# Patient Record
Sex: FEMALE | Race: WHITE | NOT HISPANIC OR LATINO | Employment: FULL TIME | URBAN - METROPOLITAN AREA
[De-identification: names, ages, dates, MRNs, and addresses within clinical notes are randomized per-mention and may not be internally consistent; named-entity substitution may affect disease eponyms.]

---

## 2017-01-12 ENCOUNTER — ALLIED HEALTH/NURSE VISIT (OUTPATIENT)
Dept: FAMILY MEDICINE | Facility: CLINIC | Age: 37
End: 2017-01-12
Payer: COMMERCIAL

## 2017-01-12 DIAGNOSIS — E11.9 TYPE 2 DIABETES MELLITUS WITHOUT COMPLICATION (H): Primary | ICD-10-CM

## 2017-01-12 PROCEDURE — 99207 ZZC NO CHARGE NURSE ONLY: CPT

## 2017-01-12 RX ORDER — LISINOPRIL 2.5 MG/1
2.5 TABLET ORAL DAILY
Qty: 90 TABLET | Refills: 3 | Status: SHIPPED | OUTPATIENT
Start: 2017-01-12 | End: 2018-01-11

## 2017-02-01 ENCOUNTER — HOSPITAL ENCOUNTER (EMERGENCY)
Facility: CLINIC | Age: 37
Discharge: HOME | End: 2017-02-01
Attending: EMERGENCY MEDICINE | Admitting: EMERGENCY MEDICINE
Payer: COMMERCIAL

## 2017-02-01 VITALS
SYSTOLIC BLOOD PRESSURE: 138 MMHG | RESPIRATION RATE: 18 BRPM | BODY MASS INDEX: 33.29 KG/M2 | TEMPERATURE: 98.3 F | WEIGHT: 195 LBS | HEIGHT: 64 IN | OXYGEN SATURATION: 97 % | DIASTOLIC BLOOD PRESSURE: 77 MMHG

## 2017-02-01 DIAGNOSIS — L03.811 CELLULITIS OF POSTAURICULAR REGION: ICD-10-CM

## 2017-02-01 PROCEDURE — 25000132 ZZH RX MED GY IP 250 OP 250 PS 637: Performed by: EMERGENCY MEDICINE

## 2017-02-01 PROCEDURE — 99283 EMERGENCY DEPT VISIT LOW MDM: CPT

## 2017-02-01 PROCEDURE — 99283 EMERGENCY DEPT VISIT LOW MDM: CPT | Performed by: EMERGENCY MEDICINE

## 2017-02-01 RX ORDER — CEPHALEXIN 500 MG/1
500 CAPSULE ORAL 4 TIMES DAILY
Qty: 28 CAPSULE | Refills: 0 | Status: SHIPPED | OUTPATIENT
Start: 2017-02-01 | End: 2017-02-08

## 2017-02-01 RX ORDER — CEPHALEXIN 500 MG/1
1000 CAPSULE ORAL ONCE
Status: COMPLETED | OUTPATIENT
Start: 2017-02-01 | End: 2017-02-01

## 2017-02-01 RX ADMIN — CEPHALEXIN 1000 MG: 500 CAPSULE ORAL at 00:41

## 2017-02-01 NOTE — ED NOTES
"\"couple months\" of congestion and \"plugged feeling in ear\" reports R ear pops some times   Over past 2 days area behind R ear has become extremely painful and she can palpate a  \"swollen bump\"   Visual inspection reveals redness and light bump along crease  Where ear arises no drainage noted]  No fevers denies pain in ear  Has not been seen for this   occassionally has taken aleve - last dose approx 1 hr PTA    "

## 2017-02-01 NOTE — DISCHARGE INSTRUCTIONS
Discharge Instructions for Cellulitis  You have been diagnosed with cellulitis. This is an infection in the deepest layer of the skin. In some cases, the infection also affects the muscle. Cellulitis is caused by bacteria. The bacteria can enter the body through broken skin. This can happen with a cut, scratch, animal bite, or an insect bite that has been scratched. You may have been treated in the hospital with antibiotics and fluids. You will likely be given a prescription for antibiotics to take at home. This sheet will help you take care of yourself at home.  Home Care  When you are home:    Take the prescribed antibiotic medication you are given as directed until it is gone. Take it even if you feel better. It treats the infection and stops it from returning. Not taking all of the medication can make future infections hard to treat.    Keep the infected area clean.    When possible, raise the infected area above the level of your heart. This helps keep swelling down.    Talk to your doctor if you are in pain. Ask what kind of over-the-counter medication you can take for pain.    Apply clean bandages as advised.    Take your temperature once a day for a week.    Wash your hands often to prevent spreading the infection.  In the future, wash your hands before and after you touch cuts, scratches, or bandages. This will help prevent infection.   When to Call Your Doctor  Call your doctor immediately if you have any of the following:    Vomiting    Fever of100.4 F (38 C) or higher, or as directed by your health care provider    Shaking chills    Redness that gets worse in or around the infected area    Swelling of the infected area    Pain that gets worse in or around the infected area    Difficulty or pain when moving the joints above or below the infected area    Discharge or pus draining from the area     5074-6514 [employer]. [address] [place] [address], [place] [address]. All rights reserved. This  information is not intended as a substitute for professional medical care. Always follow your healthcare professional's instructions.      Warm pack area behind right ear 5-6 times daily    Begin keflex in am

## 2017-02-01 NOTE — ED AVS SNAPSHOT
[facility]    [address] [address]    Phone:  [phone]    Fax:  [phone]                                       Tammy Joshi   MRN: [id number]    Department:  [facility]   Date of Visit:  2/1/2017           After Visit Summary Signature Page     I have received my discharge instructions, and my questions have been answered. I have discussed any challenges I see with this plan with the nurse or doctor.    ..........................................................................................................................................  Patient/Patient Representative Signature      ..........................................................................................................................................  Patient Representative Print Name and Relationship to Patient    ..................................................               ................................................  Date                                            Time    ..........................................................................................................................................  Reviewed by Signature/Title    ...................................................              ..............................................  Date                                                            Time

## 2017-02-01 NOTE — ED PROVIDER NOTES
"Chief complaint pain behind right ear    36-year-old female 2 days pain and tenderness and swelling behind her right ear.  Denies any insect bite or trauma.  Denies change in hearing.  She has a feeling as if her right ear is plugged.  She denies associated fever.  No history of cutaneous abscesses.    Past medical history, medications, allergies, social history, family history all reviewed.  Patient Active Problem List   Diagnosis     Hand numbness     CARDIOVASCULAR SCREENING; LDL GOAL LESS THAN 160     Anxiety     Depression     Hyperlipidemia with target LDL less than 100     Type 2 diabetes mellitus without complication (H)     Problem focused review of systems otherwise negative    /77 mmHg  Temp(Src) 98.3  F (36.8  C) (Oral)  Resp 18  Ht 1.626 m (5' 4\")  Wt 88.451 kg (195 lb)  BMI 33.46 kg/m2  SpO2 97%  Nontoxic-appearing respiratory distress alert and oriented ×3  Head atraumatic normocephalic  Right ear shows postauricular erythema mild associated swelling, no area of fluctuance or obvious abscess, the ear itself is unremarkable, EAC is clear, TM unremarkable  Mild right submandibular adenopathy with mild associated tenderness    MDM: 36-year-old female right postauricular pain redness and swelling consistent with cellulitis, may represent small subcutaneous abscess, although no evidence for significant fluid collection or indication for incision and drainage at this time.  Recommended warm packs 5-6 times daily, cephalexin, return criteria reviewed.    Impression: Right postauricular cellulitis    Esteban Barnes MD  02/01/17 0036  "

## 2017-02-01 NOTE — ED AVS SNAPSHOT
[facility] Emergency Department    [address] [address]    Phone:  [phone]    Fax:  [phone]                                       Tammy Joshi   MRN: [id number]    Department:  [facility] Emergency Department   Date of Visit:  2/1/2017           Patient Information     Date Of Birth          1980        Your diagnoses for this visit were:     Cellulitis of postauricular region        You were seen by Esteban Barnes MD.      Follow-up Information     Follow up with Shawn Lewis MD.    Specialty:  Family Practice    Contact information:    [facility]  [address] [address]  [phone]          Discharge Instructions         Discharge Instructions for Cellulitis  You have been diagnosed with cellulitis. This is an infection in the deepest layer of the skin. In some cases, the infection also affects the muscle. Cellulitis is caused by bacteria. The bacteria can enter the body through broken skin. This can happen with a cut, scratch, animal bite, or an insect bite that has been scratched. You may have been treated in the hospital with antibiotics and fluids. You will likely be given a prescription for antibiotics to take at home. This sheet will help you take care of yourself at home.  Home Care  When you are home:    Take the prescribed antibiotic medication you are given as directed until it is gone. Take it even if you feel better. It treats the infection and stops it from returning. Not taking all of the medication can make future infections hard to treat.    Keep the infected area clean.    When possible, raise the infected area above the level of your heart. This helps keep swelling down.    Talk to your doctor if you are in pain. Ask what kind of over-the-counter medication you can take for pain.    Apply clean bandages as advised.    Take your temperature once a day for a week.    Wash your hands often to prevent  spreading the infection.  In the future, wash your hands before and after you touch cuts, scratches, or bandages. This will help prevent infection.   When to Call Your Doctor  Call your doctor immediately if you have any of the following:    Vomiting    Fever of100.4 F (38 C) or higher, or as directed by your health care provider    Shaking chills    Redness that gets worse in or around the infected area    Swelling of the infected area    Pain that gets worse in or around the infected area    Difficulty or pain when moving the joints above or below the infected area    Discharge or pus draining from the area     1163-3374 [employer]. [address] [place] [address]. All rights reserved. This information is not intended as a substitute for professional medical care. Always follow your healthcare professional's instructions.      Warm pack area behind right ear 5-6 times daily    Begin keflex in am    24 Hour Appointment Hotline       To make an appointment at any [facility] clinic, call [phone] ([phone]). If you don't have a family doctor or clinic, we will help you find one. [facility] clinics are conveniently located to serve the needs of you and your family.             Review of your medicines      START taking        Dose / Directions Last dose taken    cephALEXin 500 MG capsule   Commonly known as:  KEFLEX   Dose:  500 mg   Quantity:  28 capsule        Take 1 capsule (500 mg) by mouth 4 times daily for 7 days   Refills:  0          Our records show that you are taking the medicines listed below. If these are incorrect, please call your family doctor or clinic.        Dose / Directions Last dose taken    ALEVE 220 MG tablet   Generic drug:  naproxen sodium        Take by mouth 2 times daily (with meals)   Refills:  0        atorvastatin 10 MG tablet   Commonly known as:  LIPITOR   Dose:  10 mg   Quantity:  90 tablet        Take 1 tablet (10 mg) by mouth daily   Refills:  1         blood glucose monitoring lancets   Quantity:  1 Box        Use to test blood sugars 4 times daily or as directed.   Refills:  1        blood glucose monitoring test strip   Commonly known as:  no brand specified   Quantity:  120 each        Test blood sugar 4 times per day   Refills:  1        ibuprofen 200 MG tablet   Commonly known as:  ADVIL/MOTRIN   Dose:  200 mg        Take 200 mg by mouth every 4 hours as needed for mild pain   Refills:  0        lisinopril 2.5 MG tablet   Commonly known as:  PRINIVIL/Zestril   Dose:  2.5 mg   Quantity:  90 tablet        Take 1 tablet (2.5 mg) by mouth daily   Refills:  3        * metFORMIN 1000 MG tablet   Commonly known as:  GLUCOPHAGE   Dose:  1000 mg   Quantity:  180 tablet        Take 1 tablet (1,000 mg) by mouth 2 times daily (with meals)   Refills:  1        * metFORMIN 500 MG tablet   Commonly known as:  GLUCOPHAGE   Dose:  1000 mg   Quantity:  180 tablet        Take 2 tablets (1,000 mg) by mouth 2 times daily (with meals)   Refills:  3        sertraline 100 MG tablet   Commonly known as:  ZOLOFT   Dose:  50 mg   Quantity:  90 tablet        Take 0.5 tablets (50 mg) by mouth daily   Refills:  1        TYLENOL 500 MG tablet   Dose:  500-1000 mg   Generic drug:  acetaminophen        Take 500-1,000 mg by mouth every 6 hours as needed for mild pain   Refills:  0        * Notice:  This list has 2 medication(s) that are the same as other medications prescribed for you. Read the directions carefully, and ask your doctor or other care provider to review them with you.            Prescriptions were sent or printed at these locations (1 Prescription)                   Other Prescriptions                Printed at Department/Unit printer (1 of 1)         cephALEXin (KEFLEX) 500 MG capsule                Orders Needing Specimen Collection     None      Pending Results     No orders found from 1/31/2017 to 2/2/2017.            Pending Culture Results     No orders found from  "2017 to 2017.             Test Results from your hospital stay            Thank you for choosing [facility]       Thank you for choosing [facility] for your care. Our goal is always to provide you with excellent care. Hearing back from our patients is one way we can continue to improve our services. Please take a few minutes to complete the written survey that you may receive in the mail after you visit with us. Thank you!        The [email or web address] Information     [employer] lets you send messages to your doctor, view your test results, renew your prescriptions, schedule appointments and more. To sign up, go to [email or web address] . Click on \"Log in\" on the left side of the screen, which will take you to the Welcome page. Then click on \"Sign up Now\" on the right side of the page.     You will be asked to enter the access code listed below, as well as some personal information. Please follow the directions to create your username and password.     Your access code is: [email or web address]  Expires: 2017 11:37 AM     Your access code will  in 90 days. If you need help or a new code, please call your [facility] clinic or [phone].        Care EveryWhere ID     This is your Care EveryWhere ID. This could be used by other organizations to access your [facility] medical records  [id number]        After Visit Summary       This is your record. Keep this with you and show to your community pharmacist(s) and doctor(s) at your next visit.                  "

## 2017-02-07 DIAGNOSIS — E78.00 HIGH BLOOD CHOLESTEROL: Primary | ICD-10-CM

## 2017-02-07 NOTE — TELEPHONE ENCOUNTER
Lipitor 10mg     Last Written Prescription Date: 07/22/16  Last Fill Quantity: 90, # refills: 1  Last Office Visit with [facility], P or [facility] prescribing provider: 11/10/16       CHOL      188   12/17/2015  HDL       33   12/17/2015  LDL   Cannot estimate LDL when triglyceride exceeds 400 mg/dL   12/17/2015  LDL      108   7/8/2015  TRIG      513   12/17/2015  CHOLHDLRATIO      9.4   7/8/2015      Thank you -  Spike Toledo, Pharmacy Technician  [facility]  On Behalf Of [facility]

## 2017-02-08 RX ORDER — ATORVASTATIN CALCIUM 10 MG/1
10 TABLET, FILM COATED ORAL DAILY
Qty: 90 TABLET | Refills: 1 | Status: SHIPPED | OUTPATIENT
Start: 2017-02-08 | End: 2017-09-18

## 2017-03-22 ENCOUNTER — TELEPHONE (OUTPATIENT)
Dept: FAMILY MEDICINE | Facility: CLINIC | Age: 37
End: 2017-03-22

## 2017-03-22 DIAGNOSIS — F33.0 MILD EPISODE OF RECURRENT MAJOR DEPRESSIVE DISORDER (H): ICD-10-CM

## 2017-03-22 NOTE — TELEPHONE ENCOUNTER
Sertraline Hcl 100 mg tablet- Please verify current directions and send new order. Thank you!  Directions we have: Take one tablet by mouth every day     Last Written Prescription Date: 11/10/16  Last Fill Quantity: 90, # refills: 1  [place] Office Visit with [facility] [place] provider:  11/10/16        Last PHQ-9 score on record=   PHQ-9 SCORE 11/10/2016   Total Score -   Total Score 8       Daria Solano CPhT  On Behalf of [facility]

## 2017-03-24 NOTE — TELEPHONE ENCOUNTER
According to her last visit it appears she is taking half of 100 mg tablets. So I faxed a 50 mg tab for her.

## 2017-04-18 DIAGNOSIS — E11.9 TYPE 2 DIABETES MELLITUS WITHOUT COMPLICATION, WITHOUT LONG-TERM CURRENT USE OF INSULIN (H): ICD-10-CM

## 2017-04-18 NOTE — TELEPHONE ENCOUNTER
metFORMIN (GLUCOPHAGE) 1000 MG tablet         Last Written Prescription Date: 11/10/16  Last Fill Quantity: 180, # refills: 1  Last Office Visit with [facility], P or [facility] prescribing provider:  12/17/16        BP Readings from Last 3 Encounters:   02/01/17 138/77   11/10/16 124/58   06/13/16 131/79     Lab Results   Component Value Date    MICROL 56 11/10/2016     Lab Results   Component Value Date    UMALCR 20.18 11/10/2016     Creatinine   Date Value Ref Range Status   04/08/2016 0.50 (L) 0.52 - 1.04 mg/dL Final   ]  GFR Estimate   Date Value Ref Range Status   04/08/2016 >90  Non  GFR Calc   >60 mL/min/1.7m2 Final   08/03/2015 >90  Non  GFR Calc   >60 mL/min/1.7m2 Final   02/23/2014 >90 >60 mL/min/1.7m2 Final     GFR Estimate If Black   Date Value Ref Range Status   04/08/2016 >90   GFR Calc   >60 mL/min/1.7m2 Final   08/03/2015 >90   GFR Calc   >60 mL/min/1.7m2 Final   02/23/2014 >90 >60 mL/min/1.7m2 Final     Lab Results   Component Value Date    CHOL 188 12/17/2015     Lab Results   Component Value Date    HDL 33 12/17/2015     Lab Results   Component Value Date    LDL  12/17/2015     Cannot estimate LDL when triglyceride exceeds 400 mg/dL     Lab Results   Component Value Date    TRIG 513 12/17/2015     Lab Results   Component Value Date    CHOLHDLRATIO 9.4 07/08/2015     Lab Results   Component Value Date     02/23/2014     Lab Results   Component Value Date    ALT 98 02/23/2014     Lab Results   Component Value Date    A1C 7.9 11/10/2016    A1C 6.3 11/25/2015    A1C 10.9 07/08/2015     Potassium   Date Value Ref Range Status   04/08/2016 3.5 3.4 - 5.3 mmol/L Final

## 2017-04-21 ENCOUNTER — OFFICE VISIT (OUTPATIENT)
Dept: FAMILY MEDICINE | Facility: CLINIC | Age: 37
End: 2017-04-21
Payer: COMMERCIAL

## 2017-04-21 VITALS
TEMPERATURE: 98.2 F | HEART RATE: 100 BPM | WEIGHT: 200 LBS | SYSTOLIC BLOOD PRESSURE: 120 MMHG | DIASTOLIC BLOOD PRESSURE: 90 MMHG | HEIGHT: 64 IN | BODY MASS INDEX: 34.15 KG/M2

## 2017-04-21 DIAGNOSIS — A60.1 HERPES SIMPLEX INFECTION OF PERIANAL SKIN: Primary | ICD-10-CM

## 2017-04-21 PROCEDURE — 99213 OFFICE O/P EST LOW 20 MIN: CPT | Performed by: NURSE PRACTITIONER

## 2017-04-21 RX ORDER — VALACYCLOVIR HYDROCHLORIDE 1 G/1
1000 TABLET, FILM COATED ORAL 3 TIMES DAILY
Qty: 42 TABLET | Refills: 0 | Status: SHIPPED | OUTPATIENT
Start: 2017-04-21 | End: 2017-07-27

## 2017-04-21 NOTE — NURSING NOTE
"Chief Complaint   Patient presents with     Vaginal Problem     off and on for 2 months.       Initial BP (!) 120/101  Pulse 100  Temp 98.2  F (36.8  C) (Tympanic)  Ht 5' 4\" (1.626 m)  Wt 200 lb (90.7 kg)  BMI 34.33 kg/m2 Estimated body mass index is 34.33 kg/(m^2) as calculated from the following:    Height as of this encounter: 5' 4\" (1.626 m).    Weight as of this encounter: 200 lb (90.7 kg).  Medication Reconciliation: complete  "

## 2017-04-21 NOTE — PROGRESS NOTES
"  SUBJECTIVE:                                                    Tammy Joshi is a 36 year old female who presents to clinic today for the following health issues: pain in perianal area, itching, burning. Reports history of genital herpes. Her  have similar symptoms.       Vaginal Symptoms     Onset: couple days ago, had it on and off for 2 months      Description:  Vaginal Discharge: none   Itching (Pruritis): YES  Burning sensation:  YES  Odor: no     Accompanying Signs & Symptoms:  Pain with Urination: YES  Abdominal Pain: no   Fever: no    History:   Sexually active: YES  New Partner: no   Possibility of Pregnancy:  No    Precipitating factors:   Recent Antibiotic Use: no     Alleviating factors:  Nothing    Therapies Tried and outcome: Monistat without improvement.         Problem list and histories reviewed & adjusted, as indicated.  Additional history: as documented    Labs reviewed in EPIC    Reviewed and updated as needed this visit by clinical staff       Reviewed and updated as needed this visit by Provider         ROS:  Constitutional, HEENT, cardiovascular, pulmonary, gi and gu systems are negative, except as otherwise noted.    OBJECTIVE:                                                    /90  Pulse 100  Temp 98.2  F (36.8  C) (Tympanic)  Ht 5' 4\" (1.626 m)  Wt 200 lb (90.7 kg)  BMI 34.33 kg/m2  Body mass index is 34.33 kg/(m^2).  GENERAL: healthy, alert and no distress   (female): multiple open and closed blisters, erythema in perianal area      Diagnostic Test Results:  none      ASSESSMENT/PLAN:                                                      1. Herpes simplex infection of perianal skin  - valACYclovir (VALTREX) 1000 mg tablet; Take 1 tablet (1,000 mg) by mouth 3 times daily for 7 days, repeat treatment for another 7 days if still getting new blisters.   Dispense: 42 tablet; Refill: 0  -apply Bacitracin cream twice daily for prevention of secondary infection  -may use " Lidocaine cream twice daily as needed for pain    See Patient Instructions    [facility]

## 2017-04-21 NOTE — MR AVS SNAPSHOT
"              After Visit Summary   4/21/2017    Tammy Joshi    MRN: [id number]           Patient Information     Date Of Birth          1980        Visit Information        Provider Department      4/21/2017 1:20 PM Katelyn Ross APRN CNP [facility]        Today's Diagnoses     Herpes simplex infection of perianal skin    -  1      Care Instructions    Valtrex 1 tablet 3 times daily   Apply Lidocaine cream, or gel (available over the counter) as needed for pain  Apply Bacitracin cream twice daily for prevention of secondary infection                 Follow-ups after your visit        Who to contact     If you have questions or need follow up information about today's clinic visit or your schedule please contact [facility] directly at [phone].  Normal or non-critical lab and imaging results will be communicated to you by Denatorhart, letter or phone within 4 business days after the clinic has received the results. If you do not hear from us within 7 days, please contact the clinic through Denatorhart or phone. If you have a critical or abnormal lab result, we will notify you by phone as soon as possible.  Submit refill requests through AmpIdea or call your pharmacy and they will forward the refill request to us. Please allow 3 business days for your refill to be completed.          Additional Information About Your Visit        MyChart Information     AmpIdea lets you send messages to your doctor, view your test results, renew your prescriptions, schedule appointments and more. To sign up, go to [email or web address] . Click on \"Log in\" on the left side of the screen, which will take you to the Welcome page. Then click on \"Sign up Now\" on the right side of the page.     You will be asked to enter the access code listed below, as well as some personal information. Please follow the directions to create your username and password.     Your access code is: " "[email or web address]  Expires: 2017  1:32 PM     Your access code will  in 90 days. If you need help or a new code, please call your [place] clinic or [phone].        Care EveryWhere ID     This is your Care EveryWhere ID. This could be used by other organizations to access your [place] medical records  [id number]        Your Vitals Were     Pulse Temperature Height BMI (Body Mass Index)          100 98.2  F (36.8  C) (Tympanic) 5' 4\" (1.626 m) 34.33 kg/m2         Blood Pressure from Last 3 Encounters:   17 (!) 120/101   17 138/77   11/10/16 124/58    Weight from Last 3 Encounters:   17 200 lb (90.7 kg)   17 195 lb (88.5 kg)   11/10/16 195 lb (88.5 kg)              Today, you had the following     No orders found for display         Today's Medication Changes          These changes are accurate as of: 17  1:32 PM.  If you have any questions, ask your nurse or doctor.               Start taking these medicines.        Dose/Directions    valACYclovir 1000 mg tablet   Commonly known as:  VALTREX   Used for:  Herpes simplex infection of perianal skin   Started by:  Katelyn Ross APRN CNP        Dose:  1000 mg   Take 1 tablet (1,000 mg) by mouth 3 times daily for 14 days   Quantity:  42 tablet   Refills:  0            Where to get your medicines      These medications were sent to [facility] [address] [place] [address]  [address] [place] [address]     Phone:  [phone]     valACYclovir 1000 mg tablet                Primary Care Provider Office Phone # Fax #    Shawn Lewis [phone]       [facility] [address] [place] [address]        Thank you!     Thank you for choosing [facility]  for your care. Our goal is always to provide you with excellent care. Hearing back from our patients is one way we can continue to improve our services. Please take a few minutes to complete " the written survey that you may receive in the mail after your visit with us. Thank you!             Your Updated Medication List - Protect others around you: Learn how to safely use, store and throw away your medicines at www.disposemymeds.org.          This list is accurate as of: 4/21/17  1:32 PM.  Always use your most recent med list.                   Brand Name Dispense Instructions for use    ALEVE 220 MG tablet   Generic drug:  naproxen sodium      Take by mouth 2 times daily (with meals) Reported on 4/21/2017       atorvastatin 10 MG tablet    LIPITOR    90 tablet    Take 1 tablet (10 mg) by mouth daily       blood glucose monitoring lancets     1 Box    Use to test blood sugars 4 times daily or as directed.       blood glucose monitoring test strip    no brand specified    120 each    Test blood sugar 4 times per day       ibuprofen 200 MG tablet    ADVIL/MOTRIN     Take 200 mg by mouth every 4 hours as needed for mild pain Reported on 4/21/2017       lisinopril 2.5 MG tablet    PRINIVIL/Zestril    90 tablet    Take 1 tablet (2.5 mg) by mouth daily       * metFORMIN 1000 MG tablet    GLUCOPHAGE    180 tablet    Take 1 tablet (1,000 mg) by mouth 2 times daily (with meals)       * metFORMIN 500 MG tablet    GLUCOPHAGE    180 tablet    Take 2 tablets (1,000 mg) by mouth 2 times daily (with meals)       * sertraline 100 MG tablet    ZOLOFT    90 tablet    Take 0.5 tablets (50 mg) by mouth daily       * sertraline 50 MG tablet    ZOLOFT    90 tablet    Take 1 tablet (50 mg) by mouth daily       TYLENOL 500 MG tablet   Generic drug:  acetaminophen      Take 500-1,000 mg by mouth every 6 hours as needed for mild pain Reported on 4/21/2017       valACYclovir 1000 mg tablet    VALTREX    42 tablet    Take 1 tablet (1,000 mg) by mouth 3 times daily for 14 days       * Notice:  This list has 4 medication(s) that are the same as other medications prescribed for you. Read the directions carefully, and ask your doctor  or other care provider to review them with you.

## 2017-04-21 NOTE — PATIENT INSTRUCTIONS
Valtrex 1 tablet 3 times daily for 7 days, repeat treatment for another 7 days if still getting new blisters   Apply Lidocaine cream, or gel (available over the counter) as needed for pain  Apply Bacitracin cream twice daily for prevention of secondary infection

## 2017-04-25 ENCOUNTER — ALLIED HEALTH/NURSE VISIT (OUTPATIENT)
Dept: FAMILY MEDICINE | Facility: CLINIC | Age: 37
End: 2017-04-25
Payer: COMMERCIAL

## 2017-04-25 VITALS — DIASTOLIC BLOOD PRESSURE: 71 MMHG | HEART RATE: 95 BPM | SYSTOLIC BLOOD PRESSURE: 115 MMHG

## 2017-04-25 DIAGNOSIS — R03.0 ELEVATED BLOOD PRESSURE READING WITHOUT DIAGNOSIS OF HYPERTENSION: Primary | ICD-10-CM

## 2017-04-25 PROCEDURE — 99207 ZZC NO CHARGE NURSE ONLY: CPT | Performed by: FAMILY MEDICINE

## 2017-04-25 NOTE — MR AVS SNAPSHOT
"              After Visit Summary   2017    Tammy Joshi    MRN: [id number]           Patient Information     Date Of Birth          1980        Visit Information        Provider Department      2017 3:52 PM Shawn Lewis MD [facility]        Today's Diagnoses     Elevated blood pressure reading without diagnosis of hypertension    -  1       Follow-ups after your visit        Who to contact     If you have questions or need follow up information about today's clinic visit or your schedule please contact [facility] directly at [phone].  Normal or non-critical lab and imaging results will be communicated to you by GMZ Energyhart, letter or phone within 4 business days after the clinic has received the results. If you do not hear from us within 7 days, please contact the clinic through [employer] or phone. If you have a critical or abnormal lab result, we will notify you by phone as soon as possible.  Submit refill requests through Lifetable or call your pharmacy and they will forward the refill request to us. Please allow 3 business days for your refill to be completed.          Additional Information About Your Visit        MyChart Information     Lifetable lets you send messages to your doctor, view your test results, renew your prescriptions, schedule appointments and more. To sign up, go to [email or web address] . Click on \"Log in\" on the left side of the screen, which will take you to the Welcome page. Then click on \"Sign up Now\" on the right side of the page.     You will be asked to enter the access code listed below, as well as some personal information. Please follow the directions to create your username and password.     Your access code is: [email or web address]  Expires: 2017  1:32 PM     Your access code will  in 90 days. If you need help or a new code, please call your [facility] or [phone].        Care EveryWhere ID     This is your " Care EveryWhere ID. This could be used by other organizations to access your [place] medical records  [id number]        Your Vitals Were     Pulse                   95            Blood Pressure from Last 3 Encounters:   04/25/17 115/71   04/21/17 120/90   02/01/17 138/77    Weight from Last 3 Encounters:   04/21/17 200 lb (90.7 kg)   02/01/17 195 lb (88.5 kg)   11/10/16 195 lb (88.5 kg)              Today, you had the following     No orders found for display       Primary Care Provider Office Phone # Fax #    Shawn Lewis [phone]       [facility] [address] [facility] [address]        Thank you!     Thank you for choosing [facility]  for your care. Our goal is always to provide you with excellent care. Hearing back from our patients is one way we can continue to improve our services. Please take a few minutes to complete the written survey that you may receive in the mail after your visit with us. Thank you!             Your Updated Medication List - Protect others around you: Learn how to safely use, store and throw away your medicines at www.disposemymeds.org.          This list is accurate as of: 4/25/17  3:54 PM.  Always use your most recent med list.                   Brand Name Dispense Instructions for use    ALEVE 220 MG tablet   Generic drug:  naproxen sodium      Take by mouth 2 times daily (with meals) Reported on 4/21/2017       atorvastatin 10 MG tablet    LIPITOR    90 tablet    Take 1 tablet (10 mg) by mouth daily       blood glucose monitoring lancets     1 Box    Use to test blood sugars 4 times daily or as directed.       blood glucose monitoring test strip    no brand specified    120 each    Test blood sugar 4 times per day       ibuprofen 200 MG tablet    ADVIL/MOTRIN     Take 200 mg by mouth every 4 hours as needed for mild pain Reported on 4/21/2017       lisinopril 2.5 MG tablet    PRINIVIL/Zestril    90 tablet    Take 1  tablet (2.5 mg) by mouth daily       * metFORMIN 500 MG tablet    GLUCOPHAGE    180 tablet    Take 2 tablets (1,000 mg) by mouth 2 times daily (with meals)       * metFORMIN 1000 MG tablet    GLUCOPHAGE    180 tablet    TAKE ONE TABLET BY MOUTH TWICE A DAY WITH MEALS       * sertraline 100 MG tablet    ZOLOFT    90 tablet    Take 0.5 tablets (50 mg) by mouth daily       * sertraline 50 MG tablet    ZOLOFT    90 tablet    Take 1 tablet (50 mg) by mouth daily       TYLENOL 500 MG tablet   Generic drug:  acetaminophen      Take 500-1,000 mg by mouth every 6 hours as needed for mild pain Reported on 4/21/2017       valACYclovir 1000 mg tablet    VALTREX    42 tablet    Take 1 tablet (1,000 mg) by mouth 3 times daily for 14 days       * Notice:  This list has 4 medication(s) that are the same as other medications prescribed for you. Read the directions carefully, and ask your doctor or other care provider to review them with you.

## 2017-04-25 NOTE — NURSING NOTE
"No chief complaint on file.      Initial /71  Pulse 95 Estimated body mass index is 34.33 kg/(m^2) as calculated from the following:    Height as of 4/21/17: 5' 4\" (1.626 m).    Weight as of 4/21/17: 200 lb (90.7 kg).  Medication Reconciliation: unable or not appropriate to perform  "

## 2017-07-23 ENCOUNTER — HOSPITAL ENCOUNTER (EMERGENCY)
Facility: CLINIC | Age: 37
Discharge: HOME | End: 2017-07-23
Attending: FAMILY MEDICINE | Admitting: FAMILY MEDICINE
Payer: COMMERCIAL

## 2017-07-23 VITALS
SYSTOLIC BLOOD PRESSURE: 128 MMHG | TEMPERATURE: 97.7 F | DIASTOLIC BLOOD PRESSURE: 85 MMHG | OXYGEN SATURATION: 97 % | RESPIRATION RATE: 16 BRPM

## 2017-07-23 DIAGNOSIS — H81.10 BPPV (BENIGN PAROXYSMAL POSITIONAL VERTIGO), UNSPECIFIED LATERALITY: ICD-10-CM

## 2017-07-23 PROCEDURE — 25000128 H RX IP 250 OP 636: Performed by: FAMILY MEDICINE

## 2017-07-23 PROCEDURE — 96372 THER/PROPH/DIAG INJ SC/IM: CPT

## 2017-07-23 PROCEDURE — 99284 EMERGENCY DEPT VISIT MOD MDM: CPT | Mod: 25

## 2017-07-23 PROCEDURE — 25000125 ZZHC RX 250: Performed by: FAMILY MEDICINE

## 2017-07-23 PROCEDURE — 99284 EMERGENCY DEPT VISIT MOD MDM: CPT | Performed by: FAMILY MEDICINE

## 2017-07-23 RX ORDER — MECLIZINE HYDROCHLORIDE 25 MG/1
25 TABLET ORAL 3 TIMES DAILY PRN
Qty: 21 TABLET | Refills: 0 | Status: SHIPPED | OUTPATIENT
Start: 2017-07-23 | End: 2017-09-08

## 2017-07-23 RX ORDER — ONDANSETRON 4 MG/1
4-8 TABLET, ORALLY DISINTEGRATING ORAL EVERY 6 HOURS PRN
Qty: 10 TABLET | Refills: 0 | Status: SHIPPED | OUTPATIENT
Start: 2017-07-23 | End: 2017-07-27

## 2017-07-23 RX ORDER — DIPHENHYDRAMINE HYDROCHLORIDE 50 MG/ML
50 INJECTION INTRAMUSCULAR; INTRAVENOUS ONCE
Status: COMPLETED | OUTPATIENT
Start: 2017-07-23 | End: 2017-07-23

## 2017-07-23 RX ORDER — ONDANSETRON 4 MG/1
4 TABLET, ORALLY DISINTEGRATING ORAL ONCE
Status: COMPLETED | OUTPATIENT
Start: 2017-07-23 | End: 2017-07-23

## 2017-07-23 RX ADMIN — DIPHENHYDRAMINE HYDROCHLORIDE 50 MG: 50 INJECTION, SOLUTION INTRAMUSCULAR; INTRAVENOUS at 16:56

## 2017-07-23 RX ADMIN — ONDANSETRON 4 MG: 4 TABLET, ORALLY DISINTEGRATING ORAL at 16:56

## 2017-07-23 NOTE — ED AVS SNAPSHOT
[facility]    [address] [facility] [address]    Phone:  [phone]    Fax:  [phone]                                       Tammy Joshi   MRN: [id number]    Department:  [facility]   Date of Visit:  7/23/2017           After Visit Summary Signature Page     I have received my discharge instructions, and my questions have been answered. I have discussed any challenges I see with this plan with the nurse or doctor.    ..........................................................................................................................................  Patient/Patient Representative Signature      ..........................................................................................................................................  Patient Representative Print Name and Relationship to Patient    ..................................................               ................................................  Date                                            Time    ..........................................................................................................................................  Reviewed by Signature/Title    ...................................................              ..............................................  Date                                                            Time

## 2017-07-23 NOTE — ED PROVIDER NOTES
HPI  Patient is a 37-year-old female presenting with dizziness.  She has a known history of diabetes, renal lithiasis, anxiety.  She does not smoke.  Occasional alcohol, none recently.  No drug use.  No recent medication changes reported.    The patient has had vertigo since Friday, two days ago.  She recognized it first in the evening.  She was turning her head while sitting on a couch when she felt a spinning sensation.  It was sudden in onset and was brief in duration.  She became nauseous.  Her symptoms have been recurring since their onset.  Movement of her head or body position changes seem to elicit her vertigo.  When she sits still or lies down her symptoms resolve.  She has been nauseous with vertigo.  She has been vomiting.  No headache.  No old hearing or tinnitus.  No visual changes.  No difficulty with speech.  No peripheral incoordination.  No fever.  No nasal congestion or rhinorrhea.  No sore throat.  No dental pain.  No facial pain or pressure.  No falls or injury.  She has not had similar symptoms previously.    ROS: All other review of systems are negative other than that noted above.   PMH: Reviewed.  SH: Reviewed.  FH: Reviewed.      PHYSICAL  /85  Temp 97.7  F (36.5  C) (Oral)  Resp 16  SpO2 97%  General: Patient is alert and in mild to moderate distress.  Movement elicits dizziness.  Neurological: Alert.  Moving upper and lower extremities equally, bilaterally.  When putting her head down in a supine position and then placing her in Trendelenburg and turning her head to the left into the right I cannot elicit vertigo.  However, when she was doing this at home on her own she could elicit vertigo.  Head / Neck: Atraumatic.  Ears: Not done.  Eyes: Pupils are equal, round, and reactive.  Normal conjunctiva.  Nose: Midline.  No epistaxis.  Mouth / Throat: No ulcerations or lesions.  Upper pharynx is not erythematous.  Moist.  Respiratory: No respiratory distress. CTA B.  Cardiovascular:  Regular rhythm.  Peripheral extremities are warm.  No edema.  No calf tenderness.  Abdomen / Pelvis: Not tender.  No distention.  Soft throughout.  Genitalia: Not done.  Musculoskeletal: No tenderness over major muscles and joints.  Skin: No evidence of rash or trauma.        PHYSICIAN  1745.  The patient has vertigo consistent with a benign positional bright.  No infectious symptoms.  No trauma or injury.  No headache.  No neurological deficit.  Symptoms are elicited with movement and position change.  The resolve when sitting or lying still.  They can be brought on and her symptoms fatigue.  No emergent need for CT imaging or MRI.  Benadryl and Zofran given here.  Meclizine and Zofran prescribed.  Follow up with ENT discussed.  Physical therapy order place.      IMPRESSION    ICD-10-CM    1. BPPV (benign paroxysmal positional vertigo), unspecified laterality H81.10 PHYSICAL THERAPY REFERRAL           Critical Care time:  none                      Jameson Obrien MD  07/23/17 [id number]

## 2017-07-23 NOTE — DISCHARGE INSTRUCTIONS
Return to the Emergency Room if the following occurs:     Worsened vertigo, vomiting/dehydration, fever >101, severe headache, new incoordination or trouble with speech or vision, or for any concern at anytime.    Or, follow-up with the following provider as we discussed:     Return to ENT if not improved over the next week.  Consider Physical Therapy if not improved over the next 48 hours.  Call ahead to schedule - order placed in the ER.    Medications discussed:    Meclizine for vertigo, as needed.  Zofran for nausea, as needed.    If you received pain-relieving or sedating medication during your time in the ER, avoid alcohol, driving automobiles, or working with machinery.  Also, a responsible adult must stay with you.      If you had X-rays or labs done we will attempt to contact you if there is a change needed in your care.      Call the Nurse Advice Line at [phone] or [phone] for any concern at anytime.

## 2017-07-23 NOTE — ED AVS SNAPSHOT
[facility] Emergency Department    [address] [address]    Phone:  [phone]    Fax:  [phone]                                       Tammy Joshi   MRN: [id number]    Department:  [facility] Emergency Department   Date of Visit:  7/23/2017           Patient Information     Date Of Birth          1980        Your diagnoses for this visit were:     BPPV (benign paroxysmal positional vertigo), unspecified laterality        You were seen by Jameson Obrien MD.        Discharge Instructions       Return to the Emergency Room if the following occurs:     Worsened vertigo, vomiting/dehydration, fever >101, severe headache, new incoordination or trouble with speech or vision, or for any concern at anytime.    Or, follow-up with the following provider as we discussed:     Return to ENT if not improved over the next week.  Consider Physical Therapy if not improved over the next 48 hours.  Call ahead to schedule - order placed in the ER.    Medications discussed:    Meclizine for vertigo, as needed.  Zofran for nausea, as needed.    If you received pain-relieving or sedating medication during your time in the ER, avoid alcohol, driving automobiles, or working with machinery.  Also, a responsible adult must stay with you.      If you had X-rays or labs done we will attempt to contact you if there is a change needed in your care.      Call the Nurse Advice Line at [phone] or [phone] for any concern at anytime.      24 Hour Appointment Hotline       To make an appointment at any [facility], call [phone] ([phone]). If you don't have a family doctor or clinic, we will help you find one. [place] clinics are conveniently located to serve the needs of you and your family.             Review of your medicines      START taking        Dose / Directions Last dose taken    meclizine 25 MG tablet   Commonly known as:  ANTIVERT   Dose:  25 mg   Quantity:   21 tablet        Take 1 tablet (25 mg) by mouth 3 times daily as needed for dizziness   Refills:  0        ondansetron 4 MG ODT tab   Commonly known as:  ZOFRAN ODT   Dose:  4-8 mg   Quantity:  10 tablet        Take 1-2 tablets (4-8 mg) by mouth every 6 hours as needed for nausea   Refills:  0          Our records show that you are taking the medicines listed below. If these are incorrect, please call your family doctor or clinic.        Dose / Directions Last dose taken    ALEVE 220 MG tablet   Generic drug:  naproxen sodium        Take by mouth 2 times daily (with meals) Reported on 4/21/2017   Refills:  0        atorvastatin 10 MG tablet   Commonly known as:  LIPITOR   Dose:  10 mg   Quantity:  90 tablet        Take 1 tablet (10 mg) by mouth daily   Refills:  1        blood glucose monitoring lancets   Quantity:  1 Box        Use to test blood sugars 4 times daily or as directed.   Refills:  1        blood glucose monitoring test strip   Commonly known as:  no brand specified   Quantity:  120 each        Test blood sugar 4 times per day   Refills:  1        ibuprofen 200 MG tablet   Commonly known as:  ADVIL/MOTRIN   Dose:  200 mg        Take 200 mg by mouth every 4 hours as needed for mild pain Reported on 4/21/2017   Refills:  0        lisinopril 2.5 MG tablet   Commonly known as:  PRINIVIL/Zestril   Dose:  2.5 mg   Quantity:  90 tablet        Take 1 tablet (2.5 mg) by mouth daily   Refills:  3        * metFORMIN 500 MG tablet   Commonly known as:  GLUCOPHAGE   Dose:  1000 mg   Quantity:  180 tablet        Take 2 tablets (1,000 mg) by mouth 2 times daily (with meals)   Refills:  3        * metFORMIN 1000 MG tablet   Commonly known as:  GLUCOPHAGE   Quantity:  180 tablet        TAKE ONE TABLET BY MOUTH TWICE A DAY WITH MEALS   Refills:  1        * sertraline 100 MG tablet   Commonly known as:  ZOLOFT   Dose:  50 mg   Quantity:  90 tablet        Take 0.5 tablets (50 mg) by mouth daily   Refills:  1        *  sertraline 50 MG tablet   Commonly known as:  ZOLOFT   Dose:  50 mg   Quantity:  90 tablet        Take 1 tablet (50 mg) by mouth daily   Refills:  1        TYLENOL 500 MG tablet   Dose:  500-1000 mg   Generic drug:  acetaminophen        Take 500-1,000 mg by mouth every 6 hours as needed for mild pain Reported on 4/21/2017   Refills:  0        valACYclovir 1000 mg tablet   Commonly known as:  VALTREX   Dose:  1000 mg   Quantity:  42 tablet        Take 1 tablet (1,000 mg) by mouth 3 times daily for 14 days   Refills:  0        * Notice:  This list has 4 medication(s) that are the same as other medications prescribed for you. Read the directions carefully, and ask your doctor or other care provider to review them with you.            Prescriptions were sent or printed at these locations (2 Prescriptions)                   Other Prescriptions                Printed at Department/Unit printer (2 of 2)         ondansetron (ZOFRAN ODT) 4 MG ODT tab               meclizine (ANTIVERT) 25 MG tablet                Orders Needing Specimen Collection     None      Pending Results     No orders found from 7/21/2017 to 7/24/2017.            Pending Culture Results     No orders found from 7/21/2017 to 7/24/2017.            Pending Results Instructions     If you had any lab results that were not finalized at the time of your Discharge, you can call the ED Lab Result RN at [phone]. You will be contacted by this team for any positive Lab results or changes in treatment. The nurses are available 7 days a week from 10A to 6:30P.  You can leave a message 24 hours per day and they will return your call.        Test Results From Your Hospital Stay               Thank you for choosing [facility]       Thank you for choosing [facility] for your care. Our goal is always to provide you with excellent care. Hearing back from our patients is one way we can continue to improve our services. Please take a few minutes to complete the written  "survey that you may receive in the mail after you visit with us. Thank you!        [facility] Information     [employer] lets you send messages to your doctor, view your test results, renew your prescriptions, schedule appointments and more. To sign up, go to [email or web address] . Click on \"Log in\" on the left side of the screen, which will take you to the Welcome page. Then click on \"Sign up Now\" on the right side of the page.     You will be asked to enter the access code listed below, as well as some personal information. Please follow the directions to create your username and password.     Your access code is: [email or web address]  Expires: 10/21/2017  4:49 PM     Your access code will  in 90 days. If you need help or a new code, please call your [place] clinic or [phone].        Care EveryWhere ID     This is your Care EveryWhere ID. This could be used by other organizations to access your [place] medical records  [id number]        Equal Access to Services     [facility] : Hadcharline araujoo Sobrigitte, waaxda luqadaha, qaybta kaalmada candy, randall rendon . So [facility] [phone].    ATENCIÓN: Si habla español, tiene a dominguez disposición servicios gratuitos de asistencia lingüística. Llame al [phone].    We comply with applicable federal civil rights laws and [place] laws. We do not discriminate on the basis of race, color, national origin, age, disability sex, sexual orientation or gender identity.            After Visit Summary       This is your record. Keep this with you and show to your community pharmacist(s) and doctor(s) at your next visit.                  "

## 2017-07-27 ENCOUNTER — HOSPITAL ENCOUNTER (EMERGENCY)
Facility: CLINIC | Age: 37
Discharge: HOME | End: 2017-07-27
Attending: FAMILY MEDICINE | Admitting: FAMILY MEDICINE
Payer: COMMERCIAL

## 2017-07-27 ENCOUNTER — HOSPITAL ENCOUNTER (OUTPATIENT)
Dept: PHYSICAL THERAPY | Facility: CLINIC | Age: 37
Setting detail: THERAPIES SERIES
End: 2017-07-27
Attending: INTERNAL MEDICINE
Payer: COMMERCIAL

## 2017-07-27 ENCOUNTER — OFFICE VISIT (OUTPATIENT)
Dept: FAMILY MEDICINE | Facility: CLINIC | Age: 37
End: 2017-07-27
Payer: COMMERCIAL

## 2017-07-27 VITALS
DIASTOLIC BLOOD PRESSURE: 91 MMHG | TEMPERATURE: 98.1 F | HEIGHT: 64 IN | OXYGEN SATURATION: 96 % | RESPIRATION RATE: 16 BRPM | SYSTOLIC BLOOD PRESSURE: 129 MMHG | WEIGHT: 190 LBS | BODY MASS INDEX: 32.44 KG/M2

## 2017-07-27 VITALS
DIASTOLIC BLOOD PRESSURE: 88 MMHG | SYSTOLIC BLOOD PRESSURE: 130 MMHG | WEIGHT: 194 LBS | HEIGHT: 64 IN | HEART RATE: 103 BPM | TEMPERATURE: 98.2 F | BODY MASS INDEX: 33.12 KG/M2

## 2017-07-27 DIAGNOSIS — R42 VERTIGO: Primary | ICD-10-CM

## 2017-07-27 DIAGNOSIS — H81.11 BPPV (BENIGN PAROXYSMAL POSITIONAL VERTIGO), RIGHT: ICD-10-CM

## 2017-07-27 LAB
ALBUMIN SERPL-MCNC: 3.8 G/DL (ref 3.4–5)
ALP SERPL-CCNC: 63 U/L (ref 40–150)
ALT SERPL W P-5'-P-CCNC: 78 U/L (ref 0–50)
ANION GAP SERPL CALCULATED.3IONS-SCNC: 6 MMOL/L (ref 3–14)
AST SERPL W P-5'-P-CCNC: 99 U/L (ref 0–45)
BASOPHILS # BLD AUTO: 0 10E9/L (ref 0–0.2)
BASOPHILS NFR BLD AUTO: 0.6 %
BILIRUB SERPL-MCNC: 0.8 MG/DL (ref 0.2–1.3)
BUN SERPL-MCNC: 6 MG/DL (ref 7–30)
CALCIUM SERPL-MCNC: 9.3 MG/DL (ref 8.5–10.1)
CHLORIDE SERPL-SCNC: 99 MMOL/L (ref 94–109)
CO2 SERPL-SCNC: 29 MMOL/L (ref 20–32)
CREAT SERPL-MCNC: 0.44 MG/DL (ref 0.52–1.04)
DIFFERENTIAL METHOD BLD: NORMAL
EOSINOPHIL # BLD AUTO: 0.1 10E9/L (ref 0–0.7)
EOSINOPHIL NFR BLD AUTO: 1 %
ERYTHROCYTE [DISTWIDTH] IN BLOOD BY AUTOMATED COUNT: 13 % (ref 10–15)
GFR SERPL CREATININE-BSD FRML MDRD: ABNORMAL ML/MIN/1.7M2
GLUCOSE SERPL-MCNC: 176 MG/DL (ref 70–99)
HCT VFR BLD AUTO: 45.1 % (ref 35–47)
HGB BLD-MCNC: 15.4 G/DL (ref 11.7–15.7)
LYMPHOCYTES # BLD AUTO: 1.8 10E9/L (ref 0.8–5.3)
LYMPHOCYTES NFR BLD AUTO: 26.5 %
MCH RBC QN AUTO: 30.7 PG (ref 26.5–33)
MCHC RBC AUTO-ENTMCNC: 34.1 G/DL (ref 31.5–36.5)
MCV RBC AUTO: 90 FL (ref 78–100)
MONOCYTES # BLD AUTO: 0.6 10E9/L (ref 0–1.3)
MONOCYTES NFR BLD AUTO: 7.9 %
NEUTROPHILS # BLD AUTO: 4.5 10E9/L (ref 1.6–8.3)
NEUTROPHILS NFR BLD AUTO: 64 %
PLATELET # BLD AUTO: 175 10E9/L (ref 150–450)
POTASSIUM SERPL-SCNC: 4.2 MMOL/L (ref 3.4–5.3)
PROT SERPL-MCNC: 7.9 G/DL (ref 6.8–8.8)
RBC # BLD AUTO: 5.02 10E12/L (ref 3.8–5.2)
SODIUM SERPL-SCNC: 134 MMOL/L (ref 133–144)
TSH SERPL DL<=0.005 MIU/L-ACNC: 1.17 MU/L (ref 0.4–4)
VIT B12 SERPL-MCNC: 377 PG/ML (ref 193–986)
WBC # BLD AUTO: 7 10E9/L (ref 4–11)

## 2017-07-27 PROCEDURE — 99215 OFFICE O/P EST HI 40 MIN: CPT | Performed by: INTERNAL MEDICINE

## 2017-07-27 PROCEDURE — 82607 VITAMIN B-12: CPT | Performed by: INTERNAL MEDICINE

## 2017-07-27 PROCEDURE — 99284 EMERGENCY DEPT VISIT MOD MDM: CPT | Performed by: FAMILY MEDICINE

## 2017-07-27 PROCEDURE — 36415 COLL VENOUS BLD VENIPUNCTURE: CPT | Performed by: INTERNAL MEDICINE

## 2017-07-27 PROCEDURE — 40000840 ZZHC STATISTIC PT VESTIBULAR VISIT: Performed by: PHYSICAL THERAPIST

## 2017-07-27 PROCEDURE — 97162 PT EVAL MOD COMPLEX 30 MIN: CPT | Mod: GP | Performed by: PHYSICAL THERAPIST

## 2017-07-27 PROCEDURE — 80050 GENERAL HEALTH PANEL: CPT | Performed by: INTERNAL MEDICINE

## 2017-07-27 PROCEDURE — 95992 CANALITH REPOSITIONING PROC: CPT | Mod: GP | Performed by: PHYSICAL THERAPIST

## 2017-07-27 PROCEDURE — 86618 LYME DISEASE ANTIBODY: CPT | Performed by: INTERNAL MEDICINE

## 2017-07-27 PROCEDURE — 99282 EMERGENCY DEPT VISIT SF MDM: CPT

## 2017-07-27 RX ORDER — ONDANSETRON 4 MG/1
4-8 TABLET, ORALLY DISINTEGRATING ORAL EVERY 6 HOURS PRN
Qty: 20 TABLET | Refills: 2 | Status: SHIPPED | OUTPATIENT
Start: 2017-07-27 | End: 2017-08-15

## 2017-07-27 RX ORDER — LORAZEPAM 0.5 MG/1
0.5 TABLET ORAL EVERY 8 HOURS PRN
Qty: 20 TABLET | Refills: 1 | Status: SHIPPED | OUTPATIENT
Start: 2017-07-27 | End: 2017-09-08

## 2017-07-27 NOTE — PROGRESS NOTES
"   PHYSICAL THERAPY INITIAL EVALUATION  07/27/17 1600   Quick Adds   Quick Adds Vestibular Eval   Type of Visit Initial OP PT Evaluation   General Information   Start of Care Date 07/27/17   Referring Physician Dr. Duran   Orders Evaluate and Treat as Indicated   Order Date 07/27/17   Medical Diagnosis Vertigo   Onset of illness/injury or Date of Surgery 07/17/17   Precautions/Limitations no known precautions/limitations   Surgical/Medical history reviewed Yes   Pertinent history of current vestibular problem (include personal factors and/or comorbidities that impact the POC)  Anxiety;Depression   Pertinent history of current problem (include personal factors and/or comorbidities that impact the POC) Patient reports symptoms on and off with turning head for past couple months. About 10 days ago, dizziness became more severe, anytime she would change position she would get room spinning. Has fullness in the right ear, does report ear ringing. Has vomitied from it. Spinning symptoms are short in duration but feels constanty \"dizzy\". ED requesting PT to come evaluate patient.    Prior level of function comment independent   Oculomotor Exam   Smooth Pursuit Normal   Saccades Normal   VOR Normal   VOR Comments no saccades but felt really dizzy   Infrared Goggle Exam or Frenzel Lense Exam   Exam completed with Room Light   Spontaneous Nystagmus Negative   Gaze Evoked Nystagmus Negative   [place]-Hallpike (right) Upbeating R torsional   [place]-Hallpike (Left) Negative   HSCC Supine Roll Test (Right) Upbeating R torsional   HSCC Supine Roll Test (Left) Negative   BPPV Canal(s) R Posterior   BPPV Type Canalithasis   Planned Therapy Interventions   Planned Therapy Interventions neuromuscular re-education;other (see comments)   Planned Therapy Interventions Comment canalith repositioning    Clinical Impression   Criteria for Skilled Therapeutic Interventions Met yes, treatment indicated   PT Diagnosis dizziness   Influenced by the " following impairments dizziness, imbalance   Functional limitations due to impairments mobility   Clinical Presentation Evolving/Changing   Clinical Presentation Rationale symptoms worsening and becoming more constant   Clinical Decision Making (Complexity) Moderate complexity   Therapy Frequency 1 time/week   Predicted Duration of Therapy Intervention (days/wks) 4 weeks   Risk & Benefits of therapy have been explained Yes   Patient, Family & other staff in agreement with plan of care Yes   Clinical Impression Comments Patient presenting with signs and symptoms consistent with right posterior canal canalithiasis.   Education Assessment   Preferred Learning Style Listening;Demonstration;Pictures/video   Barriers to Learning No barriers   GOALS   PT Eval Goals 1;2   Goal 1   Goal Identifier 1   Goal Description Patient will report no symptoms with turning in bed.   Target Date 08/24/17   Goal 2   Goal Identifier 2   Goal Description Patient will report no return of symptoms.   Target Date 08/24/17   Total Evaluation Time   Total Evaluation Time (Minutes) 15       Please contact me with any questions or concerns.  Thank you for your referral.    Daria Arias, PT, DPT, OCS  Physical Therapist, Orthopedic Certified Specialist  [facility]  [phone]

## 2017-07-27 NOTE — MR AVS SNAPSHOT
After Visit Summary   7/27/2017    Tammy Joshi    MRN: [id number]           Patient Information     Date Of Birth          1980        Visit Information        Provider Department      7/27/2017 1:20 PM Sanjuana uDran, DO [facility]        Today's Diagnoses     Vertigo    -  1      Care Instructions          Thank you for choosing [facility].  You may be receiving a survey in the mail from Loyd Santana regarding your visit today.  Please take a few minutes to complete and return the survey to let us know how we are doing.      If you have questions or concerns, please contact us via [employer] or you can contact your care team at [phone].    Our Clinic hours are:  Monday 6:40 am  to 7:00 pm  Tuesday -Friday 6:40 am to 5:00 pm    The [place] outpatient lab hours are:  Monday - Friday 6:10 am to 4:45 pm  Saturdays 7:00 am to 11:00 am  Appointments are required, call [phone]    If you have clinical questions after hours or would like to schedule an appointment,  call the clinic at [phone].         [facility] [address]      1. Refill given on zofran for nausea.  This can also be a treatment for dizziness  2. Try Ativan (Lorazepam) in place of Meclizine for dizziness.  Watch for drowsiness.  Do not take with alcohol or other sedating medications.  Do not drive after taking this medication  3. Make appointment with physical therapy and ENT  4. Blood work today, get MRI          Follow-ups after your visit        Additional Services     OTOLARYNGOLOGY REFERRAL       Your provider has referred you to: [facility]: [facility] [phone]   [email or web address]    Please be aware that coverage of these services is subject to the terms and limitations of your health insurance plan.  Call member services at your health plan with any benefit or coverage questions.      Please bring the  "following with you to your appointment:    (1) Any X-Rays, CTs or MRIs which have been performed.  Contact the facility where they were done to arrange for  prior to your scheduled appointment.   (2) List of current medications  (3) This referral request   (4) Any documents/labs given to you for this referral            PHYSICAL THERAPY REFERRAL       *This therapy referral will be filtered to a centralized scheduling office at [facility] and the patient will receive a call to schedule an appointment at a [facility] location most convenient for them. *     [facility] provides Physical Therapy evaluation and treatment and many specialty services across the [facility] system.  If requesting a specialty program, please choose from the list below.    If you have not heard from the scheduling office within 2 business days, please call [phone] for all locations, with the exception of [place], please call [phone].  Treatment: Evaluation & Treatment  Special Instructions/Modalities:   Special Programs: Balance/Vestibular    Please be aware that coverage of these services is subject to the terms and limitations of your health insurance plan.  Call member services at your health plan with any benefit or coverage questions.      **Note to Provider:  If you are referring outside of [facility] for the therapy appointment, please list the name of the location in the \"special instructions\" above, print the referral and give to the patient to schedule the appointment.                  Future tests that were ordered for you today     Open Future Orders        Priority Expected Expires Ordered    MR Brain w/o Contrast Routine  7/27/2018 7/27/2017            Who to contact     If you have questions or need follow up information about today's clinic visit or your schedule please contact [facility] directly at [phone].  Normal or non-critical lab and imaging results " "will be communicated to you by MyChart, letter or phone within 4 business days after the clinic has received the results. If you do not hear from us within 7 days, please contact the clinic through [employer] or phone. If you have a critical or abnormal lab result, we will notify you by phone as soon as possible.  Submit refill requests through [employer] or call your pharmacy and they will forward the refill request to us. Please allow 3 business days for your refill to be completed.          Additional Information About Your Visit        [employer] Information     [employer] lets you send messages to your doctor, view your test results, renew your prescriptions, schedule appointments and more. To sign up, go to [email or web address] . Click on \"Log in\" on the left side of the screen, which will take you to the Welcome page. Then click on \"Sign up Now\" on the right side of the page.     You will be asked to enter the access code listed below, as well as some personal information. Please follow the directions to create your username and password.     Your access code is: [email or web address]  Expires: 10/21/2017  4:49 PM     Your access code will  in 90 days. If you need help or a new code, please call your [place] clinic or [phone].        Care EveryWhere ID     This is your Care EveryWhere ID. This could be used by other organizations to access your [place] medical records  [id number]        Your Vitals Were     Pulse Temperature Height Last Period BMI (Body Mass Index)       103 98.2  F (36.8  C) (Tympanic) 5' 4\" (1.626 m) 2017 33.3 kg/m2        Blood Pressure from Last 3 Encounters:   17 130/88   17 128/85   17 115/71    Weight from Last 3 Encounters:   17 194 lb (88 kg)   17 200 lb (90.7 kg)   17 195 lb (88.5 kg)              We Performed the Following     CBC with platelets and differential     Comprehensive metabolic panel     Lyme Disease Sofi with reflex to WB Serum     " OTOLARYNGOLOGY REFERRAL     PHYSICAL THERAPY REFERRAL     TSH with free T4 reflex     Vitamin B12          Today's Medication Changes          These changes are accurate as of: 7/27/17  2:07 PM.  If you have any questions, ask your nurse or doctor.               Start taking these medicines.        Dose/Directions    LORazepam 0.5 MG tablet   Commonly known as:  ATIVAN   Used for:  Vertigo   Started by:  Sanjuana Duran,         Dose:  0.5 mg   Take 1 tablet (0.5 mg) by mouth every 8 hours as needed for other   Quantity:  20 tablet   Refills:  1            Where to get your medicines      These medications were sent to [facility] [address]  [address] [address]     Phone:  [phone]     ondansetron 4 MG ODT tab         Some of these will need a paper prescription and others can be bought over the counter.  Ask your nurse if you have questions.     Bring a paper prescription for each of these medications     LORazepam 0.5 MG tablet                Primary Care Provider Office Phone # Fax #    Shawn Lewis [phone]       [facility] [address] [address]        Equal Access to Services     DIPIKA WILL AH: Hadii ree araujoo Sobrigitte, waaxda luqadaha, qaybta kaalmada adeegyada, randall monsalve. So [facility] [phone].    ATENCIÓN: Si habla español, tiene a dominguez disposición servicios gratuitos de asistencia lingüística. Cristina al [phone].    We comply with applicable federal civil rights laws and [place] laws. We do not discriminate on the basis of race, color, national origin, age, disability sex, sexual orientation or gender identity.            Thank you!     Thank you for choosing [facility]  for your care. Our goal is always to provide you with excellent care. Hearing back from our patients is one way we can continue to improve our services. Please  take a few minutes to complete the written survey that you may receive in the mail after your visit with us. Thank you!             Your Updated Medication List - Protect others around you: Learn how to safely use, store and throw away your medicines at www.disposemymeds.org.          This list is accurate as of: 7/27/17  2:07 PM.  Always use your most recent med list.                   Brand Name Dispense Instructions for use Diagnosis    ALEVE 220 MG tablet   Generic drug:  naproxen sodium      Take by mouth 2 times daily (with meals) Reported on 4/21/2017        atorvastatin 10 MG tablet    LIPITOR    90 tablet    Take 1 tablet (10 mg) by mouth daily    High blood cholesterol       blood glucose monitoring lancets     1 Box    Use to test blood sugars 4 times daily or as directed.    Type 2 diabetes mellitus without complication, without long-term current use of insulin (H)       blood glucose monitoring test strip    no brand specified    120 each    Test blood sugar 4 times per day    Type 2 diabetes mellitus without complication, without long-term current use of insulin (H)       ibuprofen 200 MG tablet    ADVIL/MOTRIN     Take 200 mg by mouth every 4 hours as needed for mild pain Reported on 4/21/2017        lisinopril 2.5 MG tablet    PRINIVIL/Zestril    90 tablet    Take 1 tablet (2.5 mg) by mouth daily    Type 2 diabetes mellitus without complication (H)       LORazepam 0.5 MG tablet    ATIVAN    20 tablet    Take 1 tablet (0.5 mg) by mouth every 8 hours as needed for other    Vertigo       meclizine 25 MG tablet    ANTIVERT    21 tablet    Take 1 tablet (25 mg) by mouth 3 times daily as needed for dizziness        * metFORMIN 500 MG tablet    GLUCOPHAGE    180 tablet    Take 2 tablets (1,000 mg) by mouth 2 times daily (with meals)    Type 2 diabetes mellitus without complication, without long-term current use of insulin (H)       * metFORMIN 1000 MG tablet    GLUCOPHAGE    180 tablet    TAKE ONE TABLET BY  MOUTH TWICE A DAY WITH MEALS    Type 2 diabetes mellitus without complication, without long-term current use of insulin (H)       ondansetron 4 MG ODT tab    ZOFRAN ODT    20 tablet    Take 1-2 tablets (4-8 mg) by mouth every 6 hours as needed for nausea    Vertigo       * sertraline 100 MG tablet    ZOLOFT    90 tablet    Take 0.5 tablets (50 mg) by mouth daily    Mild episode of recurrent major depressive disorder (H)       * sertraline 50 MG tablet    ZOLOFT    90 tablet    Take 1 tablet (50 mg) by mouth daily    Mild episode of recurrent major depressive disorder (H)       TYLENOL 500 MG tablet   Generic drug:  acetaminophen      Take 500-1,000 mg by mouth every 6 hours as needed for mild pain Reported on 4/21/2017        valACYclovir 1000 mg tablet    VALTREX    42 tablet    Take 1 tablet (1,000 mg) by mouth 3 times daily for 14 days    Herpes simplex infection of perianal skin       * Notice:  This list has 4 medication(s) that are the same as other medications prescribed for you. Read the directions carefully, and ask your doctor or other care provider to review them with you.

## 2017-07-27 NOTE — ED AVS SNAPSHOT
[facility] Emergency Department    [address] [address]    Phone:  [phone]    Fax:  [phone]                                       Tammy Joshi   MRN: [id number]    Department:  [facility] Emergency Department   Date of Visit:  7/27/2017           Patient Information     Date Of Birth          1980        Your diagnoses for this visit were:     BPPV (benign paroxysmal positional vertigo), right        You were seen by Vel Ortiz MD.        Discharge Instructions       Follow-up as planned with the vestibular therapist.  Do the exercises on the handout for BPPV.  Return if you develop new concerning symptoms.    24 Hour Appointment Hotline       To make an appointment at any [facility] clinic, call [phone] ([phone]). If you don't have a family doctor or clinic, we will help you find one. [place] clinics are conveniently located to serve the needs of you and your family.             Review of your medicines      Our records show that you are taking the medicines listed below. If these are incorrect, please call your family doctor or clinic.        Dose / Directions Last dose taken    ALEVE 220 MG tablet   Generic drug:  naproxen sodium        Take by mouth 2 times daily (with meals) Reported on 4/21/2017   Refills:  0        atorvastatin 10 MG tablet   Commonly known as:  LIPITOR   Dose:  10 mg   Quantity:  90 tablet        Take 1 tablet (10 mg) by mouth daily   Refills:  1        blood glucose monitoring lancets   Quantity:  1 Box        Use to test blood sugars 4 times daily or as directed.   Refills:  1        blood glucose monitoring test strip   Commonly known as:  no brand specified   Quantity:  120 each        Test blood sugar 4 times per day   Refills:  1        ibuprofen 200 MG tablet   Commonly known as:  ADVIL/MOTRIN   Dose:  200 mg        Take 200 mg by mouth every 4 hours as needed for mild pain Reported on 4/21/2017   Refills:  0         lisinopril 2.5 MG tablet   Commonly known as:  PRINIVIL/Zestril   Dose:  2.5 mg   Quantity:  90 tablet        Take 1 tablet (2.5 mg) by mouth daily   Refills:  3        LORazepam 0.5 MG tablet   Commonly known as:  ATIVAN   Dose:  0.5 mg   Quantity:  20 tablet        Take 1 tablet (0.5 mg) by mouth every 8 hours as needed for other   Refills:  1        meclizine 25 MG tablet   Commonly known as:  ANTIVERT   Dose:  25 mg   Quantity:  21 tablet        Take 1 tablet (25 mg) by mouth 3 times daily as needed for dizziness   Refills:  0        metFORMIN 1000 MG tablet   Commonly known as:  GLUCOPHAGE   Quantity:  180 tablet        TAKE ONE TABLET BY MOUTH TWICE A DAY WITH MEALS   Refills:  1        ondansetron 4 MG ODT tab   Commonly known as:  ZOFRAN ODT   Dose:  4-8 mg   Quantity:  20 tablet        Take 1-2 tablets (4-8 mg) by mouth every 6 hours as needed for nausea   Refills:  2        sertraline 50 MG tablet   Commonly known as:  ZOLOFT   Dose:  50 mg   Quantity:  90 tablet        Take 1 tablet (50 mg) by mouth daily   Refills:  1        TYLENOL 500 MG tablet   Dose:  500-1000 mg   Generic drug:  acetaminophen        Take 500-1,000 mg by mouth every 6 hours as needed for mild pain Reported on 4/21/2017   Refills:  0                Orders Needing Specimen Collection     None      Pending Results     Date and Time Order Name Status Description    7/27/2017 1407 VITAMIN B12 In process     7/27/2017 1407 LYME DISEASE VASYL WITH REFLEX TO WB SERUM In process     7/27/2017 1407 TSH WITH FREE T4 REFLEX In process     7/27/2017 1407 COMPREHENSIVE METABOLIC PANEL In process             Pending Culture Results     No orders found from 7/25/2017 to 7/28/2017.            Pending Results Instructions     If you had any lab results that were not finalized at the time of your Discharge, you can call the ED Lab Result RN at [phone]. You will be contacted by this team for any positive Lab results or changes in treatment. The  "nurses are available 7 days a week from 10A to 6:30P.  You can leave a message 24 hours per day and they will return your call.        Test Results From Your Hospital Stay               Thank you for choosing [facility]       Thank you for choosing [facility] for your care. Our goal is always to provide you with excellent care. Hearing back from our patients is one way we can continue to improve our services. Please take a few minutes to complete the written survey that you may receive in the mail after you visit with us. Thank you!        [employer] Information     [employer] lets you send messages to your doctor, view your test results, renew your prescriptions, schedule appointments and more. To sign up, go to [email or web address] . Click on \"Log in\" on the left side of the screen, which will take you to the Welcome page. Then click on \"Sign up Now\" on the right side of the page.     You will be asked to enter the access code listed below, as well as some personal information. Please follow the directions to create your username and password.     Your access code is: [email or web address]  Expires: 10/21/2017  4:49 PM     Your access code will  in 90 days. If you need help or a new code, please call your [facility] clinic or [phone].        Care EveryWhere ID     This is your Care EveryWhere ID. This could be used by other organizations to access your [facility] medical records  [id number]        Equal Access to Services     DIPIKA WILL : Hadii ree Torres, waaxda luayanadaha, qaybta kaalmablair gupta, randall rendon . So [facility] [phone].    ATENCIÓN: Si habla español, tiene a dominguez disposición servicios gratuitos de asistencia lingüística. Cristina al [phone].    We comply with applicable federal civil rights laws and [place] laws. We do not discriminate on the basis of race, color, national origin, age, disability sex, sexual orientation or gender identity.            After " Visit Summary       This is your record. Keep this with you and show to your community pharmacist(s) and doctor(s) at your next visit.

## 2017-07-27 NOTE — PROGRESS NOTES
SUBJECTIVE:                                                    Tammy Joshi is a 37 year old female who presents to clinic today for the following health issues:      ED/UC Followup:    Facility:  [facility]  Date of visit: 07/23/17  Reason for visit: Dizzy  Current Status: still very dizzy, pressure in ears now and then with ringing, feels like everything worse since ER.  Has 6 kids and unable to do anything. Feels like is going to fall over and laying down still feels it.          Dizziness      Duration: almost 10 days     Description   Feeling faint:  YES feels like will fall but not faint  Feeling like the surroundings are moving: YES  Loss of consciousness or falls: no     Intensity:  severe    Accompanying signs and symptoms:   Nausea/vomitting: YES  Palpitations: YES and sweaty  Weakness in arms or legs: YES  Vision or speech changes: YES vision only  Ringing in ears (Tinnitus): YES  Hearing loss related to dizziness: YES when has the pressure in her ears  Other (fevers/chills/sweating/dyspnea): YES    History (similar episodes/head trauma/previous evaluation/recent bleeding): None    Precipitating or alleviating factors (new meds/chemicals): None  Worse with activity/head movement: YES    Therapies tried and outcome: None    ER recommend ENT and physical therapy for diagnosis of BPPV. No appointment scheduled.  She wasn't clear that this was expected.    She is laying on exam table with room dark when provider enters room    The dizziness is worse - near constant.    No headache.    She has tinnitus/pressure in ears.  This along with dizziness does not go away with lying down like it did when dizziness first started.      She is scared she is dying, is tearful throughout visit.    She thinks her vision is blurry.    Vomited a few times, nausea near constant.    She has tried ondansetron which helps with nausea    Meclizine did not help with dizziness at all.    She has tried benadryl which  didn't help    Thinks has mild hearing loss when she gets pressure feeling in her ears.    No URI symptoms.  No tick bites.  No head injuries        Current Outpatient Prescriptions   Medication Sig Dispense Refill     ondansetron (ZOFRAN ODT) 4 MG ODT tab Take 1-2 tablets (4-8 mg) by mouth every 6 hours as needed for nausea 20 tablet 2     LORazepam (ATIVAN) 0.5 MG tablet Take 1 tablet (0.5 mg) by mouth every 8 hours as needed for other 20 tablet 1     meclizine (ANTIVERT) 25 MG tablet Take 1 tablet (25 mg) by mouth 3 times daily as needed for dizziness 21 tablet 0     metFORMIN (GLUCOPHAGE) 1000 MG tablet TAKE ONE TABLET BY MOUTH TWICE A DAY WITH MEALS 180 tablet 1     sertraline (ZOLOFT) 50 MG tablet Take 1 tablet (50 mg) by mouth daily 90 tablet 1     atorvastatin (LIPITOR) 10 MG tablet Take 1 tablet (10 mg) by mouth daily 90 tablet 1     lisinopril (PRINIVIL/ZESTRIL) 2.5 MG tablet Take 1 tablet (2.5 mg) by mouth daily 90 tablet 3     valACYclovir (VALTREX) 1000 mg tablet Take 1 tablet (1,000 mg) by mouth 3 times daily for 14 days 42 tablet 0     blood glucose monitoring (FREESTYLE) lancets Use to test blood sugars 4 times daily or as directed. 1 Box 1     blood glucose monitoring (NO BRAND SPECIFIED) test strip Test blood sugar 4 times per day 120 each 1     sertraline (ZOLOFT) 100 MG tablet Take 0.5 tablets (50 mg) by mouth daily 90 tablet 1     metFORMIN (GLUCOPHAGE) 500 MG tablet Take 2 tablets (1,000 mg) by mouth 2 times daily (with meals) 180 tablet 3     naproxen sodium (ALEVE) 220 MG tablet Take by mouth 2 times daily (with meals) Reported on 4/21/2017       acetaminophen (TYLENOL) 500 MG tablet Take 500-1,000 mg by mouth every 6 hours as needed for mild pain Reported on 4/21/2017       ibuprofen (ADVIL,MOTRIN) 200 MG tablet Take 200 mg by mouth every 4 hours as needed for mild pain Reported on 4/21/2017         Reviewed and updated as needed this visit by clinical staffTobacco  Allergies      "  Reviewed and updated as needed this visit by Provider  Allergies  Meds  Problems         ROS:  Constitutional, HEENT, cardiovascular, pulmonary, gi and gu systems are negative, except as otherwise noted.      OBJECTIVE:   /88  Pulse 103  Temp 98.2  F (36.8  C) (Tympanic)  Ht 5' 4\" (1.626 m)  Wt 194 lb (88 kg)  LMP 07/06/2017  BMI 33.3 kg/m2  Body mass index is 33.3 kg/(m^2).  GENERAL APPEARANCE: laying flat in dark room, severe dizziness with changing positions  RESP: lungs clear to auscultation - no rales, rhonchi or wheezes  CV: regular rates and rhythm, normal S1 S2, no S3 or S4 and no murmur, click or rub  NEURO: Normal strength and tone, mentation intact, speech normal, DTR symmetrically normal in lower extremities, gait abnormal - cannot take more than a few steps w/out wall walking, cranial nerves 2-12 intact, Romberg abnormal - cannot stand w/out falling over and rapid alternating movements slowed      ASSESSMENT/PLAN:       1. Vertigo - BPPV vs Meinere's vs intracranial disease.  Severity of symptoms warrantes further work-up.  Labs, MRI as below.  Try ativan in place of ineffective meclizine.  Refill zofran for both nausea and vertigo.  See ENT, do physical therapy asap to see if component of BPPV causing her symptoms.  - PHYSICAL THERAPY REFERRAL  - ondansetron (ZOFRAN ODT) 4 MG ODT tab; Take 1-2 tablets (4-8 mg) by mouth every 6 hours as needed for nausea  Dispense: 20 tablet; Refill: 2  - LORazepam (ATIVAN) 0.5 MG tablet; Take 1 tablet (0.5 mg) by mouth every 8 hours as needed for other  Dispense: 20 tablet; Refill: 1  - OTOLARYNGOLOGY REFERRAL  - MR Brain w/o Contrast; Future  - Comprehensive metabolic panel  - CBC with platelets and differential  - TSH with free T4 reflex  - Lyme Disease Sofi with reflex to WB Serum  - Vitamin B12    After patient left, she vomited in lobby.  Her dizziness is worse even when provider was in room.  She drove here today, and doesn't feel safe to drive " self home.  Sent to ER for IVF and IV anti-emetics to help symptomatically.    Greater than 40 minutes was spent face-to-face with the patient by the provider.  Greater than 50% was spent in counseling or coordinating care for this patient.          Sanjuana Duran, [facility]

## 2017-07-27 NOTE — NURSING NOTE
"Chief Complaint   Patient presents with     Hospital F/U     ER for vertigo 7/23/17 can't function       Initial /88  Pulse 103  Temp 98.2  F (36.8  C) (Tympanic)  Ht 5' 4\" (1.626 m)  Wt 194 lb (88 kg)  LMP 07/06/2017  BMI 33.3 kg/m2 Estimated body mass index is 33.3 kg/(m^2) as calculated from the following:    Height as of this encounter: 5' 4\" (1.626 m).    Weight as of this encounter: 194 lb (88 kg).  Medication Reconciliation: complete  "

## 2017-07-27 NOTE — PATIENT INSTRUCTIONS
Thank you for choosing [facility].  You may be receiving a survey in the mail from Loyd Santana regarding your visit today.  Please take a few minutes to complete and return the survey to let us know how we are doing.      If you have questions or concerns, please contact us via [facility] or you can contact your care team at [phone].    Our Clinic hours are:  Monday 6:40 am  to 7:00 pm  Tuesday -Friday 6:40 am to 5:00 pm    The [place] outpatient lab hours are:  Monday - Friday 6:10 am to 4:45 pm  Saturdays 7:00 am to 11:00 am  Appointments are required, call [phone]    If you have clinical questions after hours or would like to schedule an appointment,  call the clinic at [phone].         [facility] [place], [place] - [address] [place] [address]      1. Refill given on zofran for nausea.  This can also be a treatment for dizziness  2. Try Ativan (Lorazepam) in place of Meclizine for dizziness.  Watch for drowsiness.  Do not take with alcohol or other sedating medications.  Do not drive after taking this medication  3. Make appointment with physical therapy and ENT  4. Blood work today, get MRI

## 2017-07-27 NOTE — ED AVS SNAPSHOT
[facility] Emergency Department    [address] [address]    Phone:  [phone]    Fax:  [phone]                                       Tammy Joshi   MRN: [id number]    Department:  [facility] Emergency Department   Date of Visit:  7/27/2017           After Visit Summary Signature Page     I have received my discharge instructions, and my questions have been answered. I have discussed any challenges I see with this plan with the nurse or doctor.    ..........................................................................................................................................  Patient/Patient Representative Signature      ..........................................................................................................................................  Patient Representative Print Name and Relationship to Patient    ..................................................               ................................................  Date                                            Time    ..........................................................................................................................................  Reviewed by Signature/Title    ...................................................              ..............................................  Date                                                            Time

## 2017-07-27 NOTE — ED PROVIDER NOTES
"  History     Chief Complaint   Patient presents with     Dizziness     sent to ED from  clinic for vertigo. seen in ED 7/23 for same thing, coming to ED for increased dizziness, worsening sx. Labs done in clinic results pending. Out pt MRI ordered.     HPI    Tammy Joshi is a 37 year old female who presents to the ED today from Clinic for evaluation of dizziness. On 7/27/17 the patient was evaluated in ED and diagnosed with benign paroxymal positional vertigo. She was prescribed Meclizine and Zofran as well as given a referral to vestibular therapy. She reports she has not scheduled an appointment yet with vestibular therapy. Today she was evaluated in clinic by Dr. Duran due to worsening symptoms of pressure in her bilateral ears, right greater than left, and constant dizziness associated with nausea. She was sent to ED by primary care provider after one episode of emesis in the waiting room for further evaluation. The patient admits to gradual hearing loss over the last year and reports it is worse upon pressure in her ears. She notes that she has been experiencing minor blurry vision and sometimes when she feels the \"room spinning\" she has double vision but reports that it is not constant. She states every time she turns her head she feels dizzy. Sometimes the patient will feel diaphoretic but denies chills. She also notes that for the last month she has been experiencing intermittent minor abdominal pain described as cramping that she gets right before episodes of diarrhea. She denies any numbness or weakness in one arm or one leg. She also denies any symptoms of a fever, headache, congestion, sinus pressure, a sore throat and difficulty breathing. The patient has not had any recent medication changes.     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     Polycystic " "ovaries      PPH (postpartum hemorrhage) 11/2009       Problem List  Patient Active Problem List   Diagnosis     Hand numbness     CARDIOVASCULAR SCREENING; LDL GOAL LESS THAN 160     Anxiety     Depression     Hyperlipidemia with target LDL less than 100     Type 2 diabetes mellitus without complication (H)       Past Surgical History  Past Surgical History:   Procedure Laterality Date     C HAND/FINGER SURGERY UNLISTED  7th grade    left index     D & C  1998    Missed Ab     LITHOTRIPSY  2011       Social History  Social History     Social History     Marital status:      Spouse name: N/A     Number of children: 3     Years of education: N/A     Occupational History     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 Yes     adopted- unknown     Social History Narrative     I have reviewed the Medications, Allergies, Past Medical and Surgical History, and Social History in the Epic system.         Review of Systems  Further problem focused review negative.    Physical Exam   BP: (!) 129/91  Heart Rate: 88  Temp: 98.1  F (36.7  C)  Resp: 16  Height: 162.6 cm (5' 4\")  Weight: 86.2 kg (190 lb)  SpO2: 96 %  Physical Exam  Nursing note and vitals were reviewed.  Constitutional: Awake and alert, healthy appearing 37-year-old no acute distress, who does not appear acutely ill, and who answers questions appropriately and cooperates with examination.  HEENT: Atraumatic and normocephalic EACs clear.  TMs normal.  PERRLA.  EOMI. Oropharynx normal.  Neck: Freely mobile.  No meningismus.  Cardiovascular: Cardiac examination reveals normal heart rate and regular rhythm without murmur.  Pulmonary/Chest: Breathing is " unlabored.  Breath sounds are clear and equal bilaterally.  There no retractions, tachypnea, rales, wheezes, or rhonchi.  Abdomen: Soft, nontender, no HSM or masses rebound or guarding.  Musculoskeletal: Extremities are warm and well-perfused and without edema  Neurological: Alert, oriented, thought content logical, coherent.  Cranial nerve testing is normal.  Motor strength is intact in the upper and lower extremities.  Motor tone is normal.  Cerebellar testing is normal with the exception of positive Linda-Hallpike maneuver to the right reproducing her symptoms.  Skin: Warm, dry, no rashes.  Psychiatric: Affect broad and appropriate.        ED Course     ED Course     Procedures             Critical Care time:  none               Labs Ordered and Resulted from Time of ED Arrival Up to the Time of Departure from the ED - No data to display      Medications - No data to display    1505 Patient assessed. Course of care outlined.     Assessments & Plan (with Medical Decision Making)     37-year-old female presented with positional vertigo.  Physical examination the ED reveals a normal neurologic exam with the exception of vertigo reproduced by external type maneuver on the right.  This is consistent with peripheral vestibulopathy, most likely benign paroxysmal positional vertigo.  There are no red flag symptoms and no worrisome symptoms or signs.  We discussed the option of further testing with MRI to exclude unlikely sources such as tumor in the area of the cochlea but the patient would prefer to defer and 1st try treating for BPPV.  I think this is appropriate as I have low suspicion for worrisome causes.  We were able to arrange for the vestibular therapist come to the department to begin treatment.  The patient responded well to her initial treatment with fatiguing of the symptom and will follow up next week to continue vestibular therapy.  I gave her our handout on BPPV from [email or web address].  I advised  her to return if she develops new concerning symptoms such as focal neurologic symptoms, such as difficulty with speech, swallowing, vision loss, or weakness or numbness in the upper and lower extremities.  She is comfortable with this plan and her questions were all answered.    I have reviewed the nursing notes.    I have reviewed the findings, diagnosis, plan and need for follow up with the patient.       New Prescriptions    No medications on file       Final diagnoses:   BPPV (benign paroxysmal positional vertigo), right     This document serves as a record of the services and decisions personally performed and made by Vel Ortiz MD. It was created on HIS/HER behalf by Karena Reaves, a trained medical scribe. The creation of this document is based the provider's statements to the medical scribe.  Karena Reaves 3:05 PM 7/27/2017    Provider:   The information in this document, created by the medical scribe for me, accurately reflects the services I personally performed and the decisions made by me. I have reviewed and approved this document for accuracy prior to leaving the patient care area.  Vel Ortiz MD 3:05 PM 7/27/2017 7/27/2017   [facility] EMERGENCY DEPARTMENT     Vel Ortiz MD  07/27/17 1622       Vel Ortiz MD  07/27/17 1622

## 2017-07-28 LAB — B BURGDOR IGG+IGM SER QL: 0.52 (ref 0–0.89)

## 2017-08-15 ENCOUNTER — OFFICE VISIT (OUTPATIENT)
Dept: OTOLARYNGOLOGY | Facility: CLINIC | Age: 37
End: 2017-08-15
Payer: COMMERCIAL

## 2017-08-15 ENCOUNTER — OFFICE VISIT (OUTPATIENT)
Dept: AUDIOLOGY | Facility: CLINIC | Age: 37
End: 2017-08-15
Payer: COMMERCIAL

## 2017-08-15 VITALS
SYSTOLIC BLOOD PRESSURE: 119 MMHG | HEART RATE: 99 BPM | HEIGHT: 64 IN | DIASTOLIC BLOOD PRESSURE: 86 MMHG | BODY MASS INDEX: 32.44 KG/M2 | TEMPERATURE: 98.4 F | WEIGHT: 190 LBS

## 2017-08-15 DIAGNOSIS — R42 VERTIGO: Primary | ICD-10-CM

## 2017-08-15 DIAGNOSIS — R42 DISEQUILIBRIUM: ICD-10-CM

## 2017-08-15 PROCEDURE — 99207 ZZC NO CHARGE LOS: CPT | Performed by: AUDIOLOGIST

## 2017-08-15 PROCEDURE — 99203 OFFICE O/P NEW LOW 30 MIN: CPT | Performed by: OTOLARYNGOLOGY

## 2017-08-15 PROCEDURE — 92550 TYMPANOMETRY & REFLEX THRESH: CPT | Performed by: AUDIOLOGIST

## 2017-08-15 PROCEDURE — 92557 COMPREHENSIVE HEARING TEST: CPT | Performed by: AUDIOLOGIST

## 2017-08-15 ASSESSMENT — PAIN SCALES - GENERAL: PAINLEVEL: NO PAIN (0)

## 2017-08-15 NOTE — NURSING NOTE
"Initial /86 (BP Location: Left arm, Patient Position: Chair, Cuff Size: Adult Regular)  Pulse 99  Temp 98.4  F (36.9  C) (Oral)  Ht 1.626 m (5' 4\")  Wt 86.2 kg (190 lb)  LMP 07/06/2017  BMI 32.61 kg/m2 Estimated body mass index is 32.61 kg/(m^2) as calculated from the following:    Height as of this encounter: 1.626 m (5' 4\").    Weight as of this encounter: 86.2 kg (190 lb). .    Katiuska Mayer CMA    "

## 2017-08-15 NOTE — MR AVS SNAPSHOT
"              After Visit Summary   8/15/2017    Tammy Joshi    MRN: [id number]           Patient Information     Date Of Birth          1980        Visit Information        Provider Department      8/15/2017 2:30 PM Emerald Montero AuD [facility]        Today's Diagnoses     Vertigo    -  1       Follow-ups after your visit        Who to contact     If you have questions or need follow up information about today's clinic visit or your schedule please contact [facility] directly at [phone].  Normal or non-critical lab and imaging results will be communicated to you by MyChart, letter or phone within 4 business days after the clinic has received the results. If you do not hear from us within 7 days, please contact the clinic through [employer] or phone. If you have a critical or abnormal lab result, we will notify you by phone as soon as possible.  Submit refill requests through [employer] or call your pharmacy and they will forward the refill request to us. Please allow 3 business days for your refill to be completed.          Additional Information About Your Visit        MyChart Information     [employer] lets you send messages to your doctor, view your test results, renew your prescriptions, schedule appointments and more. To sign up, go to [email or web address] . Click on \"Log in\" on the left side of the screen, which will take you to the Welcome page. Then click on \"Sign up Now\" on the right side of the page.     You will be asked to enter the access code listed below, as well as some personal information. Please follow the directions to create your username and password.     Your access code is: [email or web address]  Expires: 10/21/2017  4:49 PM     Your access code will  in 90 days. If you need help or a new code, please call your [facility] or [phone].        Care EveryWhere ID     This is your Care EveryWhere ID. This could be used by other organizations to " access your [facility] medical records  [id number]        Your Vitals Were     Last Period                   07/06/2017            Blood Pressure from Last 3 Encounters:   08/15/17 119/86   07/27/17 (!) 129/91   07/27/17 130/88    Weight from Last 3 Encounters:   08/15/17 190 lb (86.2 kg)   07/27/17 190 lb (86.2 kg)   07/27/17 194 lb (88 kg)              We Performed the Following     AUDIOGRAM/TYMPANOGRAM - INTERFACE     COMPREHENSIVE HEARING TEST     TYMPANOMETRY AND REFLEX THRESHOLD MEASUREMENTS          Today's Medication Changes          These changes are accurate as of: 8/15/17  3:57 PM.  If you have any questions, ask your nurse or doctor.               Stop taking these medicines if you haven't already. Please contact your care team if you have questions.     ondansetron 4 MG ODT tab   Commonly known as:  ZOFRAN ODT   Stopped by:  Cori Prater MD           TYLENOL 500 MG tablet   Generic drug:  acetaminophen   Stopped by:  Cori Prater MD                    Primary Care Provider Office Phone # Fax #    Shawn Stephen Lewis [phone] [address] [facility] [address]        Equal Access to Services     DIPIKA WILL AH: Hadii ree daniel hadasho Somarianali, waaxda luqadaha, qaybta kaalmada adeegyada, randall gonzalesn syd monsalve. So [facility] [phone].    ATENCIÓN: Si habla español, tiene a dominguez disposición servicios gratuitos de asistencia lingüística. Llmike al [phone].    We comply with applicable federal civil rights laws and [place] laws. We do not discriminate on the basis of race, color, national origin, age, disability sex, sexual orientation or gender identity.            Thank you!     Thank you for choosing [facility]  for your care. Our goal is always to provide you with excellent care. Hearing back from our patients is one way we can continue to improve our services. Please take a few minutes to complete the written survey that you may  receive in the mail after your visit with us. Thank you!             Your Updated Medication List - Protect others around you: Learn how to safely use, store and throw away your medicines at www.disposemymeds.org.          This list is accurate as of: 8/15/17  3:57 PM.  Always use your most recent med list.                   Brand Name Dispense Instructions for use Diagnosis    ALEVE 220 MG tablet   Generic drug:  naproxen sodium      Take by mouth 2 times daily (with meals) Reported on 4/21/2017        atorvastatin 10 MG tablet    LIPITOR    90 tablet    Take 1 tablet (10 mg) by mouth daily    High blood cholesterol       blood glucose monitoring lancets     1 Box    Use to test blood sugars 4 times daily or as directed.    Type 2 diabetes mellitus without complication, without long-term current use of insulin (H)       blood glucose monitoring test strip    no brand specified    120 each    Test blood sugar 4 times per day    Type 2 diabetes mellitus without complication, without long-term current use of insulin (H)       ibuprofen 200 MG tablet    ADVIL/MOTRIN     Take 200 mg by mouth every 4 hours as needed for mild pain Reported on 4/21/2017        lisinopril 2.5 MG tablet    PRINIVIL/Zestril    90 tablet    Take 1 tablet (2.5 mg) by mouth daily    Type 2 diabetes mellitus without complication (H)       LORazepam 0.5 MG tablet    ATIVAN    20 tablet    Take 1 tablet (0.5 mg) by mouth every 8 hours as needed for other    Vertigo       meclizine 25 MG tablet    ANTIVERT    21 tablet    Take 1 tablet (25 mg) by mouth 3 times daily as needed for dizziness        metFORMIN 1000 MG tablet    GLUCOPHAGE    180 tablet    TAKE ONE TABLET BY MOUTH TWICE A DAY WITH MEALS    Type 2 diabetes mellitus without complication, without long-term current use of insulin (H)       sertraline 50 MG tablet    ZOLOFT    90 tablet    Take 1 tablet (50 mg) by mouth daily    Mild episode of recurrent major depressive disorder (H)

## 2017-08-15 NOTE — PROGRESS NOTES
AUDIOLOGY REPORT    SUBJECTIVE:  Tammy Joshi is a 37 year old female who was seen in the Audiology Clinic at [facility] for an audiologic evaluation, referred by Dr. Prater.   The patient reports a 2 week history of vertigo. She was seen by PT in the ED and reports the vertigo is better. She states she is still feeling off-balanced with her right ear feeling plugged . The patient denies bilateral tinnitus, otalgia in the left ear, bilateral drainage and fullness in the left ear.     OBJECTIVE:  Otoscopic exam indicates ears are clear of cerumen bilaterally     Pure Tone Thresholds assessed using conventional audiometry with good  reliability from 250-8000 Hz bilaterally using insert earphones and circumaural headphones     RIGHT:  normal hearing thresholds    LEFT:    normal hearing thresholds    Tympanogram:    RIGHT: normal eardrum mobility, ipsi/contra reflexes present     LEFT:   normal eardrum mobility, ipsi/contra reflexes present     Speech Reception Threshold:    RIGHT: 10 dB HL    LEFT:   10 dB HL  Word Recognition Score:     RIGHT: 92% at 50 dB HL using W22 recorded word list.    LEFT:   96% at 50 dB HL using W22 recorded word list.      ASSESSMENT:   Normal hearing assessment bilaterally.     Today s results were discussed with the patient in detail.     PLAN: It is recommended that the patient be seen by Dr. Prater for medical evaluation of their ears and hearing evaluation. Patient was counseled regarding hearing loss and impact on communication.  Please call this clinic with questions regarding these results or recommendations.        Emerald Montero M.A. F-AAA  Clinical audiologist [place] # [id number]  8/15/2017

## 2017-08-15 NOTE — PATIENT INSTRUCTIONS
Per Physician's instructions    Consider returning to PT for balance exercises.     Schedule eye exam. This can also cause balance problems.    If symptoms persist, schedule appointment with neurologist.

## 2017-08-15 NOTE — PROGRESS NOTES
"    History of Present Illness - Tammy Joshi is a 37 year old female who presents with complaints of vertigo. She describes concerns for slight hearing loss. She has to turn up the TV and others complain that it is too loud. She also feels like there is pressure in her ears. She feels that she needs to pop her ears, but cannot get either to do so. She hears a ringing or \"fan\" at night at well.  She recently had a physical therapy workup which suggested BPPV, and she has undergone canalith repositioning. This stopped the vertigo.She is able to roll over in bed and bend over without experiencing vertigo symptoms. She has little to no symptoms sitting and laying flat. Now she feels like she is \"seasick\" and she feels off-balance like the floor moves. It is worse when she first stands up or if she is walking and not paying close attention. When this happens, she often vomits. She states that she had this sensation before the vertigo as well. She notes that her vision is also blurry.     Past Medical History -   Patient Active Problem List   Diagnosis     Hand numbness     CARDIOVASCULAR SCREENING; LDL GOAL LESS THAN 160     Anxiety     Depression     Hyperlipidemia with target LDL less than 100     Type 2 diabetes mellitus without complication (H)       Current Medications -   Current Outpatient Prescriptions:      meclizine (ANTIVERT) 25 MG tablet, Take 1 tablet (25 mg) by mouth 3 times daily as needed for dizziness, Disp: 21 tablet, Rfl: 0     metFORMIN (GLUCOPHAGE) 1000 MG tablet, TAKE ONE TABLET BY MOUTH TWICE A DAY WITH MEALS, Disp: 180 tablet, Rfl: 1     sertraline (ZOLOFT) 50 MG tablet, Take 1 tablet (50 mg) by mouth daily, Disp: 90 tablet, Rfl: 1     atorvastatin (LIPITOR) 10 MG tablet, Take 1 tablet (10 mg) by mouth daily, Disp: 90 tablet, Rfl: 1     lisinopril (PRINIVIL/ZESTRIL) 2.5 MG tablet, Take 1 tablet (2.5 mg) by mouth daily, Disp: 90 tablet, Rfl: 3     blood glucose monitoring (FREESTYLE) " "lancets, Use to test blood sugars 4 times daily or as directed., Disp: 1 Box, Rfl: 1     blood glucose monitoring (NO BRAND SPECIFIED) test strip, Test blood sugar 4 times per day, Disp: 120 each, Rfl: 1     LORazepam (ATIVAN) 0.5 MG tablet, Take 1 tablet (0.5 mg) by mouth every 8 hours as needed for other (Patient not taking: Reported on 8/15/2017), Disp: 20 tablet, Rfl: 1     naproxen sodium (ALEVE) 220 MG tablet, Take by mouth 2 times daily (with meals) Reported on 4/21/2017, Disp: , Rfl:      ibuprofen (ADVIL,MOTRIN) 200 MG tablet, Take 200 mg by mouth every 4 hours as needed for mild pain Reported on 4/21/2017, Disp: , Rfl:     Allergies - No Known Allergies    Social History -   Social History     Social History     Marital status:      Spouse name: N/A     Number of children: 3     Years of education: N/A     Occupational History     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 Yes     adopted- unknown     Social History Narrative     Family History -   Family History   Problem Relation Age of Onset     Adopted: Yes     Unknown/Adopted Other      patient was adopted; knows a limited amount of medical history of birth parents       Review of Systems - As per HPI and PMHx, otherwise 7 system review of the head and neck negative. 10+ system review negative.    Physical Exam  /86 (BP Location: Left arm, Patient Position: Chair, Cuff Size: Adult Regular)  Pulse 99  Temp 98.4  F (36.9  C) (Oral)  Ht 1.626 m (5' 4\")  Wt 86.2 kg (190 lb)  LMP 07/06/2017  BMI 32.61 kg/m2  General - The patient is well nourished and well developed, and appears to have good nutritional status.  Alert and oriented to " person and place, answers questions and cooperates with examination appropriately.   Head and Face - Normocephalic and atraumatic, with no gross asymmetry noted of the contour of the facial features.  The facial nerve is intact, with strong symmetric movements.  Voice and Breathing - The patient was breathing comfortably without the use of accessory muscles. There was no wheezing, stridor, or stertor.  The patients voice was clear and strong, and had appropriate pitch and quality.  Ears - Bilateral pinna and EACs with normal appearing overlying skin. Tympanic membrane intact with good mobility on pneumatic otoscopy bilaterally. Bony landmarks of the ossicular chain are normal. The tympanic membranes are normal in appearance. No retraction, perforation, or masses.  No fluid or purulence was seen in the external canal or the middle ear.   Eyes - Extraocular movements intact.  Sclera were not icteric or injected, conjunctiva were pink and moist.  Mouth - Examination of the oral cavity showed pink, healthy oral mucosa. No lesions or ulcerations noted.  The tongue was mobile and midline, and the dentition were in good condition.    Throat - The walls of the oropharynx were smooth, pink, moist, symmetric, and had no lesions or ulcerations.  The tonsillar pillars and soft palate were symmetric.  The uvula was midline on elevation.  Neck - Normal midline excursion of the laryngotracheal complex during swallowing.  Full range of motion on passive movement.  Palpation of the occipital, submental, submandibular, internal jugular chain, and supraclavicular nodes did not demonstrate any abnormal lymph nodes or masses.  The carotid pulse was palpable bilaterally.  Palpation of the thyroid was soft and smooth, with no nodules or goiter appreciated.  The trachea was mobile and midline.  Nose - External contour is symmetric, no gross deflection or scars.  Nasal mucosa is pink and moist with no abnormal mucus.  The septum was midline  "and non-obstructive, turbinates of normal size and position.  No polyps, masses, or purulence noted on examination.    Audiogram: Normal hearing bilaterally.      Assessment - Tammy Joshi is a 37 year old female with a \"seasick\" sensation and balance issues after being treated for BPPV.  This is the most common form of vertigo in adults. The suspected etiology of benign paroxysmal positional vertigo is small crystals tumbling in the semicircular canals, which was explained at length.  Also, the treatment of the problem with physical maneuvers for canalith repositioning maneuvers was discussed, as well as the typically favorable success rate. After successful treatment of BPPV with canalith repositioning, many patients feel some disequilibrium for a few days. However, she seems to have a more longstanding disequilibrium in addition to the BPPV. I recommended she followup with physical therapy to ascertain whether she might benefit from further maneuvers or perhaps balance exercises. Other investigations may include Neurology evaluation or optometric evaluation. The nausea and vomitting seems likely a result of her disequilibrium, but could potentially warrant separate workup as well.    This document serves as a record of the services and decisions personally performed and made by Dr. Cori Prater MD. It was created on his behalf by Anne Marie Salazar, a trained medical scribe. The creation of this document is based the provider's statements to the medical scribe.  Anne Marie Salazar 3:02 PM 8/15/2017    Provider:   The information in this document, created by the medical scribe for me, accurately reflects the services I personally performed and the decisions made by me. I have reviewed and approved this document for accuracy prior to leaving the patient care area.  Dr. Cori Prater MD 3:02 PM 8/15/2017    Dr. Cori Prater MD  Otolaryngology  [facility]      "

## 2017-08-15 NOTE — MR AVS SNAPSHOT
"              After Visit Summary   8/15/2017    Tammy Joshi    MRN: [id number]           Patient Information     Date Of Birth          1980        Visit Information        Provider Department      8/15/2017 3:00 PM Cori Prater MD [facility]        Today's Diagnoses     Vertigo    -  1      Care Instructions    Per Physician's instructions    Consider returning to PT for balance exercises.     Schedule eye exam. This can also cause balance problems.    If symptoms persist, schedule appointment with neurologist.             Follow-ups after your visit        Additional Services     AUDIOLOGY ADULT REFERRAL       Your provider has referred you to: [facility] [phone]   [email or web address]    Specialty Testing:  Audiogram w/Tymps and Reflexes (Comprehensive Audiology Evaluation)                  Who to contact     If you have questions or need follow up information about today's clinic visit or your schedule please contact [facility] directly at [phone].  Normal or non-critical lab and imaging results will be communicated to you by [employer], letter or phone within 4 business days after the clinic has received the results. If you do not hear from us within 7 days, please contact the clinic through [employer] or phone. If you have a critical or abnormal lab result, we will notify you by phone as soon as possible.  Submit refill requests through [employer] or call your pharmacy and they will forward the refill request to us. Please allow 3 business days for your refill to be completed.          Additional Information About Your Visit        [employer] Information     [employer] lets you send messages to your doctor, view your test results, renew your prescriptions, schedule appointments and more. To sign up, go to [email or web address] . Click on \"Log in\" on the left side of the screen, which will take you to the " "Welcome page. Then click on \"Sign up Now\" on the right side of the page.     You will be asked to enter the access code listed below, as well as some personal information. Please follow the directions to create your username and password.     Your access code is: [email or web address]  Expires: 10/21/2017  4:49 PM     Your access code will  in 90 days. If you need help or a new code, please call your [facility] clinic or [phone].        Care EveryWhere ID     This is your Care EveryWhere ID. This could be used by other organizations to access your [place] medical records  [id number]        Your Vitals Were     Pulse Temperature Height Last Period BMI (Body Mass Index)       99 98.4  F (36.9  C) (Oral) 1.626 m (5' 4\") 2017 32.61 kg/m2        Blood Pressure from Last 3 Encounters:   08/15/17 119/86   17 (!) 129/91   17 130/88    Weight from Last 3 Encounters:   08/15/17 86.2 kg (190 lb)   17 86.2 kg (190 lb)   17 88 kg (194 lb)              We Performed the Following     AUDIOLOGY ADULT REFERRAL          Today's Medication Changes          These changes are accurate as of: 8/15/17  3:13 PM.  If you have any questions, ask your nurse or doctor.               Stop taking these medicines if you haven't already. Please contact your care team if you have questions.     ondansetron 4 MG ODT tab   Commonly known as:  ZOFRAN ODT   Stopped by:  Cori Prater MD           TYLENOL 500 MG tablet   Generic drug:  acetaminophen   Stopped by:  Cori Prater MD                    Primary Care Provider Office Phone # Fax #    Shawn Lewis [phone] [address] [facility] [address]        Equal Access to Services     [facility] : Yaa Torres, wapoloda luqadaha, qaybta kaalrandall wilson . [facility] [phone].    ATENCIÓN: Si habla español, tiene a dominguez disposición servicios gratuitos de asistencia " lingüística. Cristina al [phone].    We comply with applicable federal civil rights laws and [place] laws. We do not discriminate on the basis of race, color, national origin, age, disability sex, sexual orientation or gender identity.            Thank you!     Thank you for choosing [facility]  for your care. Our goal is always to provide you with excellent care. Hearing back from our patients is one way we can continue to improve our services. Please take a few minutes to complete the written survey that you may receive in the mail after your visit with us. Thank you!             Your Updated Medication List - Protect others around you: Learn how to safely use, store and throw away your medicines at www.disposemymeds.org.          This list is accurate as of: 8/15/17  3:13 PM.  Always use your most recent med list.                   Brand Name Dispense Instructions for use Diagnosis    ALEVE 220 MG tablet   Generic drug:  naproxen sodium      Take by mouth 2 times daily (with meals) Reported on 4/21/2017        atorvastatin 10 MG tablet    LIPITOR    90 tablet    Take 1 tablet (10 mg) by mouth daily    High blood cholesterol       blood glucose monitoring lancets     1 Box    Use to test blood sugars 4 times daily or as directed.    Type 2 diabetes mellitus without complication, without long-term current use of insulin (H)       blood glucose monitoring test strip    no brand specified    120 each    Test blood sugar 4 times per day    Type 2 diabetes mellitus without complication, without long-term current use of insulin (H)       ibuprofen 200 MG tablet    ADVIL/MOTRIN     Take 200 mg by mouth every 4 hours as needed for mild pain Reported on 4/21/2017        lisinopril 2.5 MG tablet    PRINIVIL/Zestril    90 tablet    Take 1 tablet (2.5 mg) by mouth daily    Type 2 diabetes mellitus without complication (H)       LORazepam 0.5 MG tablet    ATIVAN    20 tablet    Take 1 tablet (0.5 mg) by mouth  every 8 hours as needed for other    Vertigo       meclizine 25 MG tablet    ANTIVERT    21 tablet    Take 1 tablet (25 mg) by mouth 3 times daily as needed for dizziness        metFORMIN 1000 MG tablet    GLUCOPHAGE    180 tablet    TAKE ONE TABLET BY MOUTH TWICE A DAY WITH MEALS    Type 2 diabetes mellitus without complication, without long-term current use of insulin (H)       sertraline 50 MG tablet    ZOLOFT    90 tablet    Take 1 tablet (50 mg) by mouth daily    Mild episode of recurrent major depressive disorder (H)

## 2017-09-08 ENCOUNTER — ALLIED HEALTH/NURSE VISIT (OUTPATIENT)
Dept: FAMILY MEDICINE | Facility: CLINIC | Age: 37
End: 2017-09-08
Payer: COMMERCIAL

## 2017-09-08 ENCOUNTER — HOSPITAL ENCOUNTER (OUTPATIENT)
Dept: PHYSICAL THERAPY | Facility: CLINIC | Age: 37
Setting detail: THERAPIES SERIES
End: 2017-09-08
Attending: FAMILY MEDICINE
Payer: COMMERCIAL

## 2017-09-08 ENCOUNTER — HOSPITAL ENCOUNTER (EMERGENCY)
Facility: CLINIC | Age: 37
Discharge: HOME | End: 2017-09-08
Attending: NURSE PRACTITIONER | Admitting: NURSE PRACTITIONER
Payer: COMMERCIAL

## 2017-09-08 VITALS
RESPIRATION RATE: 18 BRPM | WEIGHT: 187 LBS | DIASTOLIC BLOOD PRESSURE: 72 MMHG | TEMPERATURE: 98.2 F | OXYGEN SATURATION: 98 % | SYSTOLIC BLOOD PRESSURE: 119 MMHG | HEIGHT: 64 IN | BODY MASS INDEX: 31.92 KG/M2 | HEART RATE: 98 BPM

## 2017-09-08 DIAGNOSIS — M25.552 HIP PAIN, LEFT: Primary | ICD-10-CM

## 2017-09-08 DIAGNOSIS — M62.838 MUSCLE SPASM OF LEFT LOWER EXTREMITY: Primary | ICD-10-CM

## 2017-09-08 PROCEDURE — 99207 ZZC NO CHARGE NURSE ONLY: CPT

## 2017-09-08 PROCEDURE — 97161 PT EVAL LOW COMPLEX 20 MIN: CPT | Mod: GP | Performed by: PHYSICAL THERAPIST

## 2017-09-08 PROCEDURE — 99212 OFFICE O/P EST SF 10 MIN: CPT

## 2017-09-08 PROCEDURE — 40000718 ZZHC STATISTIC PT DEPARTMENT ORTHO VISIT: Performed by: PHYSICAL THERAPIST

## 2017-09-08 PROCEDURE — 97140 MANUAL THERAPY 1/> REGIONS: CPT | Mod: GP | Performed by: PHYSICAL THERAPIST

## 2017-09-08 PROCEDURE — 97110 THERAPEUTIC EXERCISES: CPT | Mod: GP | Performed by: PHYSICAL THERAPIST

## 2017-09-08 PROCEDURE — 99213 OFFICE O/P EST LOW 20 MIN: CPT | Performed by: NURSE PRACTITIONER

## 2017-09-08 RX ORDER — CYCLOBENZAPRINE HCL 10 MG
10 TABLET ORAL
Qty: 14 TABLET | Refills: 1 | Status: SHIPPED | OUTPATIENT
Start: 2017-09-08 | End: 2018-09-17

## 2017-09-08 RX ORDER — HYDROCODONE BITARTRATE AND ACETAMINOPHEN 5; 325 MG/1; MG/1
1-2 TABLET ORAL EVERY 4 HOURS PRN
Qty: 15 TABLET | Refills: 0 | Status: SHIPPED | OUTPATIENT
Start: 2017-09-08 | End: 2018-09-10

## 2017-09-08 NOTE — PROGRESS NOTES
09/08/17 1400   General Information   Type of Visit Initial OP Ortho PT Evaluation   Start of Care Date 09/08/17   Referring Physician Dr. chin   Patient/Family Goals Statement decrease pain   Orders Evaluate and Treat   Date of Order 08/28/17   Insurance Type Private   Medical Diagnosis hip pain, L   Surgical/Medical history reviewed Yes   Precautions/Limitations no known precautions/limitations   Body Part(s)   Body Part(s) Hip   Presentation and Etiology   Pertinent history of current problem (include personal factors and/or comorbidities that impact the POC) hip pain for years.  gets real bad after sitting.  hard to stand after sitting.  hurts almost constantly.  hurts with walking.  bad after the car.  lateral hip by trochanter.  -xray.     Impairments A. Pain;B. Decreased WB tolerance;D. Decreased ROM;E. Decreased flexibility;G. Impaired balance;H. Impaired gait;Q. Dizziness   Functional Limitations perform activities of daily living;perform required work activities;perform desired leisure / sports activities   How/Where did it occur From insidious onset   Onset date of current episode/exacerbation 09/08/15   Chronicity Chronic   Pain rating (0-10 point scale) Best (/10);Worst (/10)   Best (/10) 1   Worst (/10) 10   Pain quality A. Sharp;B. Dull;C. Aching;D. Burning;E. Shooting;F. Stabbing;G. Cramping   Frequency of pain/symptoms A. Constant   Pain/symptoms are: The same all the time   Pain/symptoms exacerbated by A. Sitting;B. Walking;E. Rest;F. Nothing;H. Overhead reach;C. Lifting;D. Carrying;G. Certain positions;I. Bending;J. ADL;K. Home tasks;L. Work tasks   Pain/symptoms eased by E. Changing positions;F. Certain positions   Fall Risk Screen   Per patient - Fall 2 or more times in past year? Yes   Per patient - Fall with injury in past year? No   Timed Up and Go score (seconds) 10   Is patient a fall risk? No  (being seen for dizziness.  not unsatble)   Hip Objective Findings   Side (if bilateral,  select both right and left) Left   Posture flat lumbar   Gait/Locomotion stiff   Lumbar ROM stiff ext, pain R SB   Pelvic Screen R ant innom, + R march   Femoral Nerve Stretch Test -   Scour Test -   FADIR Test lateral hip pain   Palpation tender greater trochanter, L piri and glutes   Accessory Motion/Joint Mobility L LIZ post, FRS RL5, ERS LL5,    Left Hip Flexion PROM 120 ER stiff   Left Hip ER PROM ER stiff   Left Hip IR PROM ER stiff   Left Hip Ext PROM 30   Peterson Flexibility Test -   Obers/ITB Flexibility +   Left Hamstring Flexibility 70 donny, stiff L   Left Piriformis Flexibility pain L   Left Prone Quad Flexibility 130 donny   Planned Therapy Interventions   Planned Therapy Interventions joint mobilization;manual therapy;ROM;strengthening;stretching;neuromuscular re-education;balance training   Clinical Impression   Criteria for Skilled Therapeutic Interventions Met yes, treatment indicated   PT Diagnosis Hip pain   Influenced by the following impairments pain, stiffness weakness   Clinical Presentation Stable/Uncomplicated   Clinical Presentation Rationale chronic pain   Clinical Decision Making (Complexity) Low complexity   Therapy Frequency 1 time/week   Predicted Duration of Therapy Intervention (days/wks) 8   Risk & Benefits of therapy have been explained Yes   Patient, Family & other staff in agreement with plan of care No   Education Assessment   Preferred Learning Style Listening   Barriers to Learning No barriers   ORTHO GOALS   PT Ortho Eval Goals 1;2;3   Ortho Goal 1   Goal Identifier 1   Goal Description pt will walk without limp   Target Date 10/08/17   Ortho Goal 2   Goal Identifier 2   Goal Description pt will be able to ascend/descend stairs wiht min pain   Target Date 11/08/17   Ortho Goal 3   Goal Identifier 3   Goal Description pt will be able to sleep on L side    Target Date 11/08/17   Total Evaluation Time   Total Evaluation Time 30

## 2017-09-08 NOTE — ED PROVIDER NOTES
"  History     Chief Complaint   Patient presents with     Hip Pain     L hip.  acute on chronic.  going to PT for same.  has an apt on Monday but she has not pain meds at home.       HPI  Tammy Joshi is a 37 year old female who presents with left hip pain.  Pt states the pain has been chronic and she was referred by Dr. Lewis to physical therapy.  She states she went to her first visit today and immediately post therapy there was pain and it is much worse than anticipated and she is wanting something to help manage the pain.  She denies any other concerns.  She has a follow up appointment on Monday with Dr. Lewis.  Pt denies bowel or bladder dysfunction.    I have reviewed the Medications, Allergies, Past Medical and Surgical History, and Social History in the Epic system.    Patient Active Problem List   Diagnosis     Hand numbness     CARDIOVASCULAR SCREENING; LDL GOAL LESS THAN 160     Anxiety     Depression     Hyperlipidemia with target LDL less than 100     Type 2 diabetes mellitus without complication (H)     Hip pain, left     Past Surgical History:   Procedure Laterality Date     C HAND/FINGER SURGERY UNLISTED  7th grade    left index     D & C  1998    Missed Ab     LITHOTRIPSY  2011       Review of Systems  10 point ROS of systems including Constitutional, Eyes, Respiratory, Cardiovascular, Gastroenterology, Genitourinary, Integumentary, Muscularskeletal, Psychiatric were all negative except for pertinent positives noted in my HPI.    Physical Exam   BP: 119/72  Pulse: 98  Temp: 98.2  F (36.8  C)  Resp: 18  Height: 162.6 cm (5' 4\")  Weight: 84.8 kg (187 lb)  SpO2: 98 %  Physical Exam   Constitutional: She appears well-developed and well-nourished. No distress.   Musculoskeletal:        Left hip: She exhibits tenderness (at gluteus medius region and gluteus viral slightly distal greater trochanter there is muscle spasm noted.  pt able to bear weight and reports pain and gait is antalgic; " there is normal sensation and color and of extermity and normal motion of feet). She exhibits normal range of motion, normal strength, no bony tenderness, no swelling, no crepitus, no deformity and no laceration.   Skin: She is not diaphoretic.       ED Course     ED Course     Procedures    Labs Ordered and Resulted from Time of ED Arrival Up to the Time of Departure from the ED - No data to display    Assessments & Plan (with Medical Decision Making)     I have reviewed the nursing notes.    I have reviewed the findings, diagnosis, plan and need for follow up with the patient.  Tammy Joshi is a 37 year old female who presents with left hip pain.  Pt states the pain has been chronic and she was referred by Dr. Lewis to physical therapy.  She states she went to her first visit today and immediately post therapy there was pain and it is much worse than anticipated and she is wanting something to help manage the pain.  She denies any other concerns.  She has a follow up appointment on Monday with Dr. Lewis.  Exam as noted above.  Discussed trigger point injections and offered by Mani Weber and patient declined.  Discussed and encouraged consider acupuncture with Dr. Robert.  Discussed narcotic usage and flexeril and limited usage.  Discussed risks and benefits.  Pt agrees and denies questions.  Discharge Medication List as of 9/8/2017  4:16 PM      START taking these medications    Details   cyclobenzaprine (FLEXERIL) 10 MG tablet Take 1 tablet (10 mg) by mouth nightly as needed for muscle spasms, Disp-14 tablet, R-1, E-Prescribe      HYDROcodone-acetaminophen (NORCO) 5-325 MG per tablet Take 1-2 tablets by mouth every 4 hours as needed for moderate to severe pain, Disp-15 tablet, R-0, Local Print             Final diagnoses:   Muscle spasm of left lower extremity       9/8/2017   [facility] EMERGENCY DEPARTMENT     Sanjuana Ramos, PACO CNP  09/08/17 8328

## 2017-09-08 NOTE — NURSING NOTE
"Patient stops by after her initial PT treatment/eval.  Patient states \"I had no idea my hip was going to hurt this bad after the treatment\".  \"I need something stronger than Ibuprofen or tylenol\".  The controlled substance policy is gone over with the patient.  She is willing to be seen but the hour of day is after 3 p.m. And all appt are gone.  I offered to look at surrounding clinics but patient states \"I want to go to  then\".  Patient offered a WC and taken over to the  area. Archana MAGALLANES RN    "

## 2017-09-08 NOTE — ED AVS SNAPSHOT
[facility] Department    [address] [address]    Phone:  [phone]    Fax:  [phone]                                       Tammy Joshi   MRN: [id number]    Department:  [facility]   Date of Visit:  9/8/2017           After Visit Summary Signature Page     I have received my discharge instructions, and my questions have been answered. I have discussed any challenges I see with this plan with the nurse or doctor.    ..........................................................................................................................................  Patient/Patient Representative Signature      ..........................................................................................................................................  Patient Representative Print Name and Relationship to Patient    ..................................................               ................................................  Date                                            Time    ..........................................................................................................................................  Reviewed by Signature/Title    ...................................................              ..............................................  Date                                                            Time

## 2017-09-08 NOTE — ED AVS SNAPSHOT
[facility] Emergency Department    [address] [address]    Phone:  [phone]    Fax:  [phone]                                       Tammy oJshi   MRN: [id number]    Department:  [facility] Department   Date of Visit:  9/8/2017           Patient Information     Date Of Birth          1980        Your diagnoses for this visit were:     Muscle spasm of left lower extremity        You were seen by Sanjuana Ramos APRN CNP.      Follow-up Information     Follow up with Shawn Lewis MD In 3 days.    Specialty:  Family Practice    Why:  recheck of left hip    Contact information:    [address] [address]  [phone]          Discharge Instructions           Trigger Point Injection  The cause of your muscle pain or spasms may be one or more trigger points. Your healthcare provider may decide to inject the painful spots to relax the muscle. This can help relieve your pain. Relaxing the muscle can also make movement easier. You may then be able to exercise to strengthen the muscle and help it heal.    What is a trigger point?  A trigger point is a tight, painful  knot  of muscle fiber. It can form where a muscle is strained or injured. The knot can sometimes be felt under the skin. A trigger point is very tender to the touch. Pain may also spread to other parts of the affected muscle. Muscles around a knee, shoulder blade, or other bones are prone to trigger points. This is because these muscles are more likely to be injured.     Injecting a trigger point can help relax the affected muscle and relieve pain.   About the injections  Any muscle in the body can have one or more trigger points. Several injections may be needed in each trigger point to best relieve pain. These injections may be given in sessions about 2 weeks apart, depending on the preference of your healthcare provider. In some cases, you may not feel much change in  your symptoms until after the third injection.  Risks and possible complications  Risks and complications are very rare, but may include:    Infection    Bleeding    Lung puncture (pneumothorax)    Nerve damage   Date Last Reviewed: 9/21/2015 2000-2017 [employer]. [address] [place] [address], [place] [address]. All rights reserved. This information is not intended as a substitute for professional medical care. Always follow your healthcare professional's instructions.          Muscle Spasm  A muscle spasm is a sudden tightening of the muscle you can t control. This may be caused by strain, overworking the muscle, or injury. It can also be caused by dehydration, electrolyte imbalance, diabetes, alcohol use, and certain medicines. If it goes on long enough the muscle spasm causes pain. Common areas for muscle spasm are the legs, neck, and back.  Home care    Heat, massage, and stretching will help relax muscle spasm.    When the spasm is in your arm or leg, stretch the muscle passively. To do this, have someone bend or straighten the joint above or below the muscle until you feel the stretch on the sore muscle. You can stretch the muscle actively by moving the affected body part. This will stretch the muscle that is in spasm. For example, if the spasm is in your calf, bend the ankle so your toes point upward toward your knee. This will stretch your calf muscle.    You may use over-the-counter pain medicine to control pain, unless another medicine was prescribed. If you have chronic liver or kidney disease or ever had a stomach ulcer or GI bleeding, talk with your healthcare provider before using these medicines.  Follow-up care  Follow up with your healthcare provider, or as advised.    When to seek medical advice  Call your healthcare provider right away if any of the following occur:    Fingers or toes become swollen, cold, blue, numb, or tingly    You develop weakness in the affected arm or leg    Pain  increases and is not controlled by the above measures  Date Last Reviewed: 11/21/2015 2000-2017 [employer]. [address] [place] [address], [place] [address]. All rights reserved. This information is not intended as a substitute for professional medical care. Always follow your healthcare professional's instructions.                Future Appointments        Provider Department Dept Phone Center    9/11/2017 1:20 PM Shawn Lewis MD [facility] [phone] [facility]    9/14/2017 3:00 PM Otis Anne PT [facility] Physical Therapy [phone] [facility]      24 Hour Appointment Hotline       To make an appointment at any [facility], call [phone] ([phone]). If you don't have a family doctor or clinic, we will help you find one. [facility] clinics are conveniently located to serve the needs of you and your family.             Review of your medicines      START taking        Dose / Directions Last dose taken    cyclobenzaprine 10 MG tablet   Commonly known as:  FLEXERIL   Dose:  10 mg   Quantity:  14 tablet        Take 1 tablet (10 mg) by mouth nightly as needed for muscle spasms   Refills:  1        HYDROcodone-acetaminophen 5-325 MG per tablet   Commonly known as:  NORCO   Dose:  1-2 tablet   Quantity:  15 tablet        Take 1-2 tablets by mouth every 4 hours as needed for moderate to severe pain   Refills:  0          Our records show that you are taking the medicines listed below. If these are incorrect, please call your family doctor or clinic.        Dose / Directions Last dose taken    ALEVE 220 MG tablet   Generic drug:  naproxen sodium        Take by mouth 2 times daily (with meals) Reported on 4/21/2017   Refills:  0        atorvastatin 10 MG tablet   Commonly known as:  LIPITOR   Dose:  10 mg   Quantity:  90 tablet        Take 1 tablet (10 mg) by mouth daily   Refills:  1        blood glucose monitoring lancets   Quantity:  1 Box        Use to test  blood sugars 4 times daily or as directed.   Refills:  1        blood glucose monitoring test strip   Commonly known as:  no brand specified   Quantity:  120 each        Test blood sugar 4 times per day   Refills:  1        ibuprofen 200 MG tablet   Commonly known as:  ADVIL/MOTRIN   Dose:  200 mg        Take 200 mg by mouth every 4 hours as needed for mild pain Reported on 4/21/2017   Refills:  0        lisinopril 2.5 MG tablet   Commonly known as:  PRINIVIL/Zestril   Dose:  2.5 mg   Quantity:  90 tablet        Take 1 tablet (2.5 mg) by mouth daily   Refills:  3        metFORMIN 1000 MG tablet   Commonly known as:  GLUCOPHAGE   Quantity:  180 tablet        TAKE ONE TABLET BY MOUTH TWICE A DAY WITH MEALS   Refills:  1        sertraline 50 MG tablet   Commonly known as:  ZOLOFT   Dose:  50 mg   Quantity:  90 tablet        Take 1 tablet (50 mg) by mouth daily   Refills:  1          STOP taking        Dose Reason for stopping Comments    LORazepam 0.5 MG tablet   Commonly known as:  ATIVAN              meclizine 25 MG tablet   Commonly known as:  ANTIVERT                      Prescriptions were sent or printed at these locations (2 Prescriptions)                   [facility] [place], [place] - [address]   [address] [address]    Telephone:  [phone]   Fax:  [phone]   Hours:                  E-Prescribed (1 of 2)         cyclobenzaprine (FLEXERIL) 10 MG tablet                 Printed at Department/Unit printer (1 of 2)         HYDROcodone-acetaminophen (NORCO) 5-325 MG per tablet                Orders Needing Specimen Collection     None      Pending Results     No orders found from 9/6/2017 to 9/9/2017.            Pending Culture Results     No orders found from 9/6/2017 to 9/9/2017.            Pending Results Instructions     If you had any lab results that were not finalized at the time of your Discharge, you can call the ED Lab Result RN at [phone]. You will be  "contacted by this team for any positive Lab results or changes in treatment. The nurses are available 7 days a week from 10A to 6:30P.  You can leave a message 24 hours per day and they will return your call.        Test Results From Your Hospital Stay               Thank you for choosing [facility]       Thank you for choosing [facility] for your care. Our goal is always to provide you with excellent care. Hearing back from our patients is one way we can continue to improve our services. Please take a few minutes to complete the written survey that you may receive in the mail after you visit with us. Thank you!        [employer] Information     [employer] lets you send messages to your doctor, view your test results, renew your prescriptions, schedule appointments and more. To sign up, go to [email or web address] . Click on \"Log in\" on the left side of the screen, which will take you to the Welcome page. Then click on \"Sign up Now\" on the right side of the page.     You will be asked to enter the access code listed below, as well as some personal information. Please follow the directions to create your username and password.     Your access code is: [email or web address]  Expires: 10/21/2017  4:49 PM     Your access code will  in 90 days. If you need help or a new code, please call your [facility] clinic or [phone].        Care EveryWhere ID     This is your Care EveryWhere ID. This could be used by other organizations to access your [facility] medical records  [id number]        Equal Access to Services     DIPIKA WILL AH: Hadii ree Torres, waaxda cm, qaybta kaalmada candy, randall monsalve. So [facility] [phone].    ATENCIÓN: Si habla español, tiene a dominguez disposición servicios gratuitos de asistencia lingüística. Cristina al [phone].    We comply with applicable federal civil rights laws and [place] laws. We do not discriminate on the basis of race, color, national origin, " age, disability sex, sexual orientation or gender identity.            After Visit Summary       This is your record. Keep this with you and show to your community pharmacist(s) and doctor(s) at your next visit.

## 2017-09-08 NOTE — MR AVS SNAPSHOT
"              After Visit Summary   9/8/2017    Tammy Joshi    MRN: [id number]           Patient Information     Date Of Birth          1980        Visit Information        Provider Department      9/8/2017 3:00 PM ANN MACIAS/IM RN [facility]        Today's Diagnoses     Hip pain, left    -  1       Follow-ups after your visit        Your next 10 appointments already scheduled     Sep 11, 2017  1:20 PM CDT   SHORT with Shawn Lewis MD   [facility]    [address] [facility] [address] [phone]            Sep 14, 2017  3:00 PM CDT   Ortho Treatment with Otis Anne PT   [facility] ([facility])    [address]  [address]  [place] [id number] [phone]              Who to contact     If you have questions or need follow up information about today's clinic visit or your schedule please contact [facility] directly at [phone].  Normal or non-critical lab and imaging results will be communicated to you by [employer], letter or phone within 4 business days after the clinic has received the results. If you do not hear from us within 7 days, please contact the clinic through [employer] or phone. If you have a critical or abnormal lab result, we will notify you by phone as soon as possible.  Submit refill requests through [employer] or call your pharmacy and they will forward the refill request to us. Please allow 3 business days for your refill to be completed.          Additional Information About Your Visit        [employer] Information     [employer] lets you send messages to your doctor, view your test results, renew your prescriptions, schedule appointments and more. To sign up, go to [email or web address] . Click on \"Log in\" on the left side of the screen, which will take you to the Welcome page. Then click on \"Sign up Now\" on the right side of the page.     You " will be asked to enter the access code listed below, as well as some personal information. Please follow the directions to create your username and password.     Your access code is: [email or web address]  Expires: 10/21/2017  4:49 PM     Your access code will  in 90 days. If you need help or a new code, please call your [place] clinic or [phone].        Care EveryWhere ID     This is your Care EveryWhere ID. This could be used by other organizations to access your [place] medical records  [id number]         Blood Pressure from Last 3 Encounters:   17 119/72   08/15/17 119/86   17 (!) 129/91    Weight from Last 3 Encounters:   17 187 lb (84.8 kg)   08/15/17 190 lb (86.2 kg)   17 190 lb (86.2 kg)              Today, you had the following     No orders found for display       Primary Care Provider Office Phone # Fax #    Jenyradha Stephen Lewis [phone] [address] [address]        Equal Access to Services     [facility] campusDAVE : Hadii aad ku hadasho Somarianali, waaxda luqadaha, qaybta kaalmada adeepifanioyada, randall rendon . So [facility] [phone].    ATENCIÓN: Si habla español, tiene a doimnguez disposición servicios gratuitos de asistencia lingüística. Llame al [phone].    We comply with applicable federal civil rights laws and [place] laws. We do not discriminate on the basis of race, color, national origin, age, disability sex, sexual orientation or gender identity.            Thank you!     Thank you for choosing [facility]  for your care. Our goal is always to provide you with excellent care. Hearing back from our patients is one way we can continue to improve our services. Please take a few minutes to complete the written survey that you may receive in the mail after your visit with us. Thank you!             Your Updated Medication List - Protect others around you: Learn how to safely use, store and throw away  your medicines at www.disposemymeds.org.          This list is accurate as of: 9/8/17  3:23 PM.  Always use your most recent med list.                   Brand Name Dispense Instructions for use Diagnosis    ALEVE 220 MG tablet   Generic drug:  naproxen sodium      Take by mouth 2 times daily (with meals) Reported on 4/21/2017        atorvastatin 10 MG tablet    LIPITOR    90 tablet    Take 1 tablet (10 mg) by mouth daily    High blood cholesterol       blood glucose monitoring lancets     1 Box    Use to test blood sugars 4 times daily or as directed.    Type 2 diabetes mellitus without complication, without long-term current use of insulin (H)       blood glucose monitoring test strip    no brand specified    120 each    Test blood sugar 4 times per day    Type 2 diabetes mellitus without complication, without long-term current use of insulin (H)       ibuprofen 200 MG tablet    ADVIL/MOTRIN     Take 200 mg by mouth every 4 hours as needed for mild pain Reported on 4/21/2017        lisinopril 2.5 MG tablet    PRINIVIL/Zestril    90 tablet    Take 1 tablet (2.5 mg) by mouth daily    Type 2 diabetes mellitus without complication (H)       LORazepam 0.5 MG tablet    ATIVAN    20 tablet    Take 1 tablet (0.5 mg) by mouth every 8 hours as needed for other    Vertigo       meclizine 25 MG tablet    ANTIVERT    21 tablet    Take 1 tablet (25 mg) by mouth 3 times daily as needed for dizziness        metFORMIN 1000 MG tablet    GLUCOPHAGE    180 tablet    TAKE ONE TABLET BY MOUTH TWICE A DAY WITH MEALS    Type 2 diabetes mellitus without complication, without long-term current use of insulin (H)       sertraline 50 MG tablet    ZOLOFT    90 tablet    Take 1 tablet (50 mg) by mouth daily    Mild episode of recurrent major depressive disorder (H)

## 2017-09-08 NOTE — DISCHARGE INSTRUCTIONS
Trigger Point Injection  The cause of your muscle pain or spasms may be one or more trigger points. Your healthcare provider may decide to inject the painful spots to relax the muscle. This can help relieve your pain. Relaxing the muscle can also make movement easier. You may then be able to exercise to strengthen the muscle and help it heal.    What is a trigger point?  A trigger point is a tight, painful  knot  of muscle fiber. It can form where a muscle is strained or injured. The knot can sometimes be felt under the skin. A trigger point is very tender to the touch. Pain may also spread to other parts of the affected muscle. Muscles around a knee, shoulder blade, or other bones are prone to trigger points. This is because these muscles are more likely to be injured.     Injecting a trigger point can help relax the affected muscle and relieve pain.   About the injections  Any muscle in the body can have one or more trigger points. Several injections may be needed in each trigger point to best relieve pain. These injections may be given in sessions about 2 weeks apart, depending on the preference of your healthcare provider. In some cases, you may not feel much change in your symptoms until after the third injection.  Risks and possible complications  Risks and complications are very rare, but may include:    Infection    Bleeding    Lung puncture (pneumothorax)    Nerve damage   Date Last Reviewed: 9/21/2015 2000-2017 The [employer]. [address] [place], [place] [address]. All rights reserved. This information is not intended as a substitute for professional medical care. Always follow your healthcare professional's instructions.          Muscle Spasm  A muscle spasm is a sudden tightening of the muscle you can t control. This may be caused by strain, overworking the muscle, or injury. It can also be caused by dehydration, electrolyte imbalance, diabetes, alcohol use, and certain medicines.  If it goes on long enough the muscle spasm causes pain. Common areas for muscle spasm are the legs, neck, and back.  Home care    Heat, massage, and stretching will help relax muscle spasm.    When the spasm is in your arm or leg, stretch the muscle passively. To do this, have someone bend or straighten the joint above or below the muscle until you feel the stretch on the sore muscle. You can stretch the muscle actively by moving the affected body part. This will stretch the muscle that is in spasm. For example, if the spasm is in your calf, bend the ankle so your toes point upward toward your knee. This will stretch your calf muscle.    You may use over-the-counter pain medicine to control pain, unless another medicine was prescribed. If you have chronic liver or kidney disease or ever had a stomach ulcer or GI bleeding, talk with your healthcare provider before using these medicines.  Follow-up care  Follow up with your healthcare provider, or as advised.    When to seek medical advice  Call your healthcare provider right away if any of the following occur:    Fingers or toes become swollen, cold, blue, numb, or tingly    You develop weakness in the affected arm or leg    Pain increases and is not controlled by the above measures  Date Last Reviewed: 11/21/2015 2000-2017 [employer]. [address] [place] [address], [place] [address]. All rights reserved. This information is not intended as a substitute for professional medical care. Always follow your healthcare professional's instructions.

## 2017-09-11 ENCOUNTER — OFFICE VISIT (OUTPATIENT)
Dept: FAMILY MEDICINE | Facility: CLINIC | Age: 37
End: 2017-09-11
Payer: COMMERCIAL

## 2017-09-11 VITALS
HEIGHT: 64 IN | DIASTOLIC BLOOD PRESSURE: 78 MMHG | BODY MASS INDEX: 33.29 KG/M2 | SYSTOLIC BLOOD PRESSURE: 115 MMHG | TEMPERATURE: 98.4 F | HEART RATE: 109 BPM | WEIGHT: 195 LBS

## 2017-09-11 DIAGNOSIS — E11.9 TYPE 2 DIABETES MELLITUS WITHOUT COMPLICATION, WITHOUT LONG-TERM CURRENT USE OF INSULIN (H): Primary | ICD-10-CM

## 2017-09-11 DIAGNOSIS — M70.62 TROCHANTERIC BURSITIS OF LEFT HIP: ICD-10-CM

## 2017-09-11 DIAGNOSIS — R42 DIZZINESS: ICD-10-CM

## 2017-09-11 LAB — HBA1C MFR BLD: 10.3 % (ref 4.3–6)

## 2017-09-11 PROCEDURE — 36416 COLLJ CAPILLARY BLOOD SPEC: CPT | Performed by: FAMILY MEDICINE

## 2017-09-11 PROCEDURE — 83036 HEMOGLOBIN GLYCOSYLATED A1C: CPT | Performed by: FAMILY MEDICINE

## 2017-09-11 PROCEDURE — 99214 OFFICE O/P EST MOD 30 MIN: CPT | Performed by: FAMILY MEDICINE

## 2017-09-11 RX ORDER — MECLIZINE HYDROCHLORIDE 25 MG/1
25 TABLET ORAL EVERY 6 HOURS PRN
Qty: 30 TABLET | Refills: 1 | Status: SHIPPED | OUTPATIENT
Start: 2017-09-11 | End: 2017-11-29

## 2017-09-11 NOTE — MR AVS SNAPSHOT
After Visit Summary   9/11/2017    Tammy Joshi    MRN: [id number]           Patient Information     Date Of Birth          1980        Visit Information        Provider Department      9/11/2017 1:20 PM Shawn Lewis MD [facility]        Today's Diagnoses     Type 2 diabetes mellitus without complication, without long-term current use of insulin (H)    -  1    Dizziness        Trochanteric bursitis of left hip          Care Instructions          Thank you for choosing [facility] ([facility]3].  You may be receiving a survey in the mail from Loyd Santana regarding your visit today.  Please take a few minutes to complete and return the survey to let us know how we are doing.      If you have questions or concerns, please contact us via [employer] or you can contact your care team at [phone].    Our Clinic hours are:  Monday 6:40 am  to 7:00 pm  Tuesday -Friday 6:40 am to 5:00 pm    The [place] outpatient lab hours are:  Monday - Friday 6:10 am to 4:45 pm  Saturdays 7:00 am to 11:00 am  Appointments are required, call [phone]    If you have clinical questions after hours or would like to schedule an appointment,  call the clinic at [phone].            Follow-ups after your visit        Additional Services     DIABETES EDUCATOR REFERRAL       DIABETES SELF MANAGEMENT TRAINING (DSMT)      Your provider has referred you to Diabetes Education: FMG: Diabetes Education - All [facility]3] [phone]   [email or web address]    Type of training and number of hours: Previous Diagnosis: Follow-up DSMT - 2 hours.    Medicare covers: 10 hours of initial DSMT in 12 month period from the time of first visit, plus 2 hours of follow-up DSMT annually, and additional hours as requested for insulin training.    Diabetes Type: Type 2 - On Oral Medication             Diabetes Co-Morbidities: obesity               A1C Goal:  <8.0        A1C is: Lab Results       Component                Value               Date                       A1C                      10.3                09/11/2017              If an urgent visit is needed or A1C is above 12, Care Team to call the Diabetes Education Team at [phone] or send an In Basket message to the Diabetes Education Pool (P DIAB ED-PATIENT CARE).    Diabetes Education Topics: Comprehensive Knowledge Assessment and Instruction    Special Educational Needs Requiring Individual DSMT: None       MEDICAL NUTRITION THERAPY (MNT) for Diabetes    Medical Nutrition Therapy with a Registered Dietitian can be provided in coordination with Diabetes Self-Management Training to assist in achieving optimal diabetes management.    MNT Type and Hours: New diagnosis: Initial MNT - 3 hours                       Medicare will cover: 3 hours initial MNT in 12 month period after first visit, plus 2 hours of follow-up MNT annually    Please be aware that coverage of these services is subject to the terms and limitations of your health insurance plan.  Call member services at your health plan to determine Diabetes Self-Management Training benefits and ask which blood glucose monitor brands are covered by your plan.      Please bring the following with you to your appointment:    (1)  List of current medications   (2)  List of Blood Glucose Monitor brands that are covered by your insurance plan  (3)  Blood Glucose Monitor and log book  (4)   Food records for the 3 days prior to your visit    The Certified Diabetes Educator may make diabetes medication adjustments per the CDE Protocol and Collaborative Practice Agreement.            ORTHO  REFERRAL       [facility] is referring you to the Orthopedic  Services at [facility].       The  Representative will assist you in the coordination of your Orthopedic and Musculoskeletal Care as prescribed by your  physician.    The [facility] Representative will call you within 1 business day to help schedule your appointment, or you may contact the [facility] Representative at:    All areas ~ [phone]     Type of Referral : Non Surgical       Timeframe requested: 3 - 5 days  Sports medicine  Coverage of these services is subject to the terms and limitations of your health insurance plan.  Please call member services at your health plan with any benefit or coverage questions.      If X-rays, CT or MRI's have been performed, please contact the facility where they were done to arrange for , prior to your scheduled appointment.  Please bring this referral request to your appointment and present it to your specialist.                  Your next 10 appointments already scheduled     Sep 14, 2017  3:00 PM CDT   Ortho Treatment with Otis Anne PT   [facility] ([facility])    [address]  [place] [address]   [phone]            Sep 20, 2017  1:40 PM CDT   New Visit with Leela Steve MD   [facility] [place] ([facility])    [address]  [place] [address]   [phone]            Sep 27, 2017  9:30 AM CDT   Diabetic Education with [place] DIABETES ED RESOURCE   [facility] ([facility])    [address] [address]   [phone]              Who to contact     If you have questions or need follow up information about today's clinic visit or your schedule please contact [facility] directly at [phone].  Normal or non-critical lab and imaging results will be communicated to you by MyChart, letter or phone within 4 business days after the clinic has received the results. If you do not hear from us within 7 days, please contact the clinic through MyChart or phone. If you have a critical or abnormal lab result, we will notify you by phone as soon  "as possible.  Submit refill requests through [employer] or call your pharmacy and they will forward the refill request to us. Please allow 3 business days for your refill to be completed.          Additional Information About Your Visit        [employer] Information     [employer] lets you send messages to your doctor, view your test results, renew your prescriptions, schedule appointments and more. To sign up, go to [email or web address] . Click on \"Log in\" on the left side of the screen, which will take you to the Welcome page. Then click on \"Sign up Now\" on the right side of the page.     You will be asked to enter the access code listed below, as well as some personal information. Please follow the directions to create your username and password.     Your access code is: [email or web address]  Expires: 10/21/2017  4:49 PM     Your access code will  in 90 days. If you need help or a new code, please call your [place] clinic or [phone].        Care EveryWhere ID     This is your Care EveryWhere ID. This could be used by other organizations to access your [place] medical records  [id number]        Your Vitals Were     Pulse Temperature Height BMI (Body Mass Index)          109 98.4  F (36.9  C) (Tympanic) 5' 4\" (1.626 m) 33.47 kg/m2         Blood Pressure from Last 3 Encounters:   17 115/78   17 119/72   08/15/17 119/86    Weight from Last 3 Encounters:   17 195 lb (88.5 kg)   17 187 lb (84.8 kg)   08/15/17 190 lb (86.2 kg)              We Performed the Following     DIABETES EDUCATOR REFERRAL     Hemoglobin A1c     ORTHO  REFERRAL          Today's Medication Changes          These changes are accurate as of: 17 11:59 PM.  If you have any questions, ask your nurse or doctor.               Start taking these medicines.        Dose/Directions    glipiZIDE 5 MG tablet   Commonly known as:  GLUCOTROL   Used for:  Type 2 diabetes mellitus without complication, without long-term " current use of insulin (H)        Dose:  5 mg   Take 1 tablet (5 mg) by mouth 2 times daily (before meals)   Quantity:  90 tablet   Refills:  3       meclizine 25 MG tablet   Commonly known as:  ANTIVERT   Used for:  Dizziness        Dose:  25 mg   Take 1 tablet (25 mg) by mouth every 6 hours as needed for dizziness   Quantity:  30 tablet   Refills:  1            Where to get your medicines      These medications were sent to [facility] [place] - [place], [place] - [address]  [address] [address]     Phone:  [phone]     glipiZIDE 5 MG tablet    meclizine 25 MG tablet                Primary Care Provider Office Phone # Fax #    Shawn Lewis [phone] [address] [address]        Equal Access to Services     DIPIKA WILL : Yaa duke Sobrigitte, waaxda luqadaha, qaybta kaalmada candy, randall rendon . So [facility] [phone].    ATENCIÓN: Si habla español, tiene a dominguez disposición servicios gratuitos de asistencia lingüística. Cristina al [phone].    We comply with applicable federal civil rights laws and [place] laws. We do not discriminate on the basis of race, color, national origin, age, disability sex, sexual orientation or gender identity.            Thank you!     Thank you for choosing [facility]  for your care. Our goal is always to provide you with excellent care. Hearing back from our patients is one way we can continue to improve our services. Please take a few minutes to complete the written survey that you may receive in the mail after your visit with us. Thank you!             Your Updated Medication List - Protect others around you: Learn how to safely use, store and throw away your medicines at www.disposemymeds.org.          This list is accurate as of: 9/11/17 11:59 PM.  Always use your most recent med list.                   Brand Name Dispense Instructions for use  Diagnosis    ALEVE 220 MG tablet   Generic drug:  naproxen sodium      Take by mouth 2 times daily (with meals) Reported on 4/21/2017        atorvastatin 10 MG tablet    LIPITOR    90 tablet    Take 1 tablet (10 mg) by mouth daily    High blood cholesterol       blood glucose monitoring lancets     1 Box    Use to test blood sugars 4 times daily or as directed.    Type 2 diabetes mellitus without complication, without long-term current use of insulin (H)       blood glucose monitoring test strip    no brand specified    120 each    Test blood sugar 4 times per day    Type 2 diabetes mellitus without complication, without long-term current use of insulin (H)       cyclobenzaprine 10 MG tablet    FLEXERIL    14 tablet    Take 1 tablet (10 mg) by mouth nightly as needed for muscle spasms        glipiZIDE 5 MG tablet    GLUCOTROL    90 tablet    Take 1 tablet (5 mg) by mouth 2 times daily (before meals)    Type 2 diabetes mellitus without complication, without long-term current use of insulin (H)       HYDROcodone-acetaminophen 5-325 MG per tablet    NORCO    15 tablet    Take 1-2 tablets by mouth every 4 hours as needed for moderate to severe pain        ibuprofen 200 MG tablet    ADVIL/MOTRIN     Take 200 mg by mouth every 4 hours as needed for mild pain Reported on 4/21/2017        lisinopril 2.5 MG tablet    PRINIVIL/Zestril    90 tablet    Take 1 tablet (2.5 mg) by mouth daily    Type 2 diabetes mellitus without complication (H)       meclizine 25 MG tablet    ANTIVERT    30 tablet    Take 1 tablet (25 mg) by mouth every 6 hours as needed for dizziness    Dizziness       metFORMIN 1000 MG tablet    GLUCOPHAGE    180 tablet    TAKE ONE TABLET BY MOUTH TWICE A DAY WITH MEALS    Type 2 diabetes mellitus without complication, without long-term current use of insulin (H)       sertraline 50 MG tablet    ZOLOFT    90 tablet    Take 1 tablet (50 mg) by mouth daily    Mild episode of recurrent major depressive disorder (H)

## 2017-09-11 NOTE — PROGRESS NOTES
SUBJECTIVE:   Tammy Joshi is a 37 year old female who presents to clinic today for the following health issues:      Dizziness  Onset: 2-3 months    Description: Pt states that she had Vertigo a few months ago and still feels off balance.  Do you feel faint:  no   Does it feel like the surroundings (bed, room) are moving: YES  Unsteady/off balance: YES  Have you passed out or fallen: YES- Pt has fallen from feeling off balance    Intensity: mild    Progression of Symptoms:  intermittent    Accompanying Signs & Symptoms:  Heart palpitations: YES  Nausea, vomiting: YES  Weakness in arms or legs: no   Fatigue: YES  Vision or speech changes: YES- Pt has noticed that her vision has worsened, but she thinks she may just need an eye exam.  Ringing in ears (Tinnitus): YES  Hearing Loss: YES, Pt was also seen in ENT.    History:   Head trauma/concussion hx: no   Previous similar symptoms: no   Recent bleeding history: no     Precipitating factors:   Worse with activity or head movement: no   Any new medications (BP?): no   Alcohol/drug abuse/withdrawal: no     Alleviating factors:   Does staying in a fixed position give relief:  YES- Occasionally it doesn't help.    Therapies Tried and outcome: She had her ears checked and was told there isnt anything in there.      Joint Pain    Onset: Years    Description: Pt states that she has been having Left hip pain for years, but has been getting worse.  Location: left hip  Character: Sharp and Dull ache    Intensity: moderate    Progression of Symptoms: worse    Accompanying Signs & Symptoms:  Other symptoms: none    History:   Previous similar pain: YES- Pt thinks that this could be from a gymnastics injury when she was younger.      Precipitating factors:   Trauma or overuse: YES- Possibly from gymnastics as a kid. Not sure.    Alleviating factors:  Improved by: nothing    Therapies Tried and outcome: RX Pain medications helped minimally, PT did not  help.          Patient comes in for hip pain. Pain has been ongoing for about a year and has progressed recently , she was seen in the ED a couple of days for same . She says that she does not recall any trauma to the hip. Pain is sharp in nature and tends to radiate down her leg. No numbness and no tingling. Imaging done on the affected hip include an x-ray which was negative,she has never had an MRI of the hip. She has been to physical therapy but this made her symptoms worse.     Patient also is in need of follow up labs for her diabetes. She reports that she has been taking her medication , she however does not check her blood sugars so she is unaware of what they are running .    Problem list and histories reviewed & adjusted, as indicated.  Additional history: as documented    Patient Active Problem List   Diagnosis     Hand numbness     CARDIOVASCULAR SCREENING; LDL GOAL LESS THAN 160     Anxiety     Depression     Hyperlipidemia with target LDL less than 100     Type 2 diabetes mellitus without complication (H)     Hip pain, left     Past Surgical History:   Procedure Laterality Date     C HAND/FINGER SURGERY UNLISTED  7th grade    left index     D & C  1998    Missed Ab     LITHOTRIPSY  2011       Social History   Substance Use Topics     Smoking status: Never Smoker     Smokeless tobacco: Never Used     Alcohol use Yes      Comment: wine occasionally, none while pregnant     Family History   Problem Relation Age of Onset     Adopted: Yes     Unknown/Adopted Other      patient was adopted; knows a limited amount of medical history of birth parents         Current Outpatient Prescriptions   Medication Sig Dispense Refill     glipiZIDE (GLUCOTROL) 5 MG tablet Take 1 tablet (5 mg) by mouth 2 times daily (before meals) 90 tablet 3     meclizine (ANTIVERT) 25 MG tablet Take 1 tablet (25 mg) by mouth every 6 hours as needed for dizziness 30 tablet 1     cyclobenzaprine (FLEXERIL) 10 MG tablet Take 1 tablet (10  mg) by mouth nightly as needed for muscle spasms 14 tablet 1     HYDROcodone-acetaminophen (NORCO) 5-325 MG per tablet Take 1-2 tablets by mouth every 4 hours as needed for moderate to severe pain 15 tablet 0     metFORMIN (GLUCOPHAGE) 1000 MG tablet TAKE ONE TABLET BY MOUTH TWICE A DAY WITH MEALS 180 tablet 1     sertraline (ZOLOFT) 50 MG tablet Take 1 tablet (50 mg) by mouth daily 90 tablet 1     atorvastatin (LIPITOR) 10 MG tablet Take 1 tablet (10 mg) by mouth daily 90 tablet 1     lisinopril (PRINIVIL/ZESTRIL) 2.5 MG tablet Take 1 tablet (2.5 mg) by mouth daily 90 tablet 3     blood glucose monitoring (FREESTYLE) lancets Use to test blood sugars 4 times daily or as directed. 1 Box 1     blood glucose monitoring (NO BRAND SPECIFIED) test strip Test blood sugar 4 times per day 120 each 1     naproxen sodium (ALEVE) 220 MG tablet Take by mouth 2 times daily (with meals) Reported on 4/21/2017       ibuprofen (ADVIL,MOTRIN) 200 MG tablet Take 200 mg by mouth every 4 hours as needed for mild pain Reported on 4/21/2017       No Known Allergies  Recent Labs   Lab Test  09/11/17   1349  07/27/17   1412  11/10/16   1108  04/08/16   2334  12/17/15   1029  11/25/15   1229   07/08/15   1108  02/23/14   2300   06/08/11   1424   A1C  10.3*   --   7.9*   --    --   6.3*   < >   --    --    --    --    LDL   --    --    --    --   Cannot estimate LDL when triglyceride exceeds 400 mg/dL   --    --   Cannot estimate LDL when triglyceride exceeds 400 mg/dL  108   --    --   95   HDL   --    --    --    --   33*   --    --   24*   --    --   42*   TRIG   --    --    --    --   513*   --    --   1416*   --    --    --    ALT   --   78*   --    --    --    --    --    --   98*   --   61*   CR   --   0.44*   --   0.50*   --    --    < >   --   0.56   --    --    GFRESTIMATED   --   >90  Non  GFR Calc     --   >90  Non  GFR Calc     --    --    < >   --   >90   --    --    GFRESTBLACK   --    ">90   GFR Calc     --   >90   GFR Calc     --    --    < >   --   >90   --    --    POTASSIUM   --   4.2   --   3.5   --    --    --    --   3.6   --    --    TSH   --   1.17  1.02   --    --    --    --    --   1.98   < >   --     < > = values in this interval not displayed.      BP Readings from Last 3 Encounters:   09/11/17 115/78   09/08/17 119/72   08/15/17 119/86    Wt Readings from Last 3 Encounters:   09/11/17 195 lb (88.5 kg)   09/08/17 187 lb (84.8 kg)   08/15/17 190 lb (86.2 kg)                          Reviewed and updated as needed this visit by clinical staff     Reviewed and updated as needed this visit by Provider         ROS:  Constitutional, HEENT, cardiovascular, pulmonary, gi and gu systems are negative, except as otherwise noted.      OBJECTIVE:   /78 (BP Location: Left arm, Patient Position: Sitting, Cuff Size: Adult Regular)  Pulse 109  Temp 98.4  F (36.9  C) (Tympanic)  Ht 5' 4\" (1.626 m)  Wt 195 lb (88.5 kg)  BMI 33.47 kg/m2  Body mass index is 33.47 kg/(m^2).  GENERAL: healthy, alert and no distress  EYES: Eyes grossly normal to inspection, PERRL and conjunctivae and sclerae normal  HENT: ear canals and TM's normal, nose and mouth without ulcers or lesions  NECK: no adenopathy, no asymmetry, masses, or scars and thyroid normal to palpation  RESP: lungs clear to auscultation - no rales, rhonchi or wheezes  CV: regular rate and rhythm, normal S1 S2, no S3 or S4, no murmur, click or rub, no peripheral edema and peripheral pulses strong  ABDOMEN: soft, nontender, no hepatosplenomegaly, no masses and bowel sounds normal  MS: normal range of motion, no muscle wasting. No swelling or warmth on left hip  SKIN: no suspicious lesions or rashes    Diagnostic Test Results:  Results for orders placed or performed in visit on 09/11/17   Hemoglobin A1c   Result Value Ref Range    Hemoglobin A1C 10.3 (H) 4.3 - 6.0 %       ASSESSMENT/PLAN:         (E11.9) Type 2 " diabetes mellitus without complication, without long-term current use of insulin (H)  (primary encounter diagnosis)  Comment: A1c is much worse added glipizide   Plan: Hemoglobin A1c, glipiZIDE (GLUCOTROL) 5 MG         tablet, DIABETES EDUCATOR REFERRAL            (R42) Dizziness  Comment: Asked to obtain the MRi that was ordered   Plan: meclizine (ANTIVERT) 25 MG tablet            (M70.62) Trochanteric bursitis of left hip  Comment: This may be a bursitis as there are no abnormalities seen on x-rays , may benefit from an MRI in the future.   Plan: ORTHO  REFERRAL              FUTURE APPOINTMENTS:       - Follow-up visit as needed    Shawn Lewis MD  [facility]

## 2017-09-11 NOTE — NURSING NOTE
"Chief Complaint   Patient presents with     Musculoskeletal Problem     Left Hip Pain     Blood Draw     Wants labs for DM.       Initial /78 (BP Location: Left arm, Patient Position: Sitting, Cuff Size: Adult Regular)  Pulse 109  Temp 98.4  F (36.9  C) (Tympanic)  Ht 5' 4\" (1.626 m)  Wt 195 lb (88.5 kg)  BMI 33.47 kg/m2 Estimated body mass index is 33.47 kg/(m^2) as calculated from the following:    Height as of this encounter: 5' 4\" (1.626 m).    Weight as of this encounter: 195 lb (88.5 kg).  Medication Reconciliation: complete    "

## 2017-09-11 NOTE — PATIENT INSTRUCTIONS
Thank you for choosing [facility].  You may be receiving a survey in the mail from Loyd Santana regarding your visit today.  Please take a few minutes to complete and return the survey to let us know how we are doing.      If you have questions or concerns, please contact us via [employer] or you can contact your care team at [phone].    Our Clinic hours are:  Monday 6:40 am  to 7:00 pm  Tuesday -Friday 6:40 am to 5:00 pm    The [place] outpatient lab hours are:  Monday - Friday 6:10 am to 4:45 pm  Saturdays 7:00 am to 11:00 am  Appointments are required, call [phone]    If you have clinical questions after hours or would like to schedule an appointment,  call the clinic at [phone].

## 2017-09-12 NOTE — PROGRESS NOTES
Please inform patient that test result shows that her diabetes is out of control. She will need to see the diabetic educator as soon as possible so that her medication can be adjusted and also for some education on diabetes in general. I am also going to add glipizide 5 mg daily to her regimen. She will need to come back in three months for a recheck but needs to see diabetic educator soon  Thank you.     Shawn Lewis M.D.

## 2017-09-13 RX ORDER — GLIPIZIDE 5 MG/1
5 TABLET ORAL
Qty: 90 TABLET | Refills: 3 | Status: SHIPPED | OUTPATIENT
Start: 2017-09-13 | End: 2018-02-14

## 2017-09-14 ENCOUNTER — HOSPITAL ENCOUNTER (OUTPATIENT)
Dept: PHYSICAL THERAPY | Facility: CLINIC | Age: 37
Setting detail: THERAPIES SERIES
End: 2017-09-14
Attending: FAMILY MEDICINE
Payer: COMMERCIAL

## 2017-09-14 PROCEDURE — 97110 THERAPEUTIC EXERCISES: CPT | Mod: GP | Performed by: PHYSICAL THERAPIST

## 2017-09-14 PROCEDURE — 97140 MANUAL THERAPY 1/> REGIONS: CPT | Mod: GP | Performed by: PHYSICAL THERAPIST

## 2017-09-14 PROCEDURE — 40000718 ZZHC STATISTIC PT DEPARTMENT ORTHO VISIT: Performed by: PHYSICAL THERAPIST

## 2017-09-18 DIAGNOSIS — E78.00 HIGH BLOOD CHOLESTEROL: ICD-10-CM

## 2017-09-18 RX ORDER — ATORVASTATIN CALCIUM 10 MG/1
10 TABLET, FILM COATED ORAL DAILY
Qty: 90 TABLET | Refills: 3 | Status: SHIPPED | OUTPATIENT
Start: 2017-09-18 | End: 2018-09-17

## 2017-09-18 NOTE — TELEPHONE ENCOUNTER
Atorvastatin was just prescribed on 09/18/2017. Please disregard this request.      Otis MERRILL)

## 2017-09-19 RX ORDER — ATORVASTATIN CALCIUM 10 MG/1
TABLET, FILM COATED ORAL
Qty: 90 TABLET | Refills: 1 | OUTPATIENT
Start: 2017-09-19

## 2017-10-16 ENCOUNTER — ALLIED HEALTH/NURSE VISIT (OUTPATIENT)
Dept: FAMILY MEDICINE | Facility: CLINIC | Age: 37
End: 2017-10-16
Payer: COMMERCIAL

## 2017-10-16 DIAGNOSIS — Z23 NEED FOR PROPHYLACTIC VACCINATION AND INOCULATION AGAINST INFLUENZA: Primary | ICD-10-CM

## 2017-10-16 PROCEDURE — 90686 IIV4 VACC NO PRSV 0.5 ML IM: CPT

## 2017-10-16 PROCEDURE — 99207 ZZC NO CHARGE NURSE ONLY: CPT

## 2017-10-16 PROCEDURE — 90471 IMMUNIZATION ADMIN: CPT

## 2017-10-16 NOTE — MR AVS SNAPSHOT
"              After Visit Summary   10/16/2017    Tammy Joshi    MRN: [id number]           Patient Information     Date Of Birth          1980        Visit Information        Provider Department      10/16/2017 2:05 PM [facility]        Today's Diagnoses     Need for prophylactic vaccination and inoculation against influenza    -  1       Follow-ups after your visit        Who to contact     If you have questions or need follow up information about today's clinic visit or your schedule please contact [facility] directly at [phone].  Normal or non-critical lab and imaging results will be communicated to you by OurCrowdhart, letter or phone within 4 business days after the clinic has received the results. If you do not hear from us within 7 days, please contact the clinic through [employer] or phone. If you have a critical or abnormal lab result, we will notify you by phone as soon as possible.  Submit refill requests through [employer] or call your pharmacy and they will forward the refill request to us. Please allow 3 business days for your refill to be completed.          Additional Information About Your Visit        MyChart Information     [employer]O lets you send messages to your doctor, view your test results, renew your prescriptions, schedule appointments and more. To sign up, go to [email or web address] . Click on \"Log in\" on the left side of the screen, which will take you to the Welcome page. Then click on \"Sign up Now\" on the right side of the page.     You will be asked to enter the access code listed below, as well as some personal information. Please follow the directions to create your username and password.     Your access code is: [email or web address]  Expires: 10/21/2017  4:49 PM     Your access code will  in 90 days. If you need help or a new code, please call your [facility] or [phone].        Care EveryWhere ID     This is your Care " EveryWhere ID. This could be used by other organizations to access your [place] medical records  [id number]         Blood Pressure from Last 3 Encounters:   09/11/17 115/78   09/08/17 119/72   08/15/17 119/86    Weight from Last 3 Encounters:   09/11/17 195 lb (88.5 kg)   09/08/17 187 lb (84.8 kg)   08/15/17 190 lb (86.2 kg)              We Performed the Following     FLU VAC, SPLIT VIRUS IM > 3 YO (QUADRIVALENT) [82187]     Vaccine Administration, Initial [34013]        Primary Care Provider Office Phone # Fax #    Jenyradha Stephen Lewis [phone] [address] [facility] [address]        Equal Access to Services     DIPIKA WILL : Yaa duke Sobrigitte, waaxda luqadaha, qaybta kaalmada adeepifanioyablair, randall rendon . So [facility] [phone].    ATENCIÓN: Si habla español, tiene a dominguez disposición servicios gratuitos de asistencia lingüística. Llame al [phone].    We comply with applicable federal civil rights laws and [place] laws. We do not discriminate on the basis of race, color, national origin, age, disability, sex, sexual orientation, or gender identity.            Thank you!     Thank you for choosing [facility]  for your care. Our goal is always to provide you with excellent care. Hearing back from our patients is one way we can continue to improve our services. Please take a few minutes to complete the written survey that you may receive in the mail after your visit with us. Thank you!             Your Updated Medication List - Protect others around you: Learn how to safely use, store and throw away your medicines at www.disposemymeds.org.          This list is accurate as of: 10/16/17  2:14 PM.  Always use your most recent med list.                   Brand Name Dispense Instructions for use Diagnosis    ALEVE 220 MG tablet   Generic drug:  naproxen sodium      Take by mouth 2 times daily (with meals) Reported on 4/21/2017         atorvastatin 10 MG tablet    LIPITOR    90 tablet    Take 1 tablet (10 mg) by mouth daily    High blood cholesterol       blood glucose monitoring lancets     1 Box    Use to test blood sugars 4 times daily or as directed.    Type 2 diabetes mellitus without complication, without long-term current use of insulin (H)       blood glucose monitoring test strip    no brand specified    120 each    Test blood sugar 4 times per day    Type 2 diabetes mellitus without complication, without long-term current use of insulin (H)       cyclobenzaprine 10 MG tablet    FLEXERIL    14 tablet    Take 1 tablet (10 mg) by mouth nightly as needed for muscle spasms        glipiZIDE 5 MG tablet    GLUCOTROL    90 tablet    Take 1 tablet (5 mg) by mouth 2 times daily (before meals)    Type 2 diabetes mellitus without complication, without long-term current use of insulin (H)       HYDROcodone-acetaminophen 5-325 MG per tablet    NORCO    15 tablet    Take 1-2 tablets by mouth every 4 hours as needed for moderate to severe pain        ibuprofen 200 MG tablet    ADVIL/MOTRIN     Take 200 mg by mouth every 4 hours as needed for mild pain Reported on 4/21/2017        lisinopril 2.5 MG tablet    PRINIVIL/Zestril    90 tablet    Take 1 tablet (2.5 mg) by mouth daily    Type 2 diabetes mellitus without complication (H)       meclizine 25 MG tablet    ANTIVERT    30 tablet    Take 1 tablet (25 mg) by mouth every 6 hours as needed for dizziness    Dizziness       metFORMIN 1000 MG tablet    GLUCOPHAGE    180 tablet    TAKE ONE TABLET BY MOUTH TWICE A DAY WITH MEALS    Type 2 diabetes mellitus without complication, without long-term current use of insulin (H)       sertraline 50 MG tablet    ZOLOFT    90 tablet    Take 1 tablet (50 mg) by mouth daily    Mild episode of recurrent major depressive disorder (H)

## 2017-10-16 NOTE — PROGRESS NOTES
Injectable Influenza Immunization Documentation    1.  Is the person to be vaccinated sick today?   No    2. Does the person to be vaccinated have an allergy to a component   of the vaccine?   No    3. Has the person to be vaccinated ever had a serious reaction   to influenza vaccine in the past?   No    4. Has the person to be vaccinated ever had Guillain-Barré syndrome?   No    Form completed by Tamela Jernigan

## 2017-11-21 ENCOUNTER — TELEPHONE (OUTPATIENT)
Dept: FAMILY MEDICINE | Facility: CLINIC | Age: 37
End: 2017-11-21

## 2017-11-29 ENCOUNTER — HOSPITAL ENCOUNTER (OUTPATIENT)
Dept: MRI IMAGING | Facility: CLINIC | Age: 37
Discharge: HOME | End: 2017-11-29
Attending: INTERNAL MEDICINE | Admitting: INTERNAL MEDICINE
Payer: COMMERCIAL

## 2017-11-29 ENCOUNTER — HOSPITAL ENCOUNTER (EMERGENCY)
Facility: CLINIC | Age: 37
Discharge: HOME | End: 2017-11-29
Attending: FAMILY MEDICINE | Admitting: FAMILY MEDICINE
Payer: COMMERCIAL

## 2017-11-29 VITALS
DIASTOLIC BLOOD PRESSURE: 86 MMHG | SYSTOLIC BLOOD PRESSURE: 113 MMHG | BODY MASS INDEX: 33.47 KG/M2 | OXYGEN SATURATION: 95 % | WEIGHT: 195 LBS | RESPIRATION RATE: 16 BRPM | TEMPERATURE: 98.7 F

## 2017-11-29 DIAGNOSIS — R40.20 LOSS OF CONSCIOUSNESS (H): ICD-10-CM

## 2017-11-29 DIAGNOSIS — R42 VERTIGO: ICD-10-CM

## 2017-11-29 LAB
ALBUMIN SERPL-MCNC: 3.2 G/DL (ref 3.4–5)
ALP SERPL-CCNC: 56 U/L (ref 40–150)
ALT SERPL W P-5'-P-CCNC: 76 U/L (ref 0–50)
ANION GAP SERPL CALCULATED.3IONS-SCNC: 6 MMOL/L (ref 3–14)
AST SERPL W P-5'-P-CCNC: 89 U/L (ref 0–45)
BASOPHILS # BLD AUTO: 0 10E9/L (ref 0–0.2)
BASOPHILS NFR BLD AUTO: 0.5 %
BILIRUB SERPL-MCNC: 0.5 MG/DL (ref 0.2–1.3)
BUN SERPL-MCNC: 5 MG/DL (ref 7–30)
CALCIUM SERPL-MCNC: 8.3 MG/DL (ref 8.5–10.1)
CHLORIDE SERPL-SCNC: 106 MMOL/L (ref 94–109)
CO2 SERPL-SCNC: 26 MMOL/L (ref 20–32)
CREAT SERPL-MCNC: 0.48 MG/DL (ref 0.52–1.04)
CRP SERPL-MCNC: <2.9 MG/L (ref 0–8)
DIFFERENTIAL METHOD BLD: NORMAL
EOSINOPHIL # BLD AUTO: 0.1 10E9/L (ref 0–0.7)
EOSINOPHIL NFR BLD AUTO: 1.1 %
ERYTHROCYTE [DISTWIDTH] IN BLOOD BY AUTOMATED COUNT: 12.8 % (ref 10–15)
GFR SERPL CREATININE-BSD FRML MDRD: >90 ML/MIN/1.7M2
GLUCOSE SERPL-MCNC: 252 MG/DL (ref 70–99)
HCG SERPL QL: NEGATIVE
HCT VFR BLD AUTO: 40.6 % (ref 35–47)
HGB BLD-MCNC: 14 G/DL (ref 11.7–15.7)
IMM GRANULOCYTES # BLD: 0 10E9/L (ref 0–0.4)
IMM GRANULOCYTES NFR BLD: 0.2 %
LYMPHOCYTES # BLD AUTO: 1.3 10E9/L (ref 0.8–5.3)
LYMPHOCYTES NFR BLD AUTO: 28.7 %
MCH RBC QN AUTO: 30.2 PG (ref 26.5–33)
MCHC RBC AUTO-ENTMCNC: 34.5 G/DL (ref 31.5–36.5)
MCV RBC AUTO: 88 FL (ref 78–100)
MONOCYTES # BLD AUTO: 0.3 10E9/L (ref 0–1.3)
MONOCYTES NFR BLD AUTO: 6.9 %
NEUTROPHILS # BLD AUTO: 2.7 10E9/L (ref 1.6–8.3)
NEUTROPHILS NFR BLD AUTO: 62.6 %
PLATELET # BLD AUTO: 156 10E9/L (ref 150–450)
POTASSIUM SERPL-SCNC: 3.9 MMOL/L (ref 3.4–5.3)
PROT SERPL-MCNC: 7 G/DL (ref 6.8–8.8)
RBC # BLD AUTO: 4.63 10E12/L (ref 3.8–5.2)
SODIUM SERPL-SCNC: 138 MMOL/L (ref 133–144)
WBC # BLD AUTO: 4.4 10E9/L (ref 4–11)

## 2017-11-29 PROCEDURE — 86140 C-REACTIVE PROTEIN: CPT | Performed by: FAMILY MEDICINE

## 2017-11-29 PROCEDURE — 85025 COMPLETE CBC W/AUTO DIFF WBC: CPT | Performed by: FAMILY MEDICINE

## 2017-11-29 PROCEDURE — 84703 CHORIONIC GONADOTROPIN ASSAY: CPT | Performed by: FAMILY MEDICINE

## 2017-11-29 PROCEDURE — 99283 EMERGENCY DEPT VISIT LOW MDM: CPT

## 2017-11-29 PROCEDURE — 80053 COMPREHEN METABOLIC PANEL: CPT | Performed by: FAMILY MEDICINE

## 2017-11-29 PROCEDURE — 70551 MRI BRAIN STEM W/O DYE: CPT

## 2017-11-29 PROCEDURE — 99283 EMERGENCY DEPT VISIT LOW MDM: CPT | Mod: Z6 | Performed by: FAMILY MEDICINE

## 2017-11-29 ASSESSMENT — ENCOUNTER SYMPTOMS
EYES NEGATIVE: 1
DIZZINESS: 1
MUSCULOSKELETAL NEGATIVE: 1
GASTROINTESTINAL NEGATIVE: 1
HEADACHES: 1
CARDIOVASCULAR NEGATIVE: 1
LIGHT-HEADEDNESS: 1
ENDOCRINE NEGATIVE: 1
FATIGUE: 1
RESPIRATORY NEGATIVE: 1
HEMATOLOGIC/LYMPHATIC NEGATIVE: 1
ALLERGIC/IMMUNOLOGIC NEGATIVE: 1
ACTIVITY CHANGE: 1
PSYCHIATRIC NEGATIVE: 1
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
## 2017-11-29 NOTE — ED AVS SNAPSHOT
[facility] Emergency Department    [address] [address]    Phone:  [phone]    Fax:  [phone]                                       Tammy Joshi   MRN: [id number]    Department:  [facility] Emergency Department   Date of Visit:  11/29/2017           After Visit Summary Signature Page     I have received my discharge instructions, and my questions have been answered. I have discussed any challenges I see with this plan with the nurse or doctor.    ..........................................................................................................................................  Patient/Patient Representative Signature      ..........................................................................................................................................  Patient Representative Print Name and Relationship to Patient    ..................................................               ................................................  Date                                            Time    ..........................................................................................................................................  Reviewed by Signature/Title    ...................................................              ..............................................  Date                                                            Time

## 2017-11-29 NOTE — DISCHARGE INSTRUCTIONS
Appointment in clinic with neurology for further evaluation.  You should not be driving a motor vehicle until you receive clearance medically to do so.  Please follow up in clinic with the primary care provider.  Return to the emergency department if worse or changes.

## 2017-11-29 NOTE — ED AVS SNAPSHOT
[facility] Emergency Department    [address] [address]    Phone:  [phone]    Fax:  [phone]                                       Tammy Joshi   MRN: [id number]    Department:  [facility] Emergency Department   Date of Visit:  11/29/2017           Patient Information     Date Of Birth          1980        Your diagnoses for this visit were:     Vertigo     Loss of consciousness (H)        You were seen by Martínez Robert MD.      Follow-up Information     Follow up with Shawn Lewis MD. Schedule an appointment as soon as possible for a visit in 2 days.    Specialty:  Family Practice    Contact information:    [address] [facility] [id number] [phone]          Schedule an appointment as soon as possible for a visit with NEUROLOGY, PHYSICIAN.    Why:  ASAP        Discharge Instructions       Appointment in clinic with neurology for further evaluation.  You should not be driving a motor vehicle until you receive clearance medically to do so.  Please follow up in clinic with the primary care provider.  Return to the emergency department if worse or changes.      Future Appointments        Provider Department Dept Phone Center    11/30/2017 11:20 AM Shawn Lewis MD [facility] [phone] [facility]      24 Hour Appointment Hotline       To make an appointment at any [facility], call [phone] ([phone]). If you don't have a family doctor or clinic, we will help you find one. [facility] are conveniently located to serve the needs of you and your family.          ED Discharge Orders     EEG AWAKE & ASLEEP                    Review of your medicines      Our records show that you are taking the medicines listed below. If these are incorrect, please call your family doctor or clinic.        Dose / Directions Last dose taken    atorvastatin 10 MG tablet   Commonly known as:  LIPITOR   Dose:  10 mg    Quantity:  90 tablet        Take 1 tablet (10 mg) by mouth daily   Refills:  3        blood glucose monitoring lancets   Quantity:  1 Box        Use to test blood sugars 4 times daily or as directed.   Refills:  1        blood glucose monitoring test strip   Commonly known as:  no brand specified   Quantity:  120 each        Test blood sugar 4 times per day   Refills:  1        CINNAMON PO   Dose:  1 tablet        Take 1 tablet by mouth   Refills:  0        CRANBERRY EXTRACT PO   Dose:  1 capsule        Take 1 capsule by mouth   Refills:  0        cyclobenzaprine 10 MG tablet   Commonly known as:  FLEXERIL   Dose:  10 mg   Quantity:  14 tablet        Take 1 tablet (10 mg) by mouth nightly as needed for muscle spasms   Refills:  1        glipiZIDE 5 MG tablet   Commonly known as:  GLUCOTROL   Dose:  5 mg   Quantity:  90 tablet        Take 1 tablet (5 mg) by mouth 2 times daily (before meals)   Refills:  3        HYDROcodone-acetaminophen 5-325 MG per tablet   Commonly known as:  NORCO   Dose:  1-2 tablet   Quantity:  15 tablet        Take 1-2 tablets by mouth every 4 hours as needed for moderate to severe pain   Refills:  0        lisinopril 2.5 MG tablet   Commonly known as:  PRINIVIL/Zestril   Dose:  2.5 mg   Quantity:  90 tablet        Take 1 tablet (2.5 mg) by mouth daily   Refills:  3        metFORMIN 1000 MG tablet   Commonly known as:  GLUCOPHAGE   Quantity:  180 tablet        TAKE ONE TABLET BY MOUTH TWICE A DAY WITH MEALS   Refills:  1        sertraline 50 MG tablet   Commonly known as:  ZOLOFT   Dose:  50 mg   Quantity:  90 tablet        Take 1 tablet (50 mg) by mouth daily   Refills:  1                Procedures and tests performed during your visit     CBC with platelets differential    CRP inflammation    Comprehensive metabolic panel    EKG 12-lead, tracing only    HCG qualitative Blood      Orders Needing Specimen Collection     None      Pending Results     No orders found from 11/27/2017 to  11/30/2017.            Pending Culture Results     No orders found from 11/27/2017 to 11/30/2017.            Pending Results Instructions     If you had any lab results that were not finalized at the time of your Discharge, you can call the ED Lab Result RN at [phone]. You will be contacted by this team for any positive Lab results or changes in treatment. The nurses are available 7 days a week from 10A to 6:30P.  You can leave a message 24 hours per day and they will return your call.        Test Results From Your Hospital Stay        11/29/2017  2:20 PM      Component Results     Component Value Ref Range & Units Status    WBC 4.4 4.0 - 11.0 10e9/L Final    RBC Count 4.63 3.8 - 5.2 10e12/L Final    Hemoglobin 14.0 11.7 - 15.7 g/dL Final    Hematocrit 40.6 35.0 - 47.0 % Final    MCV 88 78 - 100 fl Final    MCH 30.2 26.5 - 33.0 pg Final    MCHC 34.5 31.5 - 36.5 g/dL Final    RDW 12.8 10.0 - 15.0 % Final    Platelet Count 156 150 - 450 10e9/L Final    Diff Method Automated Method  Final    % Neutrophils 62.6 % Final    % Lymphocytes 28.7 % Final    % Monocytes 6.9 % Final    % Eosinophils 1.1 % Final    % Basophils 0.5 % Final    % Immature Granulocytes 0.2 % Final    Absolute Neutrophil 2.7 1.6 - 8.3 10e9/L Final    Absolute Lymphocytes 1.3 0.8 - 5.3 10e9/L Final    Absolute Monocytes 0.3 0.0 - 1.3 10e9/L Final    Absolute Eosinophils 0.1 0.0 - 0.7 10e9/L Final    Absolute Basophils 0.0 0.0 - 0.2 10e9/L Final    Abs Immature Granulocytes 0.0 0 - 0.4 10e9/L Final         11/29/2017  3:00 PM      Component Results     Component Value Ref Range & Units Status    CRP Inflammation <2.9 0.0 - 8.0 mg/L Final         11/29/2017  3:00 PM      Component Results     Component Value Ref Range & Units Status    Sodium 138 133 - 144 mmol/L Final    Potassium 3.9 3.4 - 5.3 mmol/L Final    Chloride 106 94 - 109 mmol/L Final    Carbon Dioxide 26 20 - 32 mmol/L Final    Anion Gap 6 3 - 14 mmol/L Final    Glucose 252 (H) 70 - 99  "mg/dL Final    Urea Nitrogen 5 (L) 7 - 30 mg/dL Final    Creatinine 0.48 (L) 0.52 - 1.04 mg/dL Final    GFR Estimate >90 >60 mL/min/1.7m2 Final    Non  GFR Calc    GFR Estimate If Black >90 >60 mL/min/1.7m2 Final    African American GFR Calc    Calcium 8.3 (L) 8.5 - 10.1 mg/dL Final    Bilirubin Total 0.5 0.2 - 1.3 mg/dL Final    Albumin 3.2 (L) 3.4 - 5.0 g/dL Final    Protein Total 7.0 6.8 - 8.8 g/dL Final    Alkaline Phosphatase 56 40 - 150 U/L Final    ALT 76 (H) 0 - 50 U/L Final    AST 89 (H) 0 - 45 U/L Final         2017  2:25 PM      Component Results     Component Value Ref Range & Units Status    HCG Qualitative Serum Negative NEG^Negative Final    This test is for screening purposes.  Results should be interpreted along with   the clinical picture.  Confirmation testing is available if warranted by   ordering [id number], HCG Quantitative Pregnancy.                  Thank you for choosing [facility]       Thank you for choosing [facility] for your care. Our goal is always to provide you with excellent care. Hearing back from our patients is one way we can continue to improve our services. Please take a few minutes to complete the written survey that you may receive in the mail after you visit with us. Thank you!        [employer] Information     [employer] lets you send messages to your doctor, view your test results, renew your prescriptions, schedule appointments and more. To sign up, go to [email or web address] . Click on \"Log in\" on the left side of the screen, which will take you to the Welcome page. Then click on \"Sign up Now\" on the right side of the page.     You will be asked to enter the access code listed below, as well as some personal information. Please follow the directions to create your username and password.     Your access code is: [email or web address]  Expires: 2018  3:31 PM     Your access code will  in 90 days. If you need help or a new code, please call your [facility] or [phone].        Care EveryWhere ID     This is your Care EveryWhere ID. This could be used by other organizations to access your [facility] medical records  [id number]        Equal Access to Services     DIPIKA WILL : Yaa Torres, wapoloda luayanadaha, qaybta kaalmada sydwestleyblair, randall monsalve. So [facility] [phone].    ATENCIÓN: Si habla español, tiene a dominguez disposición servicios gratuitos de asistencia lingüística. Llame al [phone].    We comply with applicable federal civil rights laws and [place] laws. We do not discriminate on the basis of race, color, national origin, age, disability, sex, sexual orientation, or gender identity.            After Visit Summary       This is your record. Keep this with you and show to your community pharmacist(s) and doctor(s) at your next visit.

## 2017-11-29 NOTE — ED NOTES
Pt is asking about her MRI results and would prefer not to have and lab work or IV due to needles

## 2017-11-30 ENCOUNTER — OFFICE VISIT (OUTPATIENT)
Dept: FAMILY MEDICINE | Facility: CLINIC | Age: 37
End: 2017-11-30
Payer: COMMERCIAL

## 2017-11-30 VITALS
HEART RATE: 86 BPM | WEIGHT: 200 LBS | SYSTOLIC BLOOD PRESSURE: 122 MMHG | BODY MASS INDEX: 34.15 KG/M2 | TEMPERATURE: 98.4 F | HEIGHT: 64 IN | DIASTOLIC BLOOD PRESSURE: 54 MMHG

## 2017-11-30 DIAGNOSIS — R42 DIZZINESS: Primary | ICD-10-CM

## 2017-11-30 PROCEDURE — 99213 OFFICE O/P EST LOW 20 MIN: CPT | Performed by: FAMILY MEDICINE

## 2017-11-30 ASSESSMENT — PATIENT HEALTH QUESTIONNAIRE - PHQ9
5. POOR APPETITE OR OVEREATING: NEARLY EVERY DAY
SUM OF ALL RESPONSES TO PHQ QUESTIONS 1-9: 13

## 2017-11-30 ASSESSMENT — ANXIETY QUESTIONNAIRES
2. NOT BEING ABLE TO STOP OR CONTROL WORRYING: SEVERAL DAYS
7. FEELING AFRAID AS IF SOMETHING AWFUL MIGHT HAPPEN: SEVERAL DAYS
6. BECOMING EASILY ANNOYED OR IRRITABLE: SEVERAL DAYS
GAD7 TOTAL SCORE: 11
1. FEELING NERVOUS, ANXIOUS, OR ON EDGE: SEVERAL DAYS
3. WORRYING TOO MUCH ABOUT DIFFERENT THINGS: SEVERAL DAYS
5. BEING SO RESTLESS THAT IT IS HARD TO SIT STILL: NEARLY EVERY DAY

## 2017-11-30 NOTE — PATIENT INSTRUCTIONS
Thank you for choosing [facility].  You may be receiving a survey in the mail from Loyd Santana regarding your visit today.  Please take a few minutes to complete and return the survey to let us know how we are doing.      If you have questions or concerns, please contact us via [employer] or you can contact your care team at [phone].    Our Clinic hours are:  Monday 6:40 am  to 7:00 pm  Tuesday -Friday 6:40 am to 5:00 pm    The [place] outpatient lab hours are:  Monday - Friday 6:10 am to 4:45 pm  Saturdays 7:00 am to 11:00 am  Appointments are required, call [phone]    If you have clinical questions after hours or would like to schedule an appointment,  call the clinic at [phone].
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
## 2017-11-30 NOTE — MR AVS SNAPSHOT
After Visit Summary   11/30/2017    Tammy Joshi    MRN: [id number]           Patient Information     Date Of Birth          1980        Visit Information        Provider Department      11/30/2017 11:20 AM Shawn Lewis MD [facility]        Today's Diagnoses     Dizziness    -  1      Care Instructions          Thank you for choosing [facility].  You may be receiving a survey in the mail from [facility] regarding your visit today.  Please take a few minutes to complete and return the survey to let us know how we are doing.      If you have questions or concerns, please contact us via [employer] or you can contact your care team at [phone].    Our Clinic hours are:  Monday 6:40 am  to 7:00 pm  Tuesday -Friday 6:40 am to 5:00 pm    The [place] outpatient lab hours are:  Monday - Friday 6:10 am to 4:45 pm  Saturdays 7:00 am to 11:00 am  Appointments are required, call [phone]    If you have clinical questions after hours or would like to schedule an appointment,  call the clinic at [phone].          Follow-ups after your visit        Additional Services     CARE COORDINATION REFERRAL       Services are provided by a Care Coordinator for people with complex needs such as: medical, social, or financial troubles.  The Care Coordinator works with the patient and their Primary Care Provider to determine health goals, obtain resources, achieve outcomes, and develop care plans that help coordinate the patient's care.     Reason for Referral: Other: Patient cannot drive till when she can be cleared , she is having dizziness which causes transient loss of consciousness. She is struggling to pay her bills and she will also need some form of transportation to her appointments.    Provide additional details for Care Coordination to best meet the patient's current needs:     Clinical Staff have discussed the Care Coordination Referral with the patient and/or  "caregiver: yes                  Your next 10 appointments already scheduled     Dec 11, 2017 12:30 PM CST   EEG with ME [facility] EEG 2   [facility] ([facility])    [address], [address]  [facility] [address] [phone]            Dec 14, 2017  9:00 AM CST   New Patient Visit with Debbie Patel MD   [facility] ([facility])    [address]  [facility] [address] [phone]              Future tests that were ordered for you today     Open Future Orders        Priority Expected Expires Ordered    EEG AWAKE & ASLEEP Routine  12/27/2017 11/29/2017            Who to contact     If you have questions or need follow up information about today's clinic visit or your schedule please contact [facility] directly at [phone].  Normal or non-critical lab and imaging results will be communicated to you by [employer], letter or phone within 4 business days after the clinic has received the results. If you do not hear from us within 7 days, please contact the clinic through [employer] or phone. If you have a critical or abnormal lab result, we will notify you by phone as soon as possible.  Submit refill requests through [employer] or call your pharmacy and they will forward the refill request to us. Please allow 3 business days for your refill to be completed.          Additional Information About Your Visit        [employer] Information     [employer] lets you send messages to your doctor, view your test results, renew your prescriptions, schedule appointments and more. To sign up, go to [email or web address] . Click on \"Log in\" on the left side of the screen, which will take you to the Welcome page. Then click on \"Sign up Now\" on the right side of the page.     You will be asked to enter the access code listed below, as well as some personal information. Please follow the directions to create your username and password.     Your access code " "is: [email or web address]  Expires: 2018  3:31 PM     Your access code will  in 90 days. If you need help or a new code, please call your [place] clinic or [phone].        Care EveryWhere ID     This is your Care EveryWhere ID. This could be used by other organizations to access your [place] medical records  [id number]        Your Vitals Were     Pulse Temperature Height BMI (Body Mass Index)          86 98.4  F (36.9  C) (Tympanic) 5' 4\" (1.626 m) 34.33 kg/m2         Blood Pressure from Last 3 Encounters:   17 122/54   17 113/86   17 115/78    Weight from Last 3 Encounters:   17 200 lb (90.7 kg)   17 195 lb (88.5 kg)   17 195 lb (88.5 kg)              We Performed the Following     CARE COORDINATION REFERRAL     DEPRESSION ACTION PLAN (DAP)        Primary Care Provider Office Phone # Fax #    Shawn Lewis [phone] [address] [address]        Equal Access to Services     DIPIKA WILL AH: Hadii ree araujoo Sobrigitte, waaxda luqadaha, qaybta kaalmada adeegyada, randall monsalve. So [facility] [phone].    ATENCIÓN: Si habla español, tiene a dominguez disposición servicios gratuitos de asistencia lingüística. [facility] [phone].    We comply with applicable federal civil rights laws and [place] laws. We do not discriminate on the basis of race, color, national origin, age, disability, sex, sexual orientation, or gender identity.            Thank you!     Thank you for choosing [facility]  for your care. Our goal is always to provide you with excellent care. Hearing back from our patients is one way we can continue to improve our services. Please take a few minutes to complete the written survey that you may receive in the mail after your visit with us. Thank you!             Your Updated Medication List - Protect others around you: Learn how to safely use, store and throw away your " medicines at www.disposemymeds.org.          This list is accurate as of: 11/30/17 11:56 AM.  Always use your most recent med list.                   Brand Name Dispense Instructions for use Diagnosis    atorvastatin 10 MG tablet    LIPITOR    90 tablet    Take 1 tablet (10 mg) by mouth daily    High blood cholesterol       blood glucose monitoring lancets     1 Box    Use to test blood sugars 4 times daily or as directed.    Type 2 diabetes mellitus without complication, without long-term current use of insulin (H)       blood glucose monitoring test strip    no brand specified    120 each    Test blood sugar 4 times per day    Type 2 diabetes mellitus without complication, without long-term current use of insulin (H)       CINNAMON PO      Take 1 tablet by mouth        CRANBERRY EXTRACT PO      Take 1 capsule by mouth        cyclobenzaprine 10 MG tablet    FLEXERIL    14 tablet    Take 1 tablet (10 mg) by mouth nightly as needed for muscle spasms        glipiZIDE 5 MG tablet    GLUCOTROL    90 tablet    Take 1 tablet (5 mg) by mouth 2 times daily (before meals)    Type 2 diabetes mellitus without complication, without long-term current use of insulin (H)       HYDROcodone-acetaminophen 5-325 MG per tablet    NORCO    15 tablet    Take 1-2 tablets by mouth every 4 hours as needed for moderate to severe pain        lisinopril 2.5 MG tablet    PRINIVIL/Zestril    90 tablet    Take 1 tablet (2.5 mg) by mouth daily    Type 2 diabetes mellitus without complication (H)       metFORMIN 1000 MG tablet    GLUCOPHAGE    180 tablet    TAKE ONE TABLET BY MOUTH TWICE A DAY WITH MEALS    Type 2 diabetes mellitus without complication, without long-term current use of insulin (H)       sertraline 50 MG tablet    ZOLOFT    90 tablet    Take 1 tablet (50 mg) by mouth daily    Mild episode of recurrent major depressive disorder (H)

## 2017-12-01 ENCOUNTER — CARE COORDINATION (OUTPATIENT)
Dept: CARE COORDINATION | Facility: CLINIC | Age: 37
End: 2017-12-01

## 2017-12-01 ASSESSMENT — ANXIETY QUESTIONNAIRES: GAD7 TOTAL SCORE: 11
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
## 2017-12-01 NOTE — TELEPHONE ENCOUNTER
Panel Management Review      Patient has the following on her problem list:     Diabetes    ASA: Failed    Last A1C  Lab Results   Component Value Date    A1C 10.3 09/11/2017    A1C 7.9 11/10/2016    A1C 6.3 11/25/2015    A1C 10.9 07/08/2015     A1C tested: MONITOR    Last LDL:    Lab Results   Component Value Date    CHOL 188 12/17/2015     Lab Results   Component Value Date    HDL 33 12/17/2015     Lab Results   Component Value Date    LDL  12/17/2015     Cannot estimate LDL when triglyceride exceeds 400 mg/dL     Lab Results   Component Value Date    TRIG 513 12/17/2015     Lab Results   Component Value Date    CHOLHDLRATIO 9.4 07/08/2015     Lab Results   Component Value Date    NHDL 155 12/17/2015       Is the patient on a Statin? NO             Is the patient on Aspirin? NO    Medications     HMG CoA Reductase Inhibitors    atorvastatin (LIPITOR) 10 MG tablet          Last three blood pressure readings:  BP Readings from Last 3 Encounters:   11/30/17 122/54   11/29/17 113/86   09/11/17 115/78       Date of last diabetes office visit: 9-11-17     Tobacco History:     History   Smoking Status     Never Smoker   Smokeless Tobacco     Never Used             Composite cancer screening  Chart review shows that this patient is due/due soon for the following Pap Smear  Summary:    Patient is due/failing the following:   A1C and PAP    Action needed:   Patient needs office visit for diabetes after issues are cleared up with dizziness.    Type of outreach:    Phone, spoke to patient.  at her appt about diabetic exam and pap will take care of those thing when other issues are taking care of     Questions for provider review:    None                                                                                                                                    Kell Juarez CMA     Chart routed to  .

## 2017-12-11 ENCOUNTER — ALLIED HEALTH/NURSE VISIT (OUTPATIENT)
Dept: NEUROLOGY | Facility: CLINIC | Age: 37
End: 2017-12-11

## 2017-12-11 DIAGNOSIS — F41.9 ANXIETY: Primary | ICD-10-CM

## 2017-12-11 DIAGNOSIS — R40.20 LOSS OF CONSCIOUSNESS (H): ICD-10-CM

## 2017-12-11 NOTE — MR AVS SNAPSHOT
After Visit Summary   2017    Tammy Joshi    MRN: [id number]           Patient Information     Date Of Birth          1980        Visit Information        Provider Department      2017 12:30 PM [facility] EEG 2 [facility]        Today's Diagnoses     Anxiety    -  1    Loss of consciousness (H)           Follow-ups after your visit        Who to contact     Please call your clinic at [phone] to:    Ask questions about your health    Make or cancel appointments    Discuss your medicines    Learn about your test results    Speak to your doctor   If you have compliments or concerns about an experience at your clinic, or if you wish to file a complaint, please contact [facility] Patient Relations at [phone] or email us at [email or web address]         Additional Information About Your Visit        MyChart Information     RuiYi is an electronic gateway that provides easy, online access to your medical records. With RuiYi, you can request a clinic appointment, read your test results, renew a prescription or communicate with your care team.     To sign up for [employer] visit the website at [email or web address]   You will be asked to enter the access code listed below, as well as some personal information. Please follow the directions to create your username and password.     Your access code is: [email or web address]  Expires: 2018  3:31 PM     Your access code will  in 90 days. If you need help or a new code, please contact your [facility] or call [phone] for assistance.        Care EveryWhere ID     This is your Care EveryWhere ID. This could be used by other organizations to access your [place] medical records  [id number]         Blood Pressure from Last 3 Encounters:   No data found for BP    Weight from Last 3 Encounters:   No data found for Wt              Today, you had the  following     No orders found for display       Primary Care Provider Office Phone # Fax #    Jenyradha Stephen Lewis [phone] [address] [facility] [address]        Equal Access to Services     DIPIKA WILL : Hadcharline daniel gayleo Soomaali, waaxda luqadaha, qaybta kaalmada adeegyada, randall kaiin hayaan esauepifanio chan justice monsalve. So [facility] [phone].    ATENCIÓN: Si habla español, tiene a dominguez disposición servicios gratuitos de asistencia lingüística. Llame al [phone].    We comply with applicable federal civil rights laws and [place] laws. We do not discriminate on the basis of race, color, national origin, age, disability, sex, sexual orientation, or gender identity.            Thank you!     Thank you for choosing [facility]  for your care. Our goal is always to provide you with excellent care. Hearing back from our patients is one way we can continue to improve our services. Please take a few minutes to complete the written survey that you may receive in the mail after your visit with us. Thank you!             Your Updated Medication List - Protect others around you: Learn how to safely use, store and throw away your medicines at www.disposemymeds.org.          This list is accurate as of: 12/11/17 11:59 PM.  Always use your most recent med list.                   Brand Name Dispense Instructions for use Diagnosis    atorvastatin 10 MG tablet    LIPITOR    90 tablet    Take 1 tablet (10 mg) by mouth daily    High blood cholesterol       blood glucose monitoring lancets     1 Box    Use to test blood sugars 4 times daily or as directed.    Type 2 diabetes mellitus without complication, without long-term current use of insulin (H)       blood glucose monitoring test strip    no brand specified    120 each    Test blood sugar 4 times per day    Type 2 diabetes mellitus without complication, without long-term current use of insulin (H)       CINNAMON PO      Take 1  tablet by mouth        CRANBERRY EXTRACT PO      Take 1 capsule by mouth        cyclobenzaprine 10 MG tablet    FLEXERIL    14 tablet    Take 1 tablet (10 mg) by mouth nightly as needed for muscle spasms        glipiZIDE 5 MG tablet    GLUCOTROL    90 tablet    Take 1 tablet (5 mg) by mouth 2 times daily (before meals)    Type 2 diabetes mellitus without complication, without long-term current use of insulin (H)       HYDROcodone-acetaminophen 5-325 MG per tablet    NORCO    15 tablet    Take 1-2 tablets by mouth every 4 hours as needed for moderate to severe pain        lisinopril 2.5 MG tablet    PRINIVIL/Zestril    90 tablet    Take 1 tablet (2.5 mg) by mouth daily    Type 2 diabetes mellitus without complication (H)       metFORMIN 1000 MG tablet    GLUCOPHAGE    180 tablet    TAKE ONE TABLET BY MOUTH TWICE A DAY WITH MEALS    Type 2 diabetes mellitus without complication, without long-term current use of insulin (H)       sertraline 50 MG tablet    ZOLOFT    90 tablet    Take 1 tablet (50 mg) by mouth daily    Mild episode of recurrent major depressive disorder (H)

## 2017-12-14 ENCOUNTER — OFFICE VISIT (OUTPATIENT)
Dept: NEUROLOGY | Facility: CLINIC | Age: 37
End: 2017-12-14
Payer: COMMERCIAL

## 2017-12-14 VITALS
HEART RATE: 105 BPM | SYSTOLIC BLOOD PRESSURE: 138 MMHG | BODY MASS INDEX: 34.08 KG/M2 | HEIGHT: 64 IN | WEIGHT: 199.6 LBS | DIASTOLIC BLOOD PRESSURE: 86 MMHG

## 2017-12-14 DIAGNOSIS — R41.0 CONFUSION: Primary | ICD-10-CM

## 2017-12-14 NOTE — MR AVS SNAPSHOT
"              After Visit Summary   2017    Tammy Joshi    MRN: [id number]           Patient Information     Date Of Birth          1980        Visit Information        Provider Department      2017 9:00 AM Debbie Patel MD [facility]         Follow-ups after your visit        Who to contact     Please call your clinic at [phone] to:    Ask questions about your health    Make or cancel appointments    Discuss your medicines    Learn about your test results    Speak to your doctor   If you have compliments or concerns about an experience at your clinic, or if you wish to file a complaint, please contact [facility] Patient Relations at [phone] or email us at [email or web address]         Additional Information About Your Visit        MyChart Information     [employer] is an electronic gateway that provides easy, online access to your medical records. With [employer], you can request a clinic appointment, read your test results, renew a prescription or communicate with your care team.     To sign up for [employer] visit the website at [email or web address]   You will be asked to enter the access code listed below, as well as some personal information. Please follow the directions to create your username and password.     Your access code is: [email or web address]  Expires: 2018  3:31 PM     Your access code will  in 90 days. If you need help or a new code, please contact your [facility] or call [phone] for assistance.        Care EveryWhere ID     This is your Care EveryWhere ID. This could be used by other organizations to access your [place] medical records  [id number]        Your Vitals Were     Pulse Height BMI (Body Mass Index)             105 5' 4\" (162.6 cm) 34.26 kg/m2          Blood Pressure from Last 3 Encounters:   17 138/86   17 122/54   17 113/86    Weight from Last 3 " Encounters:   12/14/17 199 lb 9.6 oz (90.5 kg)   11/30/17 200 lb (90.7 kg)   11/29/17 195 lb (88.5 kg)              Today, you had the following     No orders found for display       Primary Care Provider Office Phone # Fax #    Angelin Stephen Lewis [phone] [address] [facility] [address]        Equal Access to Services     DIPIKA WILL : Hadii aad ku hadasho Soomaali, waaxda luqadaha, qaybta kaalmada adeegyada, waxay idiin hayaan adeeg abbeyaravenancio laamerica . So [facility] [phone].    ATENCIÓN: Si juany boss, tiene a dominguez disposición servicios gratuitos de asistencia lingüística. [facility] [phone].    We comply with applicable federal civil rights laws and [place] laws. We do not discriminate on the basis of race, color, national origin, age, disability, sex, sexual orientation, or gender identity.            Thank you!     Thank you for choosing [facility]  for your care. Our goal is always to provide you with excellent care. Hearing back from our patients is one way we can continue to improve our services. Please take a few minutes to complete the written survey that you may receive in the mail after your visit with us. Thank you!             Your Updated Medication List - Protect others around you: Learn how to safely use, store and throw away your medicines at www.disposemymeds.org.          This list is accurate as of: 12/14/17 11:17 AM.  Always use your most recent med list.                   Brand Name Dispense Instructions for use Diagnosis    atorvastatin 10 MG tablet    LIPITOR    90 tablet    Take 1 tablet (10 mg) by mouth daily    High blood cholesterol       blood glucose monitoring lancets     1 Box    Use to test blood sugars 4 times daily or as directed.    Type 2 diabetes mellitus without complication, without long-term current use of insulin (H)       blood glucose monitoring test strip    no brand specified    120 each    Test blood sugar 4 times per  day    Type 2 diabetes mellitus without complication, without long-term current use of insulin (H)       CINNAMON PO      Take 1 tablet by mouth        CRANBERRY EXTRACT PO      Take 1 capsule by mouth        cyclobenzaprine 10 MG tablet    FLEXERIL    14 tablet    Take 1 tablet (10 mg) by mouth nightly as needed for muscle spasms        glipiZIDE 5 MG tablet    GLUCOTROL    90 tablet    Take 1 tablet (5 mg) by mouth 2 times daily (before meals)    Type 2 diabetes mellitus without complication, without long-term current use of insulin (H)       HYDROcodone-acetaminophen 5-325 MG per tablet    NORCO    15 tablet    Take 1-2 tablets by mouth every 4 hours as needed for moderate to severe pain        lisinopril 2.5 MG tablet    PRINIVIL/Zestril    90 tablet    Take 1 tablet (2.5 mg) by mouth daily    Type 2 diabetes mellitus without complication (H)       metFORMIN 1000 MG tablet    GLUCOPHAGE    180 tablet    TAKE ONE TABLET BY MOUTH TWICE A DAY WITH MEALS    Type 2 diabetes mellitus without complication, without long-term current use of insulin (H)       sertraline 50 MG tablet    ZOLOFT    90 tablet    Take 1 tablet (50 mg) by mouth daily    Mild episode of recurrent major depressive disorder (H)
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
## 2018-01-11 DIAGNOSIS — E11.9 TYPE 2 DIABETES MELLITUS WITHOUT COMPLICATION (H): ICD-10-CM

## 2018-01-11 RX ORDER — LISINOPRIL 2.5 MG/1
TABLET ORAL
Qty: 90 TABLET | Refills: 0 | Status: SHIPPED | OUTPATIENT
Start: 2018-01-11 | End: 2018-05-21

## 2018-01-11 NOTE — TELEPHONE ENCOUNTER
"Requested Prescriptions   Pending Prescriptions Disp Refills     lisinopril (PRINIVIL/ZESTRIL) 2.5 MG tablet [Pharmacy Med Name: LISINOPRIL 2.5MG TABS]  Last Written Prescription Date:  01/12/2017  Last Fill Quantity: 90,  # refills: 3   Last Office Visit with [facility], [facility] or [facility] prescribing provider:  11/30/2017   Future Office Visit:      90 tablet 3     Sig: TAKE ONE TABLET BY MOUTH EVERY DAY    ACE Inhibitors (Including Combos) Protocol Failed    1/11/2018  1:00 PM       Failed - Normal serum creatinine on file in past 12 months    Recent Labs   Lab Test  11/29/17   1355   CR  0.48*            Failed - No positive pregnancy test in past 12 months       Passed - Blood pressure under 140/90    BP Readings from Last 3 Encounters:   12/14/17 138/86   11/30/17 122/54   11/29/17 113/86                Passed - Recent or future visit with authorizing provider's specialty    Patient had office visit in the last year or has a visit in the next 30 days with authorizing provider.  See \"Patient Info\" tab in inbasket, or \"Choose Columns\" in Meds & Orders section of the refill encounter.              Passed - Patient is age 18 or older       Passed - No active pregnancy on record       Passed - Normal serum potassium on file in past 12 months    Recent Labs   Lab Test  11/29/17   1355   POTASSIUM  3.9             Otis Viera RT (R)    "

## 2018-01-11 NOTE — TELEPHONE ENCOUNTER
Per Dr. Duran on last lab notes:    Notes Recorded by Sanjuana Duran DO on 7/28/2017 at 12:37 PM  B12 is normal, thyroid normal, negative for Lyme.  Kidney function, electrolytes are normal.  Liver enzymes mildly elevated, similar to seen in the past.  Blood count normal    Alexia POLANCO RN

## 2018-01-17 DIAGNOSIS — E11.9 TYPE 2 DIABETES MELLITUS WITHOUT COMPLICATION, WITHOUT LONG-TERM CURRENT USE OF INSULIN (H): Primary | ICD-10-CM

## 2018-01-17 NOTE — PROGRESS NOTES
Talked to patient on the phone to remind her of her A1C . She says she will come in for this in the next few weeks

## 2018-01-17 NOTE — LETTER
[facility]  [address] [facility] [address] [address]  [phone]        January 17, 2018    Tammy Joshi                                                                                                                             [address] [place] [address]            Dear Tammy,         While doing a recent chart audit, it has come to our attention that you are due for an office visit to follow up on your diabetes management. Please schedule a morning appointment, and come having fasted for the previous 10-12 hours. Regular appointments are a vital part of the care and management for your diabetes and can help prevent many of the complications that can occur as part of this disease. Please call soon to schedule your follow-up appointment.        If you have transferred your care to another office or your diabetes is being managed elsewhere, please respond back to us by calling [phone] so that we do not continue to send you reminder letters.    Sincerely,      Shawn Lewis MD

## 2018-01-24 ENCOUNTER — TELEPHONE (OUTPATIENT)
Dept: FAMILY MEDICINE | Facility: CLINIC | Age: 38
End: 2018-01-24

## 2018-01-24 NOTE — LETTER
January 24, 2018      Tammy Joshi  [address] [address]        Dear Tammy,     Dr. Lewis's Care Team has been reviewing your chart and it appears that there are aspects of your care that could be improved. At this time you are due for Pap smear.  If you could plan to do that in the near future it would be very helpful.  We are trying to help our patients achieve their health care goals.    If you have any questions, please feel free to call the clinic at [phone].              Sincerely,        Shawn Lewis MD

## 2018-01-24 NOTE — TELEPHONE ENCOUNTER
Panel Management Review      Patient has the following on her problem list:       Composite cancer screening  Chart review shows that this patient is due/due soon for the following Pap Smear  Summary:    Patient is due/failing the following:   PAP    Action needed:   Patient needs office visit for pap.    Type of outreach:    Sent letter.    Questions for provider review:                                                                                                                                        Kell Juarez CMA     Chart routed to  .

## 2018-01-26 NOTE — PROCEDURES
Procedure Date: 2017      EEG #[id number]       DATE OF RECORDIN2017      DURATION OF RECORDING:  Two hours and 55 minutes.      CLINICAL HISTORY:  This patient is a 37-year-old female who presented with passing out spells.  EEG was requested for evaluation for seizures.      CURRENT MEDICATIONS:  Lisinopril, metformin, Nitrostat.      TECHNICAL SUMMARY: This continuous video- EEG monitoring procedure was performed with 23 scalp electrodes in 10-20 electrode system placements, and additional scalp, precordial and other surface electrodes used for electrical referencing and artifact detection.  Video monitoring was utilized and periodically reviewed by EEG technologists and the physician for electroclinical correlations.    RESULTS:   BACKGROUND ACTIVITIES: During maximal wakefulness, there is a symmetric, moderate amplitude, well formed, approximately 11 Hz posterior dominant rhythm, which attenuated with eye opening. Stage I and II sleep were identified with well formed sleep architectures including vertex sharp transients and sleep spindles. Hyperventilation produced no change of the background activities.  Photic stimulation produced no driving responses.  INTERICTAL ACTIVITIES: There are no focal pathological slowing or epileptiform abnormalities.   ICTAL ACTIVITIES: There are no clinical or electrographic seizures during this recording.  IMPRESSION:  This is a normal waking and sleep EEG.          HENNA STACK MD             D: 2018   T: 2018   MT: RAFAL      Name:     MAXIM MIR   MRN:      [id number]        Account:        [id number]   :      1980           Procedure Date: 2017      Document: [id number]

## 2018-01-30 NOTE — ADDENDUM NOTE
Encounter addended by: Daria Arias, PT on: 1/30/2018  1:12 PM<BR>     Actions taken: Sign clinical note, Flowsheet accepted, Episode resolved

## 2018-01-30 NOTE — PROGRESS NOTES
Patient did not return for follow up treatments. Goal status and current objective information is therefore unknown.  The daily note from the patient's last visit will serve as the discharge note. Discharge from PT services at this time for this episode of treatment. Please see attached documentation under this episode of care for further information including dates of service, start of care date, referring physician, Dx, treatment plan, treatments, etc.    Please contact me with any questions or concerns.  Thank you for your referral.    Daria Arias PT, DPT, OCS  Physical Therapist, Orthopedic Certified Specialist  [facility]  [phone]       PHYSICAL THERAPY DISCHARGE NOTE  07/27/17 1600   Signing Clinician's Name / Credentials   Signing clinician's name / credentials Daria Arias PT, DPT, OCS   Session Number   Session Number 1 PO   Progress Note/Recertification   Progress Note Due Date 08/24/17   Adult Goals   PT Eval Goals 1;2   Goal 1   Goal Identifier 1   Goal Description Patient will report no symptoms with turning in bed.   Target Date 08/24/17   Goal 2   Goal Identifier 2   Goal Description Patient will report no return of symptoms.   Target Date 08/24/17   Subjective Report   Subjective Report see eval   Treatment Interventions   Interventions Standard Canalith Repositioning   Oculomotor Exam   Smooth Pursuit Normal   Saccades Normal   VOR Normal   VOR Comments no saccades but felt really dizzy   Infrared Goggle Exam or Frenzel Lense Exam   Exam completed with Room Light   Spontaneous Nystagmus Negative   Gaze Evoked Nystagmus Negative   [place]-Hallpike (right) Upbeating R torsional   [place]-Hallpike (Left) Negative   HSCC Supine Roll Test (Right) Upbeating R torsional   HSCC Supine Roll Test (Left) Negative   BPPV Canal(s) R Posterior   BPPV Type Canalithasis   Standard Canalith Repositioning   Minutes 15   Skilled Intervention repositioning manuever to  decrease dizziness and improve function   Patient Response no nystagmus post-treatment, patient reported feeling less dizzy   Treatment Detail Epley manuever for R posterior canal x 2. Instructed patient in anatomy of vestibular system, what BPPV is, and purpose and procedure of the repositioning manuever.    Education   Learner Patient   Readiness Eager   Method Booklet/handout;Explanation   Response Verbalizes Understanding   Plan   Plan for next session f/u in 1 week if needed   Comments   Comments pt was seen in the ED and therefore a DHI was not performed.   Total Session Time   Total Treatment Time (sum of timed and untimed services) 30, 15ev 15 crp   AMBULATORY CLINICS ONLY-MEDICAL AND TREATMENT DIAGNOSIS   Medical Diagnosis Vertigo   PT Diagnosis dizziness

## 2018-02-14 DIAGNOSIS — F33.0 MILD EPISODE OF RECURRENT MAJOR DEPRESSIVE DISORDER (H): ICD-10-CM

## 2018-02-14 DIAGNOSIS — E11.9 TYPE 2 DIABETES MELLITUS WITHOUT COMPLICATION, WITHOUT LONG-TERM CURRENT USE OF INSULIN (H): ICD-10-CM

## 2018-02-14 DIAGNOSIS — E78.5 HYPERLIPIDEMIA WITH TARGET LDL LESS THAN 100: Primary | ICD-10-CM

## 2018-02-14 NOTE — TELEPHONE ENCOUNTER
"Requested Prescriptions   Pending Prescriptions Disp Refills     sertraline (ZOLOFT) 50 MG tablet [Pharmacy Med Name: SERTRALINE HCL 50MG TABS]  Last Written Prescription Date:  03/24/17  Last Fill Quantity: 90,  # refills: 1   Last office visit: 11/30/2017 with prescribing provider:  11/30/17   Future Office Visit:     90 tablet 1     Sig: TAKE ONE TABLET BY MOUTH EVERY DAY    SSRIs Protocol Failed    2/14/2018  3:29 PM   FORTINO-7 SCORE 8/3/2015 11/10/2016 11/30/2017   Total Score 4 - -   Total Score - 5 11         Failed - PHQ-9 score less than 5 in past 6 months    The PHQ-9 criteria is meant to fail. It requires a PHQ-9 score review  PHQ-9 SCORE 8/3/2015 11/10/2016 11/30/2017   Total Score 6 - -   Total Score - 8 13          Passed - Patient is age 18 or older       Passed - No active pregnancy on record       Passed - No positive pregnancy test in last 12 months       Passed - Recent (6 mo) or future visit with authorizing provider's specialty    Patient had office visit in the last 6 months or has a visit in the next 30 days with authorizing provider.  See \"Patient Info\" tab in inbasket, or \"Choose Columns\" in Meds & Orders section of the refill encounter.            glipiZIDE (GLUCOTROL)Last Written Prescription Date:  09/13/17  Last Fill Quantity: 90,  # refills: 3   Last office visit: 11/30/2017 with prescribing provider:  11/30/17   Future Office Visit:     5 MG tablet [Pharmacy Med Name: GLIPIZIDE 5MG TABS] 90 tablet 3     Sig: TAKE ONE TABLET BY MOUTH TWICE A DAY BEFORE MEALS    Sulfonylurea Agents Failed    2/14/2018  3:29 PM       Failed - Patient has documented LDL within the past 12 mos.    Recent Labs   Lab Test  12/17/15   1029   LDL  Cannot estimate LDL when triglyceride exceeds 400 mg/dL          Failed - Patient has had a Microalbumin in the past 12 mos.    Recent Labs   Lab Test  11/10/16   1115   MICROL  56   UMALCR  20.18          Failed - Patient has documented A1c within the specified period " "of time.    Recent Labs   Lab Test  09/11/17   1349   A1C  10.3*          Failed - Patient has a recent creatinine (normal) within the past 12 mos.    Recent Labs   Lab Test  11/29/17   1355   CR  0.48*          Passed - Blood pressure less than 140/90 in past 6 months    BP Readings from Last 3 Encounters:   12/14/17 138/86   11/30/17 122/54   11/29/17 113/86          Passed - Patient is age 18 or older       Passed - No active pregnancy on record       Passed - Patient has not had a positive pregnancy test within the past 12 mos.       Passed - Patient has had an appointment with authorizing provider within the past 6 mos. or  within next 30 days    Patient had office visit in the last 6 months or has a visit in the next 30 days with authorizing provider.  See \"Patient Info\" tab in inbasket, or \"Choose Columns\" in Meds & Orders section of the refill encounter.              "

## 2018-02-19 RX ORDER — GLIPIZIDE 5 MG/1
TABLET ORAL
Qty: 60 TABLET | Refills: 0 | Status: SHIPPED | OUTPATIENT
Start: 2018-02-19 | End: 2018-03-26

## 2018-02-19 NOTE — TELEPHONE ENCOUNTER
Medication is being filled for 1 time refill only due to:  due for Phq9 and labs,  also, per health maint, she is due for pap multiple things.   Honey Calixto RNC

## 2018-02-24 ENCOUNTER — HEALTH MAINTENANCE LETTER (OUTPATIENT)
Age: 38
End: 2018-02-24

## 2018-03-19 ENCOUNTER — CARE COORDINATION (OUTPATIENT)
Dept: CARE COORDINATION | Facility: CLINIC | Age: 38
End: 2018-03-19

## 2018-03-19 NOTE — PROGRESS NOTES
Clinic Care Coordination Contact  [facility]/Voicemail    Referral Source: PCP  Clinical Data: Care Coordinator Outreach  Outreach attempted x 1.  Left message on voicemail with call back information and requested return call.  Plan: Care Coordinator mailed out care coordination introduction letter on 3-19-18. Care Coordinator will try to reach patient again in 5-15 business days.    Hali Nguyen  Social Work Care Coordinator  [facility]  [phone]

## 2018-03-19 NOTE — LETTER
[facility]  [address] [place] [address]  [phone]      March 19, 2018    Tammy Joshi  [address] [place] [address]      Dear Tammy,    I am a clinic care coordinator who works with the [facility] and I wanted to introduce myself and provide you with my contact information so that you can call me with questions or concerns about your health care. Below is a description of clinic care coordination and how I can further assist you.     The clinic care coordinator is a registered nurse and/or  who understand the health care system. The goal of clinic care coordination is to help you manage your health and improve access to the [facility] system in the most efficient manner. The registered nurse can assist you in meeting your health care goals by providing education, coordinating services, and strengthening the communication among your providers. The  can assist you with financial, behavioral, psychosocial, chemical dependency, counseling, and/or psychiatric resources.    Please feel free to contact me at [phone], with any questions or concerns. We at [facility] are focused on providing you with the highest-quality healthcare experience possible and that all starts with you.     Sincerely,     Hali Mehta    Enclosed: I have enclosed a copy of a 24 Hour Access Plan. This has helpful phone numbers for you to call when needed. Please keep this in an easy to access place to use as needed.

## 2018-03-19 NOTE — LETTER
Health Care Home - Access Care Plan    About Me  Patient Name:  Tammy Joshi    YOB: 1980  Age:                             37 year old   [place] MRN:            [id number] Telephone Information:     Home Phone [phone]   Mobile [phone]       Address:    [address] [place] [address] Email address:  [email or web address]      Emergency Contact(s)  Name Relationship Lgl Grd Work Phone Home Phone Mobile Phone   1. SEGUNDO MICHEL* Mother  none [phone] none   2. NO SECONDARY C*    [phone]              Health Maintenance: Routine Health maintenance Reviewed: Not assessed    My Access Plan  Medical Emergency 911   Questions or concerns during clinic hours Primary Clinic Line, I will call the clinic directly: [facility] - [phone]   24 Hour Appointment Line [phone] or  [phone]) (toll free)   24 Hour Nurse Line [phone] (toll free)   Questions or concerns outside clinic hours 24 Hour Appointment Line, I will call the after-hours on-call line:   [facility] [phone] or [phone]) (toll-free)   Preferred Urgent Care [facility] Emergency Department [phone]   Preferred Hospital [place], [place]  [phone]   Preferred Pharmacy [facility] [address] [facility]     Behavioral Health Crisis Line The National Suicide Prevention Lifeline at 1-564.509.6941 or 541       My Care Team Members  Patient Care Team       Relationship Specialty Notifications Start End    Shawn Lewis MD PCP - General Family Practice  8/3/15     Phone: [phone] Fax: [phone]         [address] [facility] [address]    Hali Mehta LSW Lead Care Coordinator Primary Care - [facility] Admissions 3/19/18     Phone: [phone] Fax: [phone]            My Medical and Care Information  Problem List   Patient Active Problem List   Diagnosis      Hand numbness     CARDIOVASCULAR SCREENING; LDL GOAL LESS THAN 160     Anxiety     Depression     Hyperlipidemia with target LDL less than 100     Type 2 diabetes mellitus without complication (H)     Hip pain, left      Current Medications and Allergies:  See printed Medication Report

## 2018-03-21 ENCOUNTER — TELEPHONE (OUTPATIENT)
Dept: FAMILY MEDICINE | Facility: CLINIC | Age: 38
End: 2018-03-21

## 2018-03-21 DIAGNOSIS — E11.9 TYPE 2 DIABETES MELLITUS WITHOUT COMPLICATION, WITHOUT LONG-TERM CURRENT USE OF INSULIN (H): Primary | ICD-10-CM

## 2018-03-26 DIAGNOSIS — E11.9 TYPE 2 DIABETES MELLITUS WITHOUT COMPLICATION, WITHOUT LONG-TERM CURRENT USE OF INSULIN (H): ICD-10-CM

## 2018-03-26 DIAGNOSIS — E78.5 HYPERLIPIDEMIA WITH TARGET LDL LESS THAN 100: ICD-10-CM

## 2018-03-26 LAB
CHOLEST SERPL-MCNC: 150 MG/DL
CREAT UR-MCNC: 250 MG/DL
HBA1C MFR BLD: 9.3 % (ref 4.3–6)
HDLC SERPL-MCNC: 35 MG/DL
LDLC SERPL CALC-MCNC: ABNORMAL MG/DL
LDLC SERPL DIRECT ASSAY-MCNC: 68 MG/DL
MICROALBUMIN UR-MCNC: 37 MG/L
MICROALBUMIN/CREAT UR: 14.76 MG/G CR (ref 0–25)
NONHDLC SERPL-MCNC: 115 MG/DL
TRIGL SERPL-MCNC: 405 MG/DL

## 2018-03-26 PROCEDURE — 83721 ASSAY OF BLOOD LIPOPROTEIN: CPT | Mod: 59 | Performed by: FAMILY MEDICINE

## 2018-03-26 PROCEDURE — 80061 LIPID PANEL: CPT | Performed by: FAMILY MEDICINE

## 2018-03-26 PROCEDURE — 82043 UR ALBUMIN QUANTITATIVE: CPT | Performed by: FAMILY MEDICINE

## 2018-03-26 PROCEDURE — 36415 COLL VENOUS BLD VENIPUNCTURE: CPT | Performed by: FAMILY MEDICINE

## 2018-03-26 PROCEDURE — 83036 HEMOGLOBIN GLYCOSYLATED A1C: CPT | Performed by: FAMILY MEDICINE

## 2018-03-26 NOTE — TELEPHONE ENCOUNTER
"Requested Prescriptions   Pending Prescriptions Disp Refills     glipiZIDE (GLUCOTROL) 5 MG tablet [Pharmacy Med Name: GLIPIZIDE 5MG TABS]  Last Written Prescription Date:  02/19/20218  Last Fill Quantity: 60,  # refills: 0   Last office visit: 11/30/2017 with prescribing provider:  elsy   Future Office Visit:     60 tablet 0     Sig: TAKE ONE TABLET BY MOUTH TWICE A DAY BEFORE MEALS    Sulfonylurea Agents Failed    3/26/2018 12:17 PM       Failed - Patient has documented LDL within the past 12 mos.    Recent Labs   Lab Test  03/26/18   1141   LDL  Cannot estimate LDL when triglyceride exceeds 400 mg/dL  68            Failed - Patient has had a Microalbumin in the past 12 mos.    Recent Labs   Lab Test  03/26/18   1144   MICROL  37   UMALCR  14.76            Failed - Patient has a recent creatinine (normal) within the past 12 mos.    Recent Labs   Lab Test  11/29/17   1355   CR  0.48*            Passed - Blood pressure less than 140/90 in past 6 months    BP Readings from Last 3 Encounters:   12/14/17 138/86   11/30/17 122/54   11/29/17 113/86                Passed - Patient has documented A1c within the specified period of time.    Recent Labs   Lab Test  03/26/18   1139   A1C  9.3*            Passed - Patient is age 18 or older       Passed - No active pregnancy on record       Passed - Patient has not had a positive pregnancy test within the past 12 mos.       Passed - Recent (6 mo) or future (30 days) visit within the authorizing provider's specialty    Patient had office visit in the last 6 months or has a visit in the next 30 days with authorizing provider or within the authorizing provider's specialty.  See \"Patient Info\" tab in inbasket, or \"Choose Columns\" in Meds & Orders section of the refill encounter.            Otis Viera RT (R)    "

## 2018-03-28 NOTE — PROGRESS NOTES
Please inform patient that test result shows that her diabetes is slightly better but still not under control. Her cholesterol was very high. I will recommend increasing her Lipitor to 40 mg > for her diabetes will recommend increasing the Glipizide to 10 mg daily . She will likely need another oral medication. Will appreciate if she can come in so we can discuss the new medication   Thank you.     Shawn Lewis M.D.

## 2018-03-30 RX ORDER — GLIPIZIDE 5 MG/1
TABLET ORAL
Qty: 60 TABLET | Refills: 0 | Status: SHIPPED | OUTPATIENT
Start: 2018-03-30 | End: 2018-07-03

## 2018-03-30 NOTE — TELEPHONE ENCOUNTER
Routing refill request to provider for review/approval because:  Labs out of range:  See below    Gilda Lawson RN

## 2018-04-21 ENCOUNTER — HOSPITAL ENCOUNTER (EMERGENCY)
Facility: CLINIC | Age: 38
Discharge: HOME | End: 2018-04-21
Attending: PHYSICIAN ASSISTANT | Admitting: PHYSICIAN ASSISTANT
Payer: COMMERCIAL

## 2018-04-21 VITALS
SYSTOLIC BLOOD PRESSURE: 146 MMHG | RESPIRATION RATE: 16 BRPM | OXYGEN SATURATION: 97 % | HEART RATE: 91 BPM | HEIGHT: 64 IN | DIASTOLIC BLOOD PRESSURE: 86 MMHG | TEMPERATURE: 99.8 F

## 2018-04-21 DIAGNOSIS — J02.9 ACUTE PHARYNGITIS, UNSPECIFIED ETIOLOGY: Primary | ICD-10-CM

## 2018-04-21 LAB
INTERNAL QC OK POCT: YES
S PYO AG THROAT QL IA.RAPID: NEGATIVE

## 2018-04-21 PROCEDURE — 87081 CULTURE SCREEN ONLY: CPT | Performed by: PHYSICIAN ASSISTANT

## 2018-04-21 PROCEDURE — G0463 HOSPITAL OUTPT CLINIC VISIT: HCPCS

## 2018-04-21 PROCEDURE — 87880 STREP A ASSAY W/OPTIC: CPT | Performed by: PHYSICIAN ASSISTANT

## 2018-04-21 PROCEDURE — 99213 OFFICE O/P EST LOW 20 MIN: CPT | Performed by: PHYSICIAN ASSISTANT

## 2018-04-21 RX ORDER — DEXAMETHASONE 4 MG/1
10 TABLET ORAL ONCE
Qty: 3 TABLET | Refills: 0 | Status: SHIPPED | OUTPATIENT
Start: 2018-04-21 | End: 2019-04-28

## 2018-04-21 ASSESSMENT — ENCOUNTER SYMPTOMS
FEVER: 1
COUGH: 1
MUSCULOSKELETAL NEGATIVE: 1
SORE THROAT: 1
RHINORRHEA: 1
GASTROINTESTINAL NEGATIVE: 1
CARDIOVASCULAR NEGATIVE: 1

## 2018-04-21 NOTE — ED AVS SNAPSHOT
[facility] Emergency Department    [address] [facility] [address]    Phone:  [phone]    Fax:  [phone]                                       Tammy Joshi   MRN: [id number]    Department:  [facility] Emergency Department   Date of Visit:  4/21/2018           Patient Information     Date Of Birth          1980        Your diagnoses for this visit were:     Acute pharyngitis, unspecified etiology        You were seen by Fanny Malik PA-C.      Follow-up Information     Follow up with Shawn Lewis MD. Call in 5 days.    Specialty:  Family Practice    Why:  As needed, For persistent symptoms    Contact information:    [address] [place] [address]  [phone]          Follow up with [facility] Emergency Department.    Specialty:  EMERGENCY MEDICINE    Why:  As needed, If symptoms worsen    Contact information:    [address] [place] [address] [phone]    Additional information:    The medical center is located at   [address] [place] [address] (between [facility] and   [facility] in [place], four miles north   of [place]).      Discharge References/Attachments     PHARYNGITIS, VIRAL (ENGLISH)      24 Hour Appointment Hotline       To make an appointment at any [facility] clinic, call [phone] ([phone]). If you don't have a family doctor or clinic, we will help you find one. [facility] clinics are conveniently located to serve the needs of you and your family.             Review of your medicines      START taking        Dose / Directions Last dose taken    dexamethasone 4 MG tablet   Commonly known as:  DECADRON   Dose:  10 mg   Quantity:  3 tablet        Take 2.5 tablets (10 mg) by mouth once for 1 dose   Refills:  0          Our records show that you are taking the medicines listed below. If these are incorrect, please call your family doctor or clinic.        Dose / Directions Last dose taken    atorvastatin 10 MG tablet    Commonly known as:  LIPITOR   Dose:  10 mg   Quantity:  90 tablet        Take 1 tablet (10 mg) by mouth daily   Refills:  3        blood glucose monitoring lancets   Quantity:  1 Box        Use to test blood sugars 4 times daily or as directed.   Refills:  1        blood glucose monitoring test strip   Commonly known as:  no brand specified   Quantity:  120 each        Test blood sugar 4 times per day   Refills:  1        CINNAMON PO   Dose:  1 tablet        Take 1 tablet by mouth   Refills:  0        CRANBERRY EXTRACT PO   Dose:  1 capsule        Take 1 capsule by mouth   Refills:  0        cyclobenzaprine 10 MG tablet   Commonly known as:  FLEXERIL   Dose:  10 mg   Quantity:  14 tablet        Take 1 tablet (10 mg) by mouth nightly as needed for muscle spasms   Refills:  1        glipiZIDE 5 MG tablet   Commonly known as:  GLUCOTROL   Quantity:  60 tablet        TAKE ONE TABLET BY MOUTH TWICE A DAY BEFORE MEALS   Refills:  0        HYDROcodone-acetaminophen 5-325 MG per tablet   Commonly known as:  NORCO   Dose:  1-2 tablet   Quantity:  15 tablet        Take 1-2 tablets by mouth every 4 hours as needed for moderate to severe pain   Refills:  0        lisinopril 2.5 MG tablet   Commonly known as:  PRINIVIL/Zestril   Quantity:  90 tablet        TAKE ONE TABLET BY MOUTH EVERY DAY   Refills:  0        metFORMIN 1000 MG tablet   Commonly known as:  GLUCOPHAGE   Quantity:  180 tablet        TAKE ONE TABLET BY MOUTH TWICE A DAY WITH MEALS   Refills:  1        sertraline 50 MG tablet   Commonly known as:  ZOLOFT   Quantity:  30 tablet        TAKE ONE TABLET BY MOUTH EVERY DAY   Refills:  0                Prescriptions were sent or printed at these locations (1 Prescription)                   [place], [place] - [address] [facility]   [address] [facility] [address]    Telephone:  [phone]   Fax:  [phone]   Hours:                  E-Prescribed (1 of 1)         dexamethasone (DECADRON) 4  "MG tablet                Procedures and tests performed during your visit     Beta strep group A r/o culture    Rapid strep group A screen POCT      Orders Needing Specimen Collection     None      Pending Results     Date and Time Order Name Status Description    4/21/2018 1756 Beta strep group A r/o culture In process             Pending Culture Results     Date and Time Order Name Status Description    4/21/2018 1756 Beta strep group A r/o culture In process             Pending Results Instructions     If you had any lab results that were not finalized at the time of your Discharge, you can call the ED Lab Result RN at [phone]. You will be contacted by this team for any positive Lab results or changes in treatment. The nurses are available 7 days a week from 10A to 6:30P.  You can leave a message 24 hours per day and they will return your call.        Test Results From Your Hospital Stay        4/21/2018  5:56 PM      Component Results     Component Value Ref Range & Units Status    Rapid Strep A Screen NEGATIVE neg Final    Internal QC OK Yes  Final         4/21/2018  5:58 PM                Thank you for choosing [facility]       Thank you for choosing [facility] for your care. Our goal is always to provide you with excellent care. Hearing back from our patients is one way we can continue to improve our services. Please take a few minutes to complete the written survey that you may receive in the mail after you visit with us. Thank you!        internetstores Information     internetstores lets you send messages to your doctor, view your test results, renew your prescriptions, schedule appointments and more. To sign up, go to [email or web address] . Click on \"Log in\" on the left side of the screen, which will take you to the Welcome page. Then click on \"Sign up Now\" on the right side of the page.     You will be asked to enter the access code listed below, as well as some personal information. Please follow the directions " to create your username and password.     Your access code is: [email or web address]  Expires: 2018  6:04 PM     Your access code will  in 90 days. If you need help or a new code, please call your [facility] clinic or [phone].        Care EveryWhere ID     This is your Care EveryWhere ID. This could be used by other organizations to access your [facility] medical records  [id number]        Equal Access to Services     [facility] SUMAN : Hadii ree daniel hadasho Soomaali, waaxda luqadaha, qaybta kaalmada adeegyada, randall rendon . So [facility] [phone].    ATENCIÓN: Si habla español, tiene a dominguez disposición servicios gratuitos de asistencia lingüística. Llame al [phone].    We comply with applicable federal civil rights laws and [place] laws. We do not discriminate on the basis of race, color, national origin, age, disability, sex, sexual orientation, or gender identity.            After Visit Summary       This is your record. Keep this with you and show to your community pharmacist(s) and doctor(s) at your next visit.

## 2018-04-21 NOTE — ED PROVIDER NOTES
History     Chief Complaint   Patient presents with     Pharyngitis     HPI  Tammy Joshi is a 37 year old female with hx type 2 DM, depression, anxiety who presents with complaints of sore throat for the past week.  She also c/o associated subjective fevers, nasal congestion, rhinorrhea, and cough.  Denies rash or neck pain/stiffness.  She has been taking Tylenol, Ibuprofen, and Dayquil at home without improvement.  Pt reports her son recently tested positive for strep throat.      Problem List:    Patient Active Problem List    Diagnosis Date Noted     Hip pain, left 09/08/2017     Priority: Medium     Type 2 diabetes mellitus without complication (H) 10/25/2015     Priority: Medium     Hyperlipidemia with target LDL less than 100 08/06/2015     Priority: Medium     Diagnosis updated by automated process. Provider to review and confirm.       Depression 02/24/2014     Priority: Medium     Anxiety 07/21/2011     Priority: Medium     CARDIOVASCULAR SCREENING; LDL GOAL LESS THAN 160 10/31/2010     Priority: Medium     Hand numbness 10/14/2009     Priority: Medium        Past Medical History:    Past Medical History:   Diagnosis Date     Absence of menstruation      Female infertility of unspecified origin      MILD/NOS PREECLAMP-ANTEP 8/29/2005     Polycystic ovaries      PPH (postpartum hemorrhage) 11/2009       Past Surgical History:    Past Surgical History:   Procedure Laterality Date     C HAND/FINGER SURGERY UNLISTED  7th grade    left index     D & C  1998    Missed Ab     LITHOTRIPSY  2011       Family History:    Family History   Problem Relation Age of Onset     Adopted: Yes     Unknown/Adopted Other      patient was adopted; knows a limited amount of medical history of birth parents       Social History:  Marital Status:   [2]  Social History   Substance Use Topics     Smoking status: Never Smoker     Smokeless tobacco: Never Used     Alcohol use Yes      Comment: wine occasionally, none  "while pregnant        Medications:      dexamethasone (DECADRON) 4 MG tablet   atorvastatin (LIPITOR) 10 MG tablet   blood glucose monitoring (FREESTYLE) lancets   blood glucose monitoring (NO BRAND SPECIFIED) test strip   CINNAMON PO   CRANBERRY EXTRACT PO   cyclobenzaprine (FLEXERIL) 10 MG tablet   glipiZIDE (GLUCOTROL) 5 MG tablet   HYDROcodone-acetaminophen (NORCO) 5-325 MG per tablet   lisinopril (PRINIVIL/ZESTRIL) 2.5 MG tablet   metFORMIN (GLUCOPHAGE) 1000 MG tablet   sertraline (ZOLOFT) 50 MG tablet         Review of Systems   Constitutional: Positive for fever.   HENT: Positive for congestion, rhinorrhea and sore throat.    Respiratory: Positive for cough.    Cardiovascular: Negative.    Gastrointestinal: Negative.    Musculoskeletal: Negative.    Skin: Negative.    All other systems reviewed and are negative.      Physical Exam   BP: 146/86  Pulse: 91  Temp: 99.8  F (37.7  C)  Resp: 16  Height: 162.6 cm (5' 4\")  SpO2: 97 %      Physical Exam   Constitutional: She is oriented to person, place, and time. She appears well-developed and well-nourished. No distress.   HENT:   Head: Normocephalic and atraumatic.   Right Ear: Tympanic membrane, external ear and ear canal normal.   Left Ear: Tympanic membrane, external ear and ear canal normal.   Nose: Mucosal edema and rhinorrhea present.   Mouth/Throat: Uvula is midline and mucous membranes are normal. No uvula swelling. Posterior oropharyngeal erythema present. No oropharyngeal exudate, posterior oropharyngeal edema or tonsillar abscesses.   Eyes: Conjunctivae and EOM are normal. Pupils are equal, round, and reactive to light.   Neck: Normal range of motion and full passive range of motion without pain. Neck supple. No rigidity. Normal range of motion present.   Cardiovascular: Normal rate, regular rhythm and normal heart sounds.    Pulmonary/Chest: Effort normal and breath sounds normal. No respiratory distress. She has no wheezes. She has no rales. "   Lymphadenopathy:     She has cervical adenopathy.   Neurological: She is alert and oriented to person, place, and time.   Skin: Skin is warm and dry. No rash noted.       ED Course     ED Course     Procedures    Results for orders placed or performed during the hospital encounter of 04/21/18 (from the past 24 hour(s))   Rapid strep group A screen POCT   Result Value Ref Range    Rapid Strep A Screen NEGATIVE neg    Internal QC OK Yes        Medications - No data to display    Assessments & Plan (with Medical Decision Making)     Pt  is a 37 year old female with hx type 2 DM, depression, anxiety who presents with complaints of sore throat for the past week.  She also c/o associated subjective fevers, nasal congestion, rhinorrhea, and cough.  Denies rash or neck pain/stiffness.  She has been taking Tylenol, Ibuprofen, and Dayquil at home without improvement.  Pt reports her son recently tested positive for strep throat.  Pt is afebrile on arrival.  Exam as above.  Rapid strep was negative.  Culture is pending.  Discussed results with patient.  Return precautions were reviewed.  Hand-outs were provided.    Patient was sent with a one-time dose of Dexamethasone for symptomatic treatment and was instructed to follow-up with PCP if no improvement in 5-7 days for continued care and management or sooner if new or worsening symptoms.  She is to return to the ED for persistent and/or worsening symptoms.  Patient expressed understanding of the diagnosis and plan and was discharged home in good condition.    I have reviewed the nursing notes.    I have reviewed the findings, diagnosis, plan and need for follow up with the patient.    New Prescriptions    DEXAMETHASONE (DECADRON) 4 MG TABLET    Take 2.5 tablets (10 mg) by mouth once for 1 dose       Final diagnoses:   Acute pharyngitis, unspecified etiology       4/21/2018   [facility] EMERGENCY DEPARTMENT     Fanny Malik PA-C  04/21/18 0380

## 2018-04-21 NOTE — ED AVS SNAPSHOT
[facility] Emergency Department    [address] [facility] [address]    Phone:  [phone]    Fax:  [phone]                                       Tammy Joshi   MRN: [id number]    Department:  [facility] Emergency Department   Date of Visit:  4/21/2018           After Visit Summary Signature Page     I have received my discharge instructions, and my questions have been answered. I have discussed any challenges I see with this plan with the nurse or doctor.    ..........................................................................................................................................  Patient/Patient Representative Signature      ..........................................................................................................................................  Patient Representative Print Name and Relationship to Patient    ..................................................               ................................................  Date                                            Time    ..........................................................................................................................................  Reviewed by Signature/Title    ...................................................              ..............................................  Date                                                            Time

## 2018-04-23 ENCOUNTER — PATIENT OUTREACH (OUTPATIENT)
Dept: CARE COORDINATION | Facility: CLINIC | Age: 38
End: 2018-04-23

## 2018-04-23 LAB
BACTERIA SPEC CULT: NORMAL
Lab: NORMAL
SPECIMEN SOURCE: NORMAL

## 2018-04-23 NOTE — LETTER
Health Care Home - Access Care Plan    About Me:  Patient Name:  Tammy Joshi    YOB: 1980  Age:                  37 year old   [place] MRN:   [id number] Telephone Information:     Home Phone [phone]   Mobile [phone]       Address:    [address] [place] [address] Email address:  [email or web address]      Emergency Contact(s)  Name Relationship Lgl Grd Work Phone Home Phone Mobile Phone   1. SEGUNDO MICHEL* Mother  none [phone] none   2. NO SECONDARY C*    [phone]                My Access Plan  Medical Emergency 911   Questions or concerns during clinic hours Primary Clinic Line, I will call the clinic directly: [facility] - [phone]   24 Hour Appointment Line [phone] or  [phone] (toll free)   24 Hour Nurse Line [phone] (toll free)   Questions or concerns outside clinic hours 24 Hour Appointment Line, I will call the after-hours on-call line:   [facility] [phone] or [phone]) (toll-free)   Preferred Urgent Care  [facility] [phone]   Preferred Hospital  [facility] [phone]   Preferred Pharmacy [facility] [address] [facility]     Behavioral Health Crisis Line The National Suicide Prevention Lifeline at 1-613.134.6341 or 911             My Care Team Members  Patient Care Team       Relationship Specialty Notifications Start End    Shawn Lewis MD PCP - General Family Practice  8/3/15     Phone: [phone] Fax: [phone]         [address] [facility] [address]    Hali Mehta LSW Clinic Care Coordinator Primary Care - [facility] Admissions 3/19/18     Phone: [phone] Fax: [phone]               My Medical and Care Information  Problem List   Patient Active Problem List   Diagnosis     Hand numbness     CARDIOVASCULAR SCREENING; LDL GOAL LESS THAN 160     Anxiety     Depression     Hyperlipidemia with target  LDL less than 100     Type 2 diabetes mellitus without complication (H)     Hip pain, left      Current Medications and Allergies:  See printed Medication Report

## 2018-04-23 NOTE — LETTER
[facility] CARE COORDINATION  [address] [place] [address]  [phone]      April 23, 2018      Tammy Joshi  [address] [place] [address]      Dear Tammy,    I am a clinic care coordinator who works with the [facility] and I wanted to introduce myself and provide you with my contact information so that you can call me with questions or concerns about your health care. Below is a description of clinic care coordination and how I can further assist you.     The clinic care coordinator is a registered nurse and/or  who understand the health care system. The goal of clinic care coordination is to help you manage your health and improve access to the [facility] system in the most efficient manner. The registered nurse can assist you in meeting your health care goals by providing education, coordinating services, and strengthening the communication among your providers. The  can assist you with financial, behavioral, psychosocial, chemical dependency, counseling, and/or psychiatric resources.    Please feel free to contact me at [phone], with any questions or concerns. We at [facility] are focused on providing you with the highest-quality healthcare experience possible and that all starts with you.     Sincerely,     Hali Mehta    Enclosed: I have enclosed a copy of a 24 Hour Access Plan. This has helpful phone numbers for you to call when needed. Please keep this in an easy to access place to use as needed.

## 2018-04-23 NOTE — LETTER
Health Care Home - Access Care Plan    About Me:  Patient Name:  Tammy Joshi    YOB: 1980  Age:                             37 year old   [place] MRN:            [id number] Telephone Information:     Home Phone [phone]   Mobile [phone]       Address:    [address] [place] [address] Email address:  [email or web address]      Emergency Contact(s)  Name Relationship Lgl Grd Work Phone Home Phone Mobile Phone   1. ANAND,SEGUNDO* Mother  none [phone] none   2. NO SECONDARY C*    [phone]              My Access Plan  Medical Emergency 911   Questions or concerns during clinic hours Primary Clinic Line, I will call the clinic directly: [facility] - [phone]   24 Hour Appointment Line [phone] or  [phone]) (toll free)   24 Hour Nurse Line [phone] (toll free)   Questions or concerns outside clinic hours 24 Hour Appointment Line, I will call the after-hours on-call line:   [facility] [phone] or [phone]) (toll-free)   Preferred Urgent Care     Preferred Hospital     Preferred Pharmacy [facility] [address]     Behavioral Health Crisis Line The National Suicide Prevention Lifeline at 1-302.188.6764 or 911             My Care Team Members  Patient Care Team       Relationship Specialty Notifications Start End    Shawn Lewis MD PCP - General Family Practice  8/3/15     Phone: [phone] Fax: [phone]         [address] [facility] [address]    Hali Mehta LSW Clinic Care Coordinator Primary Care - CC Admissions 3/19/18     Phone: [phone] Fax: [phone]               My Medical and Care Information  Problem List   Patient Active Problem List   Diagnosis     Hand numbness     CARDIOVASCULAR SCREENING; LDL GOAL LESS THAN 160     Anxiety     Depression     Hyperlipidemia with target LDL less than 100     Type 2  diabetes mellitus without complication (H)     Hip pain, left      Current Medications and Allergies:  See printed Medication Report

## 2018-04-23 NOTE — PROGRESS NOTES
Clinic Care Coordination Contact  [facility]/Voicemail    Referral Source: ED Follow-Up & Care Coordination   Clinical Data: Care Coordinator Outreach  Outreach attempted x 2.  Left message on voicemail with call back information and requested return call.  Plan: Care Coordinator mailed out care coordination introduction letter on 3-19-18 and 4-23-18. Care Coordinator will do no further out reaches as pt has not returned calls.    Hali García  Social Work Care Coordinator  [facility]  [phone]

## 2018-05-05 DIAGNOSIS — F33.0 MILD EPISODE OF RECURRENT MAJOR DEPRESSIVE DISORDER (H): ICD-10-CM

## 2018-05-07 NOTE — TELEPHONE ENCOUNTER
"Requested Prescriptions   Pending Prescriptions Disp Refills     sertraline (ZOLOFT) 50 MG tablet [Pharmacy Med Name: SERTRALINE HCL 50MG TABS]  Last Written Prescription Date:  02/19/2018  Last Fill Quantity: 30,  # refills: 0   Last office visit: 11/30/2017 with prescribing provider:  Joshua   Future Office Visit:     30 tablet 0     Sig: TAKE ONE TABLET BY MOUTH EVERY DAY    SSRIs Protocol Failed    5/5/2018 11:41 AM       Failed - PHQ-9 score less than 5 in past 6 months    Please review last PHQ-9 score.   PHQ-9 SCORE 8/3/2015 11/10/2016 11/30/2017   Total Score 6 - -   Total Score - 8 13     FORTINO-7 SCORE 8/3/2015 11/10/2016 11/30/2017   Total Score 4 - -   Total Score - 5 11              Passed - Patient is age 18 or older       Passed - No active pregnancy on record       Passed - No positive pregnancy test in last 12 months       Passed - Recent (6 mo) or future (30 days) visit within the authorizing provider's specialty    Patient had office visit in the last 6 months or has a visit in the next 30 days with authorizing provider or within the authorizing provider's specialty.  See \"Patient Info\" tab in inbasket, or \"Choose Columns\" in Meds & Orders section of the refill encounter.            Otis LOPEZ (R)    "

## 2018-05-07 NOTE — TELEPHONE ENCOUNTER
**This refill requires provider completion and is not appropriate for RN review per RN refill guidelines.**  PH-Q9 needs to be less than 5 to approve medication on RN Refill protocol pt's score is 13.  Gilda Lawson RN

## 2018-06-27 ENCOUNTER — TELEPHONE (OUTPATIENT)
Dept: FAMILY MEDICINE | Facility: CLINIC | Age: 38
End: 2018-06-27

## 2018-06-27 NOTE — TELEPHONE ENCOUNTER
Panel Management Review      Patient has the following on her problem list:     Diabetes    ASA: Failed    Last A1C  Lab Results   Component Value Date    A1C 9.3 03/26/2018    A1C 10.3 09/11/2017    A1C 7.9 11/10/2016    A1C 6.3 11/25/2015    A1C 10.9 07/08/2015     A1C tested: FAILED    Last LDL:    Lab Results   Component Value Date    CHOL 150 03/26/2018     Lab Results   Component Value Date    HDL 35 03/26/2018     Lab Results   Component Value Date    LDL  03/26/2018     Cannot estimate LDL when triglyceride exceeds 400 mg/dL    LDL 68 03/26/2018     Lab Results   Component Value Date    TRIG 405 03/26/2018     Lab Results   Component Value Date    CHOLHDLRATIO 9.4 07/08/2015     Lab Results   Component Value Date    NHDL 115 03/26/2018       Is the patient on a Statin? YES             Is the patient on Aspirin? NO    Medications     HMG CoA Reductase Inhibitors    atorvastatin (LIPITOR) 10 MG tablet          Last three blood pressure readings:  BP Readings from Last 3 Encounters:   04/21/18 146/86   12/14/17 138/86   11/30/17 122/54       Date of last diabetes office visit: 9-     Tobacco History:     History   Smoking Status     Never Smoker   Smokeless Tobacco     Never Used           Composite cancer screening  Chart review shows that this patient is due/due soon for the following Pap Smear  Summary:    Patient is due/failing the following:   A1C, PAP and PHQ9    Action needed:   Patient needs office visit for diabetes and pap.    Type of outreach:    Phone, spoke to patient.  Patient will try to make appt after she get back from vac 2-3 week in july    Questions for provider review:    None                                                                                                                                    Kell Juarez CMA     Chart routed to  .

## 2018-07-03 DIAGNOSIS — E11.9 TYPE 2 DIABETES MELLITUS WITHOUT COMPLICATION, WITHOUT LONG-TERM CURRENT USE OF INSULIN (H): ICD-10-CM

## 2018-07-06 RX ORDER — GLIPIZIDE 5 MG/1
TABLET ORAL
Qty: 60 TABLET | Refills: 0 | Status: SHIPPED | OUTPATIENT
Start: 2018-07-06 | End: 2018-08-21

## 2018-07-06 NOTE — TELEPHONE ENCOUNTER
Routing refill request for glipizide to provider for review/approval because:  Labs out of range: See below  Last recorded BP not < 140/90  #60 with 0 refills ordered 3/30/18  Last diabetic OV 9/11/17    I have attempted to contact this patient by phone with the following results: left message to return my call on answering machine. Due for OV and labs.    Tana ROY RN

## 2018-07-06 NOTE — TELEPHONE ENCOUNTER
"Requested Prescriptions   Pending Prescriptions Disp Refills     glipiZIDE (GLUCOTROL) 5 MG tablet [Pharmacy Med Name: GLIPIZIDE 5MG TABS]  Last Written Prescription Date:  3/30/2018  Last Fill Quantity: 60,  # refills: 0   Last office visit: 11/30/2017 with prescribing provider:  Joshua   Future Office Visit:     60 tablet 0     Sig: TAKE ONE TABLET BY MOUTH TWICE A DAY BEFORE MEALS    Sulfonylurea Agents Failed    7/3/2018  3:16 PM       Failed - Blood pressure less than 140/90 in past 6 months    BP Readings from Last 3 Encounters:   04/21/18 146/86   12/14/17 138/86   11/30/17 122/54                Failed - Patient has documented A1c within the specified period of time.    If HgbA1C is 8 or greater, it needs to be on file within the past 3 months.  If less than 8, must be on file within the past 6 months.     Recent Labs   Lab Test  03/26/18   1139   A1C  9.3*            Failed - Patient has a recent creatinine (normal) within the past 12 mos.    Recent Labs   Lab Test  11/29/17   1355   CR  0.48*            Failed - Recent (6 mo) or future (30 days) visit within the authorizing provider's specialty    Patient had office visit in the last 6 months or has a visit in the next 30 days with authorizing provider or within the authorizing provider's specialty.  See \"Patient Info\" tab in inbasket, or \"Choose Columns\" in Meds & Orders section of the refill encounter.           Passed - Patient has documented LDL within the past 12 mos.    Recent Labs   Lab Test  03/26/18   1141   LDL  Cannot estimate LDL when triglyceride exceeds 400 mg/dL  68            Passed - Patient has had a Microalbumin in the past 12 mos.    Recent Labs   Lab Test  03/26/18   1144   MICROL  37   UMALCR  14.76            Passed - Patient is age 18 or older       Passed - No active pregnancy on record       Passed - Patient has not had a positive pregnancy test within the past 12 mos.          "

## 2018-07-15 ENCOUNTER — HOSPITAL ENCOUNTER (EMERGENCY)
Facility: CLINIC | Age: 38
Discharge: HOME | End: 2018-07-15
Attending: EMERGENCY MEDICINE | Admitting: EMERGENCY MEDICINE
Payer: COMMERCIAL

## 2018-07-15 ENCOUNTER — APPOINTMENT (OUTPATIENT)
Dept: GENERAL RADIOLOGY | Facility: CLINIC | Age: 38
End: 2018-07-15
Attending: EMERGENCY MEDICINE
Payer: COMMERCIAL

## 2018-07-15 VITALS
DIASTOLIC BLOOD PRESSURE: 62 MMHG | SYSTOLIC BLOOD PRESSURE: 124 MMHG | TEMPERATURE: 99.3 F | RESPIRATION RATE: 18 BRPM | OXYGEN SATURATION: 98 % | HEART RATE: 90 BPM

## 2018-07-15 DIAGNOSIS — R07.81 RIB PAIN ON LEFT SIDE: ICD-10-CM

## 2018-07-15 DIAGNOSIS — M94.0 COSTOCHONDRITIS: ICD-10-CM

## 2018-07-15 PROCEDURE — 96372 THER/PROPH/DIAG INJ SC/IM: CPT | Performed by: EMERGENCY MEDICINE

## 2018-07-15 PROCEDURE — 25000128 H RX IP 250 OP 636: Performed by: EMERGENCY MEDICINE

## 2018-07-15 PROCEDURE — 99284 EMERGENCY DEPT VISIT MOD MDM: CPT | Mod: 25 | Performed by: EMERGENCY MEDICINE

## 2018-07-15 PROCEDURE — 25000132 ZZH RX MED GY IP 250 OP 250 PS 637: Performed by: EMERGENCY MEDICINE

## 2018-07-15 PROCEDURE — 71101 X-RAY EXAM UNILAT RIBS/CHEST: CPT | Mod: LT

## 2018-07-15 PROCEDURE — 99284 EMERGENCY DEPT VISIT MOD MDM: CPT | Mod: Z6 | Performed by: EMERGENCY MEDICINE

## 2018-07-15 RX ORDER — ACETAMINOPHEN 500 MG
1000 TABLET ORAL ONCE
Status: COMPLETED | OUTPATIENT
Start: 2018-07-15 | End: 2018-07-15

## 2018-07-15 RX ORDER — NAPROXEN 500 MG/1
500 TABLET ORAL 2 TIMES DAILY WITH MEALS
Qty: 16 TABLET | Refills: 0 | Status: SHIPPED | OUTPATIENT
Start: 2018-07-15 | End: 2018-07-23

## 2018-07-15 RX ORDER — HYDROCODONE BITARTRATE AND ACETAMINOPHEN 5; 325 MG/1; MG/1
2 TABLET ORAL
Status: COMPLETED | OUTPATIENT
Start: 2018-07-15 | End: 2018-07-15

## 2018-07-15 RX ORDER — KETOROLAC TROMETHAMINE 30 MG/ML
30 INJECTION, SOLUTION INTRAMUSCULAR; INTRAVENOUS ONCE
Status: COMPLETED | OUTPATIENT
Start: 2018-07-15 | End: 2018-07-15

## 2018-07-15 RX ADMIN — ACETAMINOPHEN 1000 MG: 500 TABLET ORAL at 21:41

## 2018-07-15 RX ADMIN — HYDROCODONE BITARTRATE AND ACETAMINOPHEN 2 TABLET: 5; 325 TABLET ORAL at 22:14

## 2018-07-15 RX ADMIN — KETOROLAC TROMETHAMINE 30 MG: 30 INJECTION, SOLUTION INTRAMUSCULAR at 21:41

## 2018-07-15 ASSESSMENT — ENCOUNTER SYMPTOMS
FEVER: 0
SHORTNESS OF BREATH: 0
ABDOMINAL PAIN: 0

## 2018-07-15 NOTE — ED AVS SNAPSHOT
[facility] Emergency Department    [address] [facility] [address]    Phone:  [phone]    Fax:  [phone]                                       Tammy Joshi   MRN: [id number]    Department:  [facility] Emergency Department   Date of Visit:  7/15/2018           Patient Information     Date Of Birth          1980        Your diagnoses for this visit were:     Rib pain on left side     Costochondritis        You were seen by Omar Grissom MD.        Discharge Instructions       Naproxen as needed for pain.          Chest Wall Pain: Costochondritis    The chest pain that you have had today is caused by costochondritis. This condition is caused by an inflammation of the cartilage joining your ribs to your breastbone. It is not caused by heart or lung problems. Your healthcare team has made sure that the chest pain you feel is not from a life threatening cause of chest pain such as heart attack, collapsed lung, blood clot in the lung, tear in the aorta, or esophageal rupture. The inflammation may have been brought on by a blow to the chest, lifting heavy objects, intense exercise, or an illness that made you cough and sneeze a lot. It often occurs during times of emotional stress. It can be painful, but it is not dangerous. It usually goes away in 1 to 2 weeks. But it may happen again. Rarely, a more serious condition may cause symptoms similar to costochondritis. That s why it s important to watch for the warning signs listed below.  Home care  Follow these guidelines when caring for yourself at home:    If you feel that emotional stress is a cause of your condition, try to figure out the sources of that stress. It may not be obvious. Learn ways to deal with the stress in your life. This can include regular exercise, muscle relaxation, meditation, or simply taking time out for yourself.    You may use acetaminophen, ibuprofen, or naproxen to control pain, unless another pain  medicine was prescribed. If you have liver or kidney disease or ever had a stomach ulcer, talk with your healthcare provider before using these medicines.    You can also help ease pain by using a hot, wet compress or heating pad. Use this with or without a medicated skin cream that helps relieves pain.    Do stretching exercise as advised by your provider.    Take any prescribed medicines as directed.  Follow-up care  Follow up with your healthcare provider, or as advised, if you do not start to get better in the next 2 days.  When to seek medical advice  Call your healthcare provider right away if any of these occur:    A change in the type of pain. Call if it feels different, becomes more serious, lasts longer, or spreads into your shoulder, arm, neck, jaw, or back.    Shortness of breath or pain gets worse when you breathe    Weakness, dizziness, or fainting    Cough with dark-colored sputum (phlegm) or blood    Abdominal pain    Dark red or black stools    Fever of 100.4 F (38 C) or higher, or as directed by your healthcare provider  Date Last Reviewed: 12/1/2016 2000-2017 [employer]. [address] [place] [address]. All rights reserved. This information is not intended as a substitute for professional medical care. Always follow your healthcare professional's instructions.          24 Hour Appointment Hotline       To make an appointment at any [facility] clinic, call [phone] ([phone]). If you don't have a family doctor or clinic, we will help you find one. [facility] clinics are conveniently located to serve the needs of you and your family.             Review of your medicines      START taking        Dose / Directions Last dose taken    naproxen 500 MG tablet   Commonly known as:  NAPROSYN   Dose:  500 mg   Quantity:  16 tablet        Take 1 tablet (500 mg) by mouth 2 times daily (with meals) for 8 days   Refills:  0          Our records show that you are taking the  medicines listed below. If these are incorrect, please call your family doctor or clinic.        Dose / Directions Last dose taken    atorvastatin 10 MG tablet   Commonly known as:  LIPITOR   Dose:  10 mg   Quantity:  90 tablet        Take 1 tablet (10 mg) by mouth daily   Refills:  3        blood glucose monitoring lancets   Quantity:  1 Box        Use to test blood sugars 4 times daily or as directed.   Refills:  1        blood glucose monitoring test strip   Commonly known as:  no brand specified   Quantity:  120 each        Test blood sugar 4 times per day   Refills:  1        CINNAMON PO   Dose:  1 tablet        Take 1 tablet by mouth   Refills:  0        CRANBERRY EXTRACT PO   Dose:  1 capsule        Take 1 capsule by mouth   Refills:  0        cyclobenzaprine 10 MG tablet   Commonly known as:  FLEXERIL   Dose:  10 mg   Quantity:  14 tablet        Take 1 tablet (10 mg) by mouth nightly as needed for muscle spasms   Refills:  1        dexamethasone 4 MG tablet   Commonly known as:  DECADRON   Dose:  10 mg   Quantity:  3 tablet        Take 2.5 tablets (10 mg) by mouth once for 1 dose   Refills:  0        glipiZIDE 5 MG tablet   Commonly known as:  GLUCOTROL   Quantity:  60 tablet        TAKE ONE TABLET BY MOUTH TWICE A DAY BEFORE MEALS   Refills:  0        HYDROcodone-acetaminophen 5-325 MG per tablet   Commonly known as:  NORCO   Dose:  1-2 tablet   Quantity:  15 tablet        Take 1-2 tablets by mouth every 4 hours as needed for moderate to severe pain   Refills:  0        lisinopril 2.5 MG tablet   Commonly known as:  PRINIVIL/Zestril   Quantity:  30 tablet        TAKE ONE TABLET BY MOUTH EVERY DAY   Refills:  1        metFORMIN 1000 MG tablet   Commonly known as:  GLUCOPHAGE   Quantity:  60 tablet        TAKE ONE TABLET BY MOUTH TWICE A DAY WITH MEALS   Refills:  1        sertraline 50 MG tablet   Commonly known as:  ZOLOFT   Quantity:  30 tablet        TAKE ONE TABLET BY MOUTH EVERY DAY   Refills:  1         "        Prescriptions were sent or printed at these locations (1 Prescription)                   Other Prescriptions                Printed at Department/Unit printer (1 of 1)         naproxen (NAPROSYN) 500 MG tablet                Procedures and tests performed during your visit     Ribs XR, unilat 3 views + PA chest,  left      Orders Needing Specimen Collection     None      Pending Results     No orders found from 7/13/2018 to 7/16/2018.            Pending Culture Results     No orders found from 7/13/2018 to 7/16/2018.            Pending Results Instructions     If you had any lab results that were not finalized at the time of your Discharge, you can call the ED Lab Result RN at [phone]. You will be contacted by this team for any positive Lab results or changes in treatment. The nurses are available 7 days a week from 10A to 6:30P.  You can leave a message 24 hours per day and they will return your call.        Test Results From Your Hospital Stay        7/15/2018  9:39 PM      Narrative     RIBS AND CHEST LEFT THREE VIEW   7/15/2018 9:27 PM     HISTORY: Left lower rib pain.     COMPARISON: Chest x-ray 3/24/2016.        Impression     IMPRESSION: No displaced left rib fracture. Bones are unremarkable.  Lungs appear clear.    ARLENE WHITTAKER MD                Thank you for choosing [facility]       Thank you for choosing [facility] for your care. Our goal is always to provide you with excellent care. Hearing back from our patients is one way we can continue to improve our services. Please take a few minutes to complete the written survey that you may receive in the mail after you visit with us. Thank you!        [employer] Information     [employer] lets you send messages to your doctor, view your test results, renew your prescriptions, schedule appointments and more. To sign up, go to [email or web address] . Click on \"Log in\" on the left side of the screen, which will take you to the Welcome page. Then click on " "\"Sign up Now\" on the right side of the page.     You will be asked to enter the access code listed below, as well as some personal information. Please follow the directions to create your username and password.     Your access code is: [email or web address]  Expires: 2018  6:04 PM     Your access code will  in 90 days. If you need help or a new code, please call your [facility] clinic or [phone].        Care EveryWhere ID     This is your Care EveryWhere ID. This could be used by other organizations to access your [facility] medical records  [id number]        Equal Access to Services     [facility] : Yaa Torres, van pabon, rufus gupta, randall rendon . So [facility] [phone].    ATENCIÓN: Si habla español, tiene a dominguez disposición servicios gratuitos de asistencia lingüística. Llame al [phone].    We comply with applicable federal civil rights laws and [place] laws. We do not discriminate on the basis of race, color, national origin, age, disability, sex, sexual orientation, or gender identity.            After Visit Summary       This is your record. Keep this with you and show to your community pharmacist(s) and doctor(s) at your next visit.                  "

## 2018-07-15 NOTE — ED AVS SNAPSHOT
[facility] Emergency Department    [address] [address]    Phone:  [phone]    Fax:  [phone]                                       Tammy Joshi   MRN: [id number]    Department:  [facility] Emergency Department   Date of Visit:  7/15/2018           After Visit Summary Signature Page     I have received my discharge instructions, and my questions have been answered. I have discussed any challenges I see with this plan with the nurse or doctor.    ..........................................................................................................................................  Patient/Patient Representative Signature      ..........................................................................................................................................  Patient Representative Print Name and Relationship to Patient    ..................................................               ................................................  Date                                            Time    ..........................................................................................................................................  Reviewed by Signature/Title    ...................................................              ..............................................  Date                                                            Time

## 2018-07-16 NOTE — DISCHARGE INSTRUCTIONS
Naproxen as needed for pain.          Chest Wall Pain: Costochondritis    The chest pain that you have had today is caused by costochondritis. This condition is caused by an inflammation of the cartilage joining your ribs to your breastbone. It is not caused by heart or lung problems. Your healthcare team has made sure that the chest pain you feel is not from a life threatening cause of chest pain such as heart attack, collapsed lung, blood clot in the lung, tear in the aorta, or esophageal rupture. The inflammation may have been brought on by a blow to the chest, lifting heavy objects, intense exercise, or an illness that made you cough and sneeze a lot. It often occurs during times of emotional stress. It can be painful, but it is not dangerous. It usually goes away in 1 to 2 weeks. But it may happen again. Rarely, a more serious condition may cause symptoms similar to costochondritis. That s why it s important to watch for the warning signs listed below.  Home care  Follow these guidelines when caring for yourself at home:    If you feel that emotional stress is a cause of your condition, try to figure out the sources of that stress. It may not be obvious. Learn ways to deal with the stress in your life. This can include regular exercise, muscle relaxation, meditation, or simply taking time out for yourself.    You may use acetaminophen, ibuprofen, or naproxen to control pain, unless another pain medicine was prescribed. If you have liver or kidney disease or ever had a stomach ulcer, talk with your healthcare provider before using these medicines.    You can also help ease pain by using a hot, wet compress or heating pad. Use this with or without a medicated skin cream that helps relieves pain.    Do stretching exercise as advised by your provider.    Take any prescribed medicines as directed.  Follow-up care  Follow up with your healthcare provider, or as advised, if you do not start to get better in the next 2  days.  When to seek medical advice  Call your healthcare provider right away if any of these occur:    A change in the type of pain. Call if it feels different, becomes more serious, lasts longer, or spreads into your shoulder, arm, neck, jaw, or back.    Shortness of breath or pain gets worse when you breathe    Weakness, dizziness, or fainting    Cough with dark-colored sputum (phlegm) or blood    Abdominal pain    Dark red or black stools    Fever of 100.4 F (38 C) or higher, or as directed by your healthcare provider  Date Last Reviewed: 12/1/2016 2000-2017 [employer]. [address] [place] [address]. All rights reserved. This information is not intended as a substitute for professional medical care. Always follow your healthcare professional's instructions.

## 2018-07-16 NOTE — ED PROVIDER NOTES
History     Chief Complaint   Patient presents with     Rib Pain     L lower rib, LUQ pain after turning and hitting L side on something         HPI  Tammy Joshi is a 38 year old female who has past medical history significant for left lower rib pain, and left upper quadrant abdominal pain after the patient ran into a piece of wood that was sticking out from a wall.  Injury occurred at approximately 3 PM, 6 hours prior to emergency department arrival.  Pain is constant, moderate in severity, with no alleviating factors.  Worsened with movement.  No previous severe chest wall injury.  Denies any right-sided pains.  Did not eat or drink anything after the onset of pain.    Problem List:    Patient Active Problem List    Diagnosis Date Noted     Hip pain, left 09/08/2017     Priority: Medium     Type 2 diabetes mellitus without complication (H) 10/25/2015     Priority: Medium     Hyperlipidemia with target LDL less than 100 08/06/2015     Priority: Medium     Diagnosis updated by automated process. Provider to review and confirm.       Depression 02/24/2014     Priority: Medium     Anxiety 07/21/2011     Priority: Medium     CARDIOVASCULAR SCREENING; LDL GOAL LESS THAN 160 10/31/2010     Priority: Medium     Hand numbness 10/14/2009     Priority: Medium        Past Medical History:    Past Medical History:   Diagnosis Date     Absence of menstruation      Female infertility of unspecified origin      MILD/NOS PREECLAMP-ANTEP 8/29/2005     Polycystic ovaries      PPH (postpartum hemorrhage) 11/2009       Past Surgical History:    Past Surgical History:   Procedure Laterality Date     C HAND/FINGER SURGERY UNLISTED  7th grade    left index     D & C  1998    Missed Ab     LITHOTRIPSY  2011       Family History:    Family History   Problem Relation Age of Onset     Adopted: Yes     Unknown/Adopted Other      patient was adopted; knows a limited amount of medical history of birth parents       Social  History:  Marital Status:   [2]  Social History   Substance Use Topics     Smoking status: Never Smoker     Smokeless tobacco: Never Used     Alcohol use Yes      Comment: wine occasionally, none while pregnant        Medications:      naproxen (NAPROSYN) 500 MG tablet   atorvastatin (LIPITOR) 10 MG tablet   blood glucose monitoring (FREESTYLE) lancets   blood glucose monitoring (NO BRAND SPECIFIED) test strip   CINNAMON PO   CRANBERRY EXTRACT PO   cyclobenzaprine (FLEXERIL) 10 MG tablet   dexamethasone (DECADRON) 4 MG tablet   glipiZIDE (GLUCOTROL) 5 MG tablet   HYDROcodone-acetaminophen (NORCO) 5-325 MG per tablet   lisinopril (PRINIVIL/ZESTRIL) 2.5 MG tablet   metFORMIN (GLUCOPHAGE) 1000 MG tablet   sertraline (ZOLOFT) 50 MG tablet         Review of Systems   Constitutional: Negative for fever.   Respiratory: Negative for shortness of breath.    Cardiovascular: Positive for chest pain.   Gastrointestinal: Negative for abdominal pain.   All other systems reviewed and are negative.      Physical Exam   BP: 110/73  Heart Rate: 136  Temp: 99.3  F (37.4  C)  Resp: 24      Physical Exam  /73  Temp 99.3  F (37.4  C)  Resp 24  General: alert and tearful, complaining of left-sided chest pain  Head: atraumatic, normocephalic  Abd: Soft, nontender, nondistended, no peritoneal signs  Lungs: Clear to auscultation bilaterally.  Does have left lower chest wall tenderness, however does also have tenderness that is near the mid posterior back.  Seems to be somewhat exaggerated response to painful and minimal touching  Musculoskel/Extremities: normal extremities, no edema, erythema, tenderness and full AROM of major joints without tenderness  Skin: no rashes, no diaphoresis and skin color normal  Neuro: Patient awake, alert, oriented, speech is fluent, gait is normal  Psychiatric: affect/mood normal, cooperative, normal judgement/insight and memory intact      ED Course     ED Course     Procedures                Critical Care time:  none               Results for orders placed or performed during the hospital encounter of 07/15/18 (from the past 24 hour(s))   Ribs XR, unilat 3 views + PA chest,  left    Narrative    RIBS AND CHEST LEFT THREE VIEW   7/15/2018 9:27 PM     HISTORY: Left lower rib pain.     COMPARISON: Chest x-ray 3/24/2016.      Impression    IMPRESSION: No displaced left rib fracture. Bones are unremarkable.  Lungs appear clear.    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  Injury occurred approximately 6 hours prior to arrival.  Some pain with movement, with some worsening pain with movement.  Took ibuprofen prior to arrival without alleviation.  Chest x-ray obtained to rule out fracture, pneumothorax, or other acute abnormality.  Chest x-ray normal based on my personal interpretation, confirmed by radiology.  Given Toradol, and Tylenol in the emergency department.  Patient concerned about more severe pains.  Given 2 tablets Norco for this evening, however I do not feel that narcotics are indicated for home without any evidence of fracture, with low mechanism of injury.  I also discussed with patient that NSAIDs would be treatment of choice for this type of injury as well.     I have reviewed the nursing notes.    I have reviewed the findings, diagnosis, plan and need for follow up with the patient.       New Prescriptions    NAPROXEN (NAPROSYN) 500 MG TABLET    Take 1 tablet (500 mg) by mouth 2  times daily (with meals) for 8 days       Final diagnoses:   Rib pain on left side   Costochondritis       7/15/2018   [facility] EMERGENCY DEPARTMENT     Omar Grissom MD  07/15/18 [id number]

## 2018-07-16 NOTE — ED NOTES
Pt states she ran into a piece of wood that was sticking out from a wall at 3 pm today.  Pain in lt rib area and is constant.  Denies soa.

## 2018-08-21 DIAGNOSIS — E11.9 TYPE 2 DIABETES MELLITUS WITHOUT COMPLICATION, WITHOUT LONG-TERM CURRENT USE OF INSULIN (H): ICD-10-CM

## 2018-08-21 NOTE — TELEPHONE ENCOUNTER
"Requested Prescriptions   Pending Prescriptions Disp Refills     glipiZIDE (GLUCOTROL) 5 MG tablet [Pharmacy Med Name: GLIPIZIDE 5MG TABS] 60 tablet 0     Sig: TAKE ONE TABLET BY MOUTH TWICE A DAY BEFORE MEALS    Sulfonylurea Agents Failed    8/21/2018 12:58 PM       Failed - Patient has documented A1c within the specified period of time.    If HgbA1C is 8 or greater, it needs to be on file within the past 3 months.  If less than 8, must be on file within the past 6 months.     Recent Labs   Lab Test  03/26/18   1139   A1C  9.3*            Failed - Patient has a recent creatinine (normal) within the past 12 mos.    Recent Labs   Lab Test  11/29/17   1355   CR  0.48*            Failed - Recent (6 mo) or future (30 days) visit within the authorizing provider's specialty    Patient had office visit in the last 6 months or has a visit in the next 30 days with authorizing provider or within the authorizing provider's specialty.  See \"Patient Info\" tab in inbasket, or \"Choose Columns\" in Meds & Orders section of the refill encounter.           Passed - Blood pressure less than 140/90 in past 6 months    BP Readings from Last 3 Encounters:   07/15/18 124/62   04/21/18 146/86   12/14/17 138/86                Passed - Patient has documented LDL within the past 12 mos.    Recent Labs   Lab Test  03/26/18   1141   LDL  Cannot estimate LDL when triglyceride exceeds 400 mg/dL  68            Passed - Patient has had a Microalbumin in the past 12 mos.    Recent Labs   Lab Test  03/26/18   1144   MICROL  37   UMALCR  14.76            Passed - Patient is age 18 or older       Passed - No active pregnancy on record       Passed - Patient has not had a positive pregnancy test within the past 12 mos.        Last Written Prescription Date:  8/21/18  Last Fill Quantity: 60,  # refills: 0   Last office visit: 11/30/2017 with prescribing provider:  Joshua   Future Office Visit:      "

## 2018-08-22 RX ORDER — GLIPIZIDE 5 MG/1
TABLET ORAL
Qty: 60 TABLET | Refills: 0 | Status: SHIPPED | OUTPATIENT
Start: 2018-08-22 | End: 2018-09-17

## 2018-08-24 ENCOUNTER — TELEPHONE (OUTPATIENT)
Dept: FAMILY MEDICINE | Facility: CLINIC | Age: 38
End: 2018-08-24

## 2018-08-24 DIAGNOSIS — E11.9 TYPE 2 DIABETES MELLITUS WITHOUT COMPLICATION, WITHOUT LONG-TERM CURRENT USE OF INSULIN (H): ICD-10-CM

## 2018-08-24 DIAGNOSIS — E78.00 HIGH BLOOD CHOLESTEROL: ICD-10-CM

## 2018-08-24 RX ORDER — LISINOPRIL 2.5 MG/1
2.5 TABLET ORAL DAILY
Qty: 30 TABLET | Refills: 1 | Status: CANCELLED | OUTPATIENT
Start: 2018-08-24

## 2018-08-24 RX ORDER — ATORVASTATIN CALCIUM 10 MG/1
10 TABLET, FILM COATED ORAL DAILY
Qty: 90 TABLET | Refills: 3 | Status: CANCELLED | OUTPATIENT
Start: 2018-08-24

## 2018-08-24 NOTE — TELEPHONE ENCOUNTER
"Requested Prescriptions   Pending Prescriptions Disp Refills     atorvastatin (LIPITOR) 10 MG tablet  Last Written Prescription Date:  9/18/2017  Last Fill Quantity: 90,  # refills: 3   Last office visit: 11/30/2017 with prescribing provider:  Joshua   Future Office Visit:     90 tablet 3     Sig: Take 1 tablet (10 mg) by mouth daily    Statins Protocol Passed    8/24/2018 10:35 AM       Passed - LDL on file in past 12 months    Recent Labs   Lab Test  03/26/18   1141   LDL  Cannot estimate LDL when triglyceride exceeds 400 mg/dL  68            Passed - No abnormal creatine kinase in past 12 months    No lab results found.            Passed - Recent (12 mo) or future (30 days) visit within the authorizing provider's specialty    Patient had office visit in the last 12 months or has a visit in the next 30 days with authorizing provider or within the authorizing provider's specialty.  See \"Patient Info\" tab in inbasket, or \"Choose Columns\" in Meds & Orders section of the refill encounter.           Passed - Patient is age 18 or older       Passed - No active pregnancy on record       Passed - No positive pregnancy test in past 12 months        lisinopril (PRINIVIL/ZESTRIL) 2.5 MG tablet  Last Written Prescription Date:  5/25/2018  Last Fill Quantity: 30,  # refills: 1   Last office visit: 11/30/2017 with prescribing provider:  Joshua   Future Office Visit:     30 tablet 1     Sig: Take 1 tablet (2.5 mg) by mouth daily    ACE Inhibitors (Including Combos) Protocol Failed    8/24/2018 10:35 AM       Failed - Normal serum creatinine on file in past 12 months    Recent Labs   Lab Test  11/29/17   1355   CR  0.48*            Passed - Blood pressure under 140/90 in past 12 months    BP Readings from Last 3 Encounters:   07/15/18 124/62   04/21/18 146/86   12/14/17 138/86                Passed - Recent (12 mo) or future (30 days) visit within the authorizing provider's specialty    Patient had office visit in the last " "12 months or has a visit in the next 30 days with authorizing provider or within the authorizing provider's specialty.  See \"Patient Info\" tab in inbasket, or \"Choose Columns\" in Meds & Orders section of the refill encounter.           Passed - Patient is age 18 or older       Passed - No active pregnancy on record       Passed - Normal serum potassium on file in past 12 months    Recent Labs   Lab Test  11/29/17   1355   POTASSIUM  3.9            Passed - No positive pregnancy test in past 12 months        metFORMIN (GLUCOPHAGE) 1000 MG tablet  Last Written Prescription Date:  5/25/2018  Last Fill Quantity: 60,  # refills: 1   Last office visit: 11/30/2017 with prescribing provider:  Joshua   Future Office Visit:     60 tablet 1     Sig: Take 1 tablet (1,000 mg) by mouth 2 times daily (with meals)    Biguanide Agents Failed    8/24/2018 10:35 AM       Failed - Patient has documented A1c within the specified period of time.    If HgbA1C is 8 or greater, it needs to be on file within the past 3 months.  If less than 8, must be on file within the past 6 months.     Recent Labs   Lab Test  03/26/18   1139   A1C  9.3*            Failed - Recent (6 mo) or future (30 days) visit within the authorizing provider's specialty    Patient had office visit in the last 6 months or has a visit in the next 30 days with authorizing provider or within the authorizing provider's specialty.  See \"Patient Info\" tab in inbasket, or \"Choose Columns\" in Meds & Orders section of the refill encounter.           Passed - Blood pressure less than 140/90 in past 6 months    BP Readings from Last 3 Encounters:   07/15/18 124/62   04/21/18 146/86   12/14/17 138/86                Passed - Patient has documented LDL within the past 12 mos.    Recent Labs   Lab Test  03/26/18   1141   LDL  Cannot estimate LDL when triglyceride exceeds 400 mg/dL  68            Passed - Patient has had a Microalbumin in the past 12 mos.    Recent Labs   Lab Test  " 03/26/18   1144   MICROL  37   UMALCR  14.76            Passed - Patient is age 10 or older       Passed - Patient's CR is NOT>1.4 OR Patient's EGFR is NOT<45 within past 12 mos.    Recent Labs   Lab Test  11/29/17   1355   GFRESTIMATED  >90   GFRESTBLACK  >90       Recent Labs   Lab Test  11/29/17   1355   CR  0.48*            Passed - Patient does NOT have a diagnosis of CHF.       Passed - Patient is not pregnant       Passed - Patient has not had a positive pregnancy test within the past 12 mos.

## 2018-08-24 NOTE — TELEPHONE ENCOUNTER
Left message for patient to return call to clinic.  Needs OV - due for DM recheck.  Destiny PULIDO RN

## 2018-08-27 NOTE — TELEPHONE ENCOUNTER
Left message for patient to return call to clinic.    [facility] ok to deliver message below.    Margy PULIDO Rn

## 2018-08-28 NOTE — TELEPHONE ENCOUNTER
3rd attempt  Left message on patient's self identified voice mail that she is due for OV and DM recheck for refill on medication.  Patient to call the clinic back and make appt with provider    Margy PULIDO Rn

## 2018-09-10 ENCOUNTER — HOSPITAL ENCOUNTER (EMERGENCY)
Facility: CLINIC | Age: 38
Discharge: HOME | End: 2018-09-10
Attending: EMERGENCY MEDICINE | Admitting: EMERGENCY MEDICINE
Payer: COMMERCIAL

## 2018-09-10 VITALS
TEMPERATURE: 98.1 F | RESPIRATION RATE: 16 BRPM | BODY MASS INDEX: 32.44 KG/M2 | SYSTOLIC BLOOD PRESSURE: 117 MMHG | OXYGEN SATURATION: 97 % | HEIGHT: 64 IN | DIASTOLIC BLOOD PRESSURE: 74 MMHG | WEIGHT: 190 LBS

## 2018-09-10 DIAGNOSIS — N76.4 ABSCESS OF LABIA MAJORA: ICD-10-CM

## 2018-09-10 PROCEDURE — 56405 I&D VULVA/PERINEAL ABSCESS: CPT | Mod: Z6 | Performed by: EMERGENCY MEDICINE

## 2018-09-10 PROCEDURE — 99283 EMERGENCY DEPT VISIT LOW MDM: CPT | Mod: 25

## 2018-09-10 PROCEDURE — 56405 I&D VULVA/PERINEAL ABSCESS: CPT

## 2018-09-10 PROCEDURE — 25000132 ZZH RX MED GY IP 250 OP 250 PS 637: Performed by: EMERGENCY MEDICINE

## 2018-09-10 PROCEDURE — 99283 EMERGENCY DEPT VISIT LOW MDM: CPT | Mod: 25 | Performed by: EMERGENCY MEDICINE

## 2018-09-10 RX ORDER — OXYCODONE AND ACETAMINOPHEN 5; 325 MG/1; MG/1
1-2 TABLET ORAL EVERY 4 HOURS PRN
Qty: 12 TABLET | Refills: 0 | Status: SHIPPED | OUTPATIENT
Start: 2018-09-10 | End: 2019-04-28

## 2018-09-10 RX ORDER — OXYCODONE AND ACETAMINOPHEN 5; 325 MG/1; MG/1
2 TABLET ORAL ONCE
Status: COMPLETED | OUTPATIENT
Start: 2018-09-10 | End: 2018-09-10

## 2018-09-10 RX ADMIN — OXYCODONE HYDROCHLORIDE AND ACETAMINOPHEN 2 TABLET: 5; 325 TABLET ORAL at 13:36

## 2018-09-10 ASSESSMENT — ENCOUNTER SYMPTOMS
WOUND: 1
CONSTITUTIONAL NEGATIVE: 1
ABDOMINAL PAIN: 0

## 2018-09-10 NOTE — ED PROVIDER NOTES
History     Chief Complaint   Patient presents with     Cyst     infected lesion left groin     HPI  Tammy Joshi is a 38 year old female with type 2 diabetes mellitus with ~ 1 week of right groin/genitalia painful cyst or mass which is increased in size and become more painful in the past several days.  No spontaneous drainage.  No fever or chills.  No history of previous similar symptoms no other acute complaints or concerns.    Problem List:    Patient Active Problem List    Diagnosis Date Noted     Hip pain, left 09/08/2017     Priority: Medium     Type 2 diabetes mellitus without complication (H) 10/25/2015     Priority: Medium     Hyperlipidemia with target LDL less than 100 08/06/2015     Priority: Medium     Diagnosis updated by automated process. Provider to review and confirm.       Depression 02/24/2014     Priority: Medium     Anxiety 07/21/2011     Priority: Medium     CARDIOVASCULAR SCREENING; LDL GOAL LESS THAN 160 10/31/2010     Priority: Medium     Hand numbness 10/14/2009     Priority: Medium        Past Medical History:    Past Medical History:   Diagnosis Date     Absence of menstruation      Female infertility of unspecified origin      MILD/NOS PREECLAMP-ANTEP 8/29/2005     Polycystic ovaries      PPH (postpartum hemorrhage) 11/2009       Past Surgical History:    Past Surgical History:   Procedure Laterality Date     C HAND/FINGER SURGERY UNLISTED  7th grade    left index     D & C  1998    Missed Ab     LITHOTRIPSY  2011       Family History:    Family History   Problem Relation Age of Onset     Adopted: Yes     Unknown/Adopted Other      patient was adopted; knows a limited amount of medical history of birth parents       Social History:  Marital Status:   [2]  Social History   Substance Use Topics     Smoking status: Never Smoker     Smokeless tobacco: Never Used     Alcohol use Yes      Comment: wine occasionally, none while pregnant        Medications:   "    oxyCODONE-acetaminophen (PERCOCET) 5-325 MG per tablet   atorvastatin (LIPITOR) 10 MG tablet   blood glucose monitoring (FREESTYLE) lancets   blood glucose monitoring (NO BRAND SPECIFIED) test strip   CINNAMON PO   CRANBERRY EXTRACT PO   cyclobenzaprine (FLEXERIL) 10 MG tablet   dexamethasone (DECADRON) 4 MG tablet   glipiZIDE (GLUCOTROL) 5 MG tablet   HYDROcodone-acetaminophen (NORCO) 5-325 MG per tablet   lisinopril (PRINIVIL/ZESTRIL) 2.5 MG tablet   metFORMIN (GLUCOPHAGE) 1000 MG tablet   sertraline (ZOLOFT) 50 MG tablet       Review of Systems   Constitutional: Negative.    Gastrointestinal: Negative for abdominal pain.   Genitourinary: Negative for pelvic pain.   Skin: Positive for wound ( Right genital cyst).       Physical Exam   BP: 117/74  Heart Rate: 112  Temp: 98.1  F (36.7  C)  Resp: 16  Height: 162.6 cm (5' 4\")  Weight: 86.2 kg (190 lb)  SpO2: 97 %      Physical Exam   Constitutional: She appears well-nourished. No distress.   HENT:   Head: Normocephalic and atraumatic.   Eyes: Conjunctivae and EOM are normal. No scleral icterus.   Neck: Normal range of motion. Neck supple. No tracheal deviation present.   Pulmonary/Chest: Effort normal. No respiratory distress.   Genitourinary:       There is tenderness ( Right labia majora abscess) and lesion ( Right labia majora abscess) on the right labia.   Musculoskeletal: Normal range of motion. She exhibits no edema.   Neurological: She is alert.   Skin: Skin is warm and dry. No rash noted. No erythema. No pallor.   Psychiatric: She has a normal mood and affect. Her behavior is normal.   Nursing note and vitals reviewed.      ED Course     ED Course     Incision + drainage  Performed by: PAWAN BULLOCK  Authorized by: PAWAN BULLOCK     Consent:     Consent obtained:  Verbal    Consent given by:  Patient  Location:     Type:  Abscess    Location:  Anogenital    Anogenital location: Right labia majora.  Pre-procedure details:     Procedure prep: " Chlorhexidine.  Procedure type:     Complexity:  Complex  Procedure details:     Needle aspiration: yes      Needle size:  18 G    Incision types:  Single straight    Incision depth:  Subcutaneous    Scalpel blade:  15    Wound management:  Probed and deloculated and irrigated with saline    Drainage:  Purulent    Drainage amount:  Moderate    Wound treatment:  Wound left open    Packing materials:  1/4 in iodoform gauze  Post-procedure details:     Patient tolerance of procedure:  Tolerated well, no immediate complications                         No results found for this or any previous visit (from the past 24 hour(s)).    Medications   oxyCODONE-acetaminophen (PERCOCET) 5-325 MG per tablet 2 tablet (2 tablets Oral Given 9/10/18 1032)       Assessments & Plan (with Medical Decision Making)   Right labia majora abscess which appears to be external and not clearly a Bartholin's cyst abscess.  This was incised and drained without complication and she was prescribed oxycodone/acetaminophen to use if needed for pain.  I recommend a follow-up in primary care clinic or surgery clinic in the next several days. Patient was provided instructions for supportive care and will return as needed for worsened condition or worsening symptoms, or new problems or concerns.  Did not feel that antibiotic therapy was indicated or warranted and there is currently no evidence of associated cellulitis or complication.    I have reviewed the nursing notes.    I have reviewed the findings, diagnosis, plan and need for follow up with the patient.    New Prescriptions    OXYCODONE-ACETAMINOPHEN (PERCOCET) 5-325 MG PER TABLET    Take 1-2 tablets by mouth every 4 hours as needed for moderate to severe pain       Final diagnoses:   Abscess of labia majora       9/10/2018   [facility] EMERGENCY DEPARTMENT     Jey Reyes MD  09/13/18 [id number]

## 2018-09-10 NOTE — ED AVS SNAPSHOT
[facility] Emergency Department    [address] [facility] [address]    Phone:  [phone]    Fax:  [phone]                                       Tammy Joshi   MRN: [id number]    Department:  [facility] Emergency Department   Date of Visit:  9/10/2018           Patient Information     Date Of Birth          1980        Your diagnoses for this visit were:     Abscess of labia majora        You were seen by Jey Reyes MD.      Follow-up Information     Follow up with Shawn Lewis MD In 3 days.    Specialty:  Family Practice    Why:  Or in OB/GYN clinic later this week    Contact information:    [address] [place] [address]  [phone]          Follow up with [facility].    Specialty:  OB/Gyn    Contact information:    [address] [place] [address] [phone]    Additional information:    The medical center is located at   [address] [place] [address] (between [facility] and   [facility] in [place], [place] miles north   of [place]).        Discharge Instructions         Abscess (Incision & Drainage)  An abscess is sometimes called a boil. It happens when bacteria get trapped under the skin and start to grow. Pus forms inside the abscess as the body responds to the bacteria. An abscess can happen with an insect bite, ingrown hair, blocked oil gland, pimple, cyst, or puncture wound.  Your healthcare provider has drained the pus from your abscess. If the abscess pocket was large, your healthcare provider may have put in gauze packing. Your provider will need to remove it on your next visit. He or she may also replace it at that time. You may not need antibiotics to treat a simple abscess, unless the infection is spreading into the skin around the wound (cellulitis).  The wound will take about 1 to 2 weeks to heal, depending on the size of the abscess. Healthy tissue will grow from the bottom and sides of the opening until it  seals over.  Home care  These tips can help your wound heal:    The wound may drain for the first 2 days. Cover the wound with a clean dry dressing. Change the dressing if it becomes soaked with blood or pus.    If a gauze packing was placed inside the abscess pocket, you may be told to remove it yourself. You may do this in the shower. Once the packing is removed, you should wash the area in the shower, or clean the area as directed by your provider. Continue to do this until the skin opening has closed. Make sure you wash your hands after changing the packing or cleaning the wound.    If you were prescribed antibiotics, take them as directed until they are all gone.    You may use acetaminophen or ibuprofen to control pain, unless another pain medicine was prescribed. If you have liver disease or ever had a stomach ulcer, talk with your doctor before using these medicines.  Follow-up care  Follow up with your healthcare provider, or as advised. If a gauze packing was put in your wound, it should be removed in 1 to 2 days. Check your wound every day for any signs that the infection is getting worse. The signs are listed below.  When to seek medical advice  Call your healthcare provider right away if any of these occur:    Increasing redness or swelling    Red streaks in the skin leading away from the wound    Increasing local pain or swelling    Continued pus draining from the wound 2 days after treatment    Fever of 100.4 F (38 C) or higher, or as directed by your healthcare provider    Boil returns when you are at home  Date Last Reviewed: 9/1/2016 2000-2017 [employer]. [address] [place] [address], [place] [address]. All rights reserved. This information is not intended as a substitute for professional medical care. Always follow your healthcare professional's instructions.          24 Hour Appointment Hotline       To make an appointment at any [facility], call [phone] ([phone]).  If you don't have a family doctor or clinic, we will help you find one. [facility] are conveniently located to serve the needs of you and your family.             Review of your medicines      START taking        Dose / Directions Last dose taken    oxyCODONE-acetaminophen 5-325 MG per tablet   Commonly known as:  PERCOCET   Dose:  1-2 tablet   Quantity:  12 tablet        Take 1-2 tablets by mouth every 4 hours as needed for moderate to severe pain   Refills:  0          Our records show that you are taking the medicines listed below. If these are incorrect, please call your family doctor or clinic.        Dose / Directions Last dose taken    atorvastatin 10 MG tablet   Commonly known as:  LIPITOR   Dose:  10 mg   Quantity:  90 tablet        Take 1 tablet (10 mg) by mouth daily   Refills:  3        blood glucose monitoring lancets   Quantity:  1 Box        Use to test blood sugars 4 times daily or as directed.   Refills:  1        blood glucose monitoring test strip   Commonly known as:  no brand specified   Quantity:  120 each        Test blood sugar 4 times per day   Refills:  1        CINNAMON PO   Dose:  1 tablet        Take 1 tablet by mouth   Refills:  0        CRANBERRY EXTRACT PO   Dose:  1 capsule        Take 1 capsule by mouth   Refills:  0        cyclobenzaprine 10 MG tablet   Commonly known as:  FLEXERIL   Dose:  10 mg   Quantity:  14 tablet        Take 1 tablet (10 mg) by mouth nightly as needed for muscle spasms   Refills:  1        dexamethasone 4 MG tablet   Commonly known as:  DECADRON   Dose:  10 mg   Quantity:  3 tablet        Take 2.5 tablets (10 mg) by mouth once for 1 dose   Refills:  0        glipiZIDE 5 MG tablet   Commonly known as:  GLUCOTROL   Quantity:  60 tablet        TAKE ONE TABLET BY MOUTH TWICE A DAY BEFORE MEALS   Refills:  0        HYDROcodone-acetaminophen 5-325 MG per tablet   Commonly known as:  NORCO   Dose:  1-2 tablet   Quantity:  15 tablet        Take 1-2 tablets by  mouth every 4 hours as needed for moderate to severe pain   Refills:  0        lisinopril 2.5 MG tablet   Commonly known as:  PRINIVIL/Zestril   Quantity:  30 tablet        TAKE ONE TABLET BY MOUTH EVERY DAY   Refills:  1        metFORMIN 1000 MG tablet   Commonly known as:  GLUCOPHAGE   Quantity:  60 tablet        TAKE ONE TABLET BY MOUTH TWICE A DAY WITH MEALS   Refills:  1        sertraline 50 MG tablet   Commonly known as:  ZOLOFT   Quantity:  30 tablet        TAKE ONE TABLET BY MOUTH EVERY DAY   Refills:  1                Information about OPIOIDS     PRESCRIPTION OPIOIDS: WHAT YOU NEED TO KNOW   We gave you an opioid (narcotic) pain medicine. It is important to manage your pain, but opioids are not always the best choice. You should first try all the other options your care team gave you. Take this medicine for as short a time (and as few doses) as possible.    Some activities can increase your pain, such as bandage changes or therapy sessions. It may help to take your pain medicine 30 to 60 minutes before these activities. Reduce your stress by getting enough sleep, working on hobbies you enjoy and practicing relaxation or meditation. Talk to your care team about ways to manage your pain beyond prescription opioids.    These medicines have risks:    DO NOT drive when on new or higher doses of pain medicine. These medicines can affect your alertness and reaction times, and you could be arrested for driving under the influence (DUI). If you need to use opioids long-term, talk to your care team about driving.    DO NOT operate heavy machinery    DO NOT do any other dangerous activities while taking these medicines.    DO NOT drink any alcohol while taking these medicines.     If the opioid prescribed includes acetaminophen, DO NOT take with any other medicines that contain acetaminophen. Read all labels carefully. Look for the word  acetaminophen  or  Tylenol.  Ask your pharmacist if you have questions or are  unsure.    You can get addicted to pain medicines, especially if you have a history of addiction (chemical, alcohol or substance dependence). Talk to your care team about ways to reduce this risk.    All opioids tend to cause constipation. Drink plenty of water and eat foods that have a lot of fiber, such as fruits, vegetables, prune juice, apple juice and high-fiber cereal. Take a laxative (Miralax, milk of magnesia, Colace, Senna) if you don t move your bowels at least every other day. Other side effects include upset stomach, sleepiness, dizziness, throwing up, tolerance (needing more of the medicine to have the same effect), physical dependence and slowed breathing.    Store your pills in a secure place, locked if possible. We will not replace any lost or stolen medicine. If you don t finish your medicine, please throw away (dispose) as directed by your pharmacist. The [place] [employer] has more information about safe disposal: https://www.pca.[facility].[place].us/living-green/managing-unwanted-medications        Prescriptions were sent or printed at these locations (1 Prescription)                   Other Prescriptions                Printed at Department/Unit printer (1 of 1)         oxyCODONE-acetaminophen (PERCOCET) 5-325 MG per tablet                Procedures and tests performed during your visit     Incision and drainage      Orders Needing Specimen Collection     None      Pending Results     No orders found from 9/8/2018 to 9/11/2018.            Pending Culture Results     No orders found from 9/8/2018 to 9/11/2018.            Pending Results Instructions     If you had any lab results that were not finalized at the time of your Discharge, you can call the ED Lab Result RN at [phone]. You will be contacted by this team for any positive Lab results or changes in treatment. The nurses are available 7 days a week from 10A to 6:30P.  You can leave a message 24 hours per day and they will return  "your call.        Test Results From Your Hospital Stay               Thank you for choosing [facility]       Thank you for choosing [facility] for your care. Our goal is always to provide you with excellent care. Hearing back from our patients is one way we can continue to improve our services. Please take a few minutes to complete the written survey that you may receive in the mail after you visit with us. Thank you!        [employer] Information     [employer] lets you send messages to your doctor, view your test results, renew your prescriptions, schedule appointments and more. To sign up, go to [email or web address] . Click on \"Log in\" on the left side of the screen, which will take you to the Welcome page. Then click on \"Sign up Now\" on the right side of the page.     You will be asked to enter the access code listed below, as well as some personal information. Please follow the directions to create your username and password.     Your access code is: [email or web address]  Expires: 2018  1:57 PM     Your access code will  in 90 days. If you need help or a new code, please call your [facility] clinic or [phone].        Care EveryWhere ID     This is your Care EveryWhere ID. This could be used by other organizations to access your [facility] medical records  [id number]        Equal Access to Services     DIPIKA WILL : Yaa Torres, waaxda luqadaha, qaybta kaalmada adeepifanioyada, randall monsalve. [facility] [phone].    ATENCIÓN: Si habla español, tiene a dominguez disposición servicios gratuitos de asistencia lingüística. Llame al [phone].    We comply with applicable federal civil rights laws and [place] laws. We do not discriminate on the basis of race, color, national origin, age, disability, sex, sexual orientation, or gender identity.            After Visit Summary       This is your record. Keep this with you and show to your community pharmacist(s) and doctor(s) at " your next visit.

## 2018-09-10 NOTE — DISCHARGE INSTRUCTIONS
Abscess (Incision & Drainage)  An abscess is sometimes called a boil. It happens when bacteria get trapped under the skin and start to grow. Pus forms inside the abscess as the body responds to the bacteria. An abscess can happen with an insect bite, ingrown hair, blocked oil gland, pimple, cyst, or puncture wound.  Your healthcare provider has drained the pus from your abscess. If the abscess pocket was large, your healthcare provider may have put in gauze packing. Your provider will need to remove it on your next visit. He or she may also replace it at that time. You may not need antibiotics to treat a simple abscess, unless the infection is spreading into the skin around the wound (cellulitis).  The wound will take about 1 to 2 weeks to heal, depending on the size of the abscess. Healthy tissue will grow from the bottom and sides of the opening until it seals over.  Home care  These tips can help your wound heal:    The wound may drain for the first 2 days. Cover the wound with a clean dry dressing. Change the dressing if it becomes soaked with blood or pus.    If a gauze packing was placed inside the abscess pocket, you may be told to remove it yourself. You may do this in the shower. Once the packing is removed, you should wash the area in the shower, or clean the area as directed by your provider. Continue to do this until the skin opening has closed. Make sure you wash your hands after changing the packing or cleaning the wound.    If you were prescribed antibiotics, take them as directed until they are all gone.    You may use acetaminophen or ibuprofen to control pain, unless another pain medicine was prescribed. If you have liver disease or ever had a stomach ulcer, talk with your doctor before using these medicines.  Follow-up care  Follow up with your healthcare provider, or as advised. If a gauze packing was put in your wound, it should be removed in 1 to 2 days. Check your wound every day for any  signs that the infection is getting worse. The signs are listed below.  When to seek medical advice  Call your healthcare provider right away if any of these occur:    Increasing redness or swelling    Red streaks in the skin leading away from the wound    Increasing local pain or swelling    Continued pus draining from the wound 2 days after treatment    Fever of 100.4 F (38 C) or higher, or as directed by your healthcare provider    Boil returns when you are at home  Date Last Reviewed: 9/1/2016 2000-2017 [employer]. [address] [place], [place] [address]. All rights reserved. This information is not intended as a substitute for professional medical care. Always follow your healthcare professional's instructions.

## 2018-09-10 NOTE — ED AVS SNAPSHOT
[facility] Emergency Department    [address] [facility] [address]    Phone:  [phone]    Fax:  [phone]                                       Tammy Joshi   MRN: [id number]    Department:  [facility] Emergency Department   Date of Visit:  9/10/2018           After Visit Summary Signature Page     I have received my discharge instructions, and my questions have been answered. I have discussed any challenges I see with this plan with the nurse or doctor.    ..........................................................................................................................................  Patient/Patient Representative Signature      ..........................................................................................................................................  Patient Representative Print Name and Relationship to Patient    ..................................................               ................................................  Date                                            Time    ..........................................................................................................................................  Reviewed by Signature/Title    ...................................................              ..............................................  Date                                                            Time          22EPIC Rev 08/18

## 2018-09-12 ENCOUNTER — HOSPITAL ENCOUNTER (EMERGENCY)
Facility: CLINIC | Age: 38
Discharge: HOME | End: 2018-09-12
Attending: NURSE PRACTITIONER | Admitting: NURSE PRACTITIONER
Payer: COMMERCIAL

## 2018-09-12 VITALS
TEMPERATURE: 98.4 F | HEART RATE: 95 BPM | SYSTOLIC BLOOD PRESSURE: 121 MMHG | OXYGEN SATURATION: 97 % | DIASTOLIC BLOOD PRESSURE: 65 MMHG | RESPIRATION RATE: 14 BRPM | HEIGHT: 64 IN

## 2018-09-12 DIAGNOSIS — N76.4 ABSCESS OF LABIA MAJORA: ICD-10-CM

## 2018-09-12 PROCEDURE — 99213 OFFICE O/P EST LOW 20 MIN: CPT | Mod: Z6 | Performed by: NURSE PRACTITIONER

## 2018-09-12 PROCEDURE — G0463 HOSPITAL OUTPT CLINIC VISIT: HCPCS | Performed by: NURSE PRACTITIONER

## 2018-09-12 RX ORDER — CLINDAMYCIN HCL 300 MG
300 CAPSULE ORAL 3 TIMES DAILY
Qty: 21 CAPSULE | Refills: 0 | Status: SHIPPED | OUTPATIENT
Start: 2018-09-12 | End: 2018-09-17

## 2018-09-12 ASSESSMENT — ENCOUNTER SYMPTOMS
CONSTIPATION: 0
WOUND: 1
COUGH: 0
FEVER: 0
ABDOMINAL PAIN: 0

## 2018-09-12 NOTE — ED AVS SNAPSHOT
[facility] Emergency Department    [address] [facility] [address]    Phone:  [phone]    Fax:  [phone]                                       Tammy Joshi   MRN: [id number]    Department:  [facility] Emergency Department   Date of Visit:  9/12/2018           After Visit Summary Signature Page     I have received my discharge instructions, and my questions have been answered. I have discussed any challenges I see with this plan with the nurse or doctor.    ..........................................................................................................................................  Patient/Patient Representative Signature      ..........................................................................................................................................  Patient Representative Print Name and Relationship to Patient    ..................................................               ................................................  Date                                   Time    ..........................................................................................................................................  Reviewed by Signature/Title    ...................................................              ..............................................  Date                                               Time          22EPIC Rev 08/18

## 2018-09-12 NOTE — ED AVS SNAPSHOT
[facility] Emergency Department    [address] [address]    Phone:  [phone]    Fax:  [phone]                                       Tammy Joshi   MRN: [id number]    Department:  [facility] Emergency Department   Date of Visit:  9/12/2018           Patient Information     Date Of Birth          1980        Your diagnoses for this visit were:     Abscess of labia majora        You were seen by Sanjuana Ramos APRN CNP.      Follow-up Information     Follow up with Shawn Lewis MD In 5 days.    Specialty:  Family Practice    Why:  For wound re-check    Contact information:    [address] [facility] [id number] [phone]          Discharge Instructions         Abscess (Incision & Drainage)  An abscess is sometimes called a boil. It happens when bacteria get trapped under the skin and start to grow. Pus forms inside the abscess as the body responds to the bacteria. An abscess can happen with an insect bite, ingrown hair, blocked oil gland, pimple, cyst, or puncture wound.  Your healthcare provider has drained the pus from your abscess. If the abscess pocket was large, your healthcare provider may have put in gauze packing. Your provider will need to remove it on your next visit. He or she may also replace it at that time. You may not need antibiotics to treat a simple abscess, unless the infection is spreading into the skin around the wound (cellulitis).  The wound will take about 1 to 2 weeks to heal, depending on the size of the abscess. Healthy tissue will grow from the bottom and sides of the opening until it seals over.  Home care  These tips can help your wound heal:    The wound may drain for the first 2 days. Cover the wound with a clean dry dressing. Change the dressing if it becomes soaked with blood or pus.    If a gauze packing was placed inside the abscess pocket, you may be told to remove it yourself. You may do this in the  shower. Once the packing is removed, you should wash the area in the shower, or clean the area as directed by your provider. Continue to do this until the skin opening has closed. Make sure you wash your hands after changing the packing or cleaning the wound.    If you were prescribed antibiotics, take them as directed until they are all gone.    You may use acetaminophen or ibuprofen to control pain, unless another pain medicine was prescribed. If you have liver disease or ever had a stomach ulcer, talk with your doctor before using these medicines.  Follow-up care  Follow up with your healthcare provider, or as advised. If a gauze packing was put in your wound, it should be removed in 1 to 2 days. Check your wound every day for any signs that the infection is getting worse. The signs are listed below.  When to seek medical advice  Call your healthcare provider right away if any of these occur:    Increasing redness or swelling    Red streaks in the skin leading away from the wound    Increasing local pain or swelling    Continued pus draining from the wound 2 days after treatment    Fever of 100.4 F (38 C) or higher, or as directed by your healthcare provider    Boil returns when you are at home  Date Last Reviewed: 9/1/2016 2000-2017 [employer]. [address] [place] [address]. All rights reserved. This information is not intended as a substitute for professional medical care. Always follow your healthcare professional's instructions.          24 Hour Appointment Hotline       To make an appointment at any [facility], call [phone] ([phone]). If you don't have a family doctor or clinic, we will help you find one. [place] clinics are conveniently located to serve the needs of you and your family.             Review of your medicines      START taking        Dose / Directions Last dose taken    clindamycin 300 MG capsule   Commonly known as:  CLEOCIN   Dose:  300 mg    Quantity:  21 capsule        Take 1 capsule (300 mg) by mouth 3 times daily for 7 days   Refills:  0          Our records show that you are taking the medicines listed below. If these are incorrect, please call your family doctor or clinic.        Dose / Directions Last dose taken    atorvastatin 10 MG tablet   Commonly known as:  LIPITOR   Dose:  10 mg   Quantity:  90 tablet        Take 1 tablet (10 mg) by mouth daily   Refills:  3        blood glucose monitoring lancets   Quantity:  1 Box        Use to test blood sugars 4 times daily or as directed.   Refills:  1        blood glucose monitoring test strip   Commonly known as:  no brand specified   Quantity:  120 each        Test blood sugar 4 times per day   Refills:  1        CINNAMON PO   Dose:  1 tablet        Take 1 tablet by mouth daily   Refills:  0        CRANBERRY EXTRACT PO   Dose:  1 capsule        Take 1 capsule by mouth daily   Refills:  0        cyclobenzaprine 10 MG tablet   Commonly known as:  FLEXERIL   Dose:  10 mg   Quantity:  14 tablet        Take 1 tablet (10 mg) by mouth nightly as needed for muscle spasms   Refills:  1        dexamethasone 4 MG tablet   Commonly known as:  DECADRON   Dose:  10 mg   Quantity:  3 tablet        Take 2.5 tablets (10 mg) by mouth once for 1 dose   Refills:  0        glipiZIDE 5 MG tablet   Commonly known as:  GLUCOTROL   Quantity:  60 tablet        TAKE ONE TABLET BY MOUTH TWICE A DAY BEFORE MEALS   Refills:  0        lisinopril 2.5 MG tablet   Commonly known as:  PRINIVIL/Zestril   Quantity:  30 tablet        TAKE ONE TABLET BY MOUTH EVERY DAY   Refills:  1        metFORMIN 1000 MG tablet   Commonly known as:  GLUCOPHAGE   Quantity:  60 tablet        TAKE ONE TABLET BY MOUTH TWICE A DAY WITH MEALS   Refills:  1        oxyCODONE-acetaminophen 5-325 MG per tablet   Commonly known as:  PERCOCET   Dose:  1-2 tablet   Quantity:  12 tablet        Take 1-2 tablets by mouth every 4 hours as needed for moderate to  "severe pain   Refills:  0        sertraline 50 MG tablet   Commonly known as:  ZOLOFT   Quantity:  30 tablet        TAKE ONE TABLET BY MOUTH EVERY DAY   Refills:  1                Prescriptions were sent or printed at these locations (1 Prescription)                   [facility], [place] - [address]   [address] [address]    Telephone:  [phone]   Fax:  [phone]   Hours:                  E-Prescribed (1 of 1)         clindamycin (CLEOCIN) 300 MG capsule                Orders Needing Specimen Collection     None      Pending Results     No orders found from 9/10/2018 to 9/13/2018.            Pending Culture Results     No orders found from 9/10/2018 to 9/13/2018.            Pending Results Instructions     If you had any lab results that were not finalized at the time of your Discharge, you can call the ED Lab Result RN at [phone]. You will be contacted by this team for any positive Lab results or changes in treatment. The nurses are available 7 days a week from 10A to 6:30P.  You can leave a message 24 hours per day and they will return your call.        Test Results From Your Hospital Stay               Thank you for choosing [facility]       Thank you for choosing [facility] for your care. Our goal is always to provide you with excellent care. Hearing back from our patients is one way we can continue to improve our services. Please take a few minutes to complete the written survey that you may receive in the mail after you visit with us. Thank you!        [employer] Information     [employer] lets you send messages to your doctor, view your test results, renew your prescriptions, schedule appointments and more. To sign up, go to [email or web address] . Click on \"Log in\" on the left side of the screen, which will take you to the Welcome page. Then click on \"Sign up Now\" on the right side of the page.     You will be asked to enter the access code listed below, as " well as some personal information. Please follow the directions to create your username and password.     Your access code is: [email or web address]  Expires: 2018  1:57 PM     Your access code will  in 90 days. If you need help or a new code, please call your [facility] clinic or [phone].        Care EveryWhere ID     This is your Care EveryWhere ID. This could be used by other organizations to access your [facility] medical records  [id number]        Equal Access to Services     [facility] : Yaa Torres, walilliam sanchezadaha, qakatie kaalmablair gupta, randall rendon . So [facility] [phone].    ATENCIÓN: Si habla español, tiene a dominguez disposición servicios gratuitos de asistencia lingüística. Llame al [phone].    We comply with applicable federal civil rights laws and [place] laws. We do not discriminate on the basis of race, color, national origin, age, disability, sex, sexual orientation, or gender identity.            After Visit Summary       This is your record. Keep this with you and show to your community pharmacist(s) and doctor(s) at your next visit.

## 2018-09-13 NOTE — ED PROVIDER NOTES
History     Chief Complaint   Patient presents with     Wound Infection     was here on monday with a labial abcess. Was supposed to take out the packing and is unable to get it out     HPI  Tammy Joshi is a 38 year old female who admits to past medical history of type 2 diabetes, uncontrolled who presents to urgent care with concerns of possible retained packing following a incision and drainage of a right labial abscess completed 2 days ago in the emergency department.  Patient is concerned that the packing is sealed over and is now a foreign body in the labia.  Patient denies fevers, aches, pustular drainage.  Patient reports that the labia is feeling better.  Patient denies any other concerns.    Problem List:    Patient Active Problem List    Diagnosis Date Noted     Hip pain, left 09/08/2017     Priority: Medium     Type 2 diabetes mellitus without complication (H) 10/25/2015     Priority: Medium     Hyperlipidemia with target LDL less than 100 08/06/2015     Priority: Medium     Diagnosis updated by automated process. Provider to review and confirm.       Depression 02/24/2014     Priority: Medium     Anxiety 07/21/2011     Priority: Medium     CARDIOVASCULAR SCREENING; LDL GOAL LESS THAN 160 10/31/2010     Priority: Medium     Hand numbness 10/14/2009     Priority: Medium        Past Medical History:    Past Medical History:   Diagnosis Date     Absence of menstruation      Female infertility of unspecified origin      MILD/NOS PREECLAMP-ANTEP 8/29/2005     Polycystic ovaries      PPH (postpartum hemorrhage) 11/2009       Past Surgical History:    Past Surgical History:   Procedure Laterality Date     C HAND/FINGER SURGERY UNLISTED  7th grade    left index     D & C  1998    Missed Ab     LITHOTRIPSY  2011       Family History:    Family History   Problem Relation Age of Onset     Adopted: Yes     Unknown/Adopted Other      patient was adopted; knows a limited amount of medical history of birth  "parents       Social History:  Marital Status:   [2]  Social History   Substance Use Topics     Smoking status: Never Smoker     Smokeless tobacco: Never Used     Alcohol use Yes      Comment: wine occasionally, none while pregnant        Medications:      clindamycin (CLEOCIN) 300 MG capsule   atorvastatin (LIPITOR) 10 MG tablet   blood glucose monitoring (FREESTYLE) lancets   blood glucose monitoring (NO BRAND SPECIFIED) test strip   CINNAMON PO   CRANBERRY EXTRACT PO   cyclobenzaprine (FLEXERIL) 10 MG tablet   dexamethasone (DECADRON) 4 MG tablet   glipiZIDE (GLUCOTROL) 5 MG tablet   lisinopril (PRINIVIL/ZESTRIL) 2.5 MG tablet   metFORMIN (GLUCOPHAGE) 1000 MG tablet   oxyCODONE-acetaminophen (PERCOCET) 5-325 MG per tablet   sertraline (ZOLOFT) 50 MG tablet         Review of Systems   Constitutional: Negative for fever.   Respiratory: Negative for cough.    Gastrointestinal: Negative for abdominal pain and constipation.   Genitourinary: Positive for vaginal pain (improving substantially).   Skin: Positive for wound.   All other systems reviewed and are negative.      Physical Exam   BP: 121/65  Pulse: 95  Temp: 98.4  F (36.9  C)  Resp: 14  Height: 162.6 cm (5' 4\")  SpO2: 97 %      Physical Exam   Constitutional: She appears well-developed and well-nourished. No distress.   Pulmonary/Chest: Effort normal.   Genitourinary:       Pelvic exam was performed with patient supine. There is tenderness (previous incision site appears to be healing well.  the right labia is erythematous and no palpable fluctuant masses noted and no retained packing) on the right labia. There is no rash, lesion or injury on the right labia. There is no rash, tenderness, lesion or injury on the left labia.   Skin: Skin is warm and dry. No rash noted. She is not diaphoretic. No erythema. No pallor.   Nursing note and vitals reviewed.      ED Course     ED Course     Procedures    No results found for this or any previous visit (from the " past 24 hour(s)).    Medications - No data to display    Assessments & Plan (with Medical Decision Making)     I have reviewed the nursing notes.    I have reviewed the findings, diagnosis, plan and need for follow up with the patient.  Tammy Joshi is a 38 year old female who admits to past medical history of type 2 diabetes, uncontrolled who presents to urgent care with concerns of possible retained packing following a incision and drainage of a right labial abscess completed 2 days ago in the emergency department.  Patient is concerned that the packing is sealed over and is now a foreign body in the labia.  Patient denies fevers, aches, pustular drainage.  Patient reports that the labia is feeling better.  Patient denies any other concerns.  Exam as noted above.  Point-of-care ultrasound brought over and this patient was concerned that the packing may have migrated somewhere else in her labia.  And she showed patient on ultrasound there is no evidence of foreign body.  There is no palpable foreign body there is also no palpable abscess forming again.  However the right labia is erythematous and swollen and slightly tender to palpation.  Did discuss with patient and will initiate clindamycin 300 mg p.o. 3 times daily for 7 days.  Encourage patient to follow-up with Dr. Lore Grant on Friday or Monday for recheck.  Patient verbalizes understanding denies any questions at this point in time and was discharged in stable condition.  Discharge Medication List as of 9/12/2018  7:46 PM      START taking these medications    Details   clindamycin (CLEOCIN) 300 MG capsule Take 1 capsule (300 mg) by mouth 3 times daily for 7 days, Disp-21 capsule, R-0, E-Prescribe             Final diagnoses:   Abscess of labia majora       9/12/2018   [facility] EMERGENCY DEPARTMENT     Sanjuana Ramos, PACO GABRIEL  09/12/18 2014

## 2018-09-13 NOTE — DISCHARGE INSTRUCTIONS
Abscess (Incision & Drainage)  An abscess is sometimes called a boil. It happens when bacteria get trapped under the skin and start to grow. Pus forms inside the abscess as the body responds to the bacteria. An abscess can happen with an insect bite, ingrown hair, blocked oil gland, pimple, cyst, or puncture wound.  Your healthcare provider has drained the pus from your abscess. If the abscess pocket was large, your healthcare provider may have put in gauze packing. Your provider will need to remove it on your next visit. He or she may also replace it at that time. You may not need antibiotics to treat a simple abscess, unless the infection is spreading into the skin around the wound (cellulitis).  The wound will take about 1 to 2 weeks to heal, depending on the size of the abscess. Healthy tissue will grow from the bottom and sides of the opening until it seals over.  Home care  These tips can help your wound heal:    The wound may drain for the first 2 days. Cover the wound with a clean dry dressing. Change the dressing if it becomes soaked with blood or pus.    If a gauze packing was placed inside the abscess pocket, you may be told to remove it yourself. You may do this in the shower. Once the packing is removed, you should wash the area in the shower, or clean the area as directed by your provider. Continue to do this until the skin opening has closed. Make sure you wash your hands after changing the packing or cleaning the wound.    If you were prescribed antibiotics, take them as directed until they are all gone.    You may use acetaminophen or ibuprofen to control pain, unless another pain medicine was prescribed. If you have liver disease or ever had a stomach ulcer, talk with your doctor before using these medicines.  Follow-up care  Follow up with your healthcare provider, or as advised. If a gauze packing was put in your wound, it should be removed in 1 to 2 days. Check your wound every day for any  signs that the infection is getting worse. The signs are listed below.  When to seek medical advice  Call your healthcare provider right away if any of these occur:    Increasing redness or swelling    Red streaks in the skin leading away from the wound    Increasing local pain or swelling    Continued pus draining from the wound 2 days after treatment    Fever of 100.4 F (38 C) or higher, or as directed by your healthcare provider    Boil returns when you are at home  Date Last Reviewed: 9/1/2016 2000-2017 [employer]. [address] [place], [place] [id number]. All rights reserved. This information is not intended as a substitute for professional medical care. Always follow your healthcare professional's instructions.

## 2018-09-17 ENCOUNTER — OFFICE VISIT (OUTPATIENT)
Dept: FAMILY MEDICINE | Facility: CLINIC | Age: 38
End: 2018-09-17
Payer: COMMERCIAL

## 2018-09-17 VITALS
HEART RATE: 110 BPM | WEIGHT: 192 LBS | OXYGEN SATURATION: 99 % | TEMPERATURE: 97.3 F | RESPIRATION RATE: 18 BRPM | HEIGHT: 64 IN | DIASTOLIC BLOOD PRESSURE: 82 MMHG | BODY MASS INDEX: 32.78 KG/M2 | SYSTOLIC BLOOD PRESSURE: 118 MMHG

## 2018-09-17 DIAGNOSIS — E78.5 HYPERLIPIDEMIA, UNSPECIFIED HYPERLIPIDEMIA TYPE: ICD-10-CM

## 2018-09-17 DIAGNOSIS — E11.9 TYPE 2 DIABETES MELLITUS WITHOUT COMPLICATION, WITHOUT LONG-TERM CURRENT USE OF INSULIN (H): ICD-10-CM

## 2018-09-17 DIAGNOSIS — Z23 NEED FOR PROPHYLACTIC VACCINATION AND INOCULATION AGAINST INFLUENZA: ICD-10-CM

## 2018-09-17 LAB — HBA1C MFR BLD: 9.8 % (ref 0–5.6)

## 2018-09-17 PROCEDURE — 99214 OFFICE O/P EST MOD 30 MIN: CPT | Mod: 25 | Performed by: FAMILY MEDICINE

## 2018-09-17 PROCEDURE — 83036 HEMOGLOBIN GLYCOSYLATED A1C: CPT | Performed by: FAMILY MEDICINE

## 2018-09-17 PROCEDURE — 90471 IMMUNIZATION ADMIN: CPT | Performed by: FAMILY MEDICINE

## 2018-09-17 PROCEDURE — 99207 C FOOT EXAM  NO CHARGE: CPT | Performed by: FAMILY MEDICINE

## 2018-09-17 PROCEDURE — 90686 IIV4 VACC NO PRSV 0.5 ML IM: CPT | Performed by: FAMILY MEDICINE

## 2018-09-17 RX ORDER — GLIPIZIDE 5 MG/1
TABLET ORAL
Qty: 60 TABLET | Refills: 3 | Status: SHIPPED | OUTPATIENT
Start: 2018-09-17 | End: 2019-05-07

## 2018-09-17 RX ORDER — CLINDAMYCIN HCL 300 MG
CAPSULE ORAL
Qty: 40 CAPSULE | Refills: 0 | COMMUNITY
Start: 2018-09-17 | End: 2019-04-28

## 2018-09-17 RX ORDER — LIRAGLUTIDE 6 MG/ML
0.6 INJECTION SUBCUTANEOUS DAILY
Qty: 18 ML | Refills: 1 | Status: SHIPPED | OUTPATIENT
Start: 2018-09-17 | End: 2020-08-27

## 2018-09-17 RX ORDER — LISINOPRIL 2.5 MG/1
2.5 TABLET ORAL DAILY
Qty: 90 TABLET | Refills: 3 | Status: SHIPPED | OUTPATIENT
Start: 2018-09-17 | End: 2019-05-07

## 2018-09-17 RX ORDER — ATORVASTATIN CALCIUM 10 MG/1
10 TABLET, FILM COATED ORAL DAILY
Qty: 90 TABLET | Refills: 3 | Status: SHIPPED | OUTPATIENT
Start: 2018-09-17 | End: 2019-05-07

## 2018-09-17 ASSESSMENT — ANXIETY QUESTIONNAIRES
3. WORRYING TOO MUCH ABOUT DIFFERENT THINGS: SEVERAL DAYS
GAD7 TOTAL SCORE: 8
6. BECOMING EASILY ANNOYED OR IRRITABLE: SEVERAL DAYS
5. BEING SO RESTLESS THAT IT IS HARD TO SIT STILL: MORE THAN HALF THE DAYS
1. FEELING NERVOUS, ANXIOUS, OR ON EDGE: SEVERAL DAYS
7. FEELING AFRAID AS IF SOMETHING AWFUL MIGHT HAPPEN: NOT AT ALL
2. NOT BEING ABLE TO STOP OR CONTROL WORRYING: SEVERAL DAYS

## 2018-09-17 ASSESSMENT — PATIENT HEALTH QUESTIONNAIRE - PHQ9: 5. POOR APPETITE OR OVEREATING: MORE THAN HALF THE DAYS

## 2018-09-17 NOTE — PATIENT INSTRUCTIONS
Thank you for choosing [facility].  You may be receiving a survey in the mail from Loyd Santana regarding your visit today.  Please take a few minutes to complete and return the survey to let us know how we are doing.      If you have questions or concerns, please contact us via [employer] or you can contact your care team at [phone].    Our Clinic hours are:  Monday 6:40 am  to 7:00 pm  Tuesday -Friday 6:40 am to 5:00 pm    The [place] outpatient lab hours are:  Monday - Friday 6:10 am to 4:45 pm  Saturdays 7:00 am to 11:00 am  Appointments are required, call [phone]    If you have clinical questions after hours or would like to schedule an appointment,  call the clinic at [phone].

## 2018-09-17 NOTE — PROGRESS NOTES
SUBJECTIVE:   Tammy Joshi is a 38 year old female who presents to clinic today for the following health issues:      Diabetes Follow-up      Patient is checking blood sugars: rarely.  Results range from Patient has not been taking her sugars     Diabetic concerns: None and other - patient has been not taking her sugars      Symptoms of hypoglycemia (low blood sugar): none     Paresthesias (numbness or burning in feet) or sores: No     Date of last diabetic eye exam: due     Diabetes Management Resources    Hyperlipidemia Follow-Up      Rate your low fat/cholesterol diet?: fair    Taking statin?  Yes, no muscle aches from statin    Other lipid medications/supplements?:  none    Hypertension Follow-up      Outpatient blood pressures are not being checked.    Low Salt Diet: not monitoring salt    BP Readings from Last 2 Encounters:   09/17/18 (!) 118/92   09/12/18 121/65     Hemoglobin A1C (%)   Date Value   09/17/2018 9.8 (H)   03/26/2018 9.3 (H)     LDL Cholesterol Calculated (mg/dL)   Date Value   03/26/2018     Cannot estimate LDL when triglyceride exceeds 400 mg/dL   12/17/2015     Cannot estimate LDL when triglyceride exceeds 400 mg/dL     LDL Cholesterol Direct (mg/dL)   Date Value   03/26/2018 68       Amount of exercise or physical activity: 2-3 days/week for an average of less than 15 minutes    Problems taking medications regularly: yes metformin she sometimes only takes 1 and glipizide she did not know she was to take 2    Medication side effects: none    Diet: regular (no restrictions)      ED/UC Followup:    Facility:  [facility] [place]   Date of visit: 9-10 and 9-12  Reason for visit: cyst in vaginal area   Current Status: patient is doing good no signs of infection         Medication Followup of all medication listed in      Taking Medication as prescribed: NO see above note     Side Effects:  None    Medication Helping Symptoms:  yes     Patient is a 38 yr old female here for an ER follow up.  She was seen for a labial cyst , she says this is doing much better and almost at a 100 % better . She was given antibiotics that helped/  She is also here for her diabetes recheck. She has not been too compliant with her medication says she will forget the second dose of glipizide or metformin .    Problem list and histories reviewed & adjusted, as indicated.  Additional history: as documented    Patient Active Problem List   Diagnosis     Hand numbness     CARDIOVASCULAR SCREENING; LDL GOAL LESS THAN 160     Anxiety     Depression     Hyperlipidemia with target LDL less than 100     Type 2 diabetes mellitus without complication (H)     Hip pain, left     Past Surgical History:   Procedure Laterality Date     C HAND/FINGER SURGERY UNLISTED  7th grade    left index     D & C  1998    Missed Ab     LITHOTRIPSY  2011       Social History   Substance Use Topics     Smoking status: Never Smoker     Smokeless tobacco: Never Used     Alcohol use Yes      Comment: wine occasionally, none while pregnant     Family History   Problem Relation Age of Onset     Adopted: Yes     Unknown/Adopted Other      patient was adopted; knows a limited amount of medical history of birth parents         Current Outpatient Prescriptions   Medication Sig Dispense Refill     atorvastatin (LIPITOR) 10 MG tablet Take 1 tablet (10 mg) by mouth daily 90 tablet 3     blood glucose monitoring (FREESTYLE) lancets Use to test blood sugars 4 times daily or as directed. 1 Box 1     blood glucose monitoring (NO BRAND SPECIFIED) test strip Test blood sugar 4 times per day 120 each 1     CINNAMON PO Take 1 tablet by mouth daily        clindamycin (CLEOCIN) 300 MG capsule 1 tablet 3times daily for 7 days 40 capsule 0     CRANBERRY EXTRACT PO Take 1 capsule by mouth daily        glipiZIDE (GLUCOTROL) 5 MG tablet TAKE ONE TABLET BY MOUTH TWICE A DAY BEFORE MEALS 60 tablet 3     liraglutide (VICTOZA) 18 MG/3ML soln Inject 0.6 mg Subcutaneous daily 18 mL 1      lisinopril (PRINIVIL/ZESTRIL) 2.5 MG tablet Take 1 tablet (2.5 mg) by mouth daily 90 tablet 3     metFORMIN (GLUCOPHAGE) 1000 MG tablet Take 1 tablet (1,000 mg) by mouth 2 times daily (with meals) 180 tablet 3     dexamethasone (DECADRON) 4 MG tablet Take 2.5 tablets (10 mg) by mouth once for 1 dose 3 tablet 0     oxyCODONE-acetaminophen (PERCOCET) 5-325 MG per tablet Take 1-2 tablets by mouth every 4 hours as needed for moderate to severe pain (Patient not taking: Reported on 9/17/2018) 12 tablet 0     sertraline (ZOLOFT) 50 MG tablet TAKE ONE TABLET BY MOUTH EVERY DAY (Patient not taking: Reported on 9/17/2018) 30 tablet 1     [DISCONTINUED] atorvastatin (LIPITOR) 10 MG tablet Take 1 tablet (10 mg) by mouth daily 90 tablet 3     [DISCONTINUED] glipiZIDE (GLUCOTROL) 5 MG tablet TAKE ONE TABLET BY MOUTH TWICE A DAY BEFORE MEALS 60 tablet 0     [DISCONTINUED] lisinopril (PRINIVIL/ZESTRIL) 2.5 MG tablet TAKE ONE TABLET BY MOUTH EVERY DAY 30 tablet 1     [DISCONTINUED] metFORMIN (GLUCOPHAGE) 1000 MG tablet TAKE ONE TABLET BY MOUTH TWICE A DAY WITH MEALS 60 tablet 1     No Known Allergies  Recent Labs   Lab Test  09/17/18   0927  03/26/18   1141  03/26/18   1139  11/29/17   1355  09/11/17   1349  07/27/17   1412  11/10/16   1108   12/17/15   1029   07/08/15   1108   02/23/14   2300   A1C  9.8*   --   9.3*   --   10.3*   --   7.9*   --    --    < >   --    --    --    LDL   --   Cannot estimate LDL when triglyceride exceeds 400 mg/dL  68   --    --    --    --    --    --   Cannot estimate LDL when triglyceride exceeds 400 mg/dL   --   Cannot estimate LDL when triglyceride exceeds 400 mg/dL  108   < >   --    HDL   --   35*   --    --    --    --    --    --   33*   --   24*   --    --    TRIG   --   405*   --    --    --    --    --    --   513*   --   1416*   --    --    ALT   --    --    --   76*   --   78*   --    --    --    --    --    --   98*   CR   --    --    --   0.48*   --   0.44*   --    < >   --    < >    "--    --   0.56   GFRESTIMATED   --    --    --   >90   --   >90  Non  GFR Calc     --    < >   --    < >   --    --   >90   GFRESTBLACK   --    --    --   >90   --   >90   GFR Calc     --    < >   --    < >   --    --   >90   POTASSIUM   --    --    --   3.9   --   4.2   --    < >   --    --    --    --   3.6   TSH   --    --    --    --    --   1.17  1.02   --    --    --    --    --   1.98    < > = values in this interval not displayed.      BP Readings from Last 3 Encounters:   09/17/18 (!) 118/92   09/12/18 121/65   09/10/18 117/74    Wt Readings from Last 3 Encounters:   09/17/18 192 lb (87.1 kg)   09/10/18 190 lb (86.2 kg)   12/14/17 199 lb 9.6 oz (90.5 kg)                    Reviewed and updated as needed this visit by clinical staff  Tobacco  Allergies  Meds  Med Hx  Surg Hx  Fam Hx  Soc Hx      Reviewed and updated as needed this visit by Provider         ROS:  Constitutional, HEENT, cardiovascular, pulmonary, gi and gu systems are negative, except as otherwise noted.    OBJECTIVE:     BP (!) 118/92  Pulse 110  Temp 97.3  F (36.3  C) (Tympanic)  Resp 18  Ht 5' 4\" (1.626 m)  Wt 192 lb (87.1 kg)  LMP 08/23/2018 (Approximate)  SpO2 99%  BMI 32.96 kg/m2  Body mass index is 32.96 kg/(m^2).  GENERAL: healthy, alert and no distress  EYES: Eyes grossly normal to inspection, PERRL and conjunctivae and sclerae normal  HENT: ear canals and TM's normal, nose and mouth without ulcers or lesions  NECK: no adenopathy, no asymmetry, masses, or scars and thyroid normal to palpation  RESP: lungs clear to auscultation - no rales, rhonchi or wheezes  CV: regular rate and rhythm, normal S1 S2, no S3 or S4, no murmur, click or rub, no peripheral edema and peripheral pulses strong  ABDOMEN: soft, nontender, no hepatosplenomegaly, no masses and bowel sounds normal  MS: no gross musculoskeletal defects noted, no edema  SKIN: no suspicious lesions or rashes  Diabetic foot exam: normal " DP and PT pulses, no trophic changes or ulcerative lesions and normal sensory exam    Diagnostic Test Results:  Results for orders placed or performed in visit on 09/17/18 (from the past 24 hour(s))   **A1C FUTURE anytime   Result Value Ref Range    Hemoglobin A1C 9.8 (H) 0 - 5.6 %       ASSESSMENT/PLAN:   1. Uncontrolled type 2 diabetes mellitus without complication, without long-term current use of insulin (H)  Patient is open to trying   - DIABETES EDUCATOR REFERRAL  - liraglutide (VICTOZA) 18 MG/3ML soln; Inject 0.6 mg Subcutaneous daily  Dispense: 18 mL; Refill: 1    2. Type 2 diabetes mellitus without complication, without long-term current use of insulin (H)  - A1C FUTURE anytime  - Basic metabolic panel  (Ca, Cl, CO2, Creat, Gluc, K, Na, BUN)  - FOOT EXAM  - glipiZIDE (GLUCOTROL) 5 MG tablet; TAKE ONE TABLET BY MOUTH TWICE A DAY BEFORE MEALS  Dispense: 60 tablet; Refill: 3  - lisinopril (PRINIVIL/ZESTRIL) 2.5 MG tablet; Take 1 tablet (2.5 mg) by mouth daily  Dispense: 90 tablet; Refill: 3  - metFORMIN (GLUCOPHAGE) 1000 MG tablet; Take 1 tablet (1,000 mg) by mouth 2 times daily (with meals)  Dispense: 180 tablet; Refill: 3  - DIABETES EDUCATOR REFERRAL    3. Hyperlipidemia, unspecified hyperlipidemia type  Medication refilled   - atorvastatin (LIPITOR) 10 MG tablet; Take 1 tablet (10 mg) by mouth daily  Dispense: 90 tablet; Refill: 3    FUTURE APPOINTMENTS:       - Follow-up visit as needed.    Shawn Lewis MD  [facility]

## 2018-09-17 NOTE — MR AVS SNAPSHOT
After Visit Summary   9/17/2018    Tammy Joshi    MRN: [id number]           Patient Information     Date Of Birth          1980        Visit Information        Provider Department      9/17/2018 10:20 AM Shawn Lewis MD [facility]        Today's Diagnoses     Type 2 diabetes mellitus without complication, without long-term current use of insulin (H)    -  1    High blood cholesterol          Care Instructions          Thank you for choosing [facility].  You may be receiving a survey in the mail from Loyd Santana regarding your visit today.  Please take a few minutes to complete and return the survey to let us know how we are doing.      If you have questions or concerns, please contact us via [employer] or you can contact your care team at [phone].    Our Clinic hours are:  Monday 6:40 am  to 7:00 pm  Tuesday -Friday 6:40 am to 5:00 pm    The [place] outpatient lab hours are:  Monday - Friday 6:10 am to 4:45 pm  Saturdays 7:00 am to 11:00 am  Appointments are required, call [phone]    If you have clinical questions after hours or would like to schedule an appointment,  call the clinic at [phone].          Follow-ups after your visit        Additional Services     DIABETES EDUCATOR REFERRAL       DIABETES SELF MANAGEMENT TRAINING (DSMT)      Your provider has referred you to Diabetes Education: [facility]: Diabetes Education - [facility] [phone]   [email or web address]     If an urgent visit is needed or A1C is above 12, Care Team to call the Diabetes  Education Team at [phone] or send an In Basket message to the Diabetes Education Pool (P DIAB ED-PATIENT CARE).    A  will call you to make your appointment. If it has been more than 3 business days since your referral was placed, please call the above phone number to schedule.    Type of training and number of hours: Previous  Diagnosis: Follow-up DSMT - 2 hours.    Diabetes Type: Type 2 - On Oral Medication   Medicare covers: 10 hours of initial DSMT in 12 month period from the time of first visit, plus 2 hours of follow-up DSMT annually, and additional hours as requested for insulin training.         Diabetes Co-Morbidities: none               A1C Goal:  <8.0       A1C is: Lab Results       Component                Value               Date                       A1C                      9.8                 09/17/2018              MEDICAL NUTRITION THERAPY (MNT) for Diabetes    Medical Nutrition Therapy with a Registered Dietitian can be provided in coordination with Diabetes Self-Management Training to assist in achieving optimal diabetes management.    MNT Type and Hours: Previous diagnosis: Annual follow-up MNT - 2 hours                       Medicare will cover: 3 hours initial MNT in 12 month period after first visit, plus 2 hours of follow-up MNT annually        Diabetes Education Topics: Diagnostic Continuous Glucose Monitoring  and Other: will rola to start Victoza but wants more education on it    Special Educational Needs Requiring Individual DSMT: Additional Insulin Training      Please be aware that coverage of these services is subject to the terms and limitations of your health insurance plan.  Call member services at your health plan to determine Diabetes Self-Management Training (Codes  and ) and Medical Nutrition Therapy (Codes 45592 and 51039) benefits and ask which blood glucose monitor brands are covered by your plan.  Please bring the following with you to your appointment:    (1)  List of current medications   (2)  List of Blood Glucose Monitor brands that are covered by your insurance plan  (3)  Blood Glucose Monitor and log book  (4)   Food records for the 3 days prior to your visit    The Certified Diabetes Educator may make diabetes medication adjustments per the CDE Protocol and Collaborative Practice  "Agreement.                  Your next 10 appointments already scheduled     Sep 17, 2018 10:20 AM CDT   SHORT with Shawn Lewis MD   [facility]    [address] [address] [phone]              Who to contact     If you have questions or need follow up information about today's clinic visit or your schedule please contact [facility] directly at [phone].  Normal or non-critical lab and imaging results will be communicated to you by MyChart, letter or phone within 4 business days after the clinic has received the results. If you do not hear from us within 7 days, please contact the clinic through [employer] or phone. If you have a critical or abnormal lab result, we will notify you by phone as soon as possible.  Submit refill requests through "[employer] or call your pharmacy and they will forward the refill request to us. Please allow 3 business days for your refill to be completed.          Additional Information About Your Visit        [employer] Information     "[employer] lets you send messages to your doctor, view your test results, renew your prescriptions, schedule appointments and more. To sign up, go to [email or web address] . Click on \"Log in\" on the left side of the screen, which will take you to the Welcome page. Then click on \"Sign up Now\" on the right side of the page.     You will be asked to enter the access code listed below, as well as some personal information. Please follow the directions to create your username and password.     Your access code is: [email or web address]  Expires: 2018  1:57 PM     Your access code will  in 90 days. If you need help or a new code, please call your [place] clinic or [phone].        Care EveryWhere ID     This is your Care EveryWhere ID. This could be used by other organizations to access your [place] medical records  [id number]        Your Vitals Were     Pulse " "Temperature Respirations Height Last Period Pulse Oximetry    110 97.3  F (36.3  C) (Tympanic) 18 5' 4\" (1.626 m) 08/23/2018 (Approximate) 99%    BMI (Body Mass Index)                   32.96 kg/m2            Blood Pressure from Last 3 Encounters:   09/17/18 (!) 118/92   09/12/18 121/65   09/10/18 117/74    Weight from Last 3 Encounters:   09/17/18 192 lb (87.1 kg)   09/10/18 190 lb (86.2 kg)   12/14/17 199 lb 9.6 oz (90.5 kg)              We Performed the Following     **A1C FUTURE anytime     Basic metabolic panel  (Ca, Cl, CO2, Creat, Gluc, K, Na, BUN)     DIABETES EDUCATOR REFERRAL     FOOT EXAM     Hemoglobin A1c          Today's Medication Changes          These changes are accurate as of 9/17/18 10:14 AM.  If you have any questions, ask your nurse or doctor.               These medicines have changed or have updated prescriptions.        Dose/Directions    clindamycin 300 MG capsule   Commonly known as:  CLEOCIN   This may have changed:  Another medication with the same name was removed. Continue taking this medication, and follow the directions you see here.   Changed by:  Shawn Lewis MD        1 tablet 3times daily for 7 days   Quantity:  40 capsule   Refills:  0       lisinopril 2.5 MG tablet   Commonly known as:  PRINIVIL/Zestril   This may have changed:  See the new instructions.   Used for:  Type 2 diabetes mellitus without complication, without long-term current use of insulin (H)   Changed by:  Shawn Lewis MD        Dose:  2.5 mg   Take 1 tablet (2.5 mg) by mouth daily   Quantity:  90 tablet   Refills:  3       metFORMIN 1000 MG tablet   Commonly known as:  GLUCOPHAGE   This may have changed:  See the new instructions.   Used for:  Type 2 diabetes mellitus without complication, without long-term current use of insulin (H)   Changed by:  Shawn Lewis MD        Dose:  1000 mg   Take 1 tablet (1,000 mg) by mouth 2 times daily (with meals)   Quantity:  180 " tablet   Refills:  3         Stop taking these medicines if you haven't already. Please contact your care team if you have questions.     cyclobenzaprine 10 MG tablet   Commonly known as:  FLEXERIL   Stopped by:  Shawn Lewis MD                Where to get your medicines      These medications were sent to [facility] [place] - [place], [place] - [address]  [address] [address]     Phone:  [phone]     atorvastatin 10 MG tablet    glipiZIDE 5 MG tablet    lisinopril 2.5 MG tablet    metFORMIN 1000 MG tablet                Primary Care Provider Office Phone # Fax #    Shawn Lewis [phone]       [address] [address]        Equal Access to Services     DIPIKA WILL : Hadii ree daniel hadheathero Sobrigitte, waaxda luqadaha, qaybta kaalmada adeegyada, randall rendon . So [facility] [phone].    ATENCIÓN: Si habla español, tiene a dominguez disposición servicios gratuitos de asistencia lingüística. [facility] [phone].    We comply with applicable federal civil rights laws and [place] laws. We do not discriminate on the basis of race, color, national origin, age, disability, sex, sexual orientation, or gender identity.            Thank you!     Thank you for choosing [facility]  for your care. Our goal is always to provide you with excellent care. Hearing back from our patients is one way we can continue to improve our services. Please take a few minutes to complete the written survey that you may receive in the mail after your visit with us. Thank you!             Your Updated Medication List - Protect others around you: Learn how to safely use, store and throw away your medicines at www.disposemymeds.org.          This list is accurate as of 9/17/18 10:14 AM.  Always use your most recent med list.                   Brand Name Dispense Instructions for use Diagnosis    atorvastatin 10 MG tablet     LIPITOR    90 tablet    Take 1 tablet (10 mg) by mouth daily    High blood cholesterol       blood glucose monitoring lancets     1 Box    Use to test blood sugars 4 times daily or as directed.    Type 2 diabetes mellitus without complication, without long-term current use of insulin (H)       blood glucose monitoring test strip    no brand specified    120 each    Test blood sugar 4 times per day    Type 2 diabetes mellitus without complication, without long-term current use of insulin (H)       CINNAMON PO      Take 1 tablet by mouth daily        clindamycin 300 MG capsule    CLEOCIN    40 capsule    1 tablet 3times daily for 7 days        CRANBERRY EXTRACT PO      Take 1 capsule by mouth daily        dexamethasone 4 MG tablet    DECADRON    3 tablet    Take 2.5 tablets (10 mg) by mouth once for 1 dose        glipiZIDE 5 MG tablet    GLUCOTROL    60 tablet    TAKE ONE TABLET BY MOUTH TWICE A DAY BEFORE MEALS    Type 2 diabetes mellitus without complication, without long-term current use of insulin (H)       lisinopril 2.5 MG tablet    PRINIVIL/Zestril    90 tablet    Take 1 tablet (2.5 mg) by mouth daily    Type 2 diabetes mellitus without complication, without long-term current use of insulin (H)       metFORMIN 1000 MG tablet    GLUCOPHAGE    180 tablet    Take 1 tablet (1,000 mg) by mouth 2 times daily (with meals)    Type 2 diabetes mellitus without complication, without long-term current use of insulin (H)       oxyCODONE-acetaminophen 5-325 MG per tablet    PERCOCET    12 tablet    Take 1-2 tablets by mouth every 4 hours as needed for moderate to severe pain        sertraline 50 MG tablet    ZOLOFT    30 tablet    TAKE ONE TABLET BY MOUTH EVERY DAY    Mild episode of recurrent major depressive disorder (H)

## 2018-09-18 ASSESSMENT — PATIENT HEALTH QUESTIONNAIRE - PHQ9: SUM OF ALL RESPONSES TO PHQ QUESTIONS 1-9: 4

## 2018-09-18 ASSESSMENT — ANXIETY QUESTIONNAIRES: GAD7 TOTAL SCORE: 8

## 2019-02-21 ENCOUNTER — TELEPHONE (OUTPATIENT)
Dept: FAMILY MEDICINE | Facility: CLINIC | Age: 39
End: 2019-02-21

## 2019-02-21 NOTE — TELEPHONE ENCOUNTER
Panel Management Review      Patient has the following on her problem list:       Composite cancer screening  Chart review shows that this patient is due/due soon for the following Pap Smear and diabetic exam   Summary:    Patient is due/failing the following:   Diabetic check , LDL and PAP    Action needed:   Patient needs office visit for pap and diabetic exam .    Type of outreach:    Phone, left message for patient to call back.     Questions for provider review:    None                                                                                                                                    Kell Juraez CMA     Chart routed to Care Team .

## 2019-02-21 NOTE — LETTER
March 7, 2019      Tammy Joshi  [address] [place] [id number]        Dear Tammy,     Dr. Lewis  has been reviewing your chart.  It appears that there are aspects of your care that could be improved.  At this time you are due for  A office visit to follow up on Diabetes, and also a Physical/pappe. Please call [phone] to schedule a appointment .     Also. We have been unable to reach you by phone Your [facility] Care Team works hard to make sure that you receive exceptional care. Enclosed you will find a copy of the phq-9/gad7 depression form  that our clinic uses to monitor and manage your Depression/anxiety  This test is an assessment tool that we can use to determine how well your Depression  is controlled. Please fill out and mail back the enclosed  form. Enclosed is a stamped addressed envelope for you to mail the form  back to the clinic in.       .  If you could plan to do that in the near future it would be very helpful.  We are trying to help our patients achieve their health care goals.     If you have any questions please feel free to contact the clinic     Thank you,      Sincerely,        Shawn Lewis MD

## 2019-02-28 NOTE — TELEPHONE ENCOUNTER
Panel Management Review      Patient has the following on her problem list:     Depression / Dysthymia review    Measure:  Needs PHQ-9 score of 4 or less during index window.  Administer PHQ-9 and if score is 5 or more, send encounter to provider for next steps.        PHQ-9 SCORE 11/10/2016 11/30/2017 9/17/2018   PHQ-9 Total Score - - -   PHQ-9 Total Score 8 13 4       If PHQ-9 recheck is 5 or more, route to provider for next steps.    Patient is due for:  PHQ9    Diabetes        Date of last diabetes office visit: 9/17/18     Tobacco History:     History   Smoking Status     Never Smoker   Smokeless Tobacco     Never Used           Composite cancer screening  Chart review shows that this patient is due/due soon for the following Pap Smear  Summary:    Patient is due/failing the following:   Diabetes office visit , Pappe/pe, phq-9/gad7    Type of outreach:    Phone, left message for patient to call back.     Dorian PULIDO CMA

## 2019-03-07 NOTE — TELEPHONE ENCOUNTER
Panel Management Review        Patient has the following on her problem list:      Depression / Dysthymia review     Measure:  Needs PHQ-9 score of 4 or less during index window.  Administer PHQ-9 and if score is 5 or more, send encounter to provider for next steps.           PHQ-9 SCORE 11/10/2016 11/30/2017 9/17/2018   PHQ-9 Total Score - - -   PHQ-9 Total Score 8 13 4         If PHQ-9 recheck is 5 or more, route to provider for next steps.     Patient is due for:  PHQ9     Diabetes           Date of last diabetes office visit: 9/17/18                Tobacco History:                    History   Smoking Status     Never Smoker   Smokeless Tobacco     Never Used             Composite cancer screening  Chart review shows that this patient is due/due soon for the following Pap Smear  Summary:     Patient is due/failing the following:   Diabetes office visit , Pappe/pe, phq-9/gad7     Type of outreach:  Letter sent     Dorian PULIDO CMA

## 2019-04-28 ENCOUNTER — HOSPITAL ENCOUNTER (EMERGENCY)
Facility: CLINIC | Age: 39
Discharge: HOME | End: 2019-04-28
Attending: FAMILY MEDICINE | Admitting: FAMILY MEDICINE
Payer: COMMERCIAL

## 2019-04-28 ENCOUNTER — APPOINTMENT (OUTPATIENT)
Dept: CT IMAGING | Facility: CLINIC | Age: 39
End: 2019-04-28
Attending: FAMILY MEDICINE
Payer: COMMERCIAL

## 2019-04-28 ENCOUNTER — APPOINTMENT (OUTPATIENT)
Dept: ULTRASOUND IMAGING | Facility: CLINIC | Age: 39
End: 2019-04-28
Attending: FAMILY MEDICINE
Payer: COMMERCIAL

## 2019-04-28 VITALS
OXYGEN SATURATION: 97 % | RESPIRATION RATE: 16 BRPM | TEMPERATURE: 98.1 F | BODY MASS INDEX: 31.58 KG/M2 | HEIGHT: 64 IN | WEIGHT: 185 LBS | HEART RATE: 84 BPM | SYSTOLIC BLOOD PRESSURE: 113 MMHG | DIASTOLIC BLOOD PRESSURE: 72 MMHG

## 2019-04-28 DIAGNOSIS — N83.201 RIGHT OVARIAN CYST: ICD-10-CM

## 2019-04-28 LAB
ALBUMIN UR-MCNC: NEGATIVE MG/DL
ANION GAP SERPL CALCULATED.3IONS-SCNC: 13 MMOL/L (ref 3–14)
APPEARANCE UR: CLEAR
BASOPHILS # BLD AUTO: 0.1 10E9/L (ref 0–0.2)
BASOPHILS NFR BLD AUTO: 0.9 %
BILIRUB UR QL STRIP: NEGATIVE
BUN SERPL-MCNC: 10 MG/DL (ref 7–30)
CALCIUM SERPL-MCNC: 9 MG/DL (ref 8.5–10.1)
CHLORIDE SERPL-SCNC: 104 MMOL/L (ref 94–109)
CO2 SERPL-SCNC: 23 MMOL/L (ref 20–32)
COLOR UR AUTO: YELLOW
CREAT SERPL-MCNC: 0.57 MG/DL (ref 0.52–1.04)
DIFFERENTIAL METHOD BLD: NORMAL
EOSINOPHIL # BLD AUTO: 0.2 10E9/L (ref 0–0.7)
EOSINOPHIL NFR BLD AUTO: 1.9 %
ERYTHROCYTE [DISTWIDTH] IN BLOOD BY AUTOMATED COUNT: 13 % (ref 10–15)
GFR SERPL CREATININE-BSD FRML MDRD: >90 ML/MIN/{1.73_M2}
GLUCOSE SERPL-MCNC: 80 MG/DL (ref 70–99)
GLUCOSE UR STRIP-MCNC: NEGATIVE MG/DL
HCT VFR BLD AUTO: 43.5 % (ref 35–47)
HGB BLD-MCNC: 14.9 G/DL (ref 11.7–15.7)
HGB UR QL STRIP: NEGATIVE
IMM GRANULOCYTES # BLD: 0 10E9/L (ref 0–0.4)
IMM GRANULOCYTES NFR BLD: 0.2 %
KETONES UR STRIP-MCNC: 20 MG/DL
LEUKOCYTE ESTERASE UR QL STRIP: NEGATIVE
LYMPHOCYTES # BLD AUTO: 2.2 10E9/L (ref 0.8–5.3)
LYMPHOCYTES NFR BLD AUTO: 27.9 %
MCH RBC QN AUTO: 30.6 PG (ref 26.5–33)
MCHC RBC AUTO-ENTMCNC: 34.3 G/DL (ref 31.5–36.5)
MCV RBC AUTO: 89 FL (ref 78–100)
MONOCYTES # BLD AUTO: 0.7 10E9/L (ref 0–1.3)
MONOCYTES NFR BLD AUTO: 8.4 %
NEUTROPHILS # BLD AUTO: 4.9 10E9/L (ref 1.6–8.3)
NEUTROPHILS NFR BLD AUTO: 60.7 %
NITRATE UR QL: NEGATIVE
NRBC # BLD AUTO: 0 10*3/UL
NRBC BLD AUTO-RTO: 0 /100
PH UR STRIP: 5 PH (ref 5–7)
PLATELET # BLD AUTO: 208 10E9/L (ref 150–450)
POTASSIUM SERPL-SCNC: 3.4 MMOL/L (ref 3.4–5.3)
RBC # BLD AUTO: 4.87 10E12/L (ref 3.8–5.2)
SODIUM SERPL-SCNC: 140 MMOL/L (ref 133–144)
SOURCE: ABNORMAL
SP GR UR STRIP: 1.02 (ref 1–1.03)
UROBILINOGEN UR STRIP-MCNC: 0 MG/DL (ref 0–2)
WBC # BLD AUTO: 8 10E9/L (ref 4–11)

## 2019-04-28 PROCEDURE — 80048 BASIC METABOLIC PNL TOTAL CA: CPT | Performed by: FAMILY MEDICINE

## 2019-04-28 PROCEDURE — 93010 ELECTROCARDIOGRAM REPORT: CPT | Mod: Z6 | Performed by: FAMILY MEDICINE

## 2019-04-28 PROCEDURE — 25000128 H RX IP 250 OP 636: Performed by: FAMILY MEDICINE

## 2019-04-28 PROCEDURE — 99285 EMERGENCY DEPT VISIT HI MDM: CPT | Mod: 25 | Performed by: FAMILY MEDICINE

## 2019-04-28 PROCEDURE — 96374 THER/PROPH/DIAG INJ IV PUSH: CPT | Performed by: FAMILY MEDICINE

## 2019-04-28 PROCEDURE — 74176 CT ABD & PELVIS W/O CONTRAST: CPT

## 2019-04-28 PROCEDURE — 81003 URINALYSIS AUTO W/O SCOPE: CPT | Performed by: FAMILY MEDICINE

## 2019-04-28 PROCEDURE — 93005 ELECTROCARDIOGRAM TRACING: CPT | Performed by: FAMILY MEDICINE

## 2019-04-28 PROCEDURE — 85025 COMPLETE CBC W/AUTO DIFF WBC: CPT | Performed by: FAMILY MEDICINE

## 2019-04-28 PROCEDURE — 93976 VASCULAR STUDY: CPT

## 2019-04-28 RX ORDER — HYDROCODONE BITARTRATE AND ACETAMINOPHEN 5; 325 MG/1; MG/1
1 TABLET ORAL EVERY 6 HOURS PRN
Qty: 10 TABLET | Refills: 0 | Status: SHIPPED | OUTPATIENT
Start: 2019-04-28 | End: 2019-10-10

## 2019-04-28 RX ORDER — KETOROLAC TROMETHAMINE 15 MG/ML
15 INJECTION, SOLUTION INTRAMUSCULAR; INTRAVENOUS ONCE
Status: COMPLETED | OUTPATIENT
Start: 2019-04-28 | End: 2019-04-28

## 2019-04-28 RX ADMIN — KETOROLAC TROMETHAMINE 15 MG: 15 INJECTION, SOLUTION INTRAMUSCULAR; INTRAVENOUS at 11:33

## 2019-04-28 ASSESSMENT — MIFFLIN-ST. JEOR: SCORE: 1504.15

## 2019-04-28 NOTE — ED PROVIDER NOTES
"  History     Chief Complaint   Patient presents with     Generalized Weakness     chilled this morning, felt like heart was racing     Flank Pain     right side- started last night, hx of kidney stones     HPI  Tammy Joshi is a 38 year old female who presents to the ED with generalized weakness and right flank pain. The patient reports last night she had a pain in her right flank that was similar to her previous episode of kidney stones. This morning she awoke with chills, diaphoresis, weakness, and intermittent tachycardia. She notes having difficulty walking to the bathroom. The diaphoresis and intermittent tachycardia lasted for approximately 30 minutes. She denies urinary symptoms, shortness of breath, and chest pain. She denies a history of kidney infections or abdominal surgeries. She has had one previous episode of kidney stones in which she had the stones removed via laser lithotripsy. She is menstruating normally. The patient is  5 para 4 with all vaginal deliveries. She had complications during pregnancies including hypertension, premature birth, pre-eclampsia, and eclampsia. The patient has diabetes mellitus, hypertension, and hyperlipidemia. She has been on an \"extremely low-carb diet\" for the past couple of weeks.     Allergies:  No Known Allergies    Problem List:    Patient Active Problem List    Diagnosis Date Noted     Hip pain, left 2017     Priority: Medium     Type 2 diabetes mellitus without complication (H) 10/25/2015     Priority: Medium     Hyperlipidemia with target LDL less than 100 2015     Priority: Medium     Diagnosis updated by automated process. Provider to review and confirm.       Depression 2014     Priority: Medium     Anxiety 2011     Priority: Medium     CARDIOVASCULAR SCREENING; LDL GOAL LESS THAN 160 10/31/2010     Priority: Medium     Hand numbness 10/14/2009     Priority: Medium        Past Medical History:    Past Medical History: " "  Diagnosis Date     Absence of menstruation      Female infertility of unspecified origin      MILD/NOS PREECLAMP-ANTEP 8/29/2005     Polycystic ovaries      PPH (postpartum hemorrhage) 11/2009       Past Surgical History:    Past Surgical History:   Procedure Laterality Date     C HAND/FINGER SURGERY UNLISTED  7th grade    left index     D & C  1998    Missed Ab     LITHOTRIPSY  2011       Family History:    Family History   Adopted: Yes   Problem Relation Age of Onset     Unknown/Adopted Other         patient was adopted; knows a limited amount of medical history of birth parents       Social History:  Marital Status:   [2]  Social History     Tobacco Use     Smoking status: Never Smoker     Smokeless tobacco: Never Used   Substance Use Topics     Alcohol use: Yes     Comment: wine occasionally, none while pregnant     Drug use: No        Medications:      atorvastatin (LIPITOR) 10 MG tablet   glipiZIDE (GLUCOTROL) 5 MG tablet   lisinopril (PRINIVIL/ZESTRIL) 2.5 MG tablet   metFORMIN (GLUCOPHAGE) 1000 MG tablet   blood glucose monitoring (FREESTYLE) lancets   blood glucose monitoring (NO BRAND SPECIFIED) test strip   CINNAMON PO   CRANBERRY EXTRACT PO   liraglutide (VICTOZA) 18 MG/3ML soln         Review of Systems  All other systems are reviewed and are negative    Physical Exam   BP: 142/81  Pulse: 88  Heart Rate: 94  Temp: 98.1  F (36.7  C)  Resp: (!) 31  Height: 162.6 cm (5' 4\")  Weight: 83.9 kg (185 lb)  SpO2: 98 %      Physical Exam    Nursing note and vitals were reviewed.  Constitutional: Awake and alert, adequately nourished and developed appearing 38-year-old in moderate discomfort, who does not appear acutely ill, and who answers questions appropriately and cooperates with examination.  HEENT: EOMI.   Neck: Freely mobile.  Cardiovascular: Cardiac examination reveals normal heart rate and regular rhythm without murmur.  Pulmonary/Chest: Breathing is unlabored.  Breath sounds are clear and equal " bilaterally.  There no retractions, tachypnea, rales, wheezes, or rhonchi.  Abdomen: Soft, nontender, no HSM or masses rebound or guarding.  Musculoskeletal: Extremities are warm and well-perfused and without edema  Neurological: Alert, oriented, thought content logical, coherent   Skin: Warm, dry, no rashes.  Psychiatric: Affect broad and appropriate.      ED Course        Procedures               Critical Care time:  none               Results for orders placed or performed during the hospital encounter of 04/28/19 (from the past 24 hour(s))   UA reflex to Microscopic   Result Value Ref Range    Color Urine Yellow     Appearance Urine Clear     Glucose Urine Negative NEG^Negative mg/dL    Bilirubin Urine Negative NEG^Negative    Ketones Urine 20 (A) NEG^Negative mg/dL    Specific Gravity Urine 1.020 1.003 - 1.035    Blood Urine Negative NEG^Negative    pH Urine 5.0 5.0 - 7.0 pH    Protein Albumin Urine Negative NEG^Negative mg/dL    Urobilinogen mg/dL 0.0 0.0 - 2.0 mg/dL    Nitrite Urine Negative NEG^Negative    Leukocyte Esterase Urine Negative NEG^Negative    Source Midstream Urine    CBC with platelets, differential   Result Value Ref Range    WBC 8.0 4.0 - 11.0 10e9/L    RBC Count 4.87 3.8 - 5.2 10e12/L    Hemoglobin 14.9 11.7 - 15.7 g/dL    Hematocrit 43.5 35.0 - 47.0 %    MCV 89 78 - 100 fl    MCH 30.6 26.5 - 33.0 pg    MCHC 34.3 31.5 - 36.5 g/dL    RDW 13.0 10.0 - 15.0 %    Platelet Count 208 150 - 450 10e9/L    Diff Method Automated Method     % Neutrophils 60.7 %    % Lymphocytes 27.9 %    % Monocytes 8.4 %    % Eosinophils 1.9 %    % Basophils 0.9 %    % Immature Granulocytes 0.2 %    Nucleated RBCs 0 0 /100    Absolute Neutrophil 4.9 1.6 - 8.3 10e9/L    Absolute Lymphocytes 2.2 0.8 - 5.3 10e9/L    Absolute Monocytes 0.7 0.0 - 1.3 10e9/L    Absolute Eosinophils 0.2 0.0 - 0.7 10e9/L    Absolute Basophils 0.1 0.0 - 0.2 10e9/L    Abs Immature Granulocytes 0.0 0 - 0.4 10e9/L    Absolute Nucleated RBC 0.0     Basic metabolic panel   Result Value Ref Range    Sodium 140 133 - 144 mmol/L    Potassium 3.4 3.4 - 5.3 mmol/L    Chloride 104 94 - 109 mmol/L    Carbon Dioxide 23 20 - 32 mmol/L    Anion Gap 13 3 - 14 mmol/L    Glucose 80 70 - 99 mg/dL    Urea Nitrogen 10 7 - 30 mg/dL    Creatinine 0.57 0.52 - 1.04 mg/dL    GFR Estimate >90 >60 mL/min/[1.73_m2]    GFR Estimate If Black >90 >60 mL/min/[1.73_m2]    Calcium 9.0 8.5 - 10.1 mg/dL   CT Abdomen Pelvis w/o Contrast    Narrative    CT ABDOMEN PELVIS WITHOUT CONTRAST 4/28/2019 11:31 AM    TECHNIQUE: Images from diaphragm to pubic symphysis noncontrast  technique. Radiation dose for this scan was reduced using automated  exposure control, adjustment of the mA and/or kV according to patient  size, or iterative reconstruction technique.    HISTORY: Right flank pain.    COMPARISON: 8/9/2010 CT abdomen and pelvis    FINDINGS: No evidence for hydronephrosis or urinary tract calculi.  Normal appendix. No acute appearing bowel abnormality. Lung bases  clear. Within the limits of a noncontrast exam the liver, gallbladder,  spleen, pancreas, adrenal glands and kidneys demonstrate no acute  appearing or worrisome abnormality. No periaortic or pelvic  adenopathy.    Small free fluid in the right adnexal region and cul-de-sac. Ovaries  are not ideally identified with this technique, right ovary estimated  as approximately 4.2 cm and there is a low dense 2.4 cm area  consistent with a probable left ovarian cyst. No aggressive bone  lesions.      Impression    IMPRESSION:   1. Small free fluid in the right adnexal region and cul-de-sac may be  physiologic.  2. No evidence for hydronephrosis. No acute appearing bowel  abnormality.                US Pelvis Cmplt w Transvag & Doppler LmtPel Duplex Limited    Narrative    PELVIC ULTRASOUND 4/28/2019 1:19 PM    HISTORY: Pelvic pain    COMPARISON; 4/28/2019 CT abdomen and pelvis    TECHNIQUE: Transabdominal and endovaginal  technique    FINDINGS: Uterus approximately 8.2 x 4.0 x 5.3 cm. There is small free  fluid in the cul-de-sac which is anechoic. 1.0 cm endometrial stripe.    Right ovary 4.1 x 1.9 x 4.0 cm with numerous small follicles and  arterial and venous flow noted. No dominant cyst identified on the  right.    Left ovary 3.6 x 2.7 x 4.2 cm with 2.1 cm cyst consistent with a  dominant follicle. Arterial and venous flow noted in the left ovary.    IMPRESSION;  1. Small simple free fluid in the cul-de-sac. Simple left ovarian cyst  consistent with dominant follicle. Right ovary appears normal.    CRIS EDWARDS MD       Medications   ketorolac (TORADOL) injection 15 mg (15 mg Intravenous Given 4/28/19 1133)        10:47 Patient assessed. Course of care outlined.     Assessments & Plan (with Medical Decision Making)     38-year-old female presented with right flank pain and right lower abdominal and pelvic pain.  Work-up in the emergency department included a CT scan of the abdomen and pelvis with suspicion for renal colic.  She stated the pain was the same as with her previous kidney stone.  No stone was seen and the CT was normal except for evidence of a right ovarian cyst and a small amount of pelvic free fluid.  Because of this she underwent additional imaging with pelvic ultrasound which confirmed the presence of an right ovarian cyst and a small amount of free fluid.  I suspect this is the cause of her pain.  No alternative cause has been identified.  CBC and blood chemistries were normal.  She did have an episode of feeling her heart racing feeling chilled this morning but I think this was more of pain response probably associated with rupture of the ovarian cyst given the absence of fever, signs of toxicity, and normal CBC and blood chemistries.  She has no pulmonary symptoms including no chest pain or breathing difficulty.  I have low suspicion for pulmonary process causing the symptoms.  I suspect it is all due to  ovarian cyst rupture.  We discussed treatment and medication management.  We discussed signs and symptoms that should prompt return including inability to control the pain, persistent pain, fevers, vomiting, or other new concerning symptoms.  She is comfortable with this plan and her questions were all answered.    I have reviewed the nursing notes.    I have reviewed the findings, diagnosis, plan and need for follow up with the patient.          Medication List      There are no discharge medications for this visit.         Final diagnoses:   Right ovarian cyst     This document serves as a record of the services and decisions personally performed and made by Vel Ortiz MD. It was created on HIS/HER behalf by   Wan Garcia, a trained medical scribe. The creation of this document is based the provider's statements to the medical scribe.  Wan Garcia 10:47 AM 4/28/2019    Provider:   The information in this document, created by the medical scribe for me, accurately reflects the services I personally performed and the decisions made by me. I have reviewed and approved this document for accuracy prior to leaving the patient care area.  Vel Ortiz MD 10:47 AM 4/28/2019 4/28/2019   [facility] EMERGENCY DEPARTMENT     Vel Ortiz MD  04/28/19 [id number]

## 2019-04-28 NOTE — DISCHARGE INSTRUCTIONS
Use ibuprofen 400-600 mg up to 4 times per day if needed for pain. Stop if it is causing nausea or abdominal pain.   Add Norco 5-325 (hydrocodone-acetaminophen) 1-2 pills up to every 4 hours if needed for pain. Do not use alcohol, operate machinery, drive, or climb on ladders for 8 hours after taking Norco. Use docusate (100mg) 2 times a day to prevent constipation while on narcotics.  Return to be seen if pain persists, vomiting develops, fever, or other new concerning symptoms.

## 2019-04-28 NOTE — ED NOTES
Weakness, palpitations, chills, sweating, and right flank pain since this morning.  Patient denies nausea/vomiting, pain with urination.  Gilbert Cunningham RN on 4/28/2019 at 10:09 AM

## 2019-04-28 NOTE — ED TRIAGE NOTES
"Pt woke up this morning with chills and the feeling that her \"heart was racing\". States she has been on an \"extremely low-carb diet\" for a few weeks. Also c/o right flank pain that started last night, hx of kidney stones.   "

## 2019-04-28 NOTE — ED AVS SNAPSHOT
[facility] Emergency Department  [address] [address]  Phone:  [phone]  Fax:  [phone]                                    Tammy Joshi   MRN: [id number]    Department:  [facility] Emergency Department   Date of Visit:  4/28/2019           After Visit Summary Signature Page    I have received my discharge instructions, and my questions have been answered. I have discussed any challenges I see with this plan with the nurse or doctor.    ..........................................................................................................................................  Patient/Patient Representative Signature      ..........................................................................................................................................  Patient Representative Print Name and Relationship to Patient    ..................................................               ................................................  Date                                   Time    ..........................................................................................................................................  Reviewed by Signature/Title    ...................................................              ..............................................  Date                                               Time          22EPIC Rev 08/18

## 2019-05-07 ENCOUNTER — OFFICE VISIT (OUTPATIENT)
Dept: FAMILY MEDICINE | Facility: CLINIC | Age: 39
End: 2019-05-07
Payer: COMMERCIAL

## 2019-05-07 VITALS
SYSTOLIC BLOOD PRESSURE: 110 MMHG | HEART RATE: 78 BPM | DIASTOLIC BLOOD PRESSURE: 68 MMHG | HEIGHT: 64 IN | TEMPERATURE: 98.1 F | OXYGEN SATURATION: 99 % | RESPIRATION RATE: 14 BRPM | BODY MASS INDEX: 32.1 KG/M2 | WEIGHT: 188 LBS

## 2019-05-07 DIAGNOSIS — E11.9 TYPE 2 DIABETES MELLITUS WITHOUT COMPLICATION, WITHOUT LONG-TERM CURRENT USE OF INSULIN (H): ICD-10-CM

## 2019-05-07 DIAGNOSIS — E78.5 HYPERLIPIDEMIA, UNSPECIFIED HYPERLIPIDEMIA TYPE: ICD-10-CM

## 2019-05-07 DIAGNOSIS — E11.9 TYPE 2 DIABETES MELLITUS WITHOUT COMPLICATION, WITHOUT LONG-TERM CURRENT USE OF INSULIN (H): Primary | ICD-10-CM

## 2019-05-07 LAB
ANION GAP SERPL CALCULATED.3IONS-SCNC: 4 MMOL/L (ref 3–14)
BUN SERPL-MCNC: 6 MG/DL (ref 7–30)
CALCIUM SERPL-MCNC: 9.3 MG/DL (ref 8.5–10.1)
CHLORIDE SERPL-SCNC: 105 MMOL/L (ref 94–109)
CHOLEST SERPL-MCNC: 109 MG/DL
CO2 SERPL-SCNC: 28 MMOL/L (ref 20–32)
CREAT SERPL-MCNC: 0.59 MG/DL (ref 0.52–1.04)
CREAT UR-MCNC: 126 MG/DL
GFR SERPL CREATININE-BSD FRML MDRD: >90 ML/MIN/{1.73_M2}
GLUCOSE SERPL-MCNC: 111 MG/DL (ref 70–99)
HBA1C MFR BLD: 7.1 % (ref 0–5.6)
HDLC SERPL-MCNC: 38 MG/DL
LDLC SERPL CALC-MCNC: 29 MG/DL
MICROALBUMIN UR-MCNC: 12 MG/L
MICROALBUMIN/CREAT UR: 9.29 MG/G CR (ref 0–25)
NONHDLC SERPL-MCNC: 71 MG/DL
POTASSIUM SERPL-SCNC: 4.1 MMOL/L (ref 3.4–5.3)
SODIUM SERPL-SCNC: 137 MMOL/L (ref 133–144)
TRIGL SERPL-MCNC: 212 MG/DL

## 2019-05-07 PROCEDURE — 82043 UR ALBUMIN QUANTITATIVE: CPT | Performed by: FAMILY MEDICINE

## 2019-05-07 PROCEDURE — 80048 BASIC METABOLIC PNL TOTAL CA: CPT | Performed by: FAMILY MEDICINE

## 2019-05-07 PROCEDURE — 36415 COLL VENOUS BLD VENIPUNCTURE: CPT | Performed by: FAMILY MEDICINE

## 2019-05-07 PROCEDURE — 80061 LIPID PANEL: CPT | Performed by: FAMILY MEDICINE

## 2019-05-07 PROCEDURE — 83036 HEMOGLOBIN GLYCOSYLATED A1C: CPT | Performed by: FAMILY MEDICINE

## 2019-05-07 PROCEDURE — 99214 OFFICE O/P EST MOD 30 MIN: CPT | Performed by: FAMILY MEDICINE

## 2019-05-07 RX ORDER — ATORVASTATIN CALCIUM 10 MG/1
10 TABLET, FILM COATED ORAL DAILY
Qty: 90 TABLET | Refills: 3 | Status: SHIPPED | OUTPATIENT
Start: 2019-05-07 | End: 2020-08-27

## 2019-05-07 RX ORDER — GLIPIZIDE 5 MG/1
TABLET ORAL
Qty: 90 TABLET | Refills: 3 | Status: SHIPPED | OUTPATIENT
Start: 2019-05-07 | End: 2020-08-27

## 2019-05-07 RX ORDER — LISINOPRIL 2.5 MG/1
2.5 TABLET ORAL DAILY
Qty: 90 TABLET | Refills: 3 | Status: SHIPPED | OUTPATIENT
Start: 2019-05-07 | End: 2020-08-27

## 2019-05-07 ASSESSMENT — ANXIETY QUESTIONNAIRES
IF YOU CHECKED OFF ANY PROBLEMS ON THIS QUESTIONNAIRE, HOW DIFFICULT HAVE THESE PROBLEMS MADE IT FOR YOU TO DO YOUR WORK, TAKE CARE OF THINGS AT HOME, OR GET ALONG WITH OTHER PEOPLE: SOMEWHAT DIFFICULT
2. NOT BEING ABLE TO STOP OR CONTROL WORRYING: NOT AT ALL
3. WORRYING TOO MUCH ABOUT DIFFERENT THINGS: NOT AT ALL
1. FEELING NERVOUS, ANXIOUS, OR ON EDGE: NOT AT ALL
5. BEING SO RESTLESS THAT IT IS HARD TO SIT STILL: NOT AT ALL
6. BECOMING EASILY ANNOYED OR IRRITABLE: SEVERAL DAYS
GAD7 TOTAL SCORE: 3
7. FEELING AFRAID AS IF SOMETHING AWFUL MIGHT HAPPEN: NOT AT ALL

## 2019-05-07 ASSESSMENT — PATIENT HEALTH QUESTIONNAIRE - PHQ9
5. POOR APPETITE OR OVEREATING: MORE THAN HALF THE DAYS
SUM OF ALL RESPONSES TO PHQ QUESTIONS 1-9: 3

## 2019-05-07 ASSESSMENT — MIFFLIN-ST. JEOR: SCORE: 1517.76

## 2019-05-07 NOTE — PROGRESS NOTES
SUBJECTIVE:   Tammy Joshi is a 38 year old female who presents to clinic today for the following health issues:      Diabetes Follow-up      Patient is checking blood sugars: not at all    Diabetic concerns: None     Symptoms of hypoglycemia (low blood sugar): with new diet feels like it could get low      Paresthesias (numbness or burning in feet) or sores: No     Date of last diabetic eye exam: due   Component      Latest Ref Rng & Units 9/17/2018 5/7/2019   Hemoglobin A1C      0 - 5.6 % 9.8 (H) 7.1 (H)         Diabetes Management Resources    Hyperlipidemia Follow-Up      Rate your low fat/cholesterol diet?: good in the last mo    Taking statin?  Yes, no muscle aches from statin    Other lipid medications/supplements?:  none    Hypertension Follow-up      Outpatient blood pressures are not being checked.    Low Salt Diet: low salt    BP Readings from Last 2 Encounters:   05/07/19 110/68   04/28/19 113/72     Hemoglobin A1C (%)   Date Value   05/07/2019 7.1 (H)   09/17/2018 9.8 (H)     LDL Cholesterol Calculated (mg/dL)   Date Value   05/07/2019 29   03/26/2018     Cannot estimate LDL when triglyceride exceeds 400 mg/dL     LDL Cholesterol Direct (mg/dL)   Date Value   03/26/2018 68       Amount of exercise or physical activity: 4-5 days/week for an average of 15-30 minutes    Problems taking medications regularly: No    Medication side effects: none    Diet: low salt and vegetarian/vegan for the last mo      Medication Followup of all medications listed in      Taking Medication as prescribed: metformin and glipizide only taking 1 daily     Side Effects:  None    Medication Helping Symptoms:  yes         Additional history: as documented    Patient is a 38 yr old female here for her diabetes check she has been compliant with her medication. Her A1C is 7.1 which is a much improved number from last check. She reports that she was get back on track with her diabetes and be more compliant .  Denies any side effects to her medication and appears to be tolerating this well.     Reviewed  and updated as needed this visit by clinical staff  Tobacco  Allergies  Meds  Med Hx  Surg Hx  Fam Hx  Soc Hx        Reviewed and updated as needed this visit by Provider         Patient Active Problem List   Diagnosis     Hand numbness     CARDIOVASCULAR SCREENING; LDL GOAL LESS THAN 160     Anxiety     Depression     Hyperlipidemia with target LDL less than 100     Type 2 diabetes mellitus without complication (H)     Hip pain, left     Past Surgical History:   Procedure Laterality Date     C HAND/FINGER SURGERY UNLISTED  7th grade    left index     D & C  1998    Missed Ab     LITHOTRIPSY  2011       Social History     Tobacco Use     Smoking status: Never Smoker     Smokeless tobacco: Never Used   Substance Use Topics     Alcohol use: Yes     Comment: wine occasionally, none while pregnant     Family History   Adopted: Yes   Problem Relation Age of Onset     Unknown/Adopted Other         patient was adopted; knows a limited amount of medical history of birth parents         Current Outpatient Medications   Medication Sig Dispense Refill     atorvastatin (LIPITOR) 10 MG tablet Take 1 tablet (10 mg) by mouth daily 90 tablet 3     glipiZIDE (GLUCOTROL) 5 MG tablet TAKE ONE TABLET BY MOUTH  A DAY BEFORE MEALS 90 tablet 3     lisinopril (PRINIVIL/ZESTRIL) 2.5 MG tablet Take 1 tablet (2.5 mg) by mouth daily 90 tablet 3     metFORMIN (GLUCOPHAGE) 1000 MG tablet Take 1 tablet (1,000 mg) by mouth daily (with dinner) 90 tablet 3     blood glucose monitoring (FREESTYLE) lancets Use to test blood sugars 4 times daily or as directed. 1 Box 1     blood glucose monitoring (NO BRAND SPECIFIED) test strip Test blood sugar 4 times per day 120 each 1     CINNAMON PO Take 1 tablet by mouth daily        CRANBERRY EXTRACT PO Take 1 capsule by mouth daily        liraglutide (VICTOZA) 18 MG/3ML soln Inject 0.6 mg Subcutaneous daily  "(Patient not taking: Reported on 4/28/2019) 18 mL 1     No Known Allergies  Recent Labs   Lab Test 05/07/19  1230 04/28/19  1024 09/17/18  0927 03/26/18  1141 03/26/18  1139 11/29/17  1355  07/27/17  1412 11/10/16  1108  12/17/15  1029  02/23/14  2300   A1C 7.1*  --  9.8*  --  9.3*  --    < >  --  7.9*  --   --    < >  --    LDL 29  --   --  Cannot estimate LDL when triglyceride exceeds 400 mg/dL  68  --   --   --   --   --   --  Cannot estimate LDL when triglyceride exceeds 400 mg/dL   < >  --    HDL 38*  --   --  35*  --   --   --   --   --   --  33*   < >  --    TRIG 212*  --   --  405*  --   --   --   --   --   --  513*   < >  --    ALT  --   --   --   --   --  76*  --  78*  --   --   --   --  98*   CR 0.59 0.57  --   --   --  0.48*  --  0.44*  --    < >  --    < > 0.56   GFRESTIMATED >90 >90  --   --   --  >90  --  >90  Non  GFR Calc    --    < >  --    < > >90   GFRESTBLACK >90 >90  --   --   --  >90  --  >90  African American GFR Calc    --    < >  --    < > >90   POTASSIUM 4.1 3.4  --   --   --  3.9  --  4.2  --    < >  --   --  3.6   TSH  --   --   --   --   --   --   --  1.17 1.02  --   --   --  1.98    < > = values in this interval not displayed.      BP Readings from Last 3 Encounters:   05/07/19 110/68   04/28/19 113/72   09/17/18 118/82    Wt Readings from Last 3 Encounters:   05/07/19 85.3 kg (188 lb)   04/28/19 83.9 kg (185 lb)   09/17/18 87.1 kg (192 lb)                    ROS:  Constitutional, HEENT, cardiovascular, pulmonary, gi and gu systems are negative, except as otherwise noted.    OBJECTIVE:     /68   Pulse 78   Temp 98.1  F (36.7  C) (Tympanic)   Resp 14   Ht 1.626 m (5' 4\")   Wt 85.3 kg (188 lb)   SpO2 99%   BMI 32.27 kg/m    Body mass index is 32.27 kg/m .  GENERAL: healthy, alert and no distress  EYES: Eyes grossly normal to inspection, PERRL and conjunctivae and sclerae normal  HENT: ear canals and TM's normal, nose and mouth without ulcers or " lesions  NECK: no adenopathy, no asymmetry, masses, or scars and thyroid normal to palpation  RESP: lungs clear to auscultation - no rales, rhonchi or wheezes  CV: regular rate and rhythm, normal S1 S2, no S3 or S4, no murmur, click or rub, no peripheral edema and peripheral pulses strong  ABDOMEN: soft, nontender, no hepatosplenomegaly, no masses and bowel sounds normal  MS: no gross musculoskeletal defects noted, no edema  SKIN: no suspicious lesions or rashes    Diagnostic Test Results:  Results for orders placed or performed in visit on 05/07/19   Lipid panel reflex to direct LDL Fasting   Result Value Ref Range    Cholesterol 109 <200 mg/dL    Triglycerides 212 (H) <150 mg/dL    HDL Cholesterol 38 (L) >49 mg/dL    LDL Cholesterol Calculated 29 <100 mg/dL    Non HDL Cholesterol 71 <130 mg/dL   Albumin Random Urine Quantitative with Creat Ratio   Result Value Ref Range    Creatinine Urine 126 mg/dL    Albumin Urine mg/L 12 mg/L    Albumin Urine mg/g Cr 9.29 0 - 25 mg/g Cr       ASSESSMENT/PLAN:   1. Type 2 diabetes mellitus without complication, without long-term current use of insulin (H)    Patient doing much better , congratulated on effort. Will like patient to come back in three months for an A1C .   - glipiZIDE (GLUCOTROL) 5 MG tablet; TAKE ONE TABLET BY MOUTH  A DAY BEFORE MEALS  Dispense: 90 tablet; Refill: 3  - lisinopril (PRINIVIL/ZESTRIL) 2.5 MG tablet; Take 1 tablet (2.5 mg) by mouth daily  Dispense: 90 tablet; Refill: 3  - metFORMIN (GLUCOPHAGE) 1000 MG tablet; Take 1 tablet (1,000 mg) by mouth daily (with dinner)  Dispense: 90 tablet; Refill: 3  - Albumin Random Urine Quantitative with Creat Ratio  - **A1C FUTURE 3mo; Future      2. Hyperlipidemia, unspecified hyperlipidemia type  - atorvastatin (LIPITOR) 10 MG tablet; Take 1 tablet (10 mg) by mouth daily  Dispense: 90 tablet; Refill: 3  - Lipid panel reflex to direct LDL Fasting    FUTURE APPOINTMENTS:       - Follow-up visit in three months      Shawn Lewis MD  [facility] [place]

## 2019-05-07 NOTE — PATIENT INSTRUCTIONS
Thank you for choosing [facility] [place].  You may be receiving an email and/or telephone survey request from [facility] Customer Experience regarding your visit today.  Please take a few minutes to respond to the survey to let us know how we are doing.      If you have questions or concerns, please contact us via [employer] or you can contact your care team at [phone].    Our Clinic hours are:  Monday 6:40 am  to 7:00 pm  Tuesday -Friday 6:40 am to 5:00 pm    The [place] outpatient lab hours are:  Monday - Friday 6:10 am to 4:45 pm  Saturdays 7:00 am to 11:00 am  Appointments are required, call [phone]    If you have clinical questions after hours or would like to schedule an appointment,  call the clinic at [phone].

## 2019-05-07 NOTE — LETTER
May 9, 2019      Tammy Hermes Joshi  [address] [facility] [address]        Dear ,    We are writing to inform you of your test results.    Test result show cholesterol was still a bit high but much improved from last year. You should keep taking your medication and work on diet and exercise.     Thank you.     Resulted Orders   Lipid panel reflex to direct LDL Fasting   Result Value Ref Range    Cholesterol 109 <200 mg/dL    Triglycerides 212 (H) <150 mg/dL      Comment:      Borderline high:  150-199 mg/dl  High:             200-499 mg/dl  Very high:       >499 mg/dl      HDL Cholesterol 38 (L) >49 mg/dL    LDL Cholesterol Calculated 29 <100 mg/dL      Comment:      Desirable:       <100 mg/dl    Non HDL Cholesterol 71 <130 mg/dL   Albumin Random Urine Quantitative with Creat Ratio   Result Value Ref Range    Creatinine Urine 126 mg/dL    Albumin Urine mg/L 12 mg/L    Albumin Urine mg/g Cr 9.29 0 - 25 mg/g Cr       If you have any questions or concerns, please call the clinic at the number listed above.       Sincerely,      Shawn Lewis MD

## 2019-05-07 NOTE — LETTER
May 9, 2019      Tammy Joshi  [address] [place] [address]        Dear ,    We are writing to inform you of your test results.    Test results within normal parameters.    Resulted Orders   Hemoglobin A1c   Result Value Ref Range    Hemoglobin A1C 7.1 (H) 0 - 5.6 %      Comment:      Normal <5.7% Prediabetes 5.7-6.4%  Diabetes 6.5% or higher - adopted from ADA   consensus guidelines.     Basic metabolic panel  (Ca, Cl, CO2, Creat, Gluc, K, Na, BUN)   Result Value Ref Range    Sodium 137 133 - 144 mmol/L    Potassium 4.1 3.4 - 5.3 mmol/L    Chloride 105 94 - 109 mmol/L    Carbon Dioxide 28 20 - 32 mmol/L    Anion Gap 4 3 - 14 mmol/L    Glucose 111 (H) 70 - 99 mg/dL    Urea Nitrogen 6 (L) 7 - 30 mg/dL    Creatinine 0.59 0.52 - 1.04 mg/dL    GFR Estimate >90 >60 mL/min/[1.73_m2]      Comment:      Non  GFR Calc  Starting 12/18/2018, serum creatinine based estimated GFR (eGFR) will be   calculated using the Chronic Kidney Disease Epidemiology Collaboration   (CKD-EPI) equation.      GFR Estimate If Black >90 >60 mL/min/[1.73_m2]      Comment:       GFR Calc  Starting 12/18/2018, serum creatinine based estimated GFR (eGFR) will be   calculated using the Chronic Kidney Disease Epidemiology Collaboration   (CKD-EPI) equation.      Calcium 9.3 8.5 - 10.1 mg/dL       If you have any questions or concerns, please call the clinic at the number listed above.       Sincerely,      Shawn Lewis MD

## 2019-05-08 ASSESSMENT — ANXIETY QUESTIONNAIRES: GAD7 TOTAL SCORE: 3

## 2019-05-09 NOTE — RESULT ENCOUNTER NOTE
Please inform patient that test result cholesterol was still a bit high but much improved from last year. Patient should keep taking her medication and working on diet and exercise.    Thank you.     Shawn Lewis M.D.

## 2019-05-09 NOTE — RESULT ENCOUNTER NOTE
Please inform patient that test result was within normal parameters.   Thank you.     Shawn Lewis M.D.

## 2019-10-10 ENCOUNTER — OFFICE VISIT (OUTPATIENT)
Dept: FAMILY MEDICINE | Facility: CLINIC | Age: 39
End: 2019-10-10
Payer: COMMERCIAL

## 2019-10-10 VITALS
SYSTOLIC BLOOD PRESSURE: 110 MMHG | DIASTOLIC BLOOD PRESSURE: 62 MMHG | TEMPERATURE: 98.6 F | HEIGHT: 65 IN | BODY MASS INDEX: 29.32 KG/M2 | RESPIRATION RATE: 17 BRPM | WEIGHT: 176 LBS | HEART RATE: 97 BPM | OXYGEN SATURATION: 100 %

## 2019-10-10 DIAGNOSIS — Z23 NEED FOR PROPHYLACTIC VACCINATION AND INOCULATION AGAINST INFLUENZA: ICD-10-CM

## 2019-10-10 DIAGNOSIS — E11.9 TYPE 2 DIABETES MELLITUS WITHOUT COMPLICATION, WITHOUT LONG-TERM CURRENT USE OF INSULIN (H): ICD-10-CM

## 2019-10-10 DIAGNOSIS — E11.9 TYPE 2 DIABETES MELLITUS WITHOUT COMPLICATION, WITHOUT LONG-TERM CURRENT USE OF INSULIN (H): Primary | ICD-10-CM

## 2019-10-10 DIAGNOSIS — Z12.4 SCREENING FOR CERVICAL CANCER: ICD-10-CM

## 2019-10-10 LAB — HBA1C MFR BLD: 5.5 % (ref 0–5.6)

## 2019-10-10 PROCEDURE — G0145 SCR C/V CYTO,THINLAYER,RESCR: HCPCS | Performed by: FAMILY MEDICINE

## 2019-10-10 PROCEDURE — 99207 C FOOT EXAM  NO CHARGE: CPT | Performed by: FAMILY MEDICINE

## 2019-10-10 PROCEDURE — 99214 OFFICE O/P EST MOD 30 MIN: CPT | Mod: 25 | Performed by: FAMILY MEDICINE

## 2019-10-10 PROCEDURE — 90471 IMMUNIZATION ADMIN: CPT | Performed by: FAMILY MEDICINE

## 2019-10-10 PROCEDURE — 83036 HEMOGLOBIN GLYCOSYLATED A1C: CPT | Performed by: FAMILY MEDICINE

## 2019-10-10 PROCEDURE — 87624 HPV HI-RISK TYP POOLED RSLT: CPT | Performed by: FAMILY MEDICINE

## 2019-10-10 PROCEDURE — 36415 COLL VENOUS BLD VENIPUNCTURE: CPT | Performed by: FAMILY MEDICINE

## 2019-10-10 PROCEDURE — 90686 IIV4 VACC NO PRSV 0.5 ML IM: CPT | Performed by: FAMILY MEDICINE

## 2019-10-10 ASSESSMENT — ANXIETY QUESTIONNAIRES
2. NOT BEING ABLE TO STOP OR CONTROL WORRYING: NOT AT ALL
GAD7 TOTAL SCORE: 2
6. BECOMING EASILY ANNOYED OR IRRITABLE: SEVERAL DAYS
1. FEELING NERVOUS, ANXIOUS, OR ON EDGE: SEVERAL DAYS
3. WORRYING TOO MUCH ABOUT DIFFERENT THINGS: NOT AT ALL
5. BEING SO RESTLESS THAT IT IS HARD TO SIT STILL: NOT AT ALL
7. FEELING AFRAID AS IF SOMETHING AWFUL MIGHT HAPPEN: NOT AT ALL

## 2019-10-10 ASSESSMENT — MIFFLIN-ST. JEOR: SCORE: 1470.24

## 2019-10-10 ASSESSMENT — PATIENT HEALTH QUESTIONNAIRE - PHQ9
5. POOR APPETITE OR OVEREATING: NOT AT ALL
SUM OF ALL RESPONSES TO PHQ QUESTIONS 1-9: 0

## 2019-10-10 NOTE — PATIENT INSTRUCTIONS
Thank you for choosing [facility].  You may be receiving an email and/or telephone survey request from [facility] Customer [employer] regarding your visit today.  Please take a few minutes to respond to the survey to let us know how we are doing.      If you have questions or concerns, please contact us via [email or web address] or you can contact your care team at [phone].    Our Clinic hours are:  Monday 6:40 am  to 7:00 pm  Tuesday -Friday 6:40 am to 5:00 pm    The [place] outpatient lab hours are:  Monday - Friday 6:10 am to 4:45 pm  Saturdays 7:00 am to 11:00 am  Appointments are required, call [phone]    If you have clinical questions after hours or would like to schedule an appointment,  call the clinic at [phone].

## 2019-10-10 NOTE — PROGRESS NOTES
Subjective     Tammy Joshi is a 39 year old female who presents to clinic today for the following health issues:39 yr old female here for diabetes recheck. Says she has been working very hard on diet and has lost a lot of weight. Denies any acute illness . Tolerating medications thus far.A1C today is 5.5 .     HPI   Chief Complaint   Patient presents with     Diabetes     Gyn Exam     Patient is due for a pap will do today      Imm/Inj     Flu Shot   patient would like more blood test done   Goal is to get off medication    Diabetes Follow-up      How often are you checking your blood sugar? Not at all    What time of day are you checking your blood sugars (select all that apply)?      Have you had any blood sugars above 200?      Have you had any blood sugars below 70?      What symptoms do you notice when your blood sugar is low?      What concerns do you have today about your diabetes? Other: patient would like to get off her medication     Do you have any of these symptoms? (Select all that apply)  Weight loss     Have you had a diabetic eye exam in the last 12 months? No    BP Readings from Last 2 Encounters:   10/10/19 110/62   05/07/19 110/68     Hemoglobin A1C (%)   Date Value   10/10/2019 5.5   05/07/2019 7.1 (H)     LDL Cholesterol Calculated (mg/dL)   Date Value   05/07/2019 29   03/26/2018     Cannot estimate LDL when triglyceride exceeds 400 mg/dL     LDL Cholesterol Direct (mg/dL)   Date Value   03/26/2018 68       Diabetes Management Resources      How many servings of fruits and vegetables do you eat daily?  4 or more    On average, how many sweetened beverages do you drink each day (soda, juice, sweet tea, etc)?   1 diet pop     How many days per week do you miss taking your medication? 0              Patient Active Problem List   Diagnosis     Hand numbness     CARDIOVASCULAR SCREENING; LDL GOAL LESS THAN 160     Anxiety     Depression     Hyperlipidemia with target LDL less than 100      Type 2 diabetes mellitus without complication (H)     Hip pain, left     Past Surgical History:   Procedure Laterality Date     C HAND/FINGER SURGERY UNLISTED  7th grade    left index     D & C  1998    Missed Ab     LITHOTRIPSY  2011       Social History     Tobacco Use     Smoking status: Never Smoker     Smokeless tobacco: Never Used   Substance Use Topics     Alcohol use: Yes     Comment: wine occasionally, none while pregnant     Family History   Adopted: Yes   Problem Relation Age of Onset     Unknown/Adopted Other         patient was adopted; knows a limited amount of medical history of birth parents         Current Outpatient Medications   Medication Sig Dispense Refill     atorvastatin (LIPITOR) 10 MG tablet Take 1 tablet (10 mg) by mouth daily 90 tablet 3     lisinopril (PRINIVIL/ZESTRIL) 2.5 MG tablet Take 1 tablet (2.5 mg) by mouth daily 90 tablet 3     metFORMIN (GLUCOPHAGE) 1000 MG tablet Take 1 tablet (1,000 mg) by mouth daily (with dinner) 90 tablet 3     blood glucose monitoring (FREESTYLE) lancets Use to test blood sugars 4 times daily or as directed. 1 Box 1     blood glucose monitoring (NO BRAND SPECIFIED) test strip Test blood sugar 4 times per day 120 each 1     CINNAMON PO Take 1 tablet by mouth daily        CRANBERRY EXTRACT PO Take 1 capsule by mouth daily        glipiZIDE (GLUCOTROL) 5 MG tablet TAKE ONE TABLET BY MOUTH  A DAY BEFORE MEALS (Patient not taking: Reported on 10/10/2019) 90 tablet 3     liraglutide (VICTOZA) 18 MG/3ML soln Inject 0.6 mg Subcutaneous daily (Patient not taking: Reported on 4/28/2019) 18 mL 1     No Known Allergies  BP Readings from Last 3 Encounters:   10/10/19 110/62   05/07/19 110/68   04/28/19 113/72    Wt Readings from Last 3 Encounters:   10/10/19 79.8 kg (176 lb)   05/07/19 85.3 kg (188 lb)   04/28/19 83.9 kg (185 lb)                      Reviewed and updated as needed this visit by Provider  Tobacco  Allergies  Meds  Problems  Med Hx  Surg Hx  Fam Hx  "        Review of Systems   ROS COMP: Constitutional, HEENT, cardiovascular, pulmonary, gi and gu systems are negative, except as otherwise noted.      Objective    /62   Pulse 97   Temp 98.6  F (37  C) (Tympanic)   Resp 17   Ht 1.645 m (5' 4.75\")   Wt 79.8 kg (176 lb)   LMP 09/26/2019 (Approximate)   SpO2 100%   BMI 29.51 kg/m    Body mass index is 29.51 kg/m .  Physical Exam   GENERAL: healthy, alert and no distress  EYES: Eyes grossly normal to inspection, PERRL and conjunctivae and sclerae normal  HENT: ear canals and TM's normal, nose and mouth without ulcers or lesions  NECK: no adenopathy, no asymmetry, masses, or scars and thyroid normal to palpation  RESP: lungs clear to auscultation - no rales, rhonchi or wheezes  CV: regular rate and rhythm, normal S1 S2, no S3 or S4, no murmur, click or rub, no peripheral edema and peripheral pulses strong  ABDOMEN: soft, nontender, no hepatosplenomegaly, no masses and bowel sounds normal  MS: no gross musculoskeletal defects noted, no edema  PSYCH: mentation appears normal, affect normal/bright  Diabetic foot exam: normal DP and PT pulses, no trophic changes or ulcerative lesions and normal sensory exam    Diagnostic Test Results:  Results for orders placed or performed in visit on 10/10/19 (from the past 24 hour(s))   **A1C FUTURE 3mo   Result Value Ref Range    Hemoglobin A1C 5.5 0 - 5.6 %           Assessment & Plan     1. Type 2 diabetes mellitus without complication, without long-term current use of insulin (H)  A1C much better at 5.5 , patient wanted to go off all her medication , I cautioned that she should not do that yet. I recommended cutting the 100 mg to half, return in 3-6 months for a recheck of A1c   - FOOT EXAM    2. Need for prophylactic vaccination and inoculation against influenza  - INFLUENZA VACCINE IM > 6 MONTHS VALENT IIV4 [38183]  - Vaccine Administration, Initial [71228]    3. Screening for cervical cancer  .Patient will be " notified of results  - Pap imaged thin layer screen with HPV - recommended age 30 - 65 years (select HPV order below)             FUTURE APPOINTMENTS:       - Follow-up visit in 3-6 months for A1C check    Return in about 3 months (around 1/10/2020) for Lab Work.    Shawn Lewis MD  [facility]

## 2019-10-10 NOTE — LETTER
October 22, 2019    Tammy Hermes Joshi  [facility] [address] [address]    Dear ,  This letter is regarding your recent Pap smear (cervical cancer screening) and Human Papillomavirus (HPV) test.  We are happy to inform you that your Pap smear result is normal. Cervical cancer is closely linked with certain types of HPV. Your results showed no evidence of high-risk HPV.  We recommend you have your next PAP smear and HPV test in 5 years.  You will still need to return to the clinic every year for an annual exam and other preventive tests.  If you have additional questions regarding this result, please call our registered nurse, Hali at [phone].  Sincerely,    Shawn Lewis MD/emery

## 2019-10-11 ASSESSMENT — ANXIETY QUESTIONNAIRES: GAD7 TOTAL SCORE: 2

## 2019-10-16 LAB
COPATH REPORT: NORMAL
PAP: NORMAL

## 2019-10-17 LAB
FINAL DIAGNOSIS: NORMAL
HPV HR 12 DNA CVX QL NAA+PROBE: NEGATIVE
HPV16 DNA SPEC QL NAA+PROBE: NEGATIVE
HPV18 DNA SPEC QL NAA+PROBE: NEGATIVE
SPECIMEN DESCRIPTION: NORMAL
SPECIMEN SOURCE CVX/VAG CYTO: NORMAL

## 2020-01-28 ENCOUNTER — TELEPHONE (OUTPATIENT)
Dept: FAMILY MEDICINE | Facility: CLINIC | Age: 40
End: 2020-01-28

## 2020-01-28 NOTE — TELEPHONE ENCOUNTER
Reason for Call: Request for an order or referral:    Order or referral being requested: Patient has an appointment tomorrow patient would like a A1c done before appointment.  1/29/2020    Date needed: as soon as possible    Has the patient been seen by the PCP for this problem? YES    Phone number Patient can be reached at:  Home number on file [phone] (home)    Best Time:  any    Can we leave a detailed message on this number?  YES    Call taken on 1/28/2020 at 2:18 PM by Danna Magallon

## 2020-01-28 NOTE — TELEPHONE ENCOUNTER
Dr Lewis, anything else you would like to order for her visit tomorrow? Thank you!    Gilda Lawson RN

## 2020-01-29 ENCOUNTER — APPOINTMENT (OUTPATIENT)
Dept: LAB | Facility: CLINIC | Age: 40
End: 2020-01-29
Payer: COMMERCIAL

## 2020-01-29 ENCOUNTER — OFFICE VISIT (OUTPATIENT)
Dept: FAMILY MEDICINE | Facility: CLINIC | Age: 40
End: 2020-01-29
Payer: COMMERCIAL

## 2020-01-29 VITALS
OXYGEN SATURATION: 98 % | DIASTOLIC BLOOD PRESSURE: 80 MMHG | TEMPERATURE: 98.6 F | SYSTOLIC BLOOD PRESSURE: 102 MMHG | HEIGHT: 64 IN | RESPIRATION RATE: 18 BRPM | BODY MASS INDEX: 29.19 KG/M2 | WEIGHT: 171 LBS | HEART RATE: 86 BPM

## 2020-01-29 DIAGNOSIS — E11.9 TYPE 2 DIABETES MELLITUS WITHOUT COMPLICATION, WITHOUT LONG-TERM CURRENT USE OF INSULIN (H): Primary | ICD-10-CM

## 2020-01-29 LAB — HBA1C MFR BLD: 5.3 % (ref 0–5.6)

## 2020-01-29 PROCEDURE — 36415 COLL VENOUS BLD VENIPUNCTURE: CPT | Performed by: FAMILY MEDICINE

## 2020-01-29 PROCEDURE — 99213 OFFICE O/P EST LOW 20 MIN: CPT | Performed by: FAMILY MEDICINE

## 2020-01-29 PROCEDURE — 83036 HEMOGLOBIN GLYCOSYLATED A1C: CPT | Performed by: FAMILY MEDICINE

## 2020-01-29 ASSESSMENT — ANXIETY QUESTIONNAIRES
6. BECOMING EASILY ANNOYED OR IRRITABLE: SEVERAL DAYS
3. WORRYING TOO MUCH ABOUT DIFFERENT THINGS: NOT AT ALL
5. BEING SO RESTLESS THAT IT IS HARD TO SIT STILL: NOT AT ALL
GAD7 TOTAL SCORE: 3
IF YOU CHECKED OFF ANY PROBLEMS ON THIS QUESTIONNAIRE, HOW DIFFICULT HAVE THESE PROBLEMS MADE IT FOR YOU TO DO YOUR WORK, TAKE CARE OF THINGS AT HOME, OR GET ALONG WITH OTHER PEOPLE: NOT DIFFICULT AT ALL
2. NOT BEING ABLE TO STOP OR CONTROL WORRYING: NOT AT ALL
7. FEELING AFRAID AS IF SOMETHING AWFUL MIGHT HAPPEN: NOT AT ALL
1. FEELING NERVOUS, ANXIOUS, OR ON EDGE: SEVERAL DAYS

## 2020-01-29 ASSESSMENT — PATIENT HEALTH QUESTIONNAIRE - PHQ9
SUM OF ALL RESPONSES TO PHQ QUESTIONS 1-9: 4
5. POOR APPETITE OR OVEREATING: SEVERAL DAYS

## 2020-01-29 ASSESSMENT — MIFFLIN-ST. JEOR: SCORE: 1435.65

## 2020-01-29 NOTE — PROGRESS NOTES
Subjective     Tammy Joshi is a 39 year old female who presents to clinic today for the following health issues:     Patient is a 39-year-old female who comes in today for diabetes recheck.  She has been working diligently with diet and weight loss and is she is down to taking 500 mg of metformin daily.  She reports that she switched to a plant-based diet and she has lost tremendous weight.  Her A1c today is 5.3 which is a drop from her last A1c that was done a couple of months ago.  Patient is requesting if she can try lifestyle and diet to control her diabetes she does not want to be on medication anymore.  We discussed this and  said we will give a trial of 3 months to see if she will be able to manage her diabetes  and she will return in 3 months for diabetes check and a cholesterol check at the same time.  She denies any acute symptoms today and appears to be doing well emotionally.    HPI   Diabetes Follow-up      How often are you checking your blood sugar? Not at all    What concerns do you have today about your diabetes? None     Do you have any of these symptoms? (Select all that apply)  No numbness or tingling in feet.  No redness, sores or blisters on feet.  No complaints of excessive thirst.  No reports of blurry vision.  No significant changes to weight.    Have you had a diabetic eye exam in the last 12 months? No        BP Readings from Last 2 Encounters:   01/29/20 102/80   10/10/19 110/62     Hemoglobin A1C (%)   Date Value   01/29/2020 5.3   10/10/2019 5.5     LDL Cholesterol Calculated (mg/dL)   Date Value   05/07/2019 29   03/26/2018     Cannot estimate LDL when triglyceride exceeds 400 mg/dL     LDL Cholesterol Direct (mg/dL)   Date Value   03/26/2018 68                 How many servings of fruits and vegetables do you eat daily?  4 or more    On average, how many sweetened beverages do you drink each day (Examples: soda, juice, sweet tea, etc.  Do NOT count diet or artificially  sweetened beverages)?   0    How many days per week do you exercise enough to make your heart beat faster? 3 or less    How many minutes a day do you exercise enough to make your heart beat faster? 20 - 29    How many days per week do you miss taking your medication? 0    Medication Followup of metformin     Taking Medication as prescribed: yes    Side Effects:  None    Medication Helping Symptoms:  yes         Patient Active Problem List   Diagnosis     Hand numbness     CARDIOVASCULAR SCREENING; LDL GOAL LESS THAN 160     Anxiety     Depression     Hyperlipidemia with target LDL less than 100     Type 2 diabetes mellitus without complication (H)     Hip pain, left     Past Surgical History:   Procedure Laterality Date     C HAND/FINGER SURGERY UNLISTED  7th grade    left index     D & C  1998    Missed Ab     LITHOTRIPSY  2011       Social History     Tobacco Use     Smoking status: Never Smoker     Smokeless tobacco: Never Used   Substance Use Topics     Alcohol use: Yes     Comment: wine occasionally, none while pregnant     Family History   Adopted: Yes   Problem Relation Age of Onset     Unknown/Adopted Other         patient was adopted; knows a limited amount of medical history of birth parents         Current Outpatient Medications   Medication Sig Dispense Refill     atorvastatin (LIPITOR) 10 MG tablet Take 1 tablet (10 mg) by mouth daily 90 tablet 3     lisinopril (PRINIVIL/ZESTRIL) 2.5 MG tablet Take 1 tablet (2.5 mg) by mouth daily 90 tablet 3     metFORMIN (GLUCOPHAGE) 1000 MG tablet Take 1 tablet (1,000 mg) by mouth daily (with dinner) (Patient taking differently: Take 1,000 mg by mouth daily (with dinner) Take 1/2 tablet daily) 90 tablet 3     blood glucose monitoring (FREESTYLE) lancets Use to test blood sugars 4 times daily or as directed. 1 Box 1     blood glucose monitoring (NO BRAND SPECIFIED) test strip Test blood sugar 4 times per day 120 each 1     CINNAMON PO Take 1 tablet by mouth daily         CRANBERRY EXTRACT PO Take 1 capsule by mouth daily        glipiZIDE (GLUCOTROL) 5 MG tablet TAKE ONE TABLET BY MOUTH  A DAY BEFORE MEALS (Patient not taking: Reported on 10/10/2019) 90 tablet 3     liraglutide (VICTOZA) 18 MG/3ML soln Inject 0.6 mg Subcutaneous daily (Patient not taking: Reported on 4/28/2019) 18 mL 1     No Known Allergies  Recent Labs   Lab Test 01/29/20  1308 10/10/19  0841 05/07/19  1230 04/28/19  1024  03/26/18  1141  11/29/17  1355  07/27/17  1412 11/10/16  1108  12/17/15  1029  02/23/14  2300   A1C 5.3 5.5 7.1*  --    < >  --    < >  --    < >  --  7.9*  --   --    < >  --    LDL  --   --  29  --   --  Cannot estimate LDL when triglyceride exceeds 400 mg/dL  68  --   --   --   --   --   --  Cannot estimate LDL when triglyceride exceeds 400 mg/dL   < >  --    HDL  --   --  38*  --   --  35*  --   --   --   --   --   --  33*   < >  --    TRIG  --   --  212*  --   --  405*  --   --   --   --   --   --  513*   < >  --    ALT  --   --   --   --   --   --   --  76*  --  78*  --   --   --   --  98*   CR  --   --  0.59 0.57  --   --   --  0.48*  --  0.44*  --    < >  --    < > 0.56   GFRESTIMATED  --   --  >90 >90  --   --   --  >90  --  >90  Non  GFR Calc    --    < >  --    < > >90   GFRESTBLACK  --   --  >90 >90  --   --   --  >90  --  >90  African American GFR Calc    --    < >  --    < > >90   POTASSIUM  --   --  4.1 3.4  --   --   --  3.9  --  4.2  --    < >  --   --  3.6   TSH  --   --   --   --   --   --   --   --   --  1.17 1.02  --   --   --  1.98    < > = values in this interval not displayed.      BP Readings from Last 3 Encounters:   01/29/20 102/80   10/10/19 110/62   05/07/19 110/68    Wt Readings from Last 3 Encounters:   01/29/20 77.6 kg (171 lb)   10/10/19 79.8 kg (176 lb)   05/07/19 85.3 kg (188 lb)                      Reviewed and updated as needed this visit by Provider  Tobacco  Allergies  Meds  Problems  Med Hx  Surg Hx  Fam Hx         Review of  "Systems   ROS COMP: Constitutional, HEENT, cardiovascular, pulmonary, gi and gu systems are negative, except as otherwise noted.      Objective    /80   Pulse 86   Temp 98.6  F (37  C) (Tympanic)   Resp 18   Ht 1.626 m (5' 4\")   Wt 77.6 kg (171 lb)   SpO2 98%   BMI 29.35 kg/m    Body mass index is 29.35 kg/m .  Physical Exam   GENERAL: healthy, alert and no distress  EYES: Eyes grossly normal to inspection, PERRL and conjunctivae and sclerae normal  HENT: ear canals and TM's normal, nose and mouth without ulcers or lesions  NECK: no adenopathy, no asymmetry, masses, or scars and thyroid normal to palpation  RESP: lungs clear to auscultation - no rales, rhonchi or wheezes  CV: regular rate and rhythm, normal S1 S2, no S3 or S4, no murmur, click or rub, no peripheral edema and peripheral pulses strong  ABDOMEN: soft, nontender, no hepatosplenomegaly, no masses and bowel sounds normal  MS: no gross musculoskeletal defects noted, no edema    Diagnostic Test Results:  Results for orders placed or performed in visit on 01/29/20 (from the past 24 hour(s))   Hemoglobin A1c   Result Value Ref Range    Hemoglobin A1C 5.3 0 - 5.6 %           Assessment & Plan     1. Type 2 diabetes mellitus without complication, without long-term current use of insulin (H)  A1C is 5.3 today. Patient wishes to control her diabetes with diet alone.we will recheck her A1C in three months  - Hemoglobin A1c  - **A1C FUTURE 3mo; Future  - Lipid panel reflex to direct LDL Fasting; Future         FUTURE APPOINTMENTS:       - Follow-up visit in one month or sooner as needed.    Return in about 3 months (around 4/29/2020) for Lab Work.    Shawn Lewis MD  [facility]      "

## 2020-01-30 ASSESSMENT — ANXIETY QUESTIONNAIRES: GAD7 TOTAL SCORE: 3

## 2020-02-27 ENCOUNTER — TELEPHONE (OUTPATIENT)
Dept: FAMILY MEDICINE | Facility: CLINIC | Age: 40
End: 2020-02-27

## 2020-02-27 DIAGNOSIS — E66.3 OVERWEIGHT (BMI 25.0-29.9): Primary | ICD-10-CM

## 2020-02-27 NOTE — TELEPHONE ENCOUNTER
"Reason for Call: Request for an order or referral:    Order or referral being requested: Pt calling to ask for a referral to a plastic surgeon for a \"tummy tuck.\"  She does not have a specific MD in mind and wants to get Dr. Lewis's opinion.  Please call patient and advise.    Date needed: at your convenience    Has the patient been seen by the PCP for this problem? NO    Additional comments:     Phone number Patient can be reached at:  Home number on file [phone] (home)    Best Time:  any    Can we leave a detailed message on this number?  YES    Call taken on 2/27/2020 at 12:35 PM by Liaent Noble    "

## 2020-02-27 NOTE — TELEPHONE ENCOUNTER
Dr. Lewis,    The patient calls wanting to have a tummy tuck.  I have pended a plastic surgery referral for your consideration. Archana MAGALLANES RN

## 2020-03-11 ENCOUNTER — TELEPHONE (OUTPATIENT)
Dept: FAMILY MEDICINE | Facility: CLINIC | Age: 40
End: 2020-03-11

## 2020-06-16 ENCOUNTER — APPOINTMENT (OUTPATIENT)
Dept: CT IMAGING | Facility: CLINIC | Age: 40
End: 2020-06-16
Attending: FAMILY MEDICINE
Payer: COMMERCIAL

## 2020-06-16 ENCOUNTER — HOSPITAL ENCOUNTER (EMERGENCY)
Facility: CLINIC | Age: 40
Discharge: HOME | End: 2020-06-16
Attending: FAMILY MEDICINE | Admitting: FAMILY MEDICINE
Payer: COMMERCIAL

## 2020-06-16 VITALS
BODY MASS INDEX: 25.61 KG/M2 | WEIGHT: 150 LBS | HEART RATE: 104 BPM | DIASTOLIC BLOOD PRESSURE: 66 MMHG | TEMPERATURE: 98.5 F | HEIGHT: 64 IN | SYSTOLIC BLOOD PRESSURE: 121 MMHG | OXYGEN SATURATION: 98 % | RESPIRATION RATE: 16 BRPM

## 2020-06-16 DIAGNOSIS — M54.50 ACUTE RIGHT-SIDED LOW BACK PAIN WITHOUT SCIATICA: ICD-10-CM

## 2020-06-16 LAB
ALBUMIN SERPL-MCNC: 3.5 G/DL (ref 3.4–5)
ALBUMIN UR-MCNC: NEGATIVE MG/DL
ALP SERPL-CCNC: 55 U/L (ref 40–150)
ALT SERPL W P-5'-P-CCNC: 21 U/L (ref 0–50)
ANION GAP SERPL CALCULATED.3IONS-SCNC: 9 MMOL/L (ref 3–14)
APPEARANCE UR: CLEAR
AST SERPL W P-5'-P-CCNC: 26 U/L (ref 0–45)
BASOPHILS # BLD AUTO: 0.1 10E9/L (ref 0–0.2)
BASOPHILS NFR BLD AUTO: 1 %
BILIRUB SERPL-MCNC: 1.1 MG/DL (ref 0.2–1.3)
BILIRUB UR QL STRIP: NEGATIVE
BUN SERPL-MCNC: 5 MG/DL (ref 7–30)
CALCIUM SERPL-MCNC: 8.7 MG/DL (ref 8.5–10.1)
CHLORIDE SERPL-SCNC: 107 MMOL/L (ref 94–109)
CO2 SERPL-SCNC: 26 MMOL/L (ref 20–32)
COLOR UR AUTO: ABNORMAL
CREAT SERPL-MCNC: 0.59 MG/DL (ref 0.52–1.04)
CRP SERPL-MCNC: <2.9 MG/L (ref 0–8)
DIFFERENTIAL METHOD BLD: NORMAL
EOSINOPHIL # BLD AUTO: 0 10E9/L (ref 0–0.7)
EOSINOPHIL NFR BLD AUTO: 0.6 %
ERYTHROCYTE [DISTWIDTH] IN BLOOD BY AUTOMATED COUNT: 13.1 % (ref 10–15)
GFR SERPL CREATININE-BSD FRML MDRD: >90 ML/MIN/{1.73_M2}
GLUCOSE SERPL-MCNC: 93 MG/DL (ref 70–99)
GLUCOSE UR STRIP-MCNC: NEGATIVE MG/DL
HCG SERPL QL: NEGATIVE
HCT VFR BLD AUTO: 45.3 % (ref 35–47)
HGB BLD-MCNC: 15.6 G/DL (ref 11.7–15.7)
HGB UR QL STRIP: NEGATIVE
IMM GRANULOCYTES # BLD: 0 10E9/L (ref 0–0.4)
IMM GRANULOCYTES NFR BLD: 0.2 %
KETONES UR STRIP-MCNC: 40 MG/DL
LEUKOCYTE ESTERASE UR QL STRIP: NEGATIVE
LIPASE SERPL-CCNC: 123 U/L (ref 73–393)
LYMPHOCYTES # BLD AUTO: 1.6 10E9/L (ref 0.8–5.3)
LYMPHOCYTES NFR BLD AUTO: 25.2 %
MCH RBC QN AUTO: 32 PG (ref 26.5–33)
MCHC RBC AUTO-ENTMCNC: 34.4 G/DL (ref 31.5–36.5)
MCV RBC AUTO: 93 FL (ref 78–100)
MONOCYTES # BLD AUTO: 0.3 10E9/L (ref 0–1.3)
MONOCYTES NFR BLD AUTO: 5.5 %
NEUTROPHILS # BLD AUTO: 4.2 10E9/L (ref 1.6–8.3)
NEUTROPHILS NFR BLD AUTO: 67.5 %
NITRATE UR QL: NEGATIVE
NRBC # BLD AUTO: 0 10*3/UL
NRBC BLD AUTO-RTO: 0 /100
PH UR STRIP: 6 PH (ref 5–7)
PLATELET # BLD AUTO: 226 10E9/L (ref 150–450)
POTASSIUM SERPL-SCNC: 3.7 MMOL/L (ref 3.4–5.3)
PROT SERPL-MCNC: 7.5 G/DL (ref 6.8–8.8)
RBC # BLD AUTO: 4.88 10E12/L (ref 3.8–5.2)
RBC #/AREA URNS AUTO: 0 /HPF (ref 0–2)
SODIUM SERPL-SCNC: 142 MMOL/L (ref 133–144)
SOURCE: ABNORMAL
SP GR UR STRIP: 1.02 (ref 1–1.03)
SQUAMOUS #/AREA URNS AUTO: <1 /HPF (ref 0–1)
UROBILINOGEN UR STRIP-MCNC: NORMAL MG/DL (ref 0–2)
WBC # BLD AUTO: 6.2 10E9/L (ref 4–11)
WBC #/AREA URNS AUTO: 0 /HPF (ref 0–5)

## 2020-06-16 PROCEDURE — 86140 C-REACTIVE PROTEIN: CPT | Performed by: FAMILY MEDICINE

## 2020-06-16 PROCEDURE — 96374 THER/PROPH/DIAG INJ IV PUSH: CPT | Performed by: FAMILY MEDICINE

## 2020-06-16 PROCEDURE — 25000128 H RX IP 250 OP 636: Performed by: FAMILY MEDICINE

## 2020-06-16 PROCEDURE — 99285 EMERGENCY DEPT VISIT HI MDM: CPT | Mod: 25 | Performed by: FAMILY MEDICINE

## 2020-06-16 PROCEDURE — 96361 HYDRATE IV INFUSION ADD-ON: CPT | Performed by: FAMILY MEDICINE

## 2020-06-16 PROCEDURE — 25800030 ZZH RX IP 258 OP 636: Performed by: FAMILY MEDICINE

## 2020-06-16 PROCEDURE — 81001 URINALYSIS AUTO W/SCOPE: CPT | Performed by: FAMILY MEDICINE

## 2020-06-16 PROCEDURE — 99284 EMERGENCY DEPT VISIT MOD MDM: CPT | Mod: Z6 | Performed by: FAMILY MEDICINE

## 2020-06-16 PROCEDURE — 83690 ASSAY OF LIPASE: CPT | Performed by: FAMILY MEDICINE

## 2020-06-16 PROCEDURE — 80053 COMPREHEN METABOLIC PANEL: CPT | Performed by: FAMILY MEDICINE

## 2020-06-16 PROCEDURE — 84703 CHORIONIC GONADOTROPIN ASSAY: CPT | Performed by: FAMILY MEDICINE

## 2020-06-16 PROCEDURE — 85025 COMPLETE CBC W/AUTO DIFF WBC: CPT | Performed by: FAMILY MEDICINE

## 2020-06-16 PROCEDURE — 74176 CT ABD & PELVIS W/O CONTRAST: CPT

## 2020-06-16 RX ORDER — KETOROLAC TROMETHAMINE 15 MG/ML
10 INJECTION, SOLUTION INTRAMUSCULAR; INTRAVENOUS ONCE
Status: COMPLETED | OUTPATIENT
Start: 2020-06-16 | End: 2020-06-16

## 2020-06-16 RX ORDER — SODIUM CHLORIDE, SODIUM LACTATE, POTASSIUM CHLORIDE, CALCIUM CHLORIDE 600; 310; 30; 20 MG/100ML; MG/100ML; MG/100ML; MG/100ML
1000 INJECTION, SOLUTION INTRAVENOUS CONTINUOUS
Status: DISCONTINUED | OUTPATIENT
Start: 2020-06-16 | End: 2020-06-16 | Stop reason: HOSPADM

## 2020-06-16 RX ORDER — IOPAMIDOL 755 MG/ML
74 INJECTION, SOLUTION INTRAVASCULAR ONCE
Status: DISCONTINUED | OUTPATIENT
Start: 2020-06-16 | End: 2020-06-16 | Stop reason: CLARIF

## 2020-06-16 RX ORDER — HYDROCODONE BITARTRATE AND ACETAMINOPHEN 5; 325 MG/1; MG/1
1 TABLET ORAL EVERY 6 HOURS PRN
Qty: 10 TABLET | Refills: 0 | Status: SHIPPED | OUTPATIENT
Start: 2020-06-16 | End: 2020-06-19

## 2020-06-16 RX ADMIN — KETOROLAC TROMETHAMINE 10 MG: 15 INJECTION, SOLUTION INTRAMUSCULAR; INTRAVENOUS at 13:18

## 2020-06-16 RX ADMIN — SODIUM CHLORIDE, POTASSIUM CHLORIDE, SODIUM LACTATE AND CALCIUM CHLORIDE 1000 ML: 600; 310; 30; 20 INJECTION, SOLUTION INTRAVENOUS at 12:42

## 2020-06-16 ASSESSMENT — MIFFLIN-ST. JEOR: SCORE: 1335.4

## 2020-06-16 NOTE — DISCHARGE INSTRUCTIONS
Back rest, alternating ice and warm packing over the next 2 to 3 days.  Tylenol/ibuprofen as needed for pain.  You may use the prescribed Vicodin for breakthrough pain if needed.  If not improving over the course the next 2 to 3 days please follow-up in clinic with your primary care provider.  If worse or changes please return to the emergency department.

## 2020-06-16 NOTE — ED NOTES
"Bilateral flank pain for 5 days, pain increased last evening. Noted blood in urine this am. Denies other urinary issues.  Feels \" yucky\". no fever. denies nausea or vomiting. No diarrhea. LBM today. Hx of kidney stones. Needed surgery, not able to pass stone  "

## 2020-06-16 NOTE — ED PROVIDER NOTES
History     Chief Complaint   Patient presents with     Back Pain     Hematuria     HPI  Tammy Joshi is a 40 year old female, past medical history is significant for type 2 diabetes, hyperlipidemia, depression, anxiety, presents to the emergency department with concerns of back pain and blood in her urine.  History is obtained from the patient states she began 5 days ago with right flank pain/low back pain that feels reminiscent of urolithiasis based upon previous experience by her account.  He denied any urinary tract symptoms until this morning when she awoke and noted that when she voids it appeared bloody.  This is cleared up and her urine appears nonbloody grossly to her just prior to ED arrival.  She notes no frequency, urgency, dysuria or sure prior to this morning.  She has anorexia x5 days.  No vomiting.  No nausea.  When asked about a possible musculoskeletal etiology the patient is adamant that she can recall nothing that she has done recently in terms of lifting straining bending or otherwise injury.  She does not think she might have injured herself.  She does notice that the pain is worse with ambulation bending or stretching, transitioning from one position to another.        Allergies:  No Known Allergies    Problem List:    Patient Active Problem List    Diagnosis Date Noted     Hip pain, left 09/08/2017     Priority: Medium     Type 2 diabetes mellitus without complication (H) 10/25/2015     Priority: Medium     Hyperlipidemia with target LDL less than 100 08/06/2015     Priority: Medium     Diagnosis updated by automated process. Provider to review and confirm.       Depression 02/24/2014     Priority: Medium     Anxiety 07/21/2011     Priority: Medium     CARDIOVASCULAR SCREENING; LDL GOAL LESS THAN 160 10/31/2010     Priority: Medium     Hand numbness 10/14/2009     Priority: Medium        Past Medical History:    Past Medical History:   Diagnosis Date     Absence of menstruation       "Female infertility of unspecified origin      MILD/NOS PREECLAMP-ANTEP 8/29/2005     Polycystic ovaries      PPH (postpartum hemorrhage) 11/2009       Past Surgical History:    Past Surgical History:   Procedure Laterality Date     C HAND/FINGER SURGERY UNLISTED  7th grade    left index     D & C  1998    Missed Ab     LITHOTRIPSY  2011       Family History:    Family History   Adopted: Yes   Problem Relation Age of Onset     Unknown/Adopted Other         patient was adopted; knows a limited amount of medical history of birth parents       Social History:  Marital Status:   [2]  Social History     Tobacco Use     Smoking status: Never Smoker     Smokeless tobacco: Never Used   Substance Use Topics     Alcohol use: Yes     Comment: wine occasionally, none while pregnant     Drug use: No        Medications:    atorvastatin (LIPITOR) 10 MG tablet  blood glucose monitoring (FREESTYLE) lancets  blood glucose monitoring (NO BRAND SPECIFIED) test strip  CINNAMON PO  CRANBERRY EXTRACT PO  glipiZIDE (GLUCOTROL) 5 MG tablet  liraglutide (VICTOZA) 18 MG/3ML soln  lisinopril (PRINIVIL/ZESTRIL) 2.5 MG tablet  metFORMIN (GLUCOPHAGE) 1000 MG tablet          Review of Systems   All other systems reviewed and are negative.      Physical Exam   BP: 121/66  Pulse: 104  Temp: 98.5  F (36.9  C)  Resp: 16  Height: 162.6 cm (5' 4\")  Weight: 68 kg (150 lb)  SpO2: 98 %      Physical Exam  Vitals signs and nursing note reviewed.   Constitutional:       Appearance: Normal appearance. She is normal weight.      Comments: Uncomfortable due to pain.   HENT:      Head: Normocephalic and atraumatic.      Right Ear: Tympanic membrane normal.      Left Ear: Tympanic membrane normal.      Nose: Nose normal.      Mouth/Throat:      Mouth: Mucous membranes are dry.      Pharynx: Oropharynx is clear.   Eyes:      Extraocular Movements: Extraocular movements intact.      Conjunctiva/sclera: Conjunctivae normal.      Pupils: Pupils are equal, " round, and reactive to light.   Neck:      Musculoskeletal: Normal range of motion and neck supple.   Cardiovascular:      Rate and Rhythm: Normal rate and regular rhythm.      Pulses: Normal pulses.      Heart sounds: Normal heart sounds.   Pulmonary:      Effort: Pulmonary effort is normal.      Breath sounds: Normal breath sounds.   Abdominal:      General: Bowel sounds are normal.      Palpations: Abdomen is soft.   Musculoskeletal:        Back:    Neurological:      Mental Status: She is alert.         ED Course        Procedures               Critical Care time:  none               Results for orders placed or performed during the hospital encounter of 06/16/20 (from the past 24 hour(s))   CBC with platelets differential   Result Value Ref Range    WBC 6.2 4.0 - 11.0 10e9/L    RBC Count 4.88 3.8 - 5.2 10e12/L    Hemoglobin 15.6 11.7 - 15.7 g/dL    Hematocrit 45.3 35.0 - 47.0 %    MCV 93 78 - 100 fl    MCH 32.0 26.5 - 33.0 pg    MCHC 34.4 31.5 - 36.5 g/dL    RDW 13.1 10.0 - 15.0 %    Platelet Count 226 150 - 450 10e9/L    Diff Method Automated Method     % Neutrophils 67.5 %    % Lymphocytes 25.2 %    % Monocytes 5.5 %    % Eosinophils 0.6 %    % Basophils 1.0 %    % Immature Granulocytes 0.2 %    Nucleated RBCs 0 0 /100    Absolute Neutrophil 4.2 1.6 - 8.3 10e9/L    Absolute Lymphocytes 1.6 0.8 - 5.3 10e9/L    Absolute Monocytes 0.3 0.0 - 1.3 10e9/L    Absolute Eosinophils 0.0 0.0 - 0.7 10e9/L    Absolute Basophils 0.1 0.0 - 0.2 10e9/L    Abs Immature Granulocytes 0.0 0 - 0.4 10e9/L    Absolute Nucleated RBC 0.0    CRP inflammation   Result Value Ref Range    CRP Inflammation <2.9 0.0 - 8.0 mg/L   Comprehensive metabolic panel   Result Value Ref Range    Sodium 142 133 - 144 mmol/L    Potassium 3.7 3.4 - 5.3 mmol/L    Chloride 107 94 - 109 mmol/L    Carbon Dioxide 26 20 - 32 mmol/L    Anion Gap 9 3 - 14 mmol/L    Glucose 93 70 - 99 mg/dL    Urea Nitrogen 5 (L) 7 - 30 mg/dL    Creatinine 0.59 0.52 - 1.04 mg/dL     GFR Estimate >90 >60 mL/min/[1.73_m2]    GFR Estimate If Black >90 >60 mL/min/[1.73_m2]    Calcium 8.7 8.5 - 10.1 mg/dL    Bilirubin Total 1.1 0.2 - 1.3 mg/dL    Albumin 3.5 3.4 - 5.0 g/dL    Protein Total 7.5 6.8 - 8.8 g/dL    Alkaline Phosphatase 55 40 - 150 U/L    ALT 21 0 - 50 U/L    AST 26 0 - 45 U/L   Lipase   Result Value Ref Range    Lipase 123 73 - 393 U/L   HCG qualitative Blood   Result Value Ref Range    HCG Qualitative Serum Negative NEG^Negative   UA with Microscopic reflex to Culture    Specimen: Midstream Urine   Result Value Ref Range    Color Urine Straw     Appearance Urine Clear     Glucose Urine Negative NEG^Negative mg/dL    Bilirubin Urine Negative NEG^Negative    Ketones Urine 40 (A) NEG^Negative mg/dL    Specific Gravity Urine 1.025 1.003 - 1.035    Blood Urine Negative NEG^Negative    pH Urine 6.0 5.0 - 7.0 pH    Protein Albumin Urine Negative NEG^Negative mg/dL    Urobilinogen mg/dL Normal 0.0 - 2.0 mg/dL    Nitrite Urine Negative NEG^Negative    Leukocyte Esterase Urine Negative NEG^Negative    Source Midstream Urine     WBC Urine 0 0 - 5 /HPF    RBC Urine 0 0 - 2 /HPF    Squamous Epithelial /HPF Urine <1 0 - 1 /HPF   Abd/pelvis CT - no contrast - Stone Protocol    Addendum: 6/16/2020    MAXIM MIR  Accession # [id number]    The original report on this patient was dictated by Darnell.      CT ABDOMEN/PELVIS W/O CONTRAST 6/16/2020 1:35 PM    CLINICAL HISTORY: Right flank pain, history of urolithiasis  TECHNIQUE: CT scan of the abdomen and pelvis was performed without IV  contrast. Multiplanar reformats were obtained. Dose reduction  techniques were used.  CONTRAST: None.    COMPARISON: CT abdomen and pelvis 4/28/2019    FINDINGS:   LOWER CHEST: Normal.    HEPATOBILIARY: Normal noncontrast appearance of the liver. No  calcified gallstones.    PANCREAS: No acute peripancreatic inflammatory changes.    SPLEEN: Normal.    ADRENAL GLANDS: Normal.    KIDNEYS/BLADDER: Normal noncontrast  appearance of the kidneys without  intrarenal calculi. No hydronephrosis. Although not well distended,  there is possible bladder wall thickening near the dome with mild  perivesical fat stranding.    BOWEL: Normal with no obstruction or acute inflammatory change.  Nothing for appendicitis.    LYMPH NODES: No abdominopelvic lymphadenopathy.    VASCULATURE: Unremarkable.    PELVIC ORGANS: 2.9 cm left adnexal cyst.    OTHER: No significant free fluid in the pelvis.    MUSCULOSKELETAL: Normal.    IMPRESSION:   1.  Suggestion of mild bladder wall thickening with possible  perivesical inflammation. Findings could represent cystitis in the  appropriate clinical setting. Recommend correlation with urinalysis.  Otherwise, no acute abnormalities in the abdomen or pelvis.  2.  2.9 cm left ovarian cyst. No specific imaging follow-up is needed.    REFERENCE:  Guidelines for incidental benign or probably benign adnexal cyst on  CT/MRI.  Managing Incidental Findings on Abdominal and Pelvic CT and MRI, Part  1: White Paper of the ACR Incidental Findings Committee II on Adnexal  Findings. J Am Sharri Radiol 2013;10:675-681.    Premenopausal:  Less than 5 cm: Benign, no follow up.    EMILIANA COSTA MD (Date of Addendum: 6/16/2020 1:51 PM)      EMILIANA COSTA MD      Narrative    CT ABDOMEN PELVIS W/O CONTRAST 6/16/2020 1:35 PM    CLINICAL HISTORY: Right flank pain, history of urolithiasis  TECHNIQUE: CT scan of the abdomen and pelvis was performed without IV  contrast. Multiplanar reformats were obtained. Dose reduction  techniques were used.  CONTRAST: None.    COMPARISON: CT abdomen and pelvis 4/28/2019    FINDINGS:   LOWER CHEST: Normal.    HEPATOBILIARY: Normal noncontrast appearance of the liver. No  calcified gallstones.    PANCREAS: No acute peripancreatic inflammatory changes.    SPLEEN: Normal.    ADRENAL GLANDS: Normal.    KIDNEYS/BLADDER: Normal noncontrast appearance of the kidneys without  intrarenal calculi. No  hydronephrosis. Although not well distended,  there is possible bladder wall thickening near the dome with mild  perivesical fat stranding.    BOWEL: Normal with no obstruction or acute inflammatory change.  Nothing for appendicitis.    LYMPH NODES: No abdominopelvic lymphadenopathy.    VASCULATURE: Unremarkable.    PELVIC ORGANS: 2.9 cm left adnexal cyst.    OTHER: No significant free fluid in the pelvis.    MUSCULOSKELETAL: Normal.      Impression    IMPRESSION:   1.  No acute abnormalities in the abdomen or pelvis. Specifically, no  urinary calculi. Normal appendix.  2.  2.9 cm left ovarian cyst. No specific imaging follow-up is needed.    REFERENCE:  Guidelines for incidental benign or probably benign adnexal cyst on  CT/MRI.  Managing Incidental Findings on Abdominal and Pelvic CT and MRI, Part  1: White Paper of the ACR Incidental Findings Committee II on Adnexal  Findings. J Am Sharri Radiol 2013;10:675-681.    Premenopausal:  Less than 5 cm: Benign, no follow up.      EMILIANA COSTA MD       Medications   lactated ringers BOLUS 1,000 mL (1,000 mLs Intravenous New Bag 6/16/20 1242)     Followed by   lactated ringers infusion (has no administration in time range)   ketorolac (TORADOL) injection 10 mg (10 mg Intravenous Given 6/16/20 1318)       Assessments & Plan (with Medical Decision Making)   40-year-old female past medical history reviewed as above who presents to the emergency department with concerns of back pain and an episode of hematuria this morning as discussed in the HPI.  Tenderness to palpation in the right low back area not the CVA area as diagrammed on physical exam.  Also some back mobility limitation as noted in the physical exam.  Patient insistent that she has done nothing to irritate or traumatize her back so low suspicion of musculoskeletal etiology.  This felt similar to kidney stones for her and so we obtained a CT of the abdomen primarily for rule out of urolithiasis.  Her CT is  resulted and reviewed as above without acute findings.  Radiology comments on the appearance of her bladder possibly be consistent with cystitis however she has no urinary tract symptoms other than hematuria and her urinalysis at the present time is completely negative.  Her CBC and CMP are also negative and she is not pregnant on testing today.  I suspect that the reason for her presentation today is related to musculoskeletal etiology for her low back pain.  I shared this with her.  She remains unconvinced however will dispositioned home with recommendations for symptomatic supportive care and to return if not improving or worse.    Disclaimer: This note consists of symbols derived from keyboarding, dictation and/or voice recognition software. As a result, there may be errors in the script that have gone undetected. Please consider this when interpreting information found in this chart.      I have reviewed the nursing notes.    I have reviewed the findings, diagnosis, plan and need for follow up with the patient.       New Prescriptions    No medications on file       Final diagnoses:   Acute right-sided low back pain without sciatica       6/16/2020   [facility] EMERGENCY DEPARTMENT     Martínez Robert MD  06/16/20 [id number]

## 2020-06-16 NOTE — ED AVS SNAPSHOT
[facility] Emergency Department  [address] [address]  Phone:  [phone]  Fax:  [phone]                                    Tammy Joshi   MRN: [id number]    Department:  [facility] Emergency Department   Date of Visit:  6/16/2020           After Visit Summary Signature Page    I have received my discharge instructions, and my questions have been answered. I have discussed any challenges I see with this plan with the nurse or doctor.    ..........................................................................................................................................  Patient/Patient Representative Signature      ..........................................................................................................................................  Patient Representative Print Name and Relationship to Patient    ..................................................               ................................................  Date                                   Time    ..........................................................................................................................................  Reviewed by Signature/Title    ...................................................              ..............................................  Date                                               Time          22EPIC Rev 08/18

## 2020-07-20 ENCOUNTER — OFFICE VISIT (OUTPATIENT)
Dept: FAMILY MEDICINE | Facility: CLINIC | Age: 40
End: 2020-07-20
Payer: COMMERCIAL

## 2020-07-20 VITALS
SYSTOLIC BLOOD PRESSURE: 120 MMHG | BODY MASS INDEX: 28.17 KG/M2 | DIASTOLIC BLOOD PRESSURE: 80 MMHG | HEIGHT: 64 IN | HEART RATE: 84 BPM | WEIGHT: 165 LBS

## 2020-07-20 DIAGNOSIS — Z01.818 PREOP GENERAL PHYSICAL EXAM: Primary | ICD-10-CM

## 2020-07-20 DIAGNOSIS — E66.9 OBESITY WITHOUT SERIOUS COMORBIDITY, UNSPECIFIED CLASSIFICATION, UNSPECIFIED OBESITY TYPE: ICD-10-CM

## 2020-07-20 LAB
BASOPHILS # BLD AUTO: 0.1 10E9/L (ref 0–0.2)
BASOPHILS NFR BLD AUTO: 0.8 %
DIFFERENTIAL METHOD BLD: NORMAL
EOSINOPHIL # BLD AUTO: 0.1 10E9/L (ref 0–0.7)
EOSINOPHIL NFR BLD AUTO: 2.1 %
ERYTHROCYTE [DISTWIDTH] IN BLOOD BY AUTOMATED COUNT: 12.9 % (ref 10–15)
HBA1C MFR BLD: 5.3 % (ref 0–5.6)
HCT VFR BLD AUTO: 43.2 % (ref 35–47)
HGB BLD-MCNC: 15 G/DL (ref 11.7–15.7)
LYMPHOCYTES # BLD AUTO: 2.1 10E9/L (ref 0.8–5.3)
LYMPHOCYTES NFR BLD AUTO: 31.8 %
MCH RBC QN AUTO: 32.1 PG (ref 26.5–33)
MCHC RBC AUTO-ENTMCNC: 34.7 G/DL (ref 31.5–36.5)
MCV RBC AUTO: 92 FL (ref 78–100)
MONOCYTES # BLD AUTO: 0.5 10E9/L (ref 0–1.3)
MONOCYTES NFR BLD AUTO: 7.3 %
NEUTROPHILS # BLD AUTO: 3.8 10E9/L (ref 1.6–8.3)
NEUTROPHILS NFR BLD AUTO: 58 %
PLATELET # BLD AUTO: 189 10E9/L (ref 150–450)
RBC # BLD AUTO: 4.68 10E12/L (ref 3.8–5.2)
WBC # BLD AUTO: 6.6 10E9/L (ref 4–11)

## 2020-07-20 PROCEDURE — 85025 COMPLETE CBC W/AUTO DIFF WBC: CPT | Performed by: FAMILY MEDICINE

## 2020-07-20 PROCEDURE — 93000 ELECTROCARDIOGRAM COMPLETE: CPT | Performed by: FAMILY MEDICINE

## 2020-07-20 PROCEDURE — 36415 COLL VENOUS BLD VENIPUNCTURE: CPT | Performed by: FAMILY MEDICINE

## 2020-07-20 PROCEDURE — 99214 OFFICE O/P EST MOD 30 MIN: CPT | Performed by: FAMILY MEDICINE

## 2020-07-20 PROCEDURE — 83036 HEMOGLOBIN GLYCOSYLATED A1C: CPT | Performed by: FAMILY MEDICINE

## 2020-07-20 ASSESSMENT — MIFFLIN-ST. JEOR: SCORE: 1403.44

## 2020-07-20 NOTE — PROGRESS NOTES
[facility]  [address] [address]  [phone]  Dept: [phone]    PRE-OP EVALUATION:  Today's date: 2020    Tammy Josih (: 1980) presents for pre-operative evaluation assessment as requested by Dr. Espinal.  She requires evaluation and anesthesia risk assessment prior to undergoing surgery/procedure for treatment of liposuction, muscle repair and tummy tuck.    Proposed Surgery/ Procedure: Abdominal muscle repair, tummy tuck   Date of Surgery/ Procedure: 2020  Time of Surgery/ Procedure: 7:30am   Hospital/Surgical Facility: [facility]   Surgery Fax Number: [phone]  Primary Physician: Shawn Lewis  Type of Anesthesia Anticipated: General    Preoperative Questionnaire:   No - Have you ever had a heart attack or stroke?  No - Have you ever had surgery on your heart or blood vessels, such as a stent, coronary (heart) bypass, or surgery on an artery in the head, neck, heart, or legs?  No - Do you have chest pain when you are physically active?  No - Do you have a history of heart failure?  No - Do you currently have a cold, bronchitis, or symptoms of other respiratory (head and chest) infections?  No - Do you have a cough, shortness of breath, or wheezing?  No - Do you or anyone in your family have a history of blood clots?  No - Do you or anyone in your family have a serious bleeding problem, such as long-lasting bleeding after surgeries or cuts?  No - Have you ever had anemia or been told to take iron pills?  No - Have you had any abnormal blood loss such as black, tarry or bloody stools, or abnormal vaginal bleeding?  No - Have you ever had a blood transfusion?  Yes - Are you willing to have a blood transfusion if it is medically needed before, during, or after your surgery?  No - Have you or anyone in your family ever had problems with anesthesia (sedation for surgery)?  No - Do you have sleep apnea, excessive snoring,  or daytime drowsiness?   No - Do you have any artifical heart valves or other implanted medical devices, such as a pacemaker, defibrillator, or continuous glucose monitor?  No - Do you have any artifical joints?  No - Are you allergic to latex?  No - Is there any chance that you may be pregnant?    Patient has a Health Care Directive or Living Will:  NO    HPI:     HPI related to upcoming procedure: Patient is a 40-year-old female who comes in today for a preop evaluation for liposuction and muscle repair and tummy tuck.      She reports no acute symptoms today.  She has type 2 diabetes but has managed her diabetes very well to the point where she does not take any medications for it.  Her A1c today is 5.3.      On her own she stopped taking her medications a few months ago and has effectively managed her diabetes without any medications.  She denies any acute symptoms today.  She denies any history of blood clots.      See problem list for active medical problems.  Problems all longstanding and stable, except as noted/documented.  See ROS for pertinent symptoms related to these conditions.      MEDICAL HISTORY:     Patient Active Problem List    Diagnosis Date Noted     Hip pain, left 09/08/2017     Priority: Medium     Type 2 diabetes mellitus without complication (H) 10/25/2015     Priority: Medium     Hyperlipidemia with target LDL less than 100 08/06/2015     Priority: Medium     Diagnosis updated by automated process. Provider to review and confirm.       Depression 02/24/2014     Priority: Medium     Anxiety 07/21/2011     Priority: Medium     CARDIOVASCULAR SCREENING; LDL GOAL LESS THAN 160 10/31/2010     Priority: Medium     Hand numbness 10/14/2009     Priority: Medium      Past Medical History:   Diagnosis Date     Absence of menstruation      Female infertility of unspecified origin      MILD/NOS PREECLAMP-ANTEP 8/29/2005     Polycystic ovaries      PPH (postpartum hemorrhage) 11/2009     Past Surgical  History:   Procedure Laterality Date     C HAND/FINGER SURGERY UNLISTED  7th grade    left index     D & C  1998    Missed Ab     LITHOTRIPSY  2011     Current Outpatient Medications   Medication Sig Dispense Refill     atorvastatin (LIPITOR) 10 MG tablet Take 1 tablet (10 mg) by mouth daily 90 tablet 3     blood glucose monitoring (FREESTYLE) lancets Use to test blood sugars 4 times daily or as directed. 1 Box 1     blood glucose monitoring (NO BRAND SPECIFIED) test strip Test blood sugar 4 times per day 120 each 1     CINNAMON PO Take 1 tablet by mouth daily        CRANBERRY EXTRACT PO Take 1 capsule by mouth daily        glipiZIDE (GLUCOTROL) 5 MG tablet TAKE ONE TABLET BY MOUTH  A DAY BEFORE MEALS 90 tablet 3     liraglutide (VICTOZA) 18 MG/3ML soln Inject 0.6 mg Subcutaneous daily 18 mL 1     lisinopril (PRINIVIL/ZESTRIL) 2.5 MG tablet Take 1 tablet (2.5 mg) by mouth daily 90 tablet 3     metFORMIN (GLUCOPHAGE) 1000 MG tablet Take 1 tablet (1,000 mg) by mouth daily (with dinner) (Patient taking differently: Take 1,000 mg by mouth daily (with dinner) Take 1/2 tablet daily) 90 tablet 3     OTC products: None, except as noted above    No Known Allergies   Latex Allergy: NO    Social History     Tobacco Use     Smoking status: Never Smoker     Smokeless tobacco: Never Used   Substance Use Topics     Alcohol use: Yes     Comment: wine occasionally, none while pregnant     History   Drug Use No       REVIEW OF SYSTEMS:   CONSTITUTIONAL: NEGATIVE for fever, chills, change in weight  INTEGUMENTARY/SKIN: NEGATIVE for worrisome rashes, moles or lesions  ENT/MOUTH: NEGATIVE for ear, mouth and throat problems  RESP: NEGATIVE for significant cough or SOB  CV: NEGATIVE for chest pain, palpitations or peripheral edema  GI: NEGATIVE for nausea, abdominal pain, heartburn, or change in bowel habits  : normal menstrual cycles  MUSCULOSKELETAL: NEGATIVE for significant arthralgias or myalgia  NEURO: NEGATIVE for weakness,  "dizziness or paresthesias  ENDOCRINE: NEGATIVE for temperature intolerance, skin/hair changes  HEME/ALLERGY/IMMUNE: NEGATIVE for bleeding problems    EXAM:   /80   Pulse 84   Ht 1.626 m (5' 4\")   Wt 74.8 kg (165 lb)   BMI 28.32 kg/m      GENERAL APPEARANCE: healthy, alert and no distress     EYES: EOMI, PERRL     HENT: ear canals and TM's normal and nose and mouth without ulcers or lesions     NECK: no adenopathy, no asymmetry, masses, or scars and thyroid normal to palpation     RESP: lungs clear to auscultation - no rales, rhonchi or wheezes     CV: regular rates and rhythm, normal S1 S2, no S3 or S4 and no murmur, click or rub     ABDOMEN:  soft, nontender, no HSM or masses and bowel sounds normal     MS: extremities normal- no gross deformities noted, no evidence of inflammation in joints, FROM in all extremities.     SKIN: no suspicious lesions or rashes     PSYCH: mentation appears normal. and affect normal/bright     LYMPHATICS: No cervical adenopathy    DIAGNOSTICS:   EKG: appears normal, NSR, normal axis, normal intervals, no acute ST/T changes c/w ischemia, no LVH by voltage criteria, unchanged from previous tracings    Recent Labs   Lab Test 06/16/20  1235 01/29/20  1308 10/10/19  0841 05/07/19  1230 04/28/19  1024   HGB 15.6  --   --   --  14.9     --   --   --  208     --   --  137 140   POTASSIUM 3.7  --   --  4.1 3.4   CR 0.59  --   --  0.59 0.57   A1C  --  5.3 5.5 7.1*  --       Results for orders placed or performed in visit on 07/20/20   Hemoglobin A1c     Status: None   Result Value Ref Range    Hemoglobin A1C 5.3 0 - 5.6 %   CBC with platelets and differential     Status: None   Result Value Ref Range    WBC 6.6 4.0 - 11.0 10e9/L    RBC Count 4.68 3.8 - 5.2 10e12/L    Hemoglobin 15.0 11.7 - 15.7 g/dL    Hematocrit 43.2 35.0 - 47.0 %    MCV 92 78 - 100 fl    MCH 32.1 26.5 - 33.0 pg    MCHC 34.7 31.5 - 36.5 g/dL    RDW 12.9 10.0 - 15.0 %    Platelet Count 189 150 - 450 10e9/L "    % Neutrophils 58.0 %    % Lymphocytes 31.8 %    % Monocytes 7.3 %    % Eosinophils 2.1 %    % Basophils 0.8 %    Absolute Neutrophil 3.8 1.6 - 8.3 10e9/L    Absolute Lymphocytes 2.1 0.8 - 5.3 10e9/L    Absolute Monocytes 0.5 0.0 - 1.3 10e9/L    Absolute Eosinophils 0.1 0.0 - 0.7 10e9/L    Absolute Basophils 0.1 0.0 - 0.2 10e9/L    Diff Method Automated Method         IMPRESSION:   Reason for surgery/procedure: Obesity ,  Diagnosis/reason for consult: Pre op evaluation    The proposed surgical procedure is considered LOW risk.    REVISED CARDIAC RISK INDEX  The patient has the following serious cardiovascular risks for perioperative complications such as (MI, PE, VFib and 3  AV Block):  No serious cardiac risks  INTERPRETATION: 0 risks: Class I (very low risk - 0.4% complication rate)    The patient has the following additional risks for perioperative complications:  No identified additional risks  The ASCVD Risk score (Efra HUANG Jr., et al., 2013) failed to calculate for the following reasons:    The valid total cholesterol range is 130 to 320 mg/dL      ICD-10-CM    1. Preop general physical exam  Z01.818 Hemoglobin A1c     CBC with platelets and differential     EKG 12-lead complete w/read - Clinics   2. Obesity without serious comorbidity, unspecified classification, unspecified obesity type  E66.9        RECOMMENDATIONS:       Cardiovascular Risk  Performs 4 METs exercise without symptoms (Light housework (dusting, washing dishes) and Climb a flight of stairs) .       Pulmonary Risk  Incentive spirometry post op  Respiratory Therapy (Respiratory Care IP Consult)  post op  NG tube decompression if abdominal distension or significant vomiting       --Patient is to take all scheduled medications on the day of surgery EXCEPT for modifications listed below.    Anticoagulant or Antiplatelet Medication Use  NSAIDS: Ibuprofen (Motrin):         Stop one week prior to surgery  Naproxen (Naprosyn):   Stop one week prior  to surgery        APPROVAL GIVEN to proceed with proposed procedure, without further diagnostic evaluation       Signed Electronically by: Shawn Lewis MD    Copy of this evaluation report is provided to requesting physician.    [facility] Preop Guidelines    Revised Cardiac Risk Index

## 2020-07-20 NOTE — PATIENT INSTRUCTIONS
"    Results for orders placed or performed in visit on 07/20/20   Hemoglobin A1c     Status: None   Result Value Ref Range    Hemoglobin A1C 5.3 0 - 5.6 %   CBC with platelets and differential     Status: None   Result Value Ref Range    WBC 6.6 4.0 - 11.0 10e9/L    RBC Count 4.68 3.8 - 5.2 10e12/L    Hemoglobin 15.0 11.7 - 15.7 g/dL    Hematocrit 43.2 35.0 - 47.0 %    MCV 92 78 - 100 fl    MCH 32.1 26.5 - 33.0 pg    MCHC 34.7 31.5 - 36.5 g/dL    RDW 12.9 10.0 - 15.0 %    Platelet Count 189 150 - 450 10e9/L    % Neutrophils 58.0 %    % Lymphocytes 31.8 %    % Monocytes 7.3 %    % Eosinophils 2.1 %    % Basophils 0.8 %    Absolute Neutrophil 3.8 1.6 - 8.3 10e9/L    Absolute Lymphocytes 2.1 0.8 - 5.3 10e9/L    Absolute Monocytes 0.5 0.0 - 1.3 10e9/L    Absolute Eosinophils 0.1 0.0 - 0.7 10e9/L    Absolute Basophils 0.1 0.0 - 0.2 10e9/L    Diff Method Automated Method      BP Readings from Last 1 Encounters:   07/20/20 120/80     Pulse Readings from Last 1 Encounters:   07/20/20 84     Wt Readings from Last 1 Encounters:   07/20/20 74.8 kg (165 lb)     Ht Readings from Last 1 Encounters:   07/20/20 1.626 m (5' 4\")     Estimated body mass index is 28.32 kg/m  as calculated from the following:    Height as of this encounter: 1.626 m (5' 4\").    Weight as of this encounter: 74.8 kg (165 lb).    Temp Readings from Last 1 Encounters:   06/16/20 98.5  F (36.9  C) (Oral)       Before Your Surgery    Call your surgeon if there is any change in your health. This includes signs of a cold or flu (such as a sore throat, runny nose, cough, rash or fever).    Do not smoke, drink alcohol or take over the counter medicine (unless your surgeon or primary care doctor tells you to) for the 24 hours before and after surgery.    If you take prescribed drugs: Follow your doctor s orders about which medicines to take and which to stop until after surgery.    Eating and drinking prior to surgery: follow the instructions from your " surgeon    Take a shower or bath the night before surgery. Use the soap your surgeon gave you to gently clean your skin. If you do not have soap from your surgeon, use your regular soap. Do not shave or scrub the surgery site.  Wear clean pajamas and have clean sheets on your bed.   Patient Education   Before Your Procedure or Hospital Admission  Testing for COVID-19 (Coronavirus)  Thank you for choosing [facility] for your health care needs. This is a very challenging time for everyone. The World Health Organization and the [facility] have declared the COVID-19 virus a pandemic.   Our goal is to keep you and our team here at [facility] safe and healthy. We've taken several steps to make this happen. For example:  We screen our staff, care teams and patients for COVID-19.  Everyone at [facility] must wear a mask and stay 6 feet apart.  We are limiting hospital and clinic visitors.  Before you come in  All patients must get tested for COVID-19. Your test needs to happen 1 to 3 days before you check in to the hospital or surgery site.  We'll call about a week in advance to set up a testing time. The call may come from a phone number you don't know. Then, you'll need to travel to a testing clinic, where we'll take a swab of your nose or throat.   After the test, please stay at home and stay out of contact with other people. It will be harder for you to recover if you get COVID-19 before your treatment.   Please follow all current safety guidelines, including:  Limit trips outside your home.  Limit the number of people you see.  Always wear a mask outside your home.  Use social distancing. (Stay 6 feet away from others whenever you can.)  Wash your hands often.  If your test shows you have COVID-19  If your test is positive, we'll let you know. A positive test means that you have the virus.   We'll probably have to postpone your admission, surgery or procedure. Your doctor will discuss this  with you. After that, we'll let you know what to do and when you can reschedule.   We may need to cancel your treatment on short notice for other reasons, too.  If your test shows you DON'T have COVID-19  Even if your test is negative, you may still get COVID-19. It's rare but, sometimes, the test result is wrong. You could also catch the virus after taking the test.   There's a very small chance that you could catch COVID-19 in the hospital or surgery center. [facility] has taken many steps to prevent this from happening.   Day of your surgery or procedure  Please come wearing a mask or something else that covers both your nose and mouth.  When you arrive, we'll ask you some questions to find out if you have any signs or symptoms of COVID-19.  Ask your care team if you can have visitors. All visitors must wear masks and will be screened for signs of COVID-19.  Even if no visitors are allowed, you can still have with you:  Your legal guardian or legal decision maker  A parent and one other visitor, if you are younger than 18 years old  A partner and a , if you are in labor  We might need to teach you about taking care of yourself after surgery. If so, a visitor can come into the hospital to learn about it, too.  The rules for visitors change often, depending on how much the virus is spreading. To learn more, see Visiting a Loved One in the Hospital during the COVID-19 Outbreak.  Please call your care team, hospital or surgery center if you have any questions. We thank you for your understanding and for choosing [facility] for your care.   Questions and Answers  Does it matter where I get tested for COVID-19?  Yes. We urge you to get tested at one of our [facility] COVID-19 testing sites. We process these tests in our lab and can get the results quickly. Your [facility] care team needs to get your results before you check in.  What should I do if I can't get tested at [facility]  [place]?  You can get tested somewhere else, but you'll need to take these extra steps:  Contact your family doctor or clinic to arrange your test.  Take the test within 3 days of your surgery or procedure. We can't accept tests older than 3 days.  Make sure your doctor or clinic faxes your results to [facility] at [phone].  If we don't get your results in time, we may have to postpone or cancel your treatment.  For informational purposes only. Not to replace the advice of your health care provider. Copyright   2020 [facility]. All rights reserved. Clinically reviewed by Infection Prevention and the [facility] COVID-19 Clinical Team. [employer] [id number] - 06/30/20.

## 2020-07-27 PROBLEM — E66.9 OBESITY WITHOUT SERIOUS COMORBIDITY, UNSPECIFIED CLASSIFICATION, UNSPECIFIED OBESITY TYPE: Status: ACTIVE | Noted: 2020-07-27

## 2020-08-26 NOTE — PROGRESS NOTES
Subjective     Tammy Joshi is a 40 year old female who presents to clinic today for the following health issues:    HPI     Patient is a 40 yr old female here for symptoms of anxiety and depression. She is having some relationship issues with her spouse. She also recently had a tummy tuck and she is anxious about the outcome of the surgery. She reports that she has had panic attacks on and off , she reports that her surgeon had prescribed lorazepam which did not help her symptoms. She has also tried some xanax in the past. She was on sertraline in the past bit felt like this did not help as well. She was tearful during this encounter. I recommended counseling which she says she has been through in the past . Suprisingly she mentioned that the only thing that has helped is percocet. I told patient this is not used for anxiety. She is open to trying another medication .         Depression and Anxiety Follow-Up    How are you doing with your depression since your last visit? No change    How are you doing with your anxiety since your last visit?  Worsened     Are you having other symptoms that might be associated with depression or anxiety? Yes:  panic attacks and having a hard time sleeping  had surgery about 4 weeks ago and feels like it has worsened since.    Have you had a significant life event? Relationship Concerns     Do you have any concerns with your use of alcohol or other drugs? No    Social History     Tobacco Use     Smoking status: Never Smoker     Smokeless tobacco: Never Used   Substance Use Topics     Alcohol use: Yes     Comment: wine occasionally, none while pregnant     Drug use: No     PHQ 10/10/2019 1/29/2020 8/27/2020   PHQ-9 Total Score 0 4 5   Q9: Thoughts of better off dead/self-harm past 2 weeks Not at all Not at all Not at all     FORTINO-7 SCORE 10/10/2019 1/29/2020 8/27/2020   Total Score - - -   Total Score 2 3 20         Suicide Assessment Five-step Evaluation and Treatment  "(SAFE-T)      How many servings of fruits and vegetables do you eat daily?  4 or more    On average, how many sweetened beverages do you drink each day (Examples: soda, juice, sweet tea, etc.  Do NOT count diet or artificially sweetened beverages)?   0    How many days per week do you exercise enough to make your heart beat faster? 7 just approved on Monday after surgery, was not able to prior     How many minutes a day do you exercise enough to make your heart beat faster? 10 - 19    How many days per week do you miss taking your medication? 0      Review of Systems   Constitutional, HEENT, cardiovascular, pulmonary, gi and gu systems are negative, except as otherwise noted.      Objective    /76   Pulse 95   Temp 97.5  F (36.4  C) (Tympanic)   Resp 14   Ht 1.626 m (5' 4\")   Wt 73.5 kg (162 lb)   SpO2 97%   BMI 27.81 kg/m    Body mass index is 27.81 kg/m .  Physical Exam   GENERAL: healthy, alert and no distress  EYES: Eyes grossly normal to inspection, PERRL and conjunctivae and sclerae normal  HENT: ear canals and TM's normal, nose and mouth without ulcers or lesions  NECK: no adenopathy, no asymmetry, masses, or scars and thyroid normal to palpation  RESP: lungs clear to auscultation - no rales, rhonchi or wheezes  CV: regular rate and rhythm, normal S1 S2, no S3 or S4, no murmur, click or rub, no peripheral edema and peripheral pulses strong  ABDOMEN: soft, nontender, no hepatosplenomegaly, no masses and bowel sounds normal  MS: no gross musculoskeletal defects noted, no edema  PSYCH: mentation appears normal, affect flat, tearful, anxious, judgement and insight intact and appearance well groomed    No results found for this or any previous visit (from the past 24 hour(s)).        Assessment & Plan     Anxiety  Trial of Es citalopram. If no improvement , recommend mental health evaluation .   - escitalopram (LEXAPRO) 10 MG tablet  Dispense: 30 tablet; Refill: 1  - hydrOXYzine (ATARAX) 10 MG tablet  " Dispense: 60 tablet; Refill: 1        FUTURE APPOINTMENTS:       - Follow-up visit in one month or sooner as needed.    Return in about 4 weeks (around 9/24/2020) for In-Clinic Visit.    Shawn Lewis MD  [facility]

## 2020-08-27 ENCOUNTER — OFFICE VISIT (OUTPATIENT)
Dept: FAMILY MEDICINE | Facility: CLINIC | Age: 40
End: 2020-08-27
Payer: COMMERCIAL

## 2020-08-27 VITALS
OXYGEN SATURATION: 97 % | HEART RATE: 95 BPM | TEMPERATURE: 97.5 F | RESPIRATION RATE: 14 BRPM | DIASTOLIC BLOOD PRESSURE: 76 MMHG | SYSTOLIC BLOOD PRESSURE: 104 MMHG | WEIGHT: 162 LBS | BODY MASS INDEX: 27.66 KG/M2 | HEIGHT: 64 IN

## 2020-08-27 DIAGNOSIS — F41.9 ANXIETY: Primary | ICD-10-CM

## 2020-08-27 PROCEDURE — 99213 OFFICE O/P EST LOW 20 MIN: CPT | Performed by: FAMILY MEDICINE

## 2020-08-27 RX ORDER — HYDROXYZINE HYDROCHLORIDE 10 MG/1
10 TABLET, FILM COATED ORAL 3 TIMES DAILY PRN
Qty: 60 TABLET | Refills: 1 | Status: SHIPPED | OUTPATIENT
Start: 2020-08-27 | End: 2021-06-29

## 2020-08-27 RX ORDER — ESCITALOPRAM OXALATE 10 MG/1
10 TABLET ORAL DAILY
Qty: 30 TABLET | Refills: 1 | Status: SHIPPED | OUTPATIENT
Start: 2020-08-27 | End: 2021-06-29

## 2020-08-27 ASSESSMENT — ANXIETY QUESTIONNAIRES
2. NOT BEING ABLE TO STOP OR CONTROL WORRYING: NEARLY EVERY DAY
IF YOU CHECKED OFF ANY PROBLEMS ON THIS QUESTIONNAIRE, HOW DIFFICULT HAVE THESE PROBLEMS MADE IT FOR YOU TO DO YOUR WORK, TAKE CARE OF THINGS AT HOME, OR GET ALONG WITH OTHER PEOPLE: VERY DIFFICULT
1. FEELING NERVOUS, ANXIOUS, OR ON EDGE: NEARLY EVERY DAY
GAD7 TOTAL SCORE: 20
5. BEING SO RESTLESS THAT IT IS HARD TO SIT STILL: MORE THAN HALF THE DAYS
3. WORRYING TOO MUCH ABOUT DIFFERENT THINGS: NEARLY EVERY DAY
6. BECOMING EASILY ANNOYED OR IRRITABLE: NEARLY EVERY DAY
7. FEELING AFRAID AS IF SOMETHING AWFUL MIGHT HAPPEN: NEARLY EVERY DAY

## 2020-08-27 ASSESSMENT — MIFFLIN-ST. JEOR: SCORE: 1389.83

## 2020-08-27 ASSESSMENT — PATIENT HEALTH QUESTIONNAIRE - PHQ9
5. POOR APPETITE OR OVEREATING: NEARLY EVERY DAY
SUM OF ALL RESPONSES TO PHQ QUESTIONS 1-9: 5

## 2020-08-27 NOTE — PATIENT INSTRUCTIONS
Patient Education     Treating Anxiety Disorders with Medicine  An anxiety disorder can make you feel nervous or apprehensive, even without a clear reason. In people age 65 and older, generalized anxiety disorder is one of the most commonly diagnosed anxiety disorders. Many times it occurs with depression. Certain anxiety disorders can cause intense feelings of fear or panic. You may even have physical symptoms such as a racing heartbeat, sweating, or dizziness. If you have these feelings, you don t have to suffer anymore. Treatment to help you overcome your fears will likely include therapy (also called counseling). Medicine may also be prescribed to help control your symptoms.    Medicines  Certain medicines may be prescribed to help control your symptoms. So you may feel less anxious. You may also feel able to move forward with therapy. At first, medicines and dosages may need to be adjusted to find what works best for you. Try to be patient. Tell your healthcare provider how a medicine makes you feel. This way, you can work together to find the treatment that s best for you. Keep in mind that medicines can have side effects. Talk with your provider about any side effects that are bothering you. Changing the dose or type of medicine may help. Don t stop taking medicine on your own. That can cause symptoms to come back or cause dangerous withdrawal symptoms.    Anti-anxiety medicine. This medicine eases symptoms and helps you relax. Your healthcare provider will explain when and how to use it. It may be prescribed for use before situations that make you anxious. You may also be told to take medicine on a regular schedule. Anti-anxiety medicine may make you feel a little sleepy or  out of it.  Don t drive a car or operate machinery while on this medicine, until you know how it affects you.  Never use alcohol or other drugs with anti-anxiety medicines. This could result in loss of muscular control, sedation, coma,  or death. Also, use only the amount of medicine prescribed for you. If you think you may have taken too much, get emergency care right away. Never share your medications with others. Store these medications in a safe place that can't be accessed by children or visitors.  Keep taking medicines as prescribed  Never change your dosage, share or use another person's medicine, or stop taking your medicines without talking to your healthcare provider first. Keep the following in mind:    Some medicines must be taken on a schedule. Make this part of your daily routine. For instance, always take your pill before brushing your teeth. A pillbox can help you remember if you ve taken your medicine each day.    Medicines are often taken for 6 to 12 months. Your healthcare provider will then evaluate whether you need to stay on them. Many people who have also had therapy may no longer need medicine to manage anxiety.    You may need to stop taking medicine slowly to give your body time to adjust. When it s time to stop, your healthcare provider will tell you more. Remember: Never stop taking your medicine without talking to your provider first.    If symptoms return, you may need to start taking medicines again. This isn t your fault. It s just the nature of your anxiety disorder.    Side effects. Medicines may cause side effects. Ask your healthcare provider or pharmacist what you can expect. They may have ideas for avoiding some side effects.    Sexual problems. Some antidepressants can affect your desire for sex or your ability to have an orgasm. A change in dosage or medicine often solves the problem. If you have a sexual side effect that concerns you, tell your healthcare provider.    Addiction. If you ve never had a problem with drugs or alcohol, you may not have a problem with medicines used to treat anxiety disorders. But always discuss the medicines with your healthcare provider before taking them. If you have a history  of addiction, you may not be able to use certain medicines used to treat anxiety disorders.    Medicine interactions. Always check with your pharmacist before using any over-the-counter medicines (OTCs), including herbal supplements. Some OTCs may interact with your anti-anxiety medications and increase or decrease their effectiveness.    Date Last Reviewed: 5/1/2017 2000-2019 [employer]. [address] [place] [address]. All rights reserved. This information is not intended as a substitute for professional medical care. Always follow your healthcare professional's instructions.

## 2020-08-28 ASSESSMENT — ANXIETY QUESTIONNAIRES: GAD7 TOTAL SCORE: 20

## 2021-01-20 ENCOUNTER — HOSPITAL ENCOUNTER (EMERGENCY)
Facility: CLINIC | Age: 41
Discharge: HOME | End: 2021-01-20
Attending: PHYSICIAN ASSISTANT | Admitting: PHYSICIAN ASSISTANT
Payer: COMMERCIAL

## 2021-01-20 VITALS
BODY MASS INDEX: 26.8 KG/M2 | HEIGHT: 64 IN | DIASTOLIC BLOOD PRESSURE: 83 MMHG | TEMPERATURE: 98.1 F | OXYGEN SATURATION: 99 % | WEIGHT: 157 LBS | HEART RATE: 81 BPM | SYSTOLIC BLOOD PRESSURE: 115 MMHG

## 2021-01-20 DIAGNOSIS — B02.9 HERPES ZOSTER WITHOUT COMPLICATION: ICD-10-CM

## 2021-01-20 PROCEDURE — 99213 OFFICE O/P EST LOW 20 MIN: CPT | Performed by: PHYSICIAN ASSISTANT

## 2021-01-20 PROCEDURE — G0463 HOSPITAL OUTPT CLINIC VISIT: HCPCS | Performed by: PHYSICIAN ASSISTANT

## 2021-01-20 RX ORDER — VALACYCLOVIR HYDROCHLORIDE 1 G/1
1000 TABLET, FILM COATED ORAL 3 TIMES DAILY
Qty: 21 TABLET | Refills: 0 | Status: SHIPPED | OUTPATIENT
Start: 2021-01-20 | End: 2021-06-29

## 2021-01-20 ASSESSMENT — MIFFLIN-ST. JEOR: SCORE: 1367.15

## 2021-01-20 NOTE — ED PROVIDER NOTES
History     Chief Complaint   Patient presents with     Rash     painful rash to the left arm.     HPI  Tammy Joshi is a 40 year old female who presents to the urgent care with concern over skin changes on her left arm.  Patient initially reports pain, burning on the posterior aspect of her left upper arm which occurred 5 to 7 days ago.  She reports that pain then began shooting down her forearm.  Over the last 48 hours she has developed skin changes/vesicles on her left wrist and the palm of her left hand.  She denies any other associated symptoms.  She has not attempted any OTC treatments consistently. She did have chicken pox as an infant.      Allergies:  No Known Allergies    Problem List:    Patient Active Problem List    Diagnosis Date Noted     Obesity without serious comorbidity, unspecified classification, unspecified obesity type 07/27/2020     Priority: Medium     Hip pain, left 09/08/2017     Priority: Medium     Type 2 diabetes mellitus without complication (H) 10/25/2015     Priority: Medium     Hyperlipidemia with target LDL less than 100 08/06/2015     Priority: Medium     Diagnosis updated by automated process. Provider to review and confirm.       Depression 02/24/2014     Priority: Medium     Anxiety 07/21/2011     Priority: Medium     CARDIOVASCULAR SCREENING; LDL GOAL LESS THAN 160 10/31/2010     Priority: Medium     Hand numbness 10/14/2009     Priority: Medium      Past Medical History:    Past Medical History:   Diagnosis Date     Absence of menstruation      Female infertility of unspecified origin      MILD/NOS PREECLAMP-ANTEP 8/29/2005     Polycystic ovaries      PPH (postpartum hemorrhage) 11/2009     Past Surgical History:    Past Surgical History:   Procedure Laterality Date     C HAND/FINGER SURGERY UNLISTED  7th grade    left index     D & C  1998    Missed Ab     LITHOTRIPSY  2011     Family History:    Family History   Adopted: Yes   Problem Relation Age of Onset      "Unknown/Adopted Other         patient was adopted; knows a limited amount of medical history of birth parents     Social History:  Marital Status:   [2]  Social History     Tobacco Use     Smoking status: Never Smoker     Smokeless tobacco: Never Used   Substance Use Topics     Alcohol use: Yes     Comment: wine occasionally, none while pregnant     Drug use: No      Medications:         valACYclovir (VALTREX) 1000 mg tablet       blood glucose monitoring (FREESTYLE) lancets       blood glucose monitoring (NO BRAND SPECIFIED) test strip       CINNAMON PO       CRANBERRY EXTRACT PO       escitalopram (LEXAPRO) 10 MG tablet       hydrOXYzine (ATARAX) 10 MG tablet      Review of Systems  CONSTITUTIONAL:NEGATIVE for fever, chills, change in weight  INTEGUMENTARY/SKIN: POSITIVE for vesicular lesions on left arm   RESP:NEGATIVE for significant cough or SOB  MUSCULOSKELETAL: POSITIVE  for burning pain, left arm  and NEGATIVE for other concerning new onset arthralgias or myalgias   NEURO: NEGATIVE for anesthesia of her arm    Physical Exam   BP: 115/83  Pulse: 81  Temp: 98.1  F (36.7  C)  Height: 162.6 cm (5' 4\")  Weight: 71.2 kg (157 lb)  SpO2: 99 %  Physical Exam  GENERAL APPEARANCE: healthy, alert and no distress  EYES: EOMI,  PERRL, conjunctiva clear  RESP:  No respiratory distress, speaking in full sentences  SKIN: Grouped vesicular lesions on erythematous base on the volar surface of her left wrist, left palm, and dorsal surface of distal left upper arm.    ED Course        Procedures        Critical Care time:  none        No results found for this or any previous visit (from the past 24 hour(s)).  Medications - No data to display    Assessments & Plan (with Medical Decision Making)     I have reviewed the nursing notes.  I have reviewed the findings, diagnosis, plan and need for follow up with the patient.     New Prescriptions    VALACYCLOVIR (VALTREX) 1000 MG TABLET    Take 1 tablet (1,000 mg) by mouth 3 " times daily for 7 days     Final diagnoses:   Herpes zoster without complication     40-year-old female presents to the urgent care with concern over vesicular lesions on her left arm that were preceded by several days of burning pain.  She had stable vital signs upon arrival.  Physical exam findings as described above appear most consistent with shingles.  I have low suspicion for localized bacterial infection.  No concern for cellulitis.  She was discharged home stable with prescription for valtrex, symptomatic treatment as needed with tylenol/ibuprofen, ice/heat.  Follow up with PCP if no improvement in 5-7 days.  worrisome reasons to return to ER/UC sooner discussed.     Disclaimer: This note consists of symbols derived from keyboarding, dictation, and/or voice recognition software. As a result, there may be errors in the script that have gone undetected.  Please consider this when interpreting information found in the chart.    1/20/2021   [facility] EMERGENCY DEPT     Jennifer Dunn PA-C  01/20/21 2873

## 2021-05-30 ENCOUNTER — APPOINTMENT (OUTPATIENT)
Dept: GENERAL RADIOLOGY | Facility: CLINIC | Age: 41
End: 2021-05-30
Attending: EMERGENCY MEDICINE

## 2021-05-30 ENCOUNTER — HOSPITAL ENCOUNTER (EMERGENCY)
Facility: CLINIC | Age: 41
Discharge: HOME | End: 2021-05-31
Attending: EMERGENCY MEDICINE | Admitting: EMERGENCY MEDICINE

## 2021-05-30 VITALS
SYSTOLIC BLOOD PRESSURE: 131 MMHG | HEART RATE: 95 BPM | OXYGEN SATURATION: 96 % | WEIGHT: 155 LBS | BODY MASS INDEX: 26.61 KG/M2 | DIASTOLIC BLOOD PRESSURE: 89 MMHG | TEMPERATURE: 98 F | RESPIRATION RATE: 20 BRPM

## 2021-05-30 DIAGNOSIS — S56.912A FOREARM STRAIN, LEFT, INITIAL ENCOUNTER: ICD-10-CM

## 2021-05-30 PROCEDURE — 99283 EMERGENCY DEPT VISIT LOW MDM: CPT | Performed by: EMERGENCY MEDICINE

## 2021-05-30 PROCEDURE — 73090 X-RAY EXAM OF FOREARM: CPT | Mod: LT

## 2021-05-30 PROCEDURE — 99284 EMERGENCY DEPT VISIT MOD MDM: CPT | Performed by: EMERGENCY MEDICINE

## 2021-05-30 RX ORDER — KETOROLAC TROMETHAMINE 30 MG/ML
60 INJECTION, SOLUTION INTRAMUSCULAR; INTRAVENOUS ONCE
Status: DISCONTINUED | OUTPATIENT
Start: 2021-05-30 | End: 2021-05-30

## 2021-05-31 PROCEDURE — 250N000013 HC RX MED GY IP 250 OP 250 PS 637: Performed by: EMERGENCY MEDICINE

## 2021-05-31 RX ORDER — HYDROCODONE BITARTRATE AND ACETAMINOPHEN 5; 325 MG/1; MG/1
1-2 TABLET ORAL EVERY 4 HOURS PRN
Qty: 6 TABLET | Refills: 0 | Status: SHIPPED | OUTPATIENT
Start: 2021-05-31 | End: 2021-06-29

## 2021-05-31 RX ORDER — HYDROCODONE BITARTRATE AND ACETAMINOPHEN 5; 325 MG/1; MG/1
2 TABLET ORAL ONCE
Status: COMPLETED | OUTPATIENT
Start: 2021-05-31 | End: 2021-05-31

## 2021-05-31 RX ADMIN — IBUPROFEN 600 MG: 400 TABLET ORAL at 00:10

## 2021-05-31 RX ADMIN — HYDROCODONE BITARTRATE AND ACETAMINOPHEN 2 TABLET: 5; 325 TABLET ORAL at 01:27

## 2021-05-31 ASSESSMENT — ENCOUNTER SYMPTOMS
WEAKNESS: 0
NECK PAIN: 0
BACK PAIN: 0
NUMBNESS: 1
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
## 2021-06-28 ENCOUNTER — NURSE TRIAGE (OUTPATIENT)
Dept: FAMILY MEDICINE | Facility: CLINIC | Age: 41
End: 2021-06-28

## 2021-06-28 NOTE — TELEPHONE ENCOUNTER
Spoke with patient.  Right side pain,  Started a a couple days ago.  To the right of the belly button.  Comes and goes.  0-6/10 no vomiting no diarrhea.      No chance of pregnancy.      Advised that she needs to be seen for evaluation.  If pain returns and worsens needs ED< but for now clinic.    Transferred to scheduling.    Medhat Logan, TONIN, RN, PHN  [facility] ~ Registered Nurse  Clinic Triage ~ [place] [facility] [place]  June 28, 2021        Reason for Disposition    MODERATE OR MILD pain that comes and goes (cramps) lasts > 24 hours    Additional Information    Negative: Passed out (i.e., fainted, collapsed and was not responding)    Negative: Shock suspected (e.g., cold/pale/clammy skin, too weak to stand, low BP, rapid pulse)    Negative: Sounds like a life-threatening emergency to the triager    Negative: Chest pain    Negative: Pain is mainly in upper abdomen (if needed ask: 'is it mainly above the belly button?')    Negative: Abdominal pain and pregnant > 20 weeks    Negative: Abdominal pain and pregnant < 20 weeks    Negative: SEVERE abdominal pain (e.g., excruciating)    Negative: Vomiting red blood or black (coffee ground) material    Negative: Bloody, black, or tarry bowel movements (Exception: chronic-unchanged black-grey bowel movements and is taking iron pills or Pepto-bismol)    Negative: Constant abdominal pain lasting > 2 hours    Negative: Vomiting bile (green color)    Negative: Patient sounds very sick or weak to the triager    Negative: White of the eyes have turned yellow (i.e., jaundice)    Negative: Vomiting and abdomen looks much more swollen than usual    Negative: Blood in urine (red, pink, or tea-colored)    Negative: Fever > 103 F (39.4 C)    Negative: Fever > 101 F (38.3 C) and over 60 years of age    Negative: Fever > 100.0 F (37.8 C) and has diabetes mellitus or a weak immune system (e.g., HIV positive, cancer chemotherapy, organ transplant, splenectomy, chronic steroids)     Negative: Fever > 100.0 F (37.8 C) and bedridden (e.g., nursing home patient, stroke, chronic illness, recovering from surgery)    Negative: Pregnant or could be pregnant (i.e., missed last menstrual period)    Protocols used: ABDOMINAL PAIN - FEMALE-A-[place]

## 2021-06-29 ENCOUNTER — HOSPITAL ENCOUNTER (OUTPATIENT)
Dept: CT IMAGING | Facility: CLINIC | Age: 41
Discharge: HOME | End: 2021-06-29
Attending: FAMILY MEDICINE | Admitting: FAMILY MEDICINE
Payer: COMMERCIAL

## 2021-06-29 ENCOUNTER — OFFICE VISIT (OUTPATIENT)
Dept: FAMILY MEDICINE | Facility: CLINIC | Age: 41
End: 2021-06-29
Payer: COMMERCIAL

## 2021-06-29 VITALS
WEIGHT: 178.8 LBS | HEART RATE: 93 BPM | DIASTOLIC BLOOD PRESSURE: 86 MMHG | SYSTOLIC BLOOD PRESSURE: 134 MMHG | TEMPERATURE: 98.4 F | OXYGEN SATURATION: 97 % | BODY MASS INDEX: 30.69 KG/M2 | RESPIRATION RATE: 14 BRPM

## 2021-06-29 DIAGNOSIS — R10.31 RLQ ABDOMINAL PAIN: ICD-10-CM

## 2021-06-29 DIAGNOSIS — E11.9 TYPE 2 DIABETES MELLITUS WITHOUT COMPLICATION, WITHOUT LONG-TERM CURRENT USE OF INSULIN (H): ICD-10-CM

## 2021-06-29 DIAGNOSIS — R10.31 RLQ ABDOMINAL PAIN: Primary | ICD-10-CM

## 2021-06-29 LAB
BASOPHILS # BLD AUTO: 0.1 10E9/L (ref 0–0.2)
BASOPHILS NFR BLD AUTO: 0.8 %
DIFFERENTIAL METHOD BLD: NORMAL
EOSINOPHIL # BLD AUTO: 0.1 10E9/L (ref 0–0.7)
EOSINOPHIL NFR BLD AUTO: 2.1 %
ERYTHROCYTE [DISTWIDTH] IN BLOOD BY AUTOMATED COUNT: 12.8 % (ref 10–15)
HBA1C MFR BLD: 5.6 % (ref 0–5.6)
HCT VFR BLD AUTO: 43.6 % (ref 35–47)
HGB BLD-MCNC: 14.7 G/DL (ref 11.7–15.7)
LYMPHOCYTES # BLD AUTO: 1.7 10E9/L (ref 0.8–5.3)
LYMPHOCYTES NFR BLD AUTO: 27.6 %
MCH RBC QN AUTO: 31.9 PG (ref 26.5–33)
MCHC RBC AUTO-ENTMCNC: 33.7 G/DL (ref 31.5–36.5)
MCV RBC AUTO: 95 FL (ref 78–100)
MONOCYTES # BLD AUTO: 0.5 10E9/L (ref 0–1.3)
MONOCYTES NFR BLD AUTO: 8.6 %
NEUTROPHILS # BLD AUTO: 3.8 10E9/L (ref 1.6–8.3)
NEUTROPHILS NFR BLD AUTO: 60.9 %
PLATELET # BLD AUTO: 196 10E9/L (ref 150–450)
RBC # BLD AUTO: 4.61 10E12/L (ref 3.8–5.2)
WBC # BLD AUTO: 6.2 10E9/L (ref 4–11)

## 2021-06-29 PROCEDURE — 250N000009 HC RX 250: Performed by: FAMILY MEDICINE

## 2021-06-29 PROCEDURE — 74177 CT ABD & PELVIS W/CONTRAST: CPT

## 2021-06-29 PROCEDURE — 99214 OFFICE O/P EST MOD 30 MIN: CPT | Performed by: FAMILY MEDICINE

## 2021-06-29 PROCEDURE — 36415 COLL VENOUS BLD VENIPUNCTURE: CPT | Performed by: FAMILY MEDICINE

## 2021-06-29 PROCEDURE — 250N000011 HC RX IP 250 OP 636: Performed by: FAMILY MEDICINE

## 2021-06-29 PROCEDURE — 83036 HEMOGLOBIN GLYCOSYLATED A1C: CPT | Performed by: FAMILY MEDICINE

## 2021-06-29 PROCEDURE — 85025 COMPLETE CBC W/AUTO DIFF WBC: CPT | Performed by: FAMILY MEDICINE

## 2021-06-29 RX ORDER — IOPAMIDOL 755 MG/ML
88 INJECTION, SOLUTION INTRAVASCULAR ONCE
Status: COMPLETED | OUTPATIENT
Start: 2021-06-29 | End: 2021-06-29

## 2021-06-29 RX ADMIN — IOPAMIDOL 88 ML: 755 INJECTION, SOLUTION INTRAVENOUS at 11:48

## 2021-06-29 RX ADMIN — SODIUM CHLORIDE 62 ML: 9 INJECTION, SOLUTION INTRAVENOUS at 11:48

## 2021-06-29 NOTE — PATIENT INSTRUCTIONS
Patient Education     Ovarian Cysts  A cyst is often a fluid-filled sac, like a small water balloon. Cysts are almost always harmless, and many go away on their own. Often they grow slowly. They can vary in size from as small as a pea to larger than a grapefruit. Many cause no symptoms at all. Often they are felt only during a pelvic exam. Ovarian cysts are often not cancer (benign).        Functional cyst  A functional cyst is the most common kind of cyst. It forms when a follicle doesn't release a mature egg or continues to grow after releasing the egg. Functional cysts often occur on only one ovary at a time. They often shrink on their own in 1 to 3 months. In rare cases, a cyst will break open (rupture), causing pain. Pain might also be caused by the twisting of an ovary that is enlarged because of the cyst growing on it.      Dermoid cyst  Sometimes cells that are present from birth will start to grow into different kinds of tissue such as skin, fat, hair, and teeth. This kind of cyst is called a dermoid cyst. Dermoid cysts can grow on one or both ovaries. Often they cause no symptoms. But if they leak or the ovary becomes twisted, they can cause severe pain.     Endometrioma  Sometimes tissue similar to the lining of the uterus (endometrium) grows and becomes part of the ovary. This kind of cyst is often called a chocolate cyst because of its dark-brown color. These cysts can grow on one or both ovaries. They often cause pain, especially around menstruation or during sex.     Benign cystadenoma  If the capsule around the ovary grows, it can form a cystadenoma. These cysts can grow on one or both ovaries. Often they cause no symptoms if they are small. But if they become large, they can press on organs near the ovaries, causing pain. They can also cause pain by stretching the ovarian capsule. A cyst that pushes on the bladder can cause frequent urination. Sometimes these cysts break open and bleed.   Cancer  (malignant) cysts  These cysts can invade other tissues or spread to other parts of the body.   [employer] last reviewed this educational content on 2/1/2021 2000-2021 [employer]. All rights reserved. This information is not intended as a substitute for professional medical care. Always follow your healthcare professional's instructions.

## 2021-11-08 ENCOUNTER — APPOINTMENT (OUTPATIENT)
Dept: CT IMAGING | Facility: HOSPITAL | Age: 41
End: 2021-11-08
Attending: FAMILY MEDICINE
Payer: COMMERCIAL

## 2021-11-08 ENCOUNTER — HOSPITAL ENCOUNTER (EMERGENCY)
Facility: HOSPITAL | Age: 41
Discharge: HOME | End: 2021-11-09
Attending: FAMILY MEDICINE | Admitting: FAMILY MEDICINE
Payer: COMMERCIAL

## 2021-11-08 DIAGNOSIS — R00.0 SINUS TACHYCARDIA: ICD-10-CM

## 2021-11-08 DIAGNOSIS — T40.715A ADVERSE EFFECT OF CANNABIS, INITIAL ENCOUNTER: ICD-10-CM

## 2021-11-08 LAB
AMPHETAMINES UR QL SCN: ABNORMAL
ANION GAP SERPL CALCULATED.3IONS-SCNC: 7 MMOL/L (ref 5–18)
BARBITURATES UR QL: ABNORMAL
BASOPHILS # BLD AUTO: 0.1 10E3/UL (ref 0–0.2)
BASOPHILS NFR BLD AUTO: 1 %
BENZODIAZ UR QL: ABNORMAL
BUN SERPL-MCNC: 5 MG/DL (ref 8–22)
CALCIUM SERPL-MCNC: 8.7 MG/DL (ref 8.5–10.5)
CANNABINOIDS UR QL SCN: ABNORMAL
CHLORIDE BLD-SCNC: 101 MMOL/L (ref 98–107)
CO2 SERPL-SCNC: 26 MMOL/L (ref 22–31)
COCAINE UR QL: ABNORMAL
CREAT SERPL-MCNC: 0.73 MG/DL (ref 0.6–1.1)
CREAT UR-MCNC: 41 MG/DL
D DIMER PPP FEU-MCNC: 0.62 UG/ML FEU (ref 0–0.5)
EOSINOPHIL # BLD AUTO: 0 10E3/UL (ref 0–0.7)
EOSINOPHIL NFR BLD AUTO: 0 %
ERYTHROCYTE [DISTWIDTH] IN BLOOD BY AUTOMATED COUNT: 12.4 % (ref 10–15)
GFR SERPL CREATININE-BSD FRML MDRD: >90 ML/MIN/1.73M2
GLUCOSE BLD-MCNC: 293 MG/DL (ref 70–125)
HCT VFR BLD AUTO: 40.5 % (ref 35–47)
HGB BLD-MCNC: 14.3 G/DL (ref 11.7–15.7)
IMM GRANULOCYTES # BLD: 0.1 10E3/UL
IMM GRANULOCYTES NFR BLD: 1 %
LYMPHOCYTES # BLD AUTO: 1.1 10E3/UL (ref 0.8–5.3)
LYMPHOCYTES NFR BLD AUTO: 11 %
MAGNESIUM SERPL-MCNC: 1.4 MG/DL (ref 1.8–2.6)
MCH RBC QN AUTO: 32.5 PG (ref 26.5–33)
MCHC RBC AUTO-ENTMCNC: 35.3 G/DL (ref 31.5–36.5)
MCV RBC AUTO: 92 FL (ref 78–100)
METHADONE UR QL SCN: ABNORMAL
MONOCYTES # BLD AUTO: 0.5 10E3/UL (ref 0–1.3)
MONOCYTES NFR BLD AUTO: 5 %
NEUTROPHILS # BLD AUTO: 8.5 10E3/UL (ref 1.6–8.3)
NEUTROPHILS NFR BLD AUTO: 82 %
NRBC # BLD AUTO: 0 10E3/UL
NRBC BLD AUTO-RTO: 0 /100
OPIATES UR QL SCN: ABNORMAL
OXYCODONE UR QL: ABNORMAL
PCP UR QL SCN: ABNORMAL
PLATELET # BLD AUTO: 180 10E3/UL (ref 150–450)
POTASSIUM BLD-SCNC: 4 MMOL/L (ref 3.5–5)
RBC # BLD AUTO: 4.4 10E6/UL (ref 3.8–5.2)
SODIUM SERPL-SCNC: 134 MMOL/L (ref 136–145)
TROPONIN I SERPL-MCNC: <0.01 NG/ML (ref 0–0.29)
TSH SERPL DL<=0.005 MIU/L-ACNC: 0.64 UIU/ML (ref 0.3–5)
WBC # BLD AUTO: 10.2 10E3/UL (ref 4–11)

## 2021-11-08 PROCEDURE — 96375 TX/PRO/DX INJ NEW DRUG ADDON: CPT | Mod: 59

## 2021-11-08 PROCEDURE — 99285 EMERGENCY DEPT VISIT HI MDM: CPT | Mod: 25

## 2021-11-08 PROCEDURE — 71275 CT ANGIOGRAPHY CHEST: CPT

## 2021-11-08 PROCEDURE — 85025 COMPLETE CBC W/AUTO DIFF WBC: CPT | Performed by: FAMILY MEDICINE

## 2021-11-08 PROCEDURE — 85379 FIBRIN DEGRADATION QUANT: CPT | Performed by: FAMILY MEDICINE

## 2021-11-08 PROCEDURE — 84484 ASSAY OF TROPONIN QUANT: CPT | Performed by: FAMILY MEDICINE

## 2021-11-08 PROCEDURE — 258N000003 HC RX IP 258 OP 636: Performed by: FAMILY MEDICINE

## 2021-11-08 PROCEDURE — 84443 ASSAY THYROID STIM HORMONE: CPT | Performed by: FAMILY MEDICINE

## 2021-11-08 PROCEDURE — 80307 DRUG TEST PRSMV CHEM ANLYZR: CPT | Performed by: FAMILY MEDICINE

## 2021-11-08 PROCEDURE — 96365 THER/PROPH/DIAG IV INF INIT: CPT | Mod: 59

## 2021-11-08 PROCEDURE — 250N000011 HC RX IP 250 OP 636: Performed by: FAMILY MEDICINE

## 2021-11-08 PROCEDURE — 80048 BASIC METABOLIC PNL TOTAL CA: CPT | Performed by: FAMILY MEDICINE

## 2021-11-08 PROCEDURE — 36415 COLL VENOUS BLD VENIPUNCTURE: CPT | Performed by: FAMILY MEDICINE

## 2021-11-08 PROCEDURE — 83735 ASSAY OF MAGNESIUM: CPT | Performed by: FAMILY MEDICINE

## 2021-11-08 PROCEDURE — 96361 HYDRATE IV INFUSION ADD-ON: CPT

## 2021-11-08 RX ORDER — IOPAMIDOL 755 MG/ML
100 INJECTION, SOLUTION INTRAVASCULAR ONCE
Status: COMPLETED | OUTPATIENT
Start: 2021-11-08 | End: 2021-11-08

## 2021-11-08 RX ORDER — PROPRANOLOL HYDROCHLORIDE 10 MG/1
10 TABLET ORAL ONCE
Status: COMPLETED | OUTPATIENT
Start: 2021-11-09 | End: 2021-11-09

## 2021-11-08 RX ORDER — LORAZEPAM 2 MG/ML
0.5 INJECTION INTRAMUSCULAR ONCE
Status: COMPLETED | OUTPATIENT
Start: 2021-11-08 | End: 2021-11-08

## 2021-11-08 RX ORDER — MAGNESIUM SULFATE HEPTAHYDRATE 40 MG/ML
2 INJECTION, SOLUTION INTRAVENOUS ONCE
Status: COMPLETED | OUTPATIENT
Start: 2021-11-08 | End: 2021-11-08

## 2021-11-08 RX ADMIN — LORAZEPAM 0.5 MG: 2 INJECTION INTRAMUSCULAR; INTRAVENOUS at 21:39

## 2021-11-08 RX ADMIN — SODIUM CHLORIDE 1000 ML: 9 INJECTION, SOLUTION INTRAVENOUS at 19:57

## 2021-11-08 RX ADMIN — MAGNESIUM SULFATE HEPTAHYDRATE 2 G: 40 INJECTION, SOLUTION INTRAVENOUS at 20:58

## 2021-11-08 RX ADMIN — SODIUM CHLORIDE 1000 ML: 9 INJECTION, SOLUTION INTRAVENOUS at 22:09

## 2021-11-08 RX ADMIN — IOPAMIDOL 100 ML: 755 INJECTION, SOLUTION INTRAVENOUS at 22:46

## 2021-11-08 ASSESSMENT — ENCOUNTER SYMPTOMS
VOMITING: 0
NAUSEA: 1
SHORTNESS OF BREATH: 1
PALPITATIONS: 1

## 2021-11-08 ASSESSMENT — MIFFLIN-ST. JEOR: SCORE: 1375.76

## 2021-11-08 NOTE — Clinical Note
Tammy Joshi was seen and treated in our emergency department on 11/8/2021.  She may return to work on 11/11/2021.       If you have any questions or concerns, please don't hesitate to call.      Fady Hurtado MD

## 2021-11-08 NOTE — Clinical Note
Tammy Joshi was seen and treated in our emergency department on 11/8/2021.  She may return to work on 11/10/2021.       If you have any questions or concerns, please don't hesitate to call.      Fady Hurtado MD

## 2021-11-09 VITALS
OXYGEN SATURATION: 93 % | SYSTOLIC BLOOD PRESSURE: 113 MMHG | RESPIRATION RATE: 16 BRPM | HEIGHT: 64 IN | WEIGHT: 160 LBS | BODY MASS INDEX: 27.31 KG/M2 | HEART RATE: 108 BPM | DIASTOLIC BLOOD PRESSURE: 57 MMHG

## 2021-11-09 PROCEDURE — 258N000003 HC RX IP 258 OP 636: Performed by: FAMILY MEDICINE

## 2021-11-09 PROCEDURE — 250N000013 HC RX MED GY IP 250 OP 250 PS 637: Performed by: FAMILY MEDICINE

## 2021-11-09 PROCEDURE — 96361 HYDRATE IV INFUSION ADD-ON: CPT

## 2021-11-09 RX ADMIN — PROPRANOLOL HYDROCHLORIDE 10 MG: 10 TABLET ORAL at 00:05

## 2021-11-09 RX ADMIN — SODIUM CHLORIDE 1000 ML: 9 INJECTION, SOLUTION INTRAVENOUS at 00:57

## 2021-11-09 NOTE — ED NOTES
Bed: JNED-14  Expected date: 11/8/21  Expected time: 7:20 PM  Means of arrival: Ambulance  Comments:  [facility]  40yo  CBD  Tachy 140

## 2021-11-09 NOTE — ED TRIAGE NOTES
Pt BIBA for heart palpitations and shortness of breath. Pt took 1 Delta-8 gummy with THC at 1600. Approximately 40 minutes after, pt felt her heart racing, short of breath, and some chest pain. Pt took gummy for anxiety and has taken them in the past without problem. Per medics, pt's HR was in the 170's upon their arrival. Pt was was given ativan 2 mg total. Pt more calm and HR has since come down to 150's and with resolution of shortness of breath and chest pain. .

## 2021-11-09 NOTE — ED PROVIDER NOTES
"ED SIGNOUT  Date/Time:11/9/2021 12:42 AM    Patient signed out to me by my colleague, Dr. Fady Hurtado.  Please see their note for complete history and physical. Plan to follow up on reevaluation and disposition.     Briefly, Tammy Joshi is a 41 year old female with a pertinent history of hyperlipidemia, DM2, and anxiety, who presents to this ED via EMS for evaluation of palpitations and shortness of breath.      The creation of this record is based on the scribe s observations of the work being performed by Mario Trejo and the provider s statements to them. It was created on their behalf by Richard Ramírez a trained medical scribe. This document has been checked and approved by the attending provider.      REMAINING ED WORKUP:    Vitals:  /57   Pulse 108   Resp 16   Ht 1.626 m (5' 4\")   Wt 72.6 kg (160 lb)   SpO2 93%   BMI 27.46 kg/m      Pertinent labs results reviewed   Results for orders placed or performed during the hospital encounter of 11/08/21   CT Chest Pulmonary Embolism w Contrast    Impression    IMPRESSION:  1.  No evidence for pulmonary emboli.  2.  No evidence for acute pulmonary disease.   Basic metabolic panel   Result Value Ref Range    Sodium 134 (L) 136 - 145 mmol/L    Potassium 4.0 3.5 - 5.0 mmol/L    Chloride 101 98 - 107 mmol/L    Carbon Dioxide (CO2) 26 22 - 31 mmol/L    Anion Gap 7 5 - 18 mmol/L    Urea Nitrogen 5 (L) 8 - 22 mg/dL    Creatinine 0.73 0.60 - 1.10 mg/dL    Calcium 8.7 8.5 - 10.5 mg/dL    Glucose 293 (H) 70 - 125 mg/dL    GFR Estimate >90 >60 mL/min/1.73m2   Result Value Ref Range    Troponin I <0.01 0.00 - 0.29 ng/mL   Result Value Ref Range    Magnesium 1.4 (L) 1.8 - 2.6 mg/dL   TSH with free T4 reflex   Result Value Ref Range    TSH 0.64 0.30 - 5.00 uIU/mL   CBC with platelets and differential   Result Value Ref Range    WBC Count 10.2 4.0 - 11.0 10e3/uL    RBC Count 4.40 3.80 - 5.20 10e6/uL    Hemoglobin 14.3 11.7 - 15.7 g/dL    Hematocrit 40.5 35.0 " - 47.0 %    MCV 92 78 - 100 fL    MCH 32.5 26.5 - 33.0 pg    MCHC 35.3 31.5 - 36.5 g/dL    RDW 12.4 10.0 - 15.0 %    Platelet Count 180 150 - 450 10e3/uL    % Neutrophils 82 %    % Lymphocytes 11 %    % Monocytes 5 %    % Eosinophils 0 %    % Basophils 1 %    % Immature Granulocytes 1 %    NRBCs per 100 WBC 0 <1 /100    Absolute Neutrophils 8.5 (H) 1.6 - 8.3 10e3/uL    Absolute Lymphocytes 1.1 0.8 - 5.3 10e3/uL    Absolute Monocytes 0.5 0.0 - 1.3 10e3/uL    Absolute Eosinophils 0.0 0.0 - 0.7 10e3/uL    Absolute Basophils 0.1 0.0 - 0.2 10e3/uL    Absolute Immature Granulocytes 0.1 (H) <=0.0 10e3/uL    Absolute NRBCs 0.0 10e3/uL   Drugs of Abuse 1+ Panel, Urine ([facility] Only)   Result Value Ref Range    Amphetamines Urine Screen Negative Screen Negative    Benzodiazepines Urine Screen Negative Screen Negative    Opiates Urine Screen Negative Screen Negative    PCP Urine Screen Negative Screen Negative    Cannabinoids Urine Screen Positive (A) Screen Negative    Barbiturates Urine Screen Negative Screen Negative    Cocaine Urine Screen Negative Screen Negative    Methadone Urine Screen Negative Screen Negative    Oxycodone Urine Screen Negative Screen Negative    Creatinine Urine mg/dL 41 mg/dL   D dimer quantitative   Result Value Ref Range    D-Dimer Quantitative 0.62 (H) 0.00 - 0.50 ug/mL FEU       Pertinent imaging reviewed   Please see official radiology report.  CT Chest Pulmonary Embolism w Contrast   Final Result   IMPRESSION:   1.  No evidence for pulmonary emboli.   2.  No evidence for acute pulmonary disease.           Interventions  Medications   0.9% sodium chloride BOLUS (0 mLs Intravenous Stopped 11/8/21 2052)   magnesium sulfate 2 g in water intermittent infusion (0 g Intravenous Stopped 11/8/21 2208)   LORazepam (ATIVAN) injection 0.5 mg (0.5 mg Intravenous Given 11/8/21 2139)   0.9% sodium chloride BOLUS (0 mLs Intravenous Stopped 11/9/21 0053)   iopamidol (ISOVUE-370) solution 100 mL (100 mLs  Intravenous Given 11/8/21 2246)   propranolol (INDERAL) tablet 10 mg (10 mg Oral Given 11/9/21 0005)   0.9% sodium chloride BOLUS (0 mLs Intravenous Stopped 11/9/21 0157)      ED Course/MDM:  12:44 AM Signout accepted from Dr. Fady Hurtado.  Prior records were reviewed.  Diagnostics from this visit are reviewed.  1:56 AM We discussed the plan for discharge and the patient is agreeable. Reviewed supportive cares, symptomatic treatment, outpatient follow up, and reasons to return to the Emergency Department. Patient to be discharged by ED RN.      Patient signed out with plan for follow-up of heart rate and recheck of symptoms.  Patient asymptomatic and calm at this time.  She did have persistent tachycardia prior to signout but had been started on some treatment and questionable whether she may have had some contamination to the CBD edibles that she ate.  Otherwise comfortable and improving with fluids and medications.  We will plan for discharge home as previously discussed.     1. Sinus tachycardia    2. Adverse effect of cannabis, initial encounter          Dr. Mario Trejo  [facility] Emergency Department         Mario Trejo MD  11/09/21 8758

## 2021-11-09 NOTE — ED PROVIDER NOTES
EMERGENCY DEPARTMENT ENCOUNTER      NAME: Tammy Joshi  AGE: 41 year old female  YOB: 1980  MRN: [id number]  EVALUATION DATE & TIME: 11/8/2021  7:28 PM    PCP: Shawn Lewis    ED PROVIDER: Fady Hurtado M.D.    Chief Complaint   Patient presents with     Palpitations       FINAL IMPRESSION:  1. Sinus tachycardia    2. Adverse effect of cannabis, initial encounter        ED COURSE & MEDICAL DECISION MAKING:    Pertinent Labs & Imaging studies personally reviewed and interpreted by me. (See chart for details)  7:47 PM Patient seen and examined, prior records reviewed.  Differential diagnosis includes but not limited to atrial fibrillation, atrial flutter, AVNRT, PVC, PAC, V. tach, V. fib, hypothyroidism, hyperthyroidism, electrolyte disturbance, pulmonary embolism.  Patient ate a CBD gummy and shortly thereafter.  States palpitations, chest tightness, shortness of breath, and agitation.  She is feeling better now.  Initial heart rate in the 170s, currently in the 130s to 140s.  Blood pressure stable.  Lungs are clear and patient denies shortness of breath at this time.  Labs, IV fluids are ordered.  Will monitor closely.  9:05 PM labs so far reassuring other than mild hypomagnesemia.  This is replaced intravenously.  Patient remains tachycardic in the 130s to 140s.  We will continue to monitor in the emergency department.  Additional Ativan IV is ordered.  11:13 PM D-dimer was slightly elevated, given tachycardia and slightly low oxygen saturation, CT PE protocol was ordered.  This is negative.  Patient returned from CT, heart rate still in the 130s.  Patient rechecked, she denies any chest pain or shortness of breath.  Will monitor in the emergency department for improvement of her tachycardia.  Patient will be given a dose of propanolol as well.  12:24 AM  Heart rate improved, blood pressure a little low.  Additional fluids ordred and plan for discharge.  1:00 AM Patient signout  to Dr. Trejo for disposition, likely discharge.       At the conclusion of the encounter I discussed the results of all of the tests and the disposition. The questions were answered. The patient or family acknowledged understanding and was agreeable with the care plan.     PROCEDURES:   Procedures    MEDICATIONS GIVEN IN THE EMERGENCY:  Medications   0.9% sodium chloride BOLUS (has no administration in time range)   0.9% sodium chloride BOLUS (0 mLs Intravenous Stopped 11/8/21 2052)   magnesium sulfate 2 g in water intermittent infusion (0 g Intravenous Stopped 11/8/21 2208)   LORazepam (ATIVAN) injection 0.5 mg (0.5 mg Intravenous Given 11/8/21 2139)   0.9% sodium chloride BOLUS (1,000 mLs Intravenous New Bag 11/8/21 2209)   iopamidol (ISOVUE-370) solution 100 mL (100 mLs Intravenous Given 11/8/21 2246)   propranolol (INDERAL) tablet 10 mg (10 mg Oral Given 11/9/21 0005)       NEW PRESCRIPTIONS STARTED AT TODAY'S ER VISIT  New Prescriptions    No medications on file       =================================================================    HPI    Patient information was obtained from: Sena Mayasarai Joshi is a 41 year old female with a pertinent history of hyperlipidemia, DM2, and anxiety, who presents to this ED via EMS for evaluation of palpitations and shortness of breath.    Patient reports she took one gummy with THC around 1600 today. She notes that afterwards she started feeling short of breath (resolved) and experienced palpitations. Patient also reports associating nausea and chest pain (resolved). She told her  about symptoms as he also took same gummy and recommended patient to call EMS. Patient reports that en route to ED, her heart rate was in the 170's. She was also given two Ativan. Patient reports a history of anxiety and has taken these gummies in the past without problem. She also notes that current symptoms were not reminiscent to previous anxiety attacks.    Denies any vomiting  or any changes of pregnancy. No other complaints at this time.     Per traige note, patient took one Delta-8 gummy with THC. Per medics, pt's HR was in the 170's upon their arrival. Pt was was given ativan 2 mg total. Pt more calm and HR has since come down to 150's and with resolution of shortness of breath and chest pain. .    REVIEW OF SYSTEMS   Review of Systems   Respiratory: Positive for shortness of breath (resolved).    Cardiovascular: Positive for chest pain (resolved) and palpitations.   Gastrointestinal: Positive for nausea. Negative for vomiting.   All other systems reviewed and are negative.     All other systems reviewed and negative    PAST MEDICAL HISTORY:  Past Medical History:   Diagnosis Date     Absence of menstruation      Female infertility of unspecified origin      MILD/NOS PREECLAMP-ANTEP 8/29/2005     Polycystic ovaries      PPH (postpartum hemorrhage) 11/2009       PAST SURGICAL HISTORY:  Past Surgical History:   Procedure Laterality Date     C HAND/FINGER SURGERY UNLISTED  7th grade    left index     D & C  1998    Missed Ab     LITHOTRIPSY  2011       CURRENT MEDICATIONS:    Current Facility-Administered Medications   Medication     0.9% sodium chloride BOLUS     No current outpatient medications on file.       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 Yes     Comment: adopted- unknown   Social History Narrative     Not on file     Social Determinants of Health     Financial Resource Strain: Not on file   Food Insecurity: Not on file   Transportation Needs: Not on file   Physical Activity: Not on file   Stress: Not on file   Social Connections: Not on file   Intimate Partner Violence: Not on file   Housing Stability: Not on file       VITALS:  /63   Pulse (!) 135   Resp 20   Ht 1.626 m (5' 4\")   Wt 72.6 kg (160 lb)   SpO2 96%   BMI 27.46 kg/m      PHYSICAL EXAM:  Physical Exam  Vitals and nursing note reviewed.   Constitutional:       Appearance: Normal appearance.   HENT:      Head: Normocephalic and atraumatic.      Right Ear: External ear normal.      Left Ear: External ear normal.      Nose: Nose normal.      Mouth/Throat:      Mouth: Mucous membranes are moist.   Eyes:      Extraocular Movements: Extraocular movements intact.      Conjunctiva/sclera: Conjunctivae normal.      Pupils: Pupils are equal, round, and reactive to light.   Cardiovascular:      Rate and Rhythm: Regular rhythm. Tachycardia present.   Pulmonary:      Effort: Pulmonary effort is normal.      Breath sounds: Normal breath sounds. No wheezing or rales.   Abdominal:      General: Abdomen is flat. There is no distension.      Palpations: Abdomen is soft.      Tenderness: There is no abdominal tenderness. There is no guarding.   Musculoskeletal:         General: Normal range of motion.      Cervical back: Normal range of motion and neck supple.      Right lower leg: No edema.      Left lower leg: No edema.   Lymphadenopathy:      Cervical: No cervical adenopathy.   Skin:     General: Skin is warm and dry.   Neurological:      General: No focal deficit present.      Mental Status: She is alert and " oriented to person, place, and time. Mental status is at baseline.      Comments: No gross focal neurologic deficits   Psychiatric:         Mood and Affect: Mood normal.         Behavior: Behavior normal.         Thought Content: Thought content normal.        LAB:  All pertinent labs reviewed and interpreted.  Results for orders placed or performed during the hospital encounter of 11/08/21   CT Chest Pulmonary Embolism w Contrast    Impression    IMPRESSION:  1.  No evidence for pulmonary emboli.  2.  No evidence for acute pulmonary disease.   Basic metabolic panel   Result Value Ref Range    Sodium 134 (L) 136 - 145 mmol/L    Potassium 4.0 3.5 - 5.0 mmol/L    Chloride 101 98 - 107 mmol/L    Carbon Dioxide (CO2) 26 22 - 31 mmol/L    Anion Gap 7 5 - 18 mmol/L    Urea Nitrogen 5 (L) 8 - 22 mg/dL    Creatinine 0.73 0.60 - 1.10 mg/dL    Calcium 8.7 8.5 - 10.5 mg/dL    Glucose 293 (H) 70 - 125 mg/dL    GFR Estimate >90 >60 mL/min/1.73m2   Result Value Ref Range    Troponin I <0.01 0.00 - 0.29 ng/mL   Result Value Ref Range    Magnesium 1.4 (L) 1.8 - 2.6 mg/dL   TSH with free T4 reflex   Result Value Ref Range    TSH 0.64 0.30 - 5.00 uIU/mL   CBC with platelets and differential   Result Value Ref Range    WBC Count 10.2 4.0 - 11.0 10e3/uL    RBC Count 4.40 3.80 - 5.20 10e6/uL    Hemoglobin 14.3 11.7 - 15.7 g/dL    Hematocrit 40.5 35.0 - 47.0 %    MCV 92 78 - 100 fL    MCH 32.5 26.5 - 33.0 pg    MCHC 35.3 31.5 - 36.5 g/dL    RDW 12.4 10.0 - 15.0 %    Platelet Count 180 150 - 450 10e3/uL    % Neutrophils 82 %    % Lymphocytes 11 %    % Monocytes 5 %    % Eosinophils 0 %    % Basophils 1 %    % Immature Granulocytes 1 %    NRBCs per 100 WBC 0 <1 /100    Absolute Neutrophils 8.5 (H) 1.6 - 8.3 10e3/uL    Absolute Lymphocytes 1.1 0.8 - 5.3 10e3/uL    Absolute Monocytes 0.5 0.0 - 1.3 10e3/uL    Absolute Eosinophils 0.0 0.0 - 0.7 10e3/uL    Absolute Basophils 0.1 0.0 - 0.2 10e3/uL    Absolute Immature Granulocytes 0.1 (H) <=0.0  10e3/uL    Absolute NRBCs 0.0 10e3/uL   Drugs of Abuse 1+ Panel, Urine ([facility] Only)   Result Value Ref Range    Amphetamines Urine Screen Negative Screen Negative    Benzodiazepines Urine Screen Negative Screen Negative    Opiates Urine Screen Negative Screen Negative    PCP Urine Screen Negative Screen Negative    Cannabinoids Urine Screen Positive (A) Screen Negative    Barbiturates Urine Screen Negative Screen Negative    Cocaine Urine Screen Negative Screen Negative    Methadone Urine Screen Negative Screen Negative    Oxycodone Urine Screen Negative Screen Negative    Creatinine Urine mg/dL 41 mg/dL   D dimer quantitative   Result Value Ref Range    D-Dimer Quantitative 0.62 (H) 0.00 - 0.50 ug/mL FEU       RADIOLOGY:  Reviewed all pertinent imaging. Please see official radiology report.  CT Chest Pulmonary Embolism w Contrast   Final Result   IMPRESSION:   1.  No evidence for pulmonary emboli.   2.  No evidence for acute pulmonary disease.          EKG:    Performed at: 7:37 PM  Impression: Sinus tachycardia, nonspecific T wave changes  Rate: 142  Rhythm: Sinus  Axis: Normal  SD Interval: 132  QRS Interval: 76  QTc Interval: 446  ST Changes: No acute ischemic changes  Comparison: Prior of August 1998, specific T wave changes are now present and tachycardia is present    I have independently reviewed and interpreted the EKG(s) documented above.    I, Shadia Trinh, am serving as a scribe to document services personally performed by Dr. Hurtado based on my observation and the provider's statements to me. I, Fady Hurtado MD attest that Shadia Trinh is acting in a scribe capacity, has observed my performance of the services and has documented them in accordance with my direction.    Fady Hurtado M.D.  Emergency Medicine  [facility] EMERGENCY DEPARTMENT  [facility] [facility] [id number] [phone]  Dept: [phone]     [place]  Fady BOLDEN MD  11/09/21 0030

## 2021-11-10 ENCOUNTER — TELEPHONE (OUTPATIENT)
Dept: FAMILY MEDICINE | Facility: CLINIC | Age: 41
End: 2021-11-10
Payer: COMMERCIAL

## 2021-11-10 ENCOUNTER — HOSPITAL ENCOUNTER (EMERGENCY)
Facility: CLINIC | Age: 41
Discharge: HOME | End: 2021-11-10
Attending: EMERGENCY MEDICINE | Admitting: EMERGENCY MEDICINE
Payer: COMMERCIAL

## 2021-11-10 VITALS
OXYGEN SATURATION: 97 % | SYSTOLIC BLOOD PRESSURE: 119 MMHG | DIASTOLIC BLOOD PRESSURE: 87 MMHG | TEMPERATURE: 98.2 F | RESPIRATION RATE: 16 BRPM | HEART RATE: 109 BPM

## 2021-11-10 DIAGNOSIS — U07.1 INFECTION DUE TO 2019 NOVEL CORONAVIRUS: ICD-10-CM

## 2021-11-10 DIAGNOSIS — R00.0 SINUS TACHYCARDIA: ICD-10-CM

## 2021-11-10 DIAGNOSIS — Z20.822 SUSPECTED COVID-19 VIRUS INFECTION: ICD-10-CM

## 2021-11-10 DIAGNOSIS — F41.9 ANXIETY: ICD-10-CM

## 2021-11-10 LAB
GLUCOSE BLDC GLUCOMTR-MCNC: 97 MG/DL (ref 70–99)
HCG UR QL: NEGATIVE
SARS-COV-2 RNA RESP QL NAA+PROBE: POSITIVE

## 2021-11-10 PROCEDURE — 250N000013 HC RX MED GY IP 250 OP 250 PS 637: Performed by: EMERGENCY MEDICINE

## 2021-11-10 PROCEDURE — 99284 EMERGENCY DEPT VISIT MOD MDM: CPT | Performed by: EMERGENCY MEDICINE

## 2021-11-10 PROCEDURE — 93005 ELECTROCARDIOGRAM TRACING: CPT | Performed by: EMERGENCY MEDICINE

## 2021-11-10 PROCEDURE — 93010 ELECTROCARDIOGRAM REPORT: CPT | Performed by: EMERGENCY MEDICINE

## 2021-11-10 PROCEDURE — C9803 HOPD COVID-19 SPEC COLLECT: HCPCS | Performed by: EMERGENCY MEDICINE

## 2021-11-10 PROCEDURE — 87635 SARS-COV-2 COVID-19 AMP PRB: CPT | Performed by: EMERGENCY MEDICINE

## 2021-11-10 PROCEDURE — 99285 EMERGENCY DEPT VISIT HI MDM: CPT | Mod: 25 | Performed by: EMERGENCY MEDICINE

## 2021-11-10 PROCEDURE — 81025 URINE PREGNANCY TEST: CPT | Performed by: EMERGENCY MEDICINE

## 2021-11-10 RX ORDER — LORAZEPAM 1 MG/1
1 TABLET ORAL ONCE
Status: COMPLETED | OUTPATIENT
Start: 2021-11-10 | End: 2021-11-10

## 2021-11-10 RX ORDER — VENLAFAXINE 75 MG/1
75 TABLET ORAL DAILY
Qty: 30 TABLET | Refills: 0 | Status: SHIPPED | OUTPATIENT
Start: 2021-11-10 | End: 2022-01-25 | Stop reason: SINTOL

## 2021-11-10 RX ORDER — LORAZEPAM 1 MG/1
.5-1 TABLET ORAL EVERY 8 HOURS PRN
Qty: 15 TABLET | Refills: 0 | Status: SHIPPED | OUTPATIENT
Start: 2021-11-10 | End: 2022-01-24

## 2021-11-10 RX ADMIN — LORAZEPAM 1 MG: 1 TABLET ORAL at 17:22

## 2021-11-10 NOTE — ED TRIAGE NOTES
"Pt took a gummy on MOnday, thought it was for anxiety, but it was not, it was a \"Delta 8\" gummy.  Pt was seen at [place], and evaluated that day.  Pt states that she still doesn't feel right and she keeps getting confused, and it doesn't seem normal.  Pt is A&Ox4 and tearful in triage.  Pt tachycardic.    "

## 2021-11-10 NOTE — ED PROVIDER NOTES
"  History     Chief Complaint   Patient presents with     Anxiety     Tachycardia     History per patient and review of EMR.    HPI  Tammy Joshi is a 41 year old female who presents in emergency department for evaluation of anxiety and elevated heart rate which began 2 days ago after she took a cannabinoid \"gummy\" called \"Delta 8\" for anxiety.  She presents with persistent tachycardia since taking this, anxiety and feeling confused.  She states she sometimes forgets things, and then remembers them. She was seen for these symptoms 2 days ago at [facility] and had an EKG with sinus tachycardia, extensive laboratory evaluation remarkable for urine tox screen positive for cannabinoids, mildly elevated D-dimer, glucose 293 and a CT of the chest PE protocol which were unremarkable.  She was given an IV fluid bolus, Ativan, Propranolol,   Previous treatment of anxiety included Lexapro, Hydroxyzine, Zoloft, Ativan, Nortriptyline, Trazodone and Celexa.  She is also seeing a psychologist/therapist in the past.  She reports nothing is really help with her anxiety in the past, except for Percocet.  She feels a something besides anxiety is causing her symptoms, states she is previously used the CBD Gummies without adverse affect and reports that she feels like \"people are judging me\".  Mild recent nonproductive cough for least a week, but no chest pain, shortness of breath, hemoptysis or leg pain or leg swelling.  He has fatigue and malaise and feels very tired with minimal activity.  No fever or chills.  No other signs or symptoms of URI or COVID-19 illness.  No known infectious exposures.  She is not vaccinated versus COVID-19.      Allergies:  No Known Allergies    Problem List:    Patient Active Problem List    Diagnosis Date Noted     Obesity without serious comorbidity, unspecified classification, unspecified obesity type 07/27/2020     Priority: Medium     Hip pain, left 09/08/2017     Priority: Medium     " Type 2 diabetes mellitus without complication (H) 10/25/2015     Priority: Medium     Hyperlipidemia with target LDL less than 100 08/06/2015     Priority: Medium     Diagnosis updated by automated process. Provider to review and confirm.       Depression 02/24/2014     Priority: Medium     Anxiety 07/21/2011     Priority: Medium     CARDIOVASCULAR SCREENING; LDL GOAL LESS THAN 160 10/31/2010     Priority: Medium     Hand numbness 10/14/2009     Priority: Medium        Past Medical History:    Past Medical History:   Diagnosis Date     Absence of menstruation      Female infertility of unspecified origin      MILD/NOS PREECLAMP-ANTEP 8/29/2005     Polycystic ovaries      PPH (postpartum hemorrhage) 11/2009       Past Surgical History:    Past Surgical History:   Procedure Laterality Date     C HAND/FINGER SURGERY UNLISTED  7th grade    left index     D & C  1998    Missed Ab     LITHOTRIPSY  2011       Family History:    Family History   Adopted: Yes   Problem Relation Age of Onset     Unknown/Adopted Other         patient was adopted; knows a limited amount of medical history of birth parents       Social History:  Marital Status:   [2]  Social History     Tobacco Use     Smoking status: Never Smoker     Smokeless tobacco: Never Used   Substance Use Topics     Alcohol use: Yes     Comment: wine occasionally, none while pregnant     Drug use: No        Medications:    LORazepam (ATIVAN) 1 MG tablet  venlafaxine (EFFEXOR) 75 MG tablet        Review of Systems   Bitemporal and occipital headache.  No neck stiffness, nausea, vomiting, rash or fever or chills.  As mentioned above in the history present illness.  All other systems were reviewed and are negative.    Physical Exam   BP: 133/75  Pulse: 112  Temp: 98.2  F (36.8  C)  Resp: 16  SpO2: 98 %      Physical Exam  Vitals and nursing note reviewed.   Constitutional:       General: She is not in acute distress.     Appearance: Normal appearance. She is  well-developed. She is not ill-appearing or diaphoretic.   HENT:      Head: Normocephalic and atraumatic.      Right Ear: External ear normal.      Left Ear: External ear normal.      Nose: Nose normal.   Eyes:      General: No scleral icterus.     Extraocular Movements: Extraocular movements intact.      Conjunctiva/sclera: Conjunctivae normal.   Neck:      Trachea: No tracheal deviation.   Cardiovascular:      Rate and Rhythm: Regular rhythm. Tachycardia present.      Heart sounds: Normal heart sounds. No murmur heard.  No friction rub. No gallop.    Pulmonary:      Effort: Pulmonary effort is normal. No respiratory distress.      Breath sounds: Normal breath sounds. No wheezing, rhonchi or rales.   Abdominal:      General: There is no distension.      Palpations: Abdomen is soft.      Tenderness: There is no abdominal tenderness.   Musculoskeletal:         General: No swelling or tenderness. Normal range of motion.      Cervical back: Normal range of motion and neck supple.      Right lower leg: No edema.      Left lower leg: No edema.   Skin:     General: Skin is warm and dry.      Coloration: Skin is not pale.      Findings: No erythema or rash.   Neurological:      General: No focal deficit present.      Mental Status: She is alert and oriented to person, place, and time.      Coordination: Coordination normal.   Psychiatric:         Mood and Affect: Mood normal.         Behavior: Behavior normal.      Comments: Anxious, tearful.         ED Course        Procedures              EKG Interpretation:      Interpreted by Jey Reyes MD  Time reviewed: Upon completion  Symptoms at time of EKG: Rapid palpitations  Rhythm: Sinus tachycardia, 114  Rate: normal  Axis: normal  Ectopy: none  Conduction: normal  ST Segments/ T Waves: No ST-T wave changes  Q Waves: none  Comparison to prior: 7/20/2020 EKG was normal.  Clinical Impression: EKG         Results for orders placed or performed during the hospital encounter  of 11/10/21 (from the past 24 hour(s))   Glucose by meter   Result Value Ref Range    GLUCOSE BY METER POCT 97 70 - 99 mg/dL   Asymptomatic COVID-19 Virus (Coronavirus) by PCR Nasopharyngeal    Specimen: Nasopharyngeal; Swab   Result Value Ref Range    SARS CoV2 PCR Positive (A) Negative    Narrative    Testing was performed using the [place]  SARS-CoV-2 & Influenza A/B Assay on the [place]  [employer]  System.  This test should be ordered for the detection of SARS-COV-2 in individuals who meet SARS-CoV-2 clinical and/or epidemiological criteria. Test performance is unknown in asymptomatic patients.  This test is for in vitro diagnostic use under the FDA EUA for laboratories certified under CLIA to perform moderate and/or high complexity testing. This test has not been FDA cleared or approved.  A negative test does not rule out the presence of PCR inhibitors in the specimen or target RNA in concentration below the limit of detection for the assay. The possibility of a false negative should be considered if the patient's recent exposure or clinical presentation suggests COVID-19.  [facility] are certified under the Clinical Laboratory Improvement Amendments of 1988 (CLIA-88) as qualified to perform moderate and/or high complexity laboratory testing.   HCG qualitative urine (UPT)   Result Value Ref Range    hCG Urine Qualitative Negative Negative       Medications   LORazepam (ATIVAN) tablet 1 mg (1 mg Oral Given 11/10/21 1722)       Assessments & Plan (with Medical Decision Making)   41-year-old female with history of anxiety with increased anxiety, fatigue, malaise, feeling confused and tachycardia since taking a CBD gummy 2 days ago for anxiety.  Seen in the [facility] emergency department after onset of symptoms 2 days ago and has sinus tachycardia to approximately 140.  Work-up was otherwise unremarkable, including CT scan of the chest PE protocol which was negative/unremarkable.  In the ED she  has heart rate approximate 110 which increases into the 110s when anxious or distressed in the ED.  EKG is was unremarkable.  Otherwise normal vital signs and no respiratory distress or hypoxia.  No chest pain, palpitations or syncope.  She is recently had a mild cough.  COVID-19 nasopharyngeal study was positive.  Her symptoms appear secondary to COVID-19 illness and anxiety.  No respiratory distress, hypoxia or other concerning signs or symptoms to suggest PE/DVT or other emergent disease process.  Other than sinus tachycardia, EKG is unremarkable.  Do not feel that repeat imaging evaluation currently indicated and the risk of radiation exposure would appear to outweigh the benefit at the present time.  I checked a urine pregnancy test today and this was negative.  Blood sugar was significantly elevated 2 days ago, but not today.  She appears stable and appropriate for discharge with referral to the get well loop program and discharge her with instructions for quarantine and supportive care of COVID-19 illness.  I will prescribe a small number of Ativan to use in the short-term for anxiety, and initiate anxiolytic therapy with Effexor.  A primary care referral was made for her follow-up in the near future.  Ordered an outpatient 48-hour Holter monitor study for follow-up.  She was provided instructions for supportive care and will return as needed for worsened condition or worsening symptoms, or new problems or concerns.      I have reviewed the nursing notes.    I have reviewed the findings, diagnosis, plan and need for follow up with the patient.    New Prescriptions    LORAZEPAM (ATIVAN) 1 MG TABLET    Take 0.5-1 tablets (0.5-1 mg) by mouth every 8 hours as needed for anxiety (or insomnia)    VENLAFAXINE (EFFEXOR) 75 MG TABLET    Take 1 tablet (75 mg) by mouth daily       Final diagnoses:   Infection due to 2019 novel coronavirus   Sinus tachycardia - Presumed secondary to COVID-19 illness   Anxiety   Suspected  COVID-19 virus infection       11/10/2021   [facility] EMERGENCY DEPT     Jey Reyes MD  11/10/21 2011

## 2021-11-10 NOTE — TELEPHONE ENCOUNTER
"S-(situation): tachycardia    B-(background): seen 2 days ago at [place] for tachycardia that was related to cannabis use.  Patient states \"since its legal now I just wanted to try it for anxiety\".  Patient checks her pulse with me 140+ BPM and she is crying.  Feels in a fog.     A-(assessment): tachycardia    R-(recommendations): to the ER. Archana MAGALLANES RN      "

## 2021-11-10 NOTE — ED NOTES
Called to triage regarding patient - patient was crying in lobby and has concerns related to her heart rate. Discussed her symptoms that started on Monday - she was evaluated in ED on Monday for palpitations and increased HR. Patient states she tried a gummy 'anti anxiety' medication on Monday that she attributes to the increased HR and feeling 'miserable' since. Today she has continued high heart rate, states 130, and feeling confused. Patient is here with her  and appears upset and anxious. Patient denies any recent medication changes.

## 2021-11-11 ENCOUNTER — PATIENT OUTREACH (OUTPATIENT)
Dept: NURSING | Facility: CLINIC | Age: 41
End: 2021-11-11
Payer: COMMERCIAL

## 2021-11-11 NOTE — PROGRESS NOTES
Care Coordination Hospital/ED Discharge Follow up Note    Hospital/ED Discharge date: 11/10/21    Reason/Diagnosis for Hospital/ED visit: Anxiety, tachycardia.    Are you feeling better, the same, or worse since your Hospital/ED visit? unchanged    Symptoms:     Cough -  productive clear    Shortness of breath:  no shortness of breath     Chest pain:  No    Fever: Yes, 100    Current temperature: 100    Headache:  Yes    Sore throat:  No    Nasal congestion:  No    Nausea/vomiting/diarrhea:  Yes, nausea. Has not thrown up.     Body aches/joint pains:  Yes    Fatigue:  Yes    Home treatment measures used and outcome:  Patient shares she is resting and drinking adequate fluids.     Pulse Oximeter/Oxygen Questions:    Were you sent home with a pulse oximeter?  No    Are you currently utilizing the pulse oximeter?  N/A    Do you understand how to use the home oximeter?  N/A    What are your current oxygen saturation levels?  Not monitoring    Were you sent home with home oxygen?  No      Medications:    Were you prescribed any new medications?  Yes, or anxiety.     If yes, have you picked up these new medications?  Yes    Are the medications helping? Patient shares she does not know if they are working.     Do you have any questions about your new medications?  No       Follow Up:    Do you have a follow up appointment scheduled with your PCP or specialist?  No    Do you have a plan in place in the event of an emergency?  Yes    If patient is established or planning to establish primary care within [facility], Care Coordination was offered. Care Coordination accepted/declined:  Declined.    [facility] invitation received?  Patient shares she does not know if she received the invitation. Writer recommended patient check in her discharge paper work which she confirms she has. Patient declined to check paperwork while on the phone with writer.     [facility] invitation accepted, pending patient activation or  declined: Unknown.     Sarah Singh RN  [facility]

## 2021-11-11 NOTE — DISCHARGE INSTRUCTIONS
Call Cardiac Services to schedule your heart monitor study as soon as possible: [phone].  Follow-up with your primary care provider after this is performed to review the results.

## 2022-01-24 ENCOUNTER — OFFICE VISIT (OUTPATIENT)
Dept: FAMILY MEDICINE | Facility: CLINIC | Age: 42
End: 2022-01-24
Payer: COMMERCIAL

## 2022-01-24 VITALS
OXYGEN SATURATION: 97 % | RESPIRATION RATE: 16 BRPM | SYSTOLIC BLOOD PRESSURE: 122 MMHG | BODY MASS INDEX: 31.09 KG/M2 | TEMPERATURE: 97.8 F | HEIGHT: 65 IN | WEIGHT: 186.6 LBS | HEART RATE: 114 BPM | DIASTOLIC BLOOD PRESSURE: 84 MMHG

## 2022-01-24 DIAGNOSIS — M54.2 NECK PAIN: Primary | ICD-10-CM

## 2022-01-24 DIAGNOSIS — F41.9 ANXIETY: ICD-10-CM

## 2022-01-24 PROCEDURE — 99213 OFFICE O/P EST LOW 20 MIN: CPT | Performed by: FAMILY MEDICINE

## 2022-01-24 RX ORDER — GABAPENTIN 300 MG/1
300 CAPSULE ORAL DAILY
Qty: 90 CAPSULE | Refills: 0 | Status: SHIPPED | OUTPATIENT
Start: 2022-01-24 | End: 2022-02-04

## 2022-01-24 RX ORDER — LORAZEPAM 1 MG/1
.5-1 TABLET ORAL EVERY 8 HOURS PRN
Qty: 15 TABLET | Refills: 0 | Status: SHIPPED | OUTPATIENT
Start: 2022-01-24 | End: 2022-02-04

## 2022-01-24 RX ORDER — OMEGA-3 FATTY ACIDS/FISH OIL 300-1000MG
200 CAPSULE ORAL EVERY 4 HOURS PRN
COMMUNITY

## 2022-01-24 RX ORDER — ACETAMINOPHEN 325 MG/1
325-650 TABLET ORAL EVERY 6 HOURS PRN
COMMUNITY
End: 2024-03-26

## 2022-01-24 ASSESSMENT — PAIN SCALES - GENERAL: PAINLEVEL: MODERATE PAIN (4)

## 2022-01-24 ASSESSMENT — MIFFLIN-ST. JEOR: SCORE: 1506.03

## 2022-01-24 NOTE — PROGRESS NOTES
Assessment & Plan     Neck pain  Appears like some form of radiculopathy,   MRI neck ordered.Patient will be notified of results.  - MR Cervical Spine w/o Contrast; Future  - gabapentin (NEURONTIN) 300 MG capsule; Take 1 capsule (300 mg) by mouth daily    Anxiety  - LORazepam (ATIVAN) 1 MG tablet; Take 0.5-1 tablets (0.5-1 mg) by mouth every 8 hours as needed for anxiety (or insomnia)               FUTURE APPOINTMENTS:       - Follow-up visit in one month or sooner as needed    Return in about 4 weeks (around 2/21/2022) for Follow up.    Shawn Lewis MD  [facility]oz is a 41 year old who presents for the following health issues     HPI patient is a 41-year-old presenting with left-sided trunk pain ongoing for about 6 months.  She reports that her pain started in the neck area radiating to the left shoulder and upper left arm and also pain in the lower left leg.  She reports numbness and tingling.  There is also pain with palpating the area.  Denies any injury.  She reports that the pain is severe enough that it affects her sleep.  She denies any headaches or dizzy spells or lightheadedness.  No other systemic symptoms reported.    Joint Pain    Onset: since august    Description:   Location: left shoulder, left elbow and left leg and left foot and left side of back  Character: Sharp and Dull ache    Intensity: severe, 8/10 at it's worst and currently a 4/10    Progression of Symptoms: worse    Accompanying Signs & Symptoms:  Other symptoms: tingling in left leg and foot and weakness of left arm    History:   Previous similar pain: no       Precipitating factors:   Trauma or overuse: no     Alleviating factors:  Improved by: nothing    Therapies Tried and outcome: heat, ice, stretching, tylenol, ibuprofen, massage, chiropractor, changed pillows - none have helped         Review of Systems   Constitutional: Negative.    HENT: Negative.    Eyes: Negative.   "  Respiratory: Negative.    Cardiovascular: Negative.    Gastrointestinal: Negative.    Genitourinary: Negative.    Musculoskeletal: Negative.    Skin: Negative.    Neurological: Positive for numbness and paresthesias.   Psychiatric/Behavioral: Negative.           Objective    /84 (BP Location: Right arm, Patient Position: Sitting, Cuff Size: Adult Large)   Pulse 114   Temp 97.8  F (36.6  C) (Tympanic)   Resp 16   Ht 1.641 m (5' 4.61\")   Wt 84.6 kg (186 lb 9.6 oz)   SpO2 97%   Breastfeeding No   BMI 31.43 kg/m    Body mass index is 31.43 kg/m .  Physical Exam  Constitutional:       Appearance: Normal appearance.   HENT:      Head: Normocephalic and atraumatic.      Right Ear: Tympanic membrane normal.   Musculoskeletal:         General: Swelling present.      Right upper arm: Swelling present.      Left upper arm: Swelling and tenderness present.        Arms:       Left lower leg: Tenderness present.        Legs:    Skin:     General: Skin is warm.   Neurological:      Mental Status: She is alert.                  "

## 2022-01-25 ASSESSMENT — ENCOUNTER SYMPTOMS
PARESTHESIAS: 1
EYES NEGATIVE: 1
CONSTITUTIONAL NEGATIVE: 1
PSYCHIATRIC NEGATIVE: 1
RESPIRATORY NEGATIVE: 1
GASTROINTESTINAL NEGATIVE: 1
MUSCULOSKELETAL NEGATIVE: 1
CARDIOVASCULAR NEGATIVE: 1
NUMBNESS: 1

## 2022-01-26 ENCOUNTER — HOSPITAL ENCOUNTER (OUTPATIENT)
Dept: MRI IMAGING | Facility: CLINIC | Age: 42
Discharge: HOME | End: 2022-01-26
Attending: FAMILY MEDICINE | Admitting: FAMILY MEDICINE
Payer: COMMERCIAL

## 2022-01-26 DIAGNOSIS — M54.2 NECK PAIN: ICD-10-CM

## 2022-01-26 PROCEDURE — 72141 MRI NECK SPINE W/O DYE: CPT

## 2022-01-28 ENCOUNTER — TELEPHONE (OUTPATIENT)
Dept: FAMILY MEDICINE | Facility: CLINIC | Age: 42
End: 2022-01-28

## 2022-01-28 DIAGNOSIS — M54.2 NECK PAIN: Primary | ICD-10-CM

## 2022-01-28 NOTE — TELEPHONE ENCOUNTER
Dr Lewis,    Please see pt's phone call msg.  She is requesting a call from you to discuss MRI, plan for poss PT, injections & medications.   Pt does not have mychart.    Brigida Barnes RN

## 2022-01-28 NOTE — TELEPHONE ENCOUNTER
Reason for Call:  Request for results:    Name of test or procedure: MRI    Date of test of procedure: 1/26/2022    Location of the test or procedure: [place]    OK to leave the result message on voice mail or with a family member? YES    Phone number Patient can be reached at:  Home number on file [phone] (home)    Additional comments: Patient is asking for these to be reviewed before weekend that pain is not getting better even with the gabapentin. She wants something else for the pain also.     Call taken on 1/28/2022 at 12:38 PM by Danna Magallon

## 2022-01-28 NOTE — TELEPHONE ENCOUNTER
Pt calling for MRI results & read covering providers message. States PCP told her at [facility] 1/24/22 that she would call pt with results & plan & poss physical therapy referral.  Discussed new Rx for muscle relaxer & the increase in gabapentin.   Pt does not have mychart account & did not want to take the number to call & set up at this time.  Will route to Dr Lewis.    Brigida Barnes RN

## 2022-01-28 NOTE — TELEPHONE ENCOUNTER
Dr. Lewis will address results further when she returns next week, however MRI of neck does show disc herniations at C5-6 and C6-7 with some narrowing of the canal. May benefit from PT, injections, but she can discuss that further with Dr. Lewis. Can increase gabapentin to twice daily, and if not helpful in 2-3 days, 3 times daily. I will send a prescription for a muscle relaxer to our pharmacy as one was not pended and she has 2 preferred. She can try that for the weekend.

## 2022-01-31 ENCOUNTER — TELEPHONE (OUTPATIENT)
Dept: PALLIATIVE MEDICINE | Facility: CLINIC | Age: 42
End: 2022-01-31

## 2022-01-31 DIAGNOSIS — M48.02 CERVICAL STENOSIS OF SPINAL CANAL: Primary | ICD-10-CM

## 2022-01-31 NOTE — TELEPHONE ENCOUNTER
I called pt.    She says that she and Dr Lewis are playing phone tag this morning.  Pt is available now.  Dr Lewis informed.    Gabriella Brown RN

## 2022-01-31 NOTE — TELEPHONE ENCOUNTER
Patient called back provider unavailable. Told patient to keep her phone by her that provider will call back.    Danna Scott PSC on 1/31/2022 at 8:59 AM

## 2022-01-31 NOTE — TELEPHONE ENCOUNTER
Screening Questions for Radiology Injections:    Injection to be done at which interventional clinic site? [facility]    Remind [place] Pt's that Covid test is no longer required except for sedation procedure Pt's.    Instruct patient to arrive as directed prior to the scheduled appointment time:    [place] minutes before      [place]: 30 minutes before; if IV needed 1 hour before     Procedure ordered by     Procedure ordered? PRABHJOT      Transforaminal Cervical ULICES -  [place] does NOT have providers that do these- [facility] do have providers that do    As a reminder, receiving steroids can decrease your body's ability to fight infection.   Would you still like to move forward with scheduling the injection?  Yes    What insurance would patient like us to bill for this procedure? [employer]       Worker's comp or MVA (motor vehicle accident) -Any injection DO NOT SCHEDULE and route to Leandro Smith.      [employer] insurance - For SI joint injections, DO NOT SCHEDULE and route Monica Fitzgerald.       ALL BCBS, [employer] and [employer]-Route to Monica for review DO NOT SCHEDULE      IF SCHEDULING IN [place] AND NEEDS A PA, IT IS OKAY TO SCHEDULE. [place] HANDLES THEIR OWN PA'S AFTER THE PATIENT IS SCHEDULED. PLEASE SCHEDULE AT LEAST 1 WEEK OUT SO A PA CAN BE OBTAINED.    Any chance of pregnancy? NO   If YES, do NOT schedule and route to RN pool    Is an  needed? No     Patient has a drive home? (mandatory) YES: ok    Is patient taking any blood thinners (That is: Coumadin, Warfarin, Jantoven, Pradaxa Xarelto, Eliquis, Edoxaban, Enoxaparin, Lovenox, Heparin, Arixtra, Fondaparinux, or Fragmin? OR Antiplatelet medication such as Plavix, Brilinta, or Effient? )? No   If hold needed, do NOT schedule, route to RN pool     Is patient taking any aspirin products (includes Excedrin and Fiorinal)? No     If more than 325mg/day, OK to schedule; Instruct pt to decrease to less than 325 mg for  7 days AND route to RN pool    For CERVICAL procedures, hold all aspirin products for 6 days.     Tell pt that if aspirin product is not held for 6 days, the procedure WILL BE cancelled.      Does the patient have a bleeding or clotting disorder? No     If YES, okay to schedule AND route to RN nurse pool    For any patients with platelet count <100, must be forwarded to provider    Any allergies to contrast dye, iodine, shellfish, or numbing and steroid medications? No    If YES, add allergy information to appointment notes AND route to the RN pool     If ULICES and Contrast Dye / Iodine Allergy? DO NOT SCHEDULE, route to RN pool    Allergies: Patient has no known allergies.     Is patient diabetic?  Yes  If YES, instruct them to bring their glucometer.    Does patient have an active infection or treated for one within the past week? No     Is patient currently taking any antibiotics?  No     For patients on chronic, preventative, or prophylactic antibiotics, procedures may be scheduled.     For patients on antibiotics for active or recent infection:antibiotic course must have been completed for 4 days    Is patient currently taking any steroid medications? (i.e. Prednisone, Medrol)  No     For patients on steroid medications, course must have been completed for 4 days    Is patient actively being treated for cancer or immunocompromised? No  If YES, do NOT schedule and route to RN pool     Are you able to get on and off an exam table with minimal or no assistance? Yes  If NO, do NOT schedule and route to RN pool    Are you able to roll over and lay on your stomach with minimal or no assistance? Yes  If NO, do NOT schedule and route to RN pool     Has the patient had a flu shot or any other vaccinations within 7 days before or after the procedure.  No     Have you recently had a COVID vaccine or have plans to get it in the near future? No    If yes, explain that for the vaccine to work best they need to:       wait 1  week before and 1 week after getting Vaccine #1    wait 1 week before and 2 weeks after getting Vaccine #2    wait 1 week before and 2 weeks after getting Vaccine BOOSTER    If patient has concerns about the timing, send to RN pool     Does patient have an MRI/CT?  YES: MRI  Check Procedure Scheduling Grid to see if required.      Was the MRI done within the last 3 years?  Yes    If yes, where was the MRI done i.e.[facility], [facility], [place], [facility] etc? [facility]      If no, do not schedule and route to RN pool    If MRI was not done at [place], [facility] or [facility] do NOT schedule and route to RN pool.      If pt has an imaging disc, the injection MAY be scheduled but pt has to bring disc to appt.     If they show up without the disc the injection cannot be done    Procedure Specific Instructions:      If celiac plexus block, informed patient NPO for 6 hours and that it is okay to take medications with sips of water, especially blood pressure medications  Not Applicable         If this is for a cervical procedure, informed patient that aspirin needs to be held for 6 days.   Not Applicable      If IV needed:    Do not schedule procedures requiring IV placement in the first appointment of the day or first appointment after lunch. Do NOT schedule at 0745, 0815 or 1245. ok    Instructed pt to arrive 30 minutes early for IV start if required. (Check Procedure Scheduling Grid)  YES: ok    Reminders:      If you are started on any steroids or antibiotics between now and your appointment, you must contact us because the procedure may need to be cancelled.  Yes      For all procedures except radiofrequency ablations (RFAs) and spinal cord stimulator (SCS) trials, informed patient:    IV sedation is not provided for this procedure.  If you feel that an oral anti-anxiety medication is needed, you can discuss this further with your referring provider or primary care provider.  The Pain Clinic provider  will discuss specifics of what the procedure includes at your appointment.  Most procedures last 10-20 minutes.  We use numbing medications to help with any discomfort during the procedure.  Not Applicable      For patients 85 or older we recommend having an adult stay w/ them for the remainder of the day.   ok    Does the patient have any questions?  NO  Mariel Smith  [facility]

## 2022-02-01 ENCOUNTER — TELEPHONE (OUTPATIENT)
Dept: FAMILY MEDICINE | Facility: CLINIC | Age: 42
End: 2022-02-01

## 2022-02-01 DIAGNOSIS — M50.20 CERVICAL DISC HERNIATION: ICD-10-CM

## 2022-02-01 DIAGNOSIS — M54.2 CHRONIC NECK PAIN: Primary | ICD-10-CM

## 2022-02-01 DIAGNOSIS — G89.29 CHRONIC NECK PAIN: Primary | ICD-10-CM

## 2022-02-01 NOTE — TELEPHONE ENCOUNTER
Reason for Call: Request for an order or referral:    Order or referral being requested: Patient is calling saying that she wants to see pain management not just for epidural. She wants to talk with the doctor. She told the  for pain management and they told her they can't schedule her. Patient also wants a order for physical therapy.     Date needed: at your convenience    Has the patient been seen by the PCP for this problem? YES    Phone number Patient can be reached at:  Home number on file [phone] (home)    Best Time:  any    Can we leave a detailed message on this number?  YES    Call taken on 2/1/2022 at 11:05 AM by Danna Magallon

## 2022-02-03 ENCOUNTER — TELEPHONE (OUTPATIENT)
Dept: PALLIATIVE MEDICINE | Facility: CLINIC | Age: 42
End: 2022-02-03

## 2022-02-03 NOTE — LETTER
February 3, 2022    Tammy Joshi  [address] [address]    Dear Tammy,                                                                 Welcome to the [facility].  We are located in the [facility]oped[facility] Center of the [facility], located at [address] [place] [address]. Your appointment has been scheduled on Monday February 28, 2022 at 3:30 PM with Dr. Mcnally.  **As you approach the building and the parking lots, we are on the far LEFT side of the building, and there is an entrance at the far end (south side of the building).  We recommend you park and enter there.**  At your first visit, you will meet your team of caregivers who will help you to develop pain management strategies that will last a lifetime. You will meet with our support staff to review your insurance information and collect your co-payment if required by your insurance company. You will also meet with a medical pain specialist and care coordinator who will assess your pain and develop a plan of care for your successful pain rehabilitation. You should expect to spend approximately 1 hour at your first visit with us. Usually, patients work with us for a period of 6-12 months, and eventually return to their primary doctor once their pain management has stabilized.   To help us make your visit go as smoothly as possible, please bring the following items with you on your visit:     Completed Pain Questionnaire enclosed in this packet.  If you do not bring the completed questionnaire, we may have to reschedule your appointment.    List of any medicines that you are currently taking or have been prescribed    Important NON-[facility] medical information such as medical records or tests results (X-rays, or laboratory tests)    Your health insurance card    Financial resources to cover your co-payment or balance due at the time of service (cash, personal check, Visa, and MasterCard are acceptable  methods of payment)     Due to the demand for new patient evaluations, you must notify the scheduling department 48 hours (2 days)  in advance if you are not able to keep this appointment. Failure to do so could affect your ability to reschedule with our clinic. Please be aware that we will not prescribe any medications at your first visit.   Please call [phone] with any questions regarding your appointment. We look forward to meeting you and working to address your health care needs.     Sincerely,    [facility]

## 2022-02-03 NOTE — TELEPHONE ENCOUNTER

## 2022-02-04 ENCOUNTER — OFFICE VISIT (OUTPATIENT)
Dept: FAMILY MEDICINE | Facility: CLINIC | Age: 42
End: 2022-02-04
Payer: COMMERCIAL

## 2022-02-04 VITALS
WEIGHT: 183 LBS | HEIGHT: 65 IN | OXYGEN SATURATION: 98 % | SYSTOLIC BLOOD PRESSURE: 142 MMHG | TEMPERATURE: 98.2 F | BODY MASS INDEX: 30.49 KG/M2 | DIASTOLIC BLOOD PRESSURE: 92 MMHG | HEART RATE: 100 BPM

## 2022-02-04 DIAGNOSIS — M54.12 CERVICAL RADICULOPATHY: Primary | ICD-10-CM

## 2022-02-04 DIAGNOSIS — R10.9 LEFT FLANK PAIN: ICD-10-CM

## 2022-02-04 DIAGNOSIS — M79.662 PAIN OF LEFT LOWER LEG: ICD-10-CM

## 2022-02-04 PROCEDURE — 99417 PROLNG OP E/M EACH 15 MIN: CPT | Performed by: PHYSICIAN ASSISTANT

## 2022-02-04 PROCEDURE — 99215 OFFICE O/P EST HI 40 MIN: CPT | Performed by: PHYSICIAN ASSISTANT

## 2022-02-04 RX ORDER — OXYCODONE AND ACETAMINOPHEN 5; 325 MG/1; MG/1
1 TABLET ORAL EVERY 6 HOURS PRN
Qty: 20 TABLET | Refills: 0 | Status: SHIPPED | OUTPATIENT
Start: 2022-02-04 | End: 2022-02-15

## 2022-02-04 RX ORDER — GABAPENTIN 300 MG/1
CAPSULE ORAL
Qty: 90 CAPSULE | Refills: 0 | COMMUNITY
Start: 2022-02-04 | End: 2022-02-15

## 2022-02-04 RX ORDER — PREDNISONE 20 MG/1
TABLET ORAL
Qty: 20 TABLET | Refills: 0 | Status: SHIPPED | OUTPATIENT
Start: 2022-02-04 | End: 2022-06-20

## 2022-02-04 ASSESSMENT — MIFFLIN-ST. JEOR: SCORE: 1489.61

## 2022-02-04 ASSESSMENT — PAIN SCALES - GENERAL: PAINLEVEL: SEVERE PAIN (7)

## 2022-02-04 NOTE — PATIENT INSTRUCTIONS
PLAN:     Prednisone - folllow taper as written    60mg x3 days  40mg x3 days  20mg x3 days  10mg x3 days      Do NOT take ibuprofen, aleve or any NSAID products while on this    Okay to take tylenol but if you end up taking a percocet, now that there is 325mg of tylenol in each tab so do not take more than 4000mg (4g) in one day    Continue the gabapentin in the morning (300mg) but INCREASE the nighttime dose to 600mg (2 capsules)    OKay to continue the tizanidine 4mg three times daily as needed    Reserve the percocet for pain NOT controlled by above

## 2022-02-04 NOTE — PROGRESS NOTES
Assessment & Plan     (M54.12) Cervical radiculopathy  (primary encounter diagnosis)  Comment: severe pain in neck and posterior left upper back. MRI reviewed - does have noted disc protusions impacting left side which is consistent with her left arm pain. She is nervous about proceeding to an injection so we did discuss this today and why this may be an option she would want to consider, however I also agree that trying PT and adjusting medications could help as well. Verified with ortho that if we were to try a steroid burst today she would need to be off prednisone for 4 days before doing an injection so I feel it is a reasonable option to try to see what improvement we can get. We also discussed increasing the gabapentin at night and utilizing hte muscle relaxer more as she as been underdosing so does have room to increase. We reviewed the main side effect of all these medications which is sedation, but this would be a welcome effect at night as she is not sleeping which I think is contributing to the anxiety which in turn makes the pain worse.   Plan: gabapentin (NEURONTIN) 300 MG capsule,         oxyCODONE-acetaminophen (PERCOCET) 5-325 MG         tablet, tiZANidine (ZANAFLEX) 4 MG tablet,         predniSONE (DELTASONE) 20 MG tablet          PLAN (as per AVS)  Prednisone - folllow taper as written    60mg x3 days  40mg x3 days  20mg x3 days  10mg x3 days      Do NOT take ibuprofen, aleve or any NSAID products while on this    Okay to take tylenol but if you end up taking a percocet, now that there is 325mg of tylenol in each tab so do not take more than 4000mg (4g) in one day    Continue the gabapentin in the morning (300mg) but INCREASE the nighttime dose to 600mg (2 capsules)    OKay to continue the tizanidine 4mg three times daily as needed    Reserve the percocet for pain NOT controlled by above      (R10.9) Left flank pain  Comment: unclear what is causing this. She is very sensitive to touch all  "along the skin following a distinct dermatome however no rash present. I suspect her whole body on the left side is 'irritated' and she is having some myofascial pain. Also possible that her mid and lower back are trying to over compensate for the current issue in the upper back and so nerves are irritated.   Plan: plan per above - hopefully prednisone will help calm some of the issues down to clear the picture a little as to what is going on    (M79.662) Pain of left lower leg  Comment: as per left flank pain - unsure of exact cause. She was surprised to learn that the left lower leg p ain is not from the nerves in the neck and someone told her it was all the same issue. As stated above, I do think it could be related but more indirectly than directly as the nerves causing the lower leg pain would be from her low back and she is currently having no low back symptoms  Plan: monitor as we treat as per above      70 minutes spent on the date of the encounter doing chart review, review of test results, interpretation of tests, patient visit and documentation     BMI:   Estimated body mass index is 30.83 kg/m  as calculated from the following:    Height as of this encounter: 1.641 m (5' 4.6\").    Weight as of this encounter: 83 kg (183 lb).     Return in about 4 days (around 2/8/2022) for follow up .    BRIA Gamboa [facility] MIRANDA Munoz is a 41 year old who presents for the following health issues     HPI       Patient is here today to Establish Care with you. She has not been happy with her care with her previous doctor.     -She has been having ongoing neck pain. It started in August 2021. It has radiated down her left arm in the past as well as her left leg. She did have an MRI and it did show herniated discs. It is not getting better. She is struggling a lot with pain. She is struggling doing normal day to day activities.     -She has noticed a bulge near her left rib " "cage which is new.    Patient notes she first noticed something was \"off\" in her neck back in August  No specific injury or trauma so is not sure what precipitated it  She assumed it would just get better so was just \"living:\" with it  Notes she probably would have continued to do that if things hadn't worsened fairly significantly in the last 2.5 weeks    Tearful today  Says she is not able to sleep and is in so much pain  She is frustrated as she doesn't feel like she has been taken seriously  She was refused somsething stronger for medications which she understands but is also frustrated by as she is not a pain medication seeker and has not been on them in the past except after surgeries  She doesn't want to use them often but just needs something to help with the pain so she can reset.     Scared. Not sure what is going on  Pain is severe across her neck and especially the left side  More recently she is having left arm weakness and pain  Cannot even hold an empty pan in her left hand because of the pain/weakness/numbness    She did have an MRI  Was referred for an epidural injection and had it scheduled for yesteday but got very nervous about it so cancelled it  Will reschedule if that is necessary but really wanted to try PT first and states it took several calls before she could get a referral for that   Does have her first PT scheduled for 2/8    Admits she does struggle a lot with anxiety and some paranoia symptoms that she doesn't like to mention for fear of being labeled as a \"mental case\"   She states she has had this her entire life but is highly functional - 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  Feels she manages it well but in situations like this when her pain is so bad which disrupts her thinking it is hard for her not to be anxious and worried    She is nervous because in the last few days she has had some pain in her left calf and left lateral foot - no reason but was told this was likely " "related to her neck pain  Also feels like the left side of her abdomen looks swollen and is tender to touch  No rash, no injury    Scared that everything is getting worse and not sure what to do          Review of Systems   Remainder of ROS obtained and found to be negative other than that which was documented above        Objective    BP (!) 142/92   Pulse 100   Temp 98.2  F (36.8  C) (Tympanic)   Ht 1.641 m (5' 4.6\")   Wt 83 kg (183 lb)   SpO2 98%   BMI 30.83 kg/m    Body mass index is 30.83 kg/m .  Physical Exam   GENERAL: tearful, in mild to moderate distress  NECK: exquisite tenderness to touch from lower cervical spine and are in between spine and left shoulder blade up to base of skull and across top of left posterior shoulder  TORSO/SPINE:   Also very tender to touch over left upper abdomen wrapping around in linear fashion to left flank and left mid thoracic although nontender over thoracic spine. No skin lesions or changes in skin noted  LEG, left: tender to touch over left lower lateral leg and foot. Again no swelling or change in skin noted    Diagnostic Tests:  Reviewed MRI with patient       "

## 2022-02-07 ENCOUNTER — TELEPHONE (OUTPATIENT)
Dept: FAMILY MEDICINE | Facility: CLINIC | Age: 42
End: 2022-02-07

## 2022-02-07 NOTE — TELEPHONE ENCOUNTER
Call placed to patient  Relayed eJnn's message    Patient verbalized understanding  No further questions/concerns     Ba Luo RN

## 2022-02-07 NOTE — TELEPHONE ENCOUNTER
Routing to Provider to review and advise, patient calling to give an update as requested by Provider.    Prednisone update   - hard to know if it is helping or not.   -The leg pain is better/improving.   -Her foot does seem a little better but not fully.  -Now is having pain on right side of neck (new symptom)  -All her other symptoms are the same as they were before the Prednisone.       Lizette Holley RN BSN

## 2022-02-07 NOTE — TELEPHONE ENCOUNTER
I am optimistic that because the leg pain and foot pain are maybe improving at least a little that it is doing something but it is still early in the course    What I would advise is to continue the prednisone and plan we laid out last week but go ahead and schedule the injection for 5-7 days AFTER she is due to be off the prednisone. That way, if the pain is not better we have the next step in place. If the pain seems to be improving between the prednisone and therapy then she can always cancel it again    Mary Wang PA-C

## 2022-02-08 ENCOUNTER — THERAPY VISIT (OUTPATIENT)
Dept: PHYSICAL THERAPY | Facility: CLINIC | Age: 42
End: 2022-02-08
Attending: FAMILY MEDICINE
Payer: COMMERCIAL

## 2022-02-08 DIAGNOSIS — G89.29 CHRONIC NECK PAIN: ICD-10-CM

## 2022-02-08 DIAGNOSIS — M54.2 CHRONIC NECK PAIN: ICD-10-CM

## 2022-02-08 DIAGNOSIS — M50.20 CERVICAL DISC HERNIATION: ICD-10-CM

## 2022-02-08 DIAGNOSIS — M54.2 NECK PAIN: ICD-10-CM

## 2022-02-08 PROCEDURE — 97161 PT EVAL LOW COMPLEX 20 MIN: CPT | Mod: GP | Performed by: PHYSICAL THERAPIST

## 2022-02-08 PROCEDURE — 97140 MANUAL THERAPY 1/> REGIONS: CPT | Mod: GP | Performed by: PHYSICAL THERAPIST

## 2022-02-08 PROCEDURE — 97110 THERAPEUTIC EXERCISES: CPT | Mod: GP | Performed by: PHYSICAL THERAPIST

## 2022-02-08 NOTE — PROGRESS NOTES
Physical Therapy Initial Evaluation  PT impression:  Pt presenting to PT with neck pain and L sided radicular symptoms. Also reporting L sided trunk and LE symptoms that have somewhat improved w/ recent prednisone treatment. Impairments including pain and ongoing hypomobility of cervical spine, decreased ROM and flexibility, radicular symptoms and UE weakness L> R and headaches resulting in decreased tolerance to functional and daily activities. Appropriate for intervention to address impairments.   Subjective:  The history is provided by the patient and medical records. No  was used.   Patient Health History  Tammy Joshi being seen for Neck and left sided pain.     Problem began: 8/1/2021.   Problem occurred: from insidious onset   Pain is reported as 7/10 on pain scale.  General health as reported by patient is good.  Pertinent medical history includes: high blood pressure, overweight and diabetes (anxiety).   Red flags:  Severe headaches and pain at rest/night.  Medical allergies: none.       Current medications:  Muscle relaxants and pain medication. Other medications details: Gabapentin, tizanidine, prednisone, percocet.    Current occupation is Sub teacher.   Primary job tasks include:  Prolonged standing.                  Therapist Generated HPI Evaluation  Problem details: Pt notes that in August woke up with excrutiating neck pain, after no specific incident. Tried chiropractor treatment, different pillows etc without change in symptoms. Notes that 2-3 weeks ago woke up and also noting LLE symptoms. Has been having left upper arm weakness as well which has been worse over past few weeks. Notes tingling in left hand and decreased ability to lift with that hand.   Also noting some left sided trunk/rib pain and most recently right sided neck pain.   Did start on Prednisone on Friday 2/4/22, which has helped with LE pain, slight improvement in foot and unsure if changing neck pain. .     "     Type of problem:  Cervical spine.    This is a new condition.  Condition occurred with:  Insidious onset.  Where condition occurred: for unknown reasons.  Patient reports pain:  Cervical left side, central cervical spine and thoracic left side.  Pain is described as aching, sharp, shooting and burning (tingling) and is constant.  Pain radiates to:  Upper arm left, shoulder left and hand left. Pain is worse during the night.  Progression since onset: worsened initially, unchanged since then.  Symptoms are exacerbated by carrying, lifting, rotating head, certain positions, sitting, change of position, lying down, stress and looking up or down  and relieved by nothing.  Special tests included:  MRI (Per chart: . Diffuse degenerative change of the cervical spine. 2. Posterior disc herniations  C5-C6 /7 C6-C7 levels. 3. Moderate spinal canal stenosis  C5-C6. No other significant spinal canal narrowing. 4. Moderate neural foraminal stenosis  left C5-C6).  Previous treatment includes chiropractic. There was none improvement following previous treatment.  Work activity restrictions: sub teacher, has not been taking as many days.                          Objective:  System         Lumbar/SI Evaluation  ROM:    AROM Lumbar:   Flexion:          To knees  Ext:                    To neutral, upper back pain   Side Bend:        Left:  L SB 3\" lateral, no change mid back    Right:  SB 3\", no change mid back  Rotation:           Left:     Right:   Side Glide:        Left:     Right:                                  Cervical/Thoracic Evaluation    AROM:  AROM Cervical:    Flexion:            30  Extension:       10, ERP through neck, L upper back  Rotation:         Left: 32     Right: 41  Side Bend:      Left: 20, L UE symptoms     Right:  25        Cervical Myotomes:  Cervical myotomes: R grossly 5/5.      C4 (shrug):  Left: 4      C5 (Deltoid):  Left: 4-      C6 (Biceps):  Left: 4-      C7 (Triceps):  Left: 4-      C8 (Thumb " Ext): Left: 4-      T1 (Intrinsics): Left: 4-        Neural Tension:    Left side:  Radial positive.  Left side:  Ulnar and Median  negative.    Cervical Dermatomes:  Cervical dermatomes: L UE sensation reported diminished, no specific dermatomal pattern identified                     Cervical Palpation:    Tenderness present at Left:    Upper Trap; Levator; Erector Spinae and Suboccipitals      Cervical Stability/Joint Clearing:  normal      Spinal Segmental Conclusions:    Level:  Hypo at C4, C5 and C6                                                General     ROS    Assessment/Plan:    Patient is a 41 year old female with cervical complaints.    Patient has the following significant findings with corresponding treatment plan.                Diagnosis 1:  Neck pain  Pain -  hot/cold therapy, US, electric stimulation, mechanical traction, manual therapy, splint/taping/bracing/orthotics, self management and education  Decreased ROM/flexibility - manual therapy and therapeutic exercise  Decreased joint mobility - manual therapy and therapeutic exercise  Decreased strength - therapeutic exercise and therapeutic activities  Decreased proprioception - neuro re-education and therapeutic activities  Impaired muscle performance - neuro re-education  Impaired posture - neuro re-education    Therapy Evaluation Codes:   1) History comprised of:   Personal factors that impact the plan of care:      Anxiety, Coping style, Overall behavior pattern and Time since onset of symptoms.    Comorbidity factors that impact the plan of care are:      Pain at night/rest.     Medications impacting care: Muscle relaxant and Pain.  2) Examination of Body Systems comprised of:   Body structures and functions that impact the plan of care:      Cervical spine, Elbow, Fingers, Hand and Shoulder.   Activity limitations that impact the plan of care are:      Bathing, Bending, Cooking, Driving, Dressing, Grasping, Lifting, Reading/Computer work and  Working.  3) Clinical presentation characteristics are:   Evolving/Changing.  4) Decision-Making    Low complexity using standardized patient assessment instrument and/or measureable assessment of functional outcome.  Cumulative Therapy Evaluation is: Low complexity.    Previous and current functional limitations:  (See Goal Flow Sheet for this information)    Short term and Long term goals: (See Goal Flow Sheet for this information)     Communication ability:  Patient appears to be able to clearly communicate and understand verbal and written communication and follow directions correctly.  Treatment Explanation - The following has been discussed with the patient:   RX ordered/plan of care  Anticipated outcomes  Possible risks and side effects  This patient would benefit from PT intervention to resume normal activities.   Rehab potential is good.    Frequency:  1 X week, once daily  Duration:  for 8 weeks  Discharge Plan:  Achieve all LTG.  Independent in home treatment program.    Please refer to the daily flowsheet for treatment today, total treatment time and time spent performing 1:1 timed codes.

## 2022-02-15 ENCOUNTER — THERAPY VISIT (OUTPATIENT)
Dept: PHYSICAL THERAPY | Facility: CLINIC | Age: 42
End: 2022-02-15
Attending: FAMILY MEDICINE
Payer: COMMERCIAL

## 2022-02-15 DIAGNOSIS — M54.12 CERVICAL RADICULOPATHY: ICD-10-CM

## 2022-02-15 DIAGNOSIS — M54.2 NECK PAIN: ICD-10-CM

## 2022-02-15 PROCEDURE — 97140 MANUAL THERAPY 1/> REGIONS: CPT | Mod: GP | Performed by: PHYSICAL THERAPIST

## 2022-02-15 PROCEDURE — 97110 THERAPEUTIC EXERCISES: CPT | Mod: GP | Performed by: PHYSICAL THERAPIST

## 2022-02-15 RX ORDER — OXYCODONE AND ACETAMINOPHEN 5; 325 MG/1; MG/1
TABLET ORAL
Qty: 20 TABLET | Refills: 0 | Status: SHIPPED | OUTPATIENT
Start: 2022-02-15 | End: 2022-06-20

## 2022-02-15 RX ORDER — GABAPENTIN 300 MG/1
CAPSULE ORAL
Qty: 90 CAPSULE | Refills: 1 | Status: SHIPPED | OUTPATIENT
Start: 2022-02-15 | End: 2022-06-20

## 2022-02-15 NOTE — TELEPHONE ENCOUNTER
Routing refill request to provider for review/approval because:  Drug not on the [facility] refill protocol   Isabell Adams RN

## 2022-02-15 NOTE — TELEPHONE ENCOUNTER
Pending Prescriptions:                       Disp   Refills    gabapentin (NEURONTIN) 300 MG capsule [Pha*90 cap*0        Sig: TAKE 1 CAPSULE (300 MG) BY MOUTH DAILY    Routing refill request to provider for review/approval because:  Drug not on the [facility] refill protocol. Historical med, with differing sigs.    gabapentin (NEURONTIN) 300 MG capsule 90 capsule 0 2/4/2022  No   Sig: Take 1 capsule (300mg) in the morning and 2 capsules (600mg) at bedtime   Class: Historical   Order: [id number]     Jennifer Cates RN  [facility]

## 2022-02-15 NOTE — TELEPHONE ENCOUNTER
Patient called and says her gabapentin and percocet never went to the pharmacy.  The percocet RX says local print and the gabapentin says historical.  Pharmacy says they never got them.      Khadijah White, BRAXTON Bettencourt

## 2022-02-15 NOTE — TELEPHONE ENCOUNTER
Trying to clarify -     When I saw patient on 2/4 she had recently had the gabapentin refilled on 1/24 by Dr. Lewis so I did NOT refill it at that time but updated the script in her medication list (so it appears historical) to reflect the new way in which we were going to dose it. I did just send in a refill today as I'm assuming she is running low.     In regards to the percocet - I'm unclear if she was looking for a refill. Pharmacy has record of her picking up and signing for the script on 2/4/22 (when she saw me). So she was able to get this filled on 2/4. If she is needing a refill, I did just send that in but I just want to make sure we are on the same page in terms of her already getting the first script filled    Mary Wang PA-C

## 2022-02-16 RX ORDER — GABAPENTIN 300 MG/1
CAPSULE ORAL
Qty: 90 CAPSULE | Refills: 0 | Status: SHIPPED | OUTPATIENT
Start: 2022-02-16 | End: 2022-06-20

## 2022-03-01 ENCOUNTER — TELEPHONE (OUTPATIENT)
Dept: FAMILY MEDICINE | Facility: CLINIC | Age: 42
End: 2022-03-01

## 2022-03-01 DIAGNOSIS — F41.8 SITUATIONAL ANXIETY: Primary | ICD-10-CM

## 2022-03-01 RX ORDER — LORAZEPAM 0.5 MG/1
TABLET ORAL
Qty: 2 TABLET | Refills: 0 | Status: SHIPPED | OUTPATIENT
Start: 2022-03-01 | End: 2022-06-20

## 2022-03-01 NOTE — TELEPHONE ENCOUNTER
Script for ativan sent to the pharmacy. Please remind patient she will need a  if she uses the ativan     Mary

## 2022-03-01 NOTE — TELEPHONE ENCOUNTER
Reason for Call:  Other prescription    Detailed comments: Patient is requesting Ativan or something to help her through her Epidural neck injection 3/3, please advise.    Phone Number Patient can be reached at: Home number on file [phone] (home)    Best Time: any    Can we leave a detailed message on this number? YES    Call taken on 3/1/2022 at 1:56 PM by Daria Martinez

## 2022-03-03 ENCOUNTER — RADIOLOGY INJECTION OFFICE VISIT (OUTPATIENT)
Dept: PALLIATIVE MEDICINE | Facility: CLINIC | Age: 42
End: 2022-03-03
Payer: COMMERCIAL

## 2022-03-03 VITALS — SYSTOLIC BLOOD PRESSURE: 116 MMHG | HEART RATE: 120 BPM | DIASTOLIC BLOOD PRESSURE: 86 MMHG

## 2022-03-03 DIAGNOSIS — M48.02 CERVICAL STENOSIS OF SPINAL CANAL: ICD-10-CM

## 2022-03-03 DIAGNOSIS — M54.12 CERVICAL RADICULOPATHY: ICD-10-CM

## 2022-03-03 PROCEDURE — 62321 NJX INTERLAMINAR CRV/THRC: CPT | Performed by: PAIN MEDICINE

## 2022-03-03 RX ORDER — DEXAMETHASONE SODIUM PHOSPHATE 10 MG/ML
10 INJECTION INTRAMUSCULAR; INTRAVENOUS ONCE
Status: COMPLETED | OUTPATIENT
Start: 2022-03-03 | End: 2022-03-03

## 2022-03-03 RX ADMIN — DEXAMETHASONE SODIUM PHOSPHATE 10 MG: 10 INJECTION INTRAMUSCULAR; INTRAVENOUS at 12:23

## 2022-03-03 ASSESSMENT — PAIN SCALES - GENERAL
PAINLEVEL: MODERATE PAIN (5)
PAINLEVEL: SEVERE PAIN (6)

## 2022-03-03 NOTE — PATIENT INSTRUCTIONS
[facility]   Procedure Discharge Instructions    Today you saw:  Dr. Rafy Salomon      You had an: Cervical Epidural steroid injection      Medications used:  Lidocaine   Bupivacaine   Dexamethasone Omnipaque  Normal saline            Be cautious when walking. Numbness and/or weakness in the upper extremities may occur for up to 6-8 hours after the procedure due to effect of the local anesthetic    Do not drive for 6 hours. The effect of the local anesthetic could slow your reflexes.     You may resume your regular activities after 24 hours    Avoid strenuous activity for the first 24 hours    You may shower, however avoid swimming, tub baths or hot tubs for 24 hours following your procedure    You may have a mild to moderate increase in pain for several days following the injection.    It may take up to 14 days for the steroid medication to start working although you may feel the effect as early as a few days after the procedure.       You may use ice packs for 10-15 minutes, 3 to 4 times a day at the injection site for comfort    Do not use heat to painful areas for 6 to 8 hours. This will give the local anesthetic time to wear off and prevent you from accidentally burning your skin.     Unless you have been directed to avoid the use of anti-inflammatory medications (NSAIDS), you may use medications such as ibuprofen, Aleve or Tylenol for pain control if needed.     If you were fasting, you may resume your normal diet and medications after the procedure    If you have diabetes, check your blood sugar more frequently than usual as your blood sugar may be higher than normal for 10-14 days following a steroid injection. Contact your doctor who manages your diabetes if your blood sugar is higher than usual    Possible side effects of steroids that you may experience include flushing, elevated blood pressure, increased appetite, mild headaches and restlessness.  All of these symptoms  will get better with time.    If you experience any of the following, call the Pain Clinic during work hours (Mon-Friday 8-4:30 pm) at [phone] or the Provider Line after hours at [phone]:  -Fever over 100 degree F  -Swelling, bleeding, redness, drainage, warmth at the injection site  -Progressive weakness or numbness in your  arms  -Loss of bowel or bladder function  -Unusual headache that is not relieved by Tylenol or other pain reliever  -Unusual new onset of pain that is not improving

## 2022-03-03 NOTE — NURSING NOTE
Pre-procedure Intake  If YES to any questions or NO to having a   Please complete laminated checklist and leave on the computer keyboard for Provider, verbally inform provider if able.    For SCS Trial, RFA's or any sedation procedure:  Have you been fasting? NA    If yes, for how long?     Are you taking any any blood thinners such as Coumadin, Warfarin, Jantoven, Pradaxa Xarelto, Eliquis, Edoxaban, Enoxaparin, Lovenox, Heparin, Arixtra, Fondaparinux, or Fragmin? OR Antiplatelet medication such as Plavix, Brilinta, or Effient?   No     If yes, when did you take your last dose?     Do you take aspirin?  No    If cervical procedure, have you held aspirin for 6 days?       Do you have any allergies to contrast dye, iodine, steroid and/or numbing medications?  NO    Are you currently taking antibiotics or have an active infection?  NO    Have you had a fever/elevated temperature within the past week? NO    Are you currently taking oral steroids? NO    Do you have a ? Yes    Are you pregnant or breastfeeding?  NO    Have you received the COVID-19 vaccine? No     If yes, was it your 1st, 2nd or only dose needed?    Date of most recent vaccine:     Notify provider and RNs if systolic BP >170, diastolic BP >100, P >100 or O2 sats < 90%

## 2022-03-03 NOTE — NURSING NOTE
Discharge Information    IV Discontiued Time:  NA    Amount of Fluid Infused:  NA    Discharge Criteria = When patient returns to baseline or as per MD order    Consciousness:  Pt is fully awake    Circulation:  BP +/- 20% of pre-procedure level    Respiration:  Patient is able to breathe deeply    O2 Sat:  Patient is able to maintain O2 Sat >92% on room air    Activity:  Moves 4 extremities on command    Ambulation:  Patient is able to stand and walk or stand and pivot into wheelchair    Dressing:  Clean/dry or No Dressing    Notes:   Discharge instructions and AVS given to patient    Patient meets criteria for discharge?  YES    Admitted to PCU?  No    Responsible adult present to accompany patient home?  Yes    Signature/Title:    Christel Rodriguze RN  RN Care Coordinator  [facility]

## 2022-03-03 NOTE — PROGRESS NOTES
[facility] - Procedure Note    Date of Visit: 3/3/2022    Procedure performed: C 6/7 interlaminar epidural steroid injection with fluoroscopic guidance  Diagnosis: Cervical spondylosis; Cervical radiculitis/radiculopathy  : Rafy Salomon MD  Anesthesia: none    Indications: Tammy Joshi is a 41 year old female who is seen=for cervical epidural steroid injection. The patient describes left-sided neck pain radiating to her entire left hand. The patient has been exhibiting symptoms consistent with cervical intraspinal inflammation and radiculopathy. Symptoms have been persistent, disabling, and intermittently severe. The patient reports minimal improvement with conservative treatment, including meds.    Cervical MRI reviewed with patient    Allergies:    No Known Allergies     Vitals:  /86   Pulse 120     Review of Systems: The patient denies recent fever, chills, illness, use of antibiotics or anticoagulants. All other 10-point review of systems negative.     Procedure: The procedure and risks were explained, and informed written consent was obtained from the patient. Risks include but are not limited to: infection, bleeding, increased pain, and damage to soft tissue, nerve, muscle, and vasculature structures. After getting informed consent, patient was brought into the procedure suite and was placed in a prone position on the procedure table. A Pause for the Cause was performed. Patient was prepped and draped in sterile fashion.     The C6-iu post procedure showed 7 interspace was identified with use of fluoroscopy in AP view. A 25-gauge, 1.5 inch needle was used to anesthetize the skin and subcutaneous tissue entry site with a total of 2 ml of 1% lidocaine. Under fluoroscopic visualization, a 22-gauge, 3.5 inch Tuohy epidural needle was slowly advanced towards the epidural space a few millimeters left of midline. The latter part of the needle advancement was guided with  fluoroscopy in the lateral view. The epidural space was identified using loss of resistance technique. After negative aspiration for heme and cerebrospinal fluid, a total of 1 mL of Omnipaque was injected to confirm needle placement. 9 mL of contrast was wasted. Epidurogram confirmed spread within the posterior epidural space. 2 ml of 10mg/ml of dexamethasone and 1 ml of preservative free 0.25% bupivacaine was injected. The needle was removed.  Images were saved to PACS.    The patient tolerated the procedure well, and there was no evidence of procedural complications. No new sensory or motor deficits were noted following the procedure. The patient was stable and able to ambulate on discharge home. Post-procedure instructions were provided.     Pre-procedure pain score: 8/10 in the neck, 8/10 in the arm  Post-procedure pain score: 8/10 in the neck, 8/10 in the arm    Assessment/Plan: Tammy Joshi is a 41 year old female s/p cervical interlaminar epidural steroid injection today for cervical spondylosis and radiculitis/radiculopathy.     1. Following today's procedure, the patient was advised to contact the [facility] for any of the following:   Fever, chills, or night sweats   New onset of pain, numbness, or weakness   Any questions/concerns regarding the procedure  If unable to contact the Pain Center, the patient was instructed to go to a local Emergency Room for any complications.   2. The patient will receive a follow-up call in 1 week.   3. Follow-up with provider in 2 weeks for post-procedure evaluation.    Rafy Salomon MD   Pain Management    [facility]

## 2022-04-11 PROBLEM — M54.2 NECK PAIN: Status: RESOLVED | Noted: 2022-02-08 | Resolved: 2022-04-11

## 2022-04-11 NOTE — PROGRESS NOTES
DISCHARGE SUMMARY    Tammy Joshi was seen 2 times for evaluation and treatment.  Patient did not return for further treatment and current status is unknown.  Due to short treatment duration, no objective or functional changes were made.  Please see goal flow sheet from episode noted date below and initial evaluation for further information.  Patient is discharged from therapy and therapy episode is resolved as of 04/11/22.      Linked Episodes   Type: Episode: Status: Noted: Resolved: Last update: Updated by:   PHYSICAL THERAPY Neck Pain 2/8/22 Active 2/8/2022  2/15/2022  1:08 PM Estephania Hammer, PT      Comments:

## 2022-06-20 ENCOUNTER — OFFICE VISIT (OUTPATIENT)
Dept: FAMILY MEDICINE | Facility: CLINIC | Age: 42
End: 2022-06-20
Payer: COMMERCIAL

## 2022-06-20 VITALS
HEART RATE: 103 BPM | TEMPERATURE: 98 F | WEIGHT: 187.2 LBS | SYSTOLIC BLOOD PRESSURE: 124 MMHG | RESPIRATION RATE: 16 BRPM | DIASTOLIC BLOOD PRESSURE: 76 MMHG | OXYGEN SATURATION: 97 % | BODY MASS INDEX: 31.19 KG/M2 | HEIGHT: 65 IN

## 2022-06-20 DIAGNOSIS — Z11.3 SCREENING FOR STD (SEXUALLY TRANSMITTED DISEASE): ICD-10-CM

## 2022-06-20 DIAGNOSIS — A60.00 GENITAL HERPES SIMPLEX, UNSPECIFIED SITE: Primary | ICD-10-CM

## 2022-06-20 DIAGNOSIS — N94.9 VAGINAL SYMPTOM: ICD-10-CM

## 2022-06-20 LAB
ALBUMIN UR-MCNC: ABNORMAL MG/DL
APPEARANCE UR: CLEAR
BACTERIA #/AREA URNS HPF: ABNORMAL /HPF
BILIRUB UR QL STRIP: NEGATIVE
CLUE CELLS: NORMAL
COLOR UR AUTO: YELLOW
GLUCOSE UR STRIP-MCNC: 500 MG/DL
HGB UR QL STRIP: ABNORMAL
KETONES UR STRIP-MCNC: NEGATIVE MG/DL
LEUKOCYTE ESTERASE UR QL STRIP: NEGATIVE
MUCOUS THREADS #/AREA URNS LPF: PRESENT /LPF
NITRATE UR QL: NEGATIVE
PH UR STRIP: 5.5 [PH] (ref 5–7)
RBC #/AREA URNS AUTO: ABNORMAL /HPF
SP GR UR STRIP: >=1.03 (ref 1–1.03)
SQUAMOUS #/AREA URNS AUTO: ABNORMAL /LPF
T PALLIDUM AB SER QL: NONREACTIVE
TRICHOMONAS, WET PREP: NORMAL
UROBILINOGEN UR STRIP-ACNC: 0.2 E.U./DL
WBC #/AREA URNS AUTO: ABNORMAL /HPF
WBC'S/HIGH POWER FIELD, WET PREP: NORMAL
YEAST, WET PREP: NORMAL

## 2022-06-20 PROCEDURE — 87491 CHLMYD TRACH DNA AMP PROBE: CPT | Performed by: NURSE PRACTITIONER

## 2022-06-20 PROCEDURE — 86803 HEPATITIS C AB TEST: CPT | Performed by: NURSE PRACTITIONER

## 2022-06-20 PROCEDURE — 99214 OFFICE O/P EST MOD 30 MIN: CPT | Performed by: NURSE PRACTITIONER

## 2022-06-20 PROCEDURE — 87389 HIV-1 AG W/HIV-1&-2 AB AG IA: CPT | Performed by: NURSE PRACTITIONER

## 2022-06-20 PROCEDURE — 86780 TREPONEMA PALLIDUM: CPT | Performed by: NURSE PRACTITIONER

## 2022-06-20 PROCEDURE — 81001 URINALYSIS AUTO W/SCOPE: CPT | Performed by: NURSE PRACTITIONER

## 2022-06-20 PROCEDURE — 87210 SMEAR WET MOUNT SALINE/INK: CPT | Performed by: NURSE PRACTITIONER

## 2022-06-20 PROCEDURE — 36415 COLL VENOUS BLD VENIPUNCTURE: CPT | Performed by: NURSE PRACTITIONER

## 2022-06-20 PROCEDURE — 87591 N.GONORRHOEAE DNA AMP PROB: CPT | Performed by: NURSE PRACTITIONER

## 2022-06-20 RX ORDER — VALACYCLOVIR HYDROCHLORIDE 1 G/1
1000 TABLET, FILM COATED ORAL 2 TIMES DAILY
Qty: 14 TABLET | Refills: 0 | Status: SHIPPED | OUTPATIENT
Start: 2022-06-20 | End: 2023-06-29

## 2022-06-20 ASSESSMENT — PAIN SCALES - GENERAL: PAINLEVEL: EXTREME PAIN (8)

## 2022-06-20 NOTE — PATIENT INSTRUCTIONS
Labs ordered today  Treating herpes outbreak with Valtrex twice daily for 7 days.  Follow-up if no improvement in symptoms.
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
## 2022-06-21 LAB
C TRACH DNA SPEC QL NAA+PROBE: NEGATIVE
HCV AB SERPL QL IA: NONREACTIVE
HIV 1+2 AB+HIV1 P24 AG SERPL QL IA: NONREACTIVE
N GONORRHOEA DNA SPEC QL NAA+PROBE: NEGATIVE

## 2022-09-12 ENCOUNTER — TELEPHONE (OUTPATIENT)
Dept: FAMILY MEDICINE | Facility: CLINIC | Age: 42
End: 2022-09-12

## 2022-09-12 DIAGNOSIS — M54.12 CERVICAL RADICULOPATHY: Primary | ICD-10-CM

## 2022-09-12 NOTE — TELEPHONE ENCOUNTER
Patient's call transferred to author    Patient calling regarding ongoing neck pain   Patient states she has 2 herniated discs in her neck   States she had established with Jenn and had discussed pain  Patient was on a muscle relaxer, nerve pain medication, and Percocet    Patient states the pain had slowly started to improve and she weaned herself off the medications  States pain persisted but was not bad enough to continue medication regimen    Patient states 2 days ago pain started to increase in severity  Denies recent / known injury   Does report limited ROM due to pain     Pain is similar to when she first noticed the pain and is in the same place  Pain rating with rest is 5/10  Pain rating with movement is 9-10/10   Describes the pain as sharp and throbbing   Pain is constant   Severity waxes and wanes   Denies pain radiating     Does reports some numbness/tingling to left lower extremity - this has persisted since original onset of pain   Denies bowel or bladder incontinence     Attempted OTC Advil with no relief     Due to severity of pain and patient requesting recommendation from provider ASAP - advised that ED or UC may be best given symptoms  Patient does agree and states she is on the fence about going and would like to see what provider's recommendation is     Preferred pharmacy: Jean Luo RN

## 2022-09-13 NOTE — TELEPHONE ENCOUNTER
Message left on patient's answering machine [phone] to call the [facility] RN back.  Hermes Dorman RN

## 2022-09-13 NOTE — TELEPHONE ENCOUNTER
Did she wean off all the medications including the gabapentin?   When I saw her we put her on a prednisone taper - did that help? She also ended up seeing pain management and had an injection in March - how did that work?     Mary Wang PA-C

## 2022-09-14 RX ORDER — OXYCODONE AND ACETAMINOPHEN 5; 325 MG/1; MG/1
1 TABLET ORAL EVERY 6 HOURS PRN
Qty: 18 TABLET | Refills: 0 | Status: SHIPPED | OUTPATIENT
Start: 2022-09-14 | End: 2023-02-06

## 2022-09-14 RX ORDER — METHYLPREDNISOLONE 4 MG
TABLET, DOSE PACK ORAL
Qty: 21 TABLET | Refills: 0 | Status: SHIPPED | OUTPATIENT
Start: 2022-09-14 | End: 2023-02-04

## 2022-09-14 NOTE — TELEPHONE ENCOUNTER
Call placed to patient  Relayed Jenn's message    Patient reports she weaned off all the medications for pain - including the Gabapentin     Patient states it was hard to tell if the Prednisone taper provided further relief from the other medications she was taking for the pain     States the injection only offered a very small amount of noticeable relief from the pain     Patient was not evaluated in UC or ED for pain on Monday     Will forward to Jenn to review    Ba Luo RN

## 2022-09-14 NOTE — TELEPHONE ENCOUNTER
Patient notified of all of Mary's instrucitons as noted below. Tammy agreed with plan.  Hermes Dorman RN

## 2022-09-14 NOTE — TELEPHONE ENCOUNTER
Call placed to patient   Relayed Jenn's message    Patient initially hesitant about seeing a spine specialist but is willing to try   Okay with steroid burst and pain medication for the short term   Preferred pharmacy [facility] in [place]    Ba Luo RN

## 2022-09-14 NOTE — TELEPHONE ENCOUNTER
I can't see the notes from urgent care to know what they did or if they gave her anything.     We could definitely try the steroid burst again but at this point with her pain that she has had and that the infection didn't do much, it might be time for her to see a spine specialist. If she is agreeable to this I can put in a referral for that.     So if she would like, we can try the steroid burst again and I can give her something for pain in the short term and then place the referral to a specialist    What pharmacy?     Mary Wang PA-C

## 2022-09-14 NOTE — TELEPHONE ENCOUNTER
Medications sent to the pharmacy    I understand the hesitancy to see a spine specialist but I also know how uncomfortable these flares in pain can be and I just want to make sure she has all the options laid out for her in how to best manage this as I don't want this pain cycle to be a  Pattern she has to live with. The purpose of the consult is just to hear from what options there are for her and then she can choose what she feels is best. I did place the referral so she will be getting a call in 1-2 days to schedule this     Mary Wang PA-C

## 2022-10-28 ENCOUNTER — NURSE TRIAGE (OUTPATIENT)
Dept: FAMILY MEDICINE | Facility: CLINIC | Age: 42
End: 2022-10-28

## 2022-10-28 DIAGNOSIS — H81.10 BENIGN PAROXYSMAL POSITIONAL VERTIGO, UNSPECIFIED LATERALITY: Primary | ICD-10-CM

## 2022-10-28 RX ORDER — ONDANSETRON 4 MG/1
4-8 TABLET, ORALLY DISINTEGRATING ORAL EVERY 8 HOURS PRN
Qty: 20 TABLET | Refills: 0 | Status: SHIPPED | OUTPATIENT
Start: 2022-10-28 | End: 2024-03-26

## 2022-10-28 RX ORDER — MECLIZINE HYDROCHLORIDE 25 MG/1
25 TABLET ORAL 3 TIMES DAILY PRN
Qty: 30 TABLET | Refills: 0 | Status: SHIPPED | OUTPATIENT
Start: 2022-10-28 | End: 2023-11-20

## 2022-10-28 NOTE — TELEPHONE ENCOUNTER
S-(situation): dizziness with some nausea, lightheadedness for past 2 days    B-(background): had one episode (8-9 years ago)of what she thought was BPPV and had the Eply maneuver performed on her and that helped.  Stated it feels the same as then. Has been increasing her fluids to see if it helps- no improvement.     A-(assessment): symptoms worsen with laying down and turning her head in bed.  Symptoms also worsen when gets up and walking.  Stated needed assist to walk the other day due to feeling like she was going to pass out.  Currently laying down and not feeling symptoms.  Denied any visual symptoms with this feeling, ie spots or tunnel vision.  Denied fever, chest pain, vomiting, diarrhea, bleeding, weakness, numbness, tingling on one side of body, pregnancy, slow heart rate, palpitations.    R-(recommendations): please advise if patient needs to go to ER/UC for evaluation or could she get an appointment next week with primary care provider?       Reason for Disposition    MODERATE dizziness (e.g., interferes with normal activities) (Exception: dizziness caused by heat exposure, sudden standing, or poor fluid intake)    Additional Information    Negative: SEVERE difficulty breathing (e.g., struggling for each breath, speaks in single words)    Negative: Shock suspected (e.g., cold/pale/clammy skin, too weak to stand, low BP, rapid pulse)    Negative: Difficult to awaken or acting confused (e.g., disoriented, slurred speech)    Negative: Fainted, and still feels dizzy afterwards    Negative: Overdose (accidental or intentional) of medications    Negative: New neurologic deficit that is present now: * Weakness of the face, arm, or leg on one side of the body * Numbness of the face, arm, or leg on one side of the body * Loss of speech or garbled speech    Negative: Heart beating < 50 beats per minute OR > 140 beats per minute    Negative: Sounds like a life-threatening emergency to the triager    Negative:  "Chest pain    Negative: Rectal bleeding, bloody stool, or tarry-black stool    Negative: Vomiting is main symptom    Negative: Diarrhea is main symptom    Negative: Headache is main symptom    Negative: Heat exhaustion suspected (i.e., dehydration from heat exposure)    Negative: Patient states that they are having an anxiety or panic attack    Negative: Dizziness from low blood sugar (i.e., < 60 mg/dl or 3.5 mmol/l)    Answer Assessment - Initial Assessment Questions  1. DESCRIPTION: \"Describe your dizziness.\"      Lightheaded.  Spinning sensation  2. LIGHTHEADED: \"Do you feel lightheaded?\" (e.g., somewhat faint, woozy, weak upon standing)      Feels like going to pass out.  Felt a little tunnel vision and saw spots in vision and lasted and sat down until it passed(about a minute)  3. VERTIGO: \"Do you feel like either you or the room is spinning or tilting?\" (i.e. vertigo)      Spinning- better when laying down.  Turning head and turning in bed and dizziness gets worse.  4. SEVERITY: \"How bad is it?\"  \"Do you feel like you are going to faint?\" \"Can you stand and walk?\"    - MILD: Feels slightly dizzy, but walking normally.    - MODERATE: Feels unsteady when walking, but not falling; interferes with normal activities (e.g., school, work).    - SEVERE: Unable to walk without falling, or requires assistance to walk without falling; feels like passing out now.       moderate  5. ONSET:  \"When did the dizziness begin?\"      2 days ago  6. AGGRAVATING FACTORS: \"Does anything make it worse?\" (e.g., standing, change in head position)      Change in head position  7. HEART RATE: \"Can you tell me your heart rate?\" \"How many beats in 15 seconds?\"  (Note: not all patients can do this)        Normal heart rate   8. CAUSE: \"What do you think is causing the dizziness?\"      Same feeling as before with BPPV  9. RECURRENT SYMPTOM: \"Have you had dizziness before?\" If Yes, ask: \"When was the last time?\" \"What happened that time?\"     " " 8-9 years ago  10. OTHER SYMPTOMS: \"Do you have any other symptoms?\" (e.g., fever, chest pain, vomiting, diarrhea, bleeding)        nausea  11. PREGNANCY: \"Is there any chance you are pregnant?\" \"When was your last menstrual period?\"        no    Protocols used: DIZZINESS-A-OH      "

## 2022-10-28 NOTE — TELEPHONE ENCOUNTER
Mary:    Patient was called and read your message. Patient would like to try chiropractor today at 5pm, she feels they would be able to provide help for the vertigo, says she will consider physical therapy if chiropractor is not helpful, wants to know if you approve this? Patient did not schedule appointment with you, are you ok if she tries chiropractor first?      Patient also asks for both the Meclizine and Zofran, pharmacy is pended, please advise.       FRANCES Gerardo

## 2022-10-28 NOTE — TELEPHONE ENCOUNTER
Yes that is fine if she wants to try the chiropractor first. If she has had episodes in the past she is more likely to get them in the future and often they do resolve with time. Chiropractor or PT just can help (hopefully) alleviate the symptoms more quickly    I did send in the 2 scripts to the pharmacy listed    Mary Wang PA-C

## 2022-10-28 NOTE — TELEPHONE ENCOUNTER
It sounds similar to her previous BPPV episodes so I don't think ED is necessary unless symptoms worsen or she is unable to keep anything down. An appointment next week would be helpful but more helpful for her to see PT as they can help by doing the Epley maneuver. I placed a referral to PT. If she would like, I can send in a prescription for meclizine (anti vertigo medicine) or zofran (anti nausea) if she would like    Mary Wang PA-C

## 2022-12-20 ENCOUNTER — NURSE TRIAGE (OUTPATIENT)
Dept: NURSING | Facility: CLINIC | Age: 42
End: 2022-12-20

## 2022-12-20 NOTE — TELEPHONE ENCOUNTER
Call placed to Patient   Relayed Dr Merlos message regarding appointment for tomorrow  Patient scheduled for 12/21/22 at 1640  Benoit Reid RN

## 2022-12-20 NOTE — TELEPHONE ENCOUNTER
I do think Felipe has some video visits next week which may work well in this situation as well    EB

## 2022-12-20 NOTE — TELEPHONE ENCOUNTER
Call placed to Patient  States that she always deal with anxiety  1 week ago her step daughter tried to commit suicide and that has just ramped up the anxiety  Cant sleep or function   Patient sounds like she is about to cry while talking to this writer on the phone  Patient wanting some help until she can be seen in clinic  Scheduled appointment with TEENA Wang for 1/2/23 at 1440.  Benoit Reid RN

## 2022-12-20 NOTE — TELEPHONE ENCOUNTER
You can put her in my 5 pm tomorrow since the other lady is not going to be seen. Radha Mei M.D.

## 2022-12-20 NOTE — TELEPHONE ENCOUNTER
S-(situation): Pt requests anxiolytic    B-(background):   Pt reports that she has been having anxiety for years and recently, her stepdaughter tried to commit suicide and now admitted in a mental facility. Pt severely affected by this.    A-(assessment):  Crying which sometimes happen anytime, at times interfere with her work    No SOB  No lightheadedness  No other physical symptoms    R-(recommendations):   Be seen within 3 days. No openings within time frame. Pt asks if she can get prescribed antianxiety medication until she can schedule with PCP.  Pt has not scheduled for any future appointment.  To PCP/Care team: please call pt [phone]    Beth Gee RN/[place] Nurse Advisor              Reason for Disposition    Symptoms interfere with work or school    Additional Information    Negative: SEVERE difficulty breathing (e.g., struggling for each breath, speaks in single words)    Negative: Bluish (or gray) lips or face    Negative: Difficult to awaken or acting confused (e.g., disoriented, slurred speech)    Negative: Hysterical or combative behavior    Negative: Sounds like a life-threatening emergency to the triager    Negative: Chest pain    Negative: Palpitations, skipped heart beat, or rapid heart beat    Negative: Cough is main symptom    Negative: Suicide thoughts, threats, attempts, or questions    Negative: Depression is main problem or symptom (e.g., feelings of sadness or hopelessness)    Negative: Difficulty breathing and persists > 10 minutes and not relieved by reassurance provided by triager    Negative: Lightheadedness or dizziness and persists > 10 minutes and not relieved by reassurance provided by triager    Negative: Substance use (drug use) or unhealthy alcohol use, known or suspected, and feeling very shaky (i.e., visible tremors of hands)    Negative: Patient sounds very sick or weak to the triager    Negative: Patient sounds very upset or troubled to the triager    Protocols  used: ANXIETY AND PANIC ATTACK-A-OH

## 2022-12-21 ENCOUNTER — OFFICE VISIT (OUTPATIENT)
Dept: FAMILY MEDICINE | Facility: CLINIC | Age: 42
End: 2022-12-21
Payer: COMMERCIAL

## 2022-12-21 VITALS
BODY MASS INDEX: 30.49 KG/M2 | HEART RATE: 98 BPM | SYSTOLIC BLOOD PRESSURE: 122 MMHG | OXYGEN SATURATION: 99 % | WEIGHT: 183 LBS | DIASTOLIC BLOOD PRESSURE: 78 MMHG | HEIGHT: 65 IN

## 2022-12-21 DIAGNOSIS — E11.9 TYPE 2 DIABETES MELLITUS WITHOUT COMPLICATION, WITHOUT LONG-TERM CURRENT USE OF INSULIN (H): ICD-10-CM

## 2022-12-21 DIAGNOSIS — Z13.220 SCREENING FOR HYPERLIPIDEMIA: ICD-10-CM

## 2022-12-21 DIAGNOSIS — F43.21 GRIEF REACTION: ICD-10-CM

## 2022-12-21 DIAGNOSIS — F41.9 ANXIETY: Primary | ICD-10-CM

## 2022-12-21 DIAGNOSIS — F51.04 PSYCHOPHYSIOLOGICAL INSOMNIA: ICD-10-CM

## 2022-12-21 LAB
ANION GAP SERPL CALCULATED.3IONS-SCNC: 10 MMOL/L (ref 7–15)
BUN SERPL-MCNC: 6.1 MG/DL (ref 6–20)
CALCIUM SERPL-MCNC: 9.5 MG/DL (ref 8.6–10)
CHLORIDE SERPL-SCNC: 100 MMOL/L (ref 98–107)
CREAT SERPL-MCNC: 0.56 MG/DL (ref 0.51–0.95)
DEPRECATED HCO3 PLAS-SCNC: 29 MMOL/L (ref 22–29)
GFR SERPL CREATININE-BSD FRML MDRD: >90 ML/MIN/1.73M2
GLUCOSE SERPL-MCNC: 163 MG/DL (ref 70–99)
HBA1C MFR BLD: 10.1 % (ref 0–5.6)
POTASSIUM SERPL-SCNC: 3.7 MMOL/L (ref 3.4–5.3)
SODIUM SERPL-SCNC: 139 MMOL/L (ref 136–145)
TSH SERPL DL<=0.005 MIU/L-ACNC: 1.03 UIU/ML (ref 0.3–4.2)

## 2022-12-21 PROCEDURE — 36415 COLL VENOUS BLD VENIPUNCTURE: CPT | Performed by: FAMILY MEDICINE

## 2022-12-21 PROCEDURE — 99214 OFFICE O/P EST MOD 30 MIN: CPT | Mod: 25 | Performed by: FAMILY MEDICINE

## 2022-12-21 PROCEDURE — 90686 IIV4 VACC NO PRSV 0.5 ML IM: CPT | Performed by: FAMILY MEDICINE

## 2022-12-21 PROCEDURE — 83036 HEMOGLOBIN GLYCOSYLATED A1C: CPT | Performed by: FAMILY MEDICINE

## 2022-12-21 PROCEDURE — 80048 BASIC METABOLIC PNL TOTAL CA: CPT | Performed by: FAMILY MEDICINE

## 2022-12-21 PROCEDURE — 90471 IMMUNIZATION ADMIN: CPT | Performed by: FAMILY MEDICINE

## 2022-12-21 PROCEDURE — 96127 BRIEF EMOTIONAL/BEHAV ASSMT: CPT | Performed by: FAMILY MEDICINE

## 2022-12-21 PROCEDURE — 84443 ASSAY THYROID STIM HORMONE: CPT | Performed by: FAMILY MEDICINE

## 2022-12-21 RX ORDER — VENLAFAXINE HYDROCHLORIDE 37.5 MG/1
37.5 CAPSULE, EXTENDED RELEASE ORAL DAILY
Qty: 15 CAPSULE | Refills: 0 | Status: SHIPPED | OUTPATIENT
Start: 2022-12-21 | End: 2023-02-06

## 2022-12-21 RX ORDER — VENLAFAXINE HYDROCHLORIDE 75 MG/1
75 CAPSULE, EXTENDED RELEASE ORAL DAILY
Qty: 30 CAPSULE | Refills: 3 | Status: SHIPPED | OUTPATIENT
Start: 2022-12-21 | End: 2023-02-06

## 2022-12-21 RX ORDER — TRAZODONE HYDROCHLORIDE 50 MG/1
TABLET, FILM COATED ORAL
Qty: 90 TABLET | Refills: 3 | Status: SHIPPED | OUTPATIENT
Start: 2022-12-21 | End: 2023-02-06 | Stop reason: DRUGHIGH

## 2022-12-21 RX ORDER — LORAZEPAM 0.5 MG/1
.5-1 TABLET ORAL EVERY 6 HOURS PRN
Qty: 60 TABLET | Refills: 1 | Status: SHIPPED | OUTPATIENT
Start: 2022-12-21 | End: 2023-02-13

## 2022-12-21 ASSESSMENT — PATIENT HEALTH QUESTIONNAIRE - PHQ9
10. IF YOU CHECKED OFF ANY PROBLEMS, HOW DIFFICULT HAVE THESE PROBLEMS MADE IT FOR YOU TO DO YOUR WORK, TAKE CARE OF THINGS AT HOME, OR GET ALONG WITH OTHER PEOPLE: EXTREMELY DIFFICULT
SUM OF ALL RESPONSES TO PHQ QUESTIONS 1-9: 22
SUM OF ALL RESPONSES TO PHQ QUESTIONS 1-9: 22

## 2022-12-21 NOTE — PROGRESS NOTES
"  Assessment & Plan     Type 2 diabetes mellitus without complication, without long-term current use of insulin (H)  Will order labs for her   - TSH with free T4 reflex; Future  - Basic metabolic panel  (Ca, Cl, CO2, Creat, Gluc, K, Na, BUN); Future  - Hemoglobin A1c; Future  - TSH with free T4 reflex  - Basic metabolic panel  (Ca, Cl, CO2, Creat, Gluc, K, Na, BUN)  - Hemoglobin A1c    Screening for hyperlipidemia      Anxiety    - Adult Mental [facility]  Referral; Future  - venlafaxine (EFFEXOR XR) 37.5 MG 24 hr capsule; Take 1 capsule (37.5 mg) by mouth daily  - venlafaxine (EFFEXOR XR) 75 MG 24 hr capsule; Take 1 capsule (75 mg) by mouth daily  - LORazepam (ATIVAN) 0.5 MG tablet; Take 1-2 tablets (0.5-1 mg) by mouth every 6 hours as needed for anxiety No more than 4 tabs daily    Grief reaction  Will start medications and refer for therapy   [facility]; Future  - venlafaxine (EFFEXOR XR) 37.5 MG 24 hr capsule; Take 1 capsule (37.5 mg) by mouth daily  - venlafaxine (EFFEXOR XR) 75 MG 24 hr capsule; Take 1 capsule (75 mg) by mouth daily  - LORazepam (ATIVAN) 0.5 MG tablet; Take 1-2 tablets (0.5-1 mg) by mouth every 6 hours as needed for anxiety No more than 4 tabs daily    Psychophysiological insomnia  Try this for sleep   - traZODone (DESYREL) 50 MG tablet; Try the trazodone Start with 1/2 tablet an hour before bedtime. You can increase by 1/2 tablet every few nights to a maximum of 3 tablets. If you are groggy the next day after changing doses try cutting back to the dose you were taking before. Let me know if you need to try something else or if you are having side effects from this.         BMI:   Estimated body mass index is 30.93 kg/m  as calculated from the following:    Height as of this encounter: 1.638 m (5' 4.5\").    Weight as of this encounter: 83 kg (183 lb).       Depression Screening Follow Up    PHQ 12/21/2022   PHQ-9 Total Score 22   Q9: Thoughts of better off " dead/self-harm past 2 weeks Several days   F/U: Thoughts of suicide or self-harm No   F/U: Safety concerns No     Last PHQ-9 12/21/2022   1.  Little interest or pleasure in doing things 2   2.  Feeling down, depressed, or hopeless 3   3.  Trouble falling or staying asleep, or sleeping too much 3   4.  Feeling tired or having little energy 3   5.  Poor appetite or overeating 3   6.  Feeling bad about yourself 3   7.  Trouble concentrating 3   8.  Moving slowly or restless 1   Q9: Thoughts of better off dead/self-harm past 2 weeks 1   PHQ-9 Total Score 22   Difficulty at work, home, or with people -   In the past two weeks have you had thoughts of suicide or self harm? No   Do you have concerns about your personal safety or the safety of others? No               Follow Up          Return in about 3 weeks (around 1/11/2023) for can be virtual visit, med check.    Radha Mei MD  [facility] MIRANDA Munoz is a 42 year old, presenting for the following health issues:  Anxiety      History of Present Illness       Reason for visit:  Depression anxiety    She eats 4 or more servings of fruits and vegetables daily.She consumes 2 sweetened beverage(s) daily.She exercises with enough effort to increase her heart rate 9 or less minutes per day.  She exercises with enough effort to increase her heart rate 3 or less days per week.   She is taking medications regularly.    Today's PHQ-9         PHQ-9 Total Score: 22    PHQ-9 Q9 Thoughts of better off dead/self-harm past 2 weeks :   Several days  Thoughts of suicide or self harm: (P) No  Self-harm Plan:     Self-harm Action:       Safety concerns for self or others: (P) No    How difficult have these problems made it for you to do your work, take care of things at home, or get along with other people: Extremely difficult       Depression and Anxiety Follow-Up    How are you doing with your depression since your last visit? Worsened     How are you  "doing with your anxiety since your last visit?  Worsened      Are you having other symptoms that might be associated with depression or anxiety? No    Have you had a significant life event? Job Concerns, Financial Concerns and OTHER:       Do you have any concerns with your use of alcohol or other drugs? No    Social History     Tobacco Use     Smoking status: Never     Smokeless tobacco: Never   Vaping Use     Vaping Use: Never used   Substance Use Topics     Alcohol use: Yes     Comment: wine occasionally, none while pregnant     Drug use: No     PHQ 1/29/2020 8/27/2020 12/21/2022   PHQ-9 Total Score 4 5 22   Q9: Thoughts of better off dead/self-harm past 2 weeks Not at all Not at all Several days   F/U: Thoughts of suicide or self-harm - - No   F/U: Safety concerns - - No     FORTINO-7 SCORE 10/10/2019 1/29/2020 8/27/2020   Total Score - - -   Total Score 2 3 20           Follow Up Actions Taken  Crisis resource information provided in the After Visit Summary     Discussed the following ways the patient can remain in a safe environment:  be around others  Suicide Assessment Five-step Evaluation and Treatment (SAFE-T)        Review of Systems   Constitutional, HEENT, cardiovascular, pulmonary, gi and gu systems are negative, except as otherwise noted.      Objective    /78   Pulse 98   Ht 1.638 m (5' 4.5\")   Wt 83 kg (183 lb)   SpO2 99%   BMI 30.93 kg/m    Body mass index is 30.93 kg/m .  Physical Exam   GENERAL: healthy, alert and no distress  PSYCH: mentation appears normal, anxious and judgement and insight intact    Results for orders placed or performed in visit on 12/21/22   TSH with free T4 reflex     Status: Normal   Result Value Ref Range    TSH 1.03 0.30 - 4.20 uIU/mL   Basic metabolic panel  (Ca, Cl, CO2, Creat, Gluc, K, Na, BUN)     Status: Abnormal   Result Value Ref Range    Sodium 139 136 - 145 mmol/L    Potassium 3.7 3.4 - 5.3 mmol/L    Chloride 100 98 - 107 mmol/L    Carbon Dioxide (CO2) 29 " 22 - 29 mmol/L    Anion Gap 10 7 - 15 mmol/L    Urea Nitrogen 6.1 6.0 - 20.0 mg/dL    Creatinine 0.56 0.51 - 0.95 mg/dL    Calcium 9.5 8.6 - 10.0 mg/dL    Glucose 163 (H) 70 - 99 mg/dL    GFR Estimate >90 >60 mL/min/1.73m2   Hemoglobin A1c     Status: Abnormal   Result Value Ref Range    Hemoglobin A1C 10.1 (H) 0.0 - 5.6 %       Radha Mei M.D.

## 2022-12-21 NOTE — PATIENT INSTRUCTIONS
Find out about counseling through your husbands job   Start the effexor when you are ready  Start the trazodone Try the trazodone  Start with 1/2 tablet an hour before bedtime.   You can increase by 1/2 tablet every few nights to a maximum of 3 tablets.  If you are groggy the next day after changing doses try cutting back to the dose you were taking before.   Let me know if you need to try something else or if you are having side effects from this.   Use the ativan as needed up to 2 times per day for the next few weeks    I would like to check in by phone in 3 weeks or so     If you cannot get an appointment please call [phone] and pick option 2 and that should get you to our office

## 2023-02-04 ENCOUNTER — HOSPITAL ENCOUNTER (EMERGENCY)
Facility: CLINIC | Age: 43
Discharge: HOME | End: 2023-02-04
Attending: FAMILY MEDICINE | Admitting: FAMILY MEDICINE
Payer: COMMERCIAL

## 2023-02-04 VITALS
WEIGHT: 170 LBS | HEIGHT: 64 IN | SYSTOLIC BLOOD PRESSURE: 138 MMHG | HEART RATE: 114 BPM | OXYGEN SATURATION: 95 % | BODY MASS INDEX: 29.02 KG/M2 | DIASTOLIC BLOOD PRESSURE: 85 MMHG | TEMPERATURE: 98 F | RESPIRATION RATE: 18 BRPM

## 2023-02-04 DIAGNOSIS — H92.01 OTALGIA, RIGHT: ICD-10-CM

## 2023-02-04 DIAGNOSIS — E11.69 TYPE 2 DIABETES MELLITUS WITH OTHER SPECIFIED COMPLICATION, WITHOUT LONG-TERM CURRENT USE OF INSULIN (H): ICD-10-CM

## 2023-02-04 PROCEDURE — 99284 EMERGENCY DEPT VISIT MOD MDM: CPT | Performed by: FAMILY MEDICINE

## 2023-02-04 PROCEDURE — 250N000013 HC RX MED GY IP 250 OP 250 PS 637: Performed by: FAMILY MEDICINE

## 2023-02-04 RX ORDER — SULINDAC 200 MG/1
200 TABLET ORAL 2 TIMES DAILY
Qty: 14 TABLET | Refills: 0 | Status: SHIPPED | OUTPATIENT
Start: 2023-02-04 | End: 2023-11-20

## 2023-02-04 RX ORDER — SULINDAC 200 MG/1
200 TABLET ORAL 2 TIMES DAILY
Status: DISCONTINUED | OUTPATIENT
Start: 2023-02-04 | End: 2023-02-04 | Stop reason: HOSPADM

## 2023-02-04 RX ORDER — LEVOFLOXACIN 500 MG/1
500 TABLET, FILM COATED ORAL DAILY
Qty: 10 TABLET | Refills: 0 | Status: SHIPPED | OUTPATIENT
Start: 2023-02-04 | End: 2023-02-11

## 2023-02-04 RX ADMIN — SULINDAC 200 MG: 200 TABLET ORAL at 06:38

## 2023-02-04 ASSESSMENT — ENCOUNTER SYMPTOMS
FEVER: 0
SINUS PRESSURE: 0
RHINORRHEA: 0
SORE THROAT: 0
COUGH: 0

## 2023-02-04 NOTE — ED TRIAGE NOTES
Pt presents with pain to right outer ear. Pt states pain began 1 month ago following a chiropractic adjustment. Pt states pain has increased significantly over last 2 days.      Triage Assessment     Row Name 02/04/23 0426       Triage Assessment (Adult)    Airway WDL WDL       Respiratory WDL    Respiratory WDL WDL       Skin Circulation/Temperature WDL    Skin Circulation/Temperature WDL WDL       Cardiac WDL    Cardiac WDL WDL       Peripheral/Neurovascular WDL    Peripheral Neurovascular WDL WDL    Capillary Refill, General less than/equal to 3 secs       Cognitive/Neuro/Behavioral WDL    Cognitive/Neuro/Behavioral WDL WDL       Myriam Coma Scale    Best Eye Response 4-->(E4) spontaneous    Best Motor Response 6-->(M6) obeys commands    Best Verbal Response 5-->(V5) oriented    [place] Coma Scale Score 15

## 2023-02-04 NOTE — DISCHARGE INSTRUCTIONS
ICD-10-CM    1. Otalgia, right  H92.01     The pinna is tender and blood sugars are poorly controlled for the last month.  I see no serious findings on exam other than tenderenss.  You may need imaging on CT.  However, for now I would recommend taking levaquin for 10 days. rechecking in clinic in the next week as well as with ENT.  return here for redness, worsening. stay hydrated.  also use sulindac for pain.  first dose here.  also consider TMJ. avoid chewing gum, clenching jaw.

## 2023-02-04 NOTE — ED PROVIDER NOTES
History     Chief Complaint   Patient presents with     Otalgia     HPI  Tammy Joshi is a 42 year old female who presents with 3 to 4 weeks of pain that localizes to about the region of the calvarium but not within the canal and no obvious drainage.  No obvious trauma.  She initially told me diabetes had been under control but her last A1c was 10.  She notes some changes when she chews or eats but has been progressively worse hearing is not affected.  Vision is not affected.  There have been no obvious sores or lesions around the face or mouth.  She has no obvious swelling of the neck or behind the ear.  There has been no trauma.  The pain is now interfering with sleep.    Allergies:  No Known Allergies    Problem List:    Patient Active Problem List    Diagnosis Date Noted     Obesity without serious comorbidity, unspecified classification, unspecified obesity type 07/27/2020     Priority: Medium     Hip pain, left 09/08/2017     Priority: Medium     Type 2 diabetes mellitus without complication (H) 10/25/2015     Priority: Medium     Hyperlipidemia with target LDL less than 100 08/06/2015     Priority: Medium     Diagnosis updated by automated process. Provider to review and confirm.       Depression 02/24/2014     Priority: Medium     Anxiety 07/21/2011     Priority: Medium     CARDIOVASCULAR SCREENING; LDL GOAL LESS THAN 160 10/31/2010     Priority: Medium     Hand numbness 10/14/2009     Priority: Medium        Past Medical History:    Past Medical History:   Diagnosis Date     Absence of menstruation      Female infertility of unspecified origin      MILD/NOS PREECLAMP-ANTEP 8/29/2005     Polycystic ovaries      PPH (postpartum hemorrhage) 11/2009       Past Surgical History:    Past Surgical History:   Procedure Laterality Date     D & C  1998    Missed Ab     LITHOTRIPSY  2011     ZZC HAND/FINGER SURGERY UNLISTED  7th grade    left index       Family History:    Family History   Adopted: Yes   Problem  "Relation Age of Onset     Unknown/Adopted Other         patient was adopted; knows a limited amount of medical history of birth parents       Social History:  Marital Status:   [2]  Social History     Tobacco Use     Smoking status: Never     Smokeless tobacco: Never   Vaping Use     Vaping Use: Never used   Substance Use Topics     Alcohol use: Yes     Comment: wine occasionally, none while pregnant     Drug use: No        Medications:    levofloxacin (LEVAQUIN) 500 MG tablet  sulindac (CLINORIL) 200 MG tablet  acetaminophen (TYLENOL) 325 MG tablet  ibuprofen (ADVIL/MOTRIN) 200 MG capsule  LORazepam (ATIVAN) 0.5 MG tablet  meclizine (ANTIVERT) 25 MG tablet  ondansetron (ZOFRAN ODT) 4 MG ODT tab  oxyCODONE-acetaminophen (PERCOCET) 5-325 MG tablet  traZODone (DESYREL) 50 MG tablet  valACYclovir (VALTREX) 1000 mg tablet  venlafaxine (EFFEXOR XR) 37.5 MG 24 hr capsule  venlafaxine (EFFEXOR XR) 75 MG 24 hr capsule          Review of Systems   Constitutional: Negative for fever.   HENT: Positive for ear pain. Negative for congestion, dental problem, ear discharge, rhinorrhea, sinus pressure and sore throat.    Respiratory: Negative for cough.    All other systems reviewed and are negative.      Physical Exam   BP: 138/85  Pulse: 114  Temp: 98  F (36.7  C)  Resp: 18  Height: 162.6 cm (5' 4\")  Weight: 77.1 kg (170 lb)  SpO2: 95 %      Physical Exam  Constitutional:       General: She is in acute distress.      Appearance: She is not diaphoretic.   HENT:      Head: Atraumatic.      Right Ear: Tympanic membrane and ear canal normal. There is no impacted cerumen.      Left Ear: Tympanic membrane and ear canal normal. There is no impacted cerumen.      Nose: Nose normal.      Mouth/Throat:      Mouth: Mucous membranes are moist.   Eyes:      Conjunctiva/sclera: Conjunctivae normal.   Musculoskeletal:      Cervical back: Neck supple.   Skin:     Findings: No rash.   Neurological:      Mental Status: She is alert. "       The region of the cavum below the helix crux is focally tender without obvious fluctuance or swelling.  There is no obvious erythema here.  More exquisitely tender.  When I palpate the TMJ joint with opening and closing there is some tenderness here but less so.  There is mild tenderness over the mastoid region but no obvious significant swelling or exquisite tenderness here.  The tragus is nontender./  remainder of the ear pinna is nontender but some increased pain when moving the pinna.  There is no adenopathy in the cervical chain or occipital.  There are no oral or facial lesions.  There is no obvious right-sided dental lesions or abscess.  Her opening and closing of the mouth is without obvious trismus.      ED Course                 Procedures              Critical Care time:  none               No results found for this or any previous visit (from the past 24 hour(s)).    Medications   sulindac (CLINORIL) tablet 200 mg (200 mg Oral Given 2/4/23 0638)       Assessments & Plan (with Medical Decision Making)     MDM: Tammy Joshi is a 42 year old female who presents with right-sided otalgia that is at the cavus without obvious erythema but and poorly controlled diabetic with an A1c of 10.  Despite the lack of erythema there is a risk of perichondritis.  There is some tenderness over the mastoid although is not exquisite and less suspicious for mastoiditis.  She is nontoxic and afebrile.  TMJ is certainly in the differential but the exquisite tenderness over the cavus region is more atypical.    Imaging may be indicated -with CT of the region but at this point the patient is nontoxic and this could be done outpatient.  I have therefore placed a consultation for urgent reevaluation in clinic with a referral for within 3 to 5 days by primary care after starting Levaquin.  I have given enough of a course to take her through 10 days.  I have also placed priority consultation within 1 to 2 weeks with ENT.  I  discussed with the patient the risk of fluoroquinolones with tendinopathy muscle inflammation nerve related pain.  But in an uncontrolled diabetic with risk for perichondritis I recommended kenton quinolones despite this.  She was on methylprednisolone several months ago but is not on steroids now.  She is young at age 42.  Her risk of these complications at least with a short course fluoroquinolone is lower.    I did also give her some recommendations regarding TMJ although my suspicion for this is less.  She has been given precautions for worsening including signs of mastoiditis.  Should she return to the emergency department for worsening symptoms I would have a low threshold to perform CT at that time otherwise consider at the follow-up in clinic.    Finally we discussed pain management.  She has been using some ibuprofen with an adequate effect.  We discussed the use of sulindac.  There is a risk for peptic ulcer.  Discussed taking this without ever using it with a another NSAID so ibuprofen and other NSAIDs would need to be stopped.  Gave her 1 dose here orally.  Should be taken with food or milk.      I have reviewed the nursing notes.    I have reviewed the findings, diagnosis, plan and need for follow up with the patient.       Medical Decision Making  The patient's presentation is strongly suggestive of an acute and uncomplicated illness or injury and a chronic illness mild to moderate exacerbation, progression, or side effect of treatment.    The patient's evaluation involved:  strong consideration of a test (see separate area of note for details) that was ultimately deferred    The patient's management involved prescription drug management (including medications given in the ED).        Discharge Medication List as of 2/4/2023  6:26 AM      START taking these medications    Details   levofloxacin (LEVAQUIN) 500 MG tablet Take 1 tablet (500 mg) by mouth daily for 7 days, Disp-10 tablet, R-0, E-Prescribe              Final diagnoses:   Otalgia, right - The pinna is tender and blood sugars are poorly controlled for the last month.  I see no serious findings on exam other than tenderenss.  You may need imaging on CT.  However, for now I would recommend taking levaquin for 10 days. rechecking in clinic in the next week as well as with ENT.  return here for redness, worsening. stay hydrated.  also use sulindac for pain.  first dose here.  also consider TMJ. avoid chewing gum, clenching jaw.    Type 2 diabetes mellitus with other specified complication, without long-term current use of insulin (H) - a1c was 10 recently.  return for better blood sugar control       2/4/2023   [facility] EMERGENCY DEPT     Esteban Banegas MD  02/04/23 0805

## 2023-02-06 ENCOUNTER — VIRTUAL VISIT (OUTPATIENT)
Dept: FAMILY MEDICINE | Facility: CLINIC | Age: 43
End: 2023-02-06
Payer: COMMERCIAL

## 2023-02-06 DIAGNOSIS — F51.04 PSYCHOPHYSIOLOGICAL INSOMNIA: Primary | ICD-10-CM

## 2023-02-06 DIAGNOSIS — E11.9 TYPE 2 DIABETES MELLITUS WITHOUT COMPLICATION, WITHOUT LONG-TERM CURRENT USE OF INSULIN (H): ICD-10-CM

## 2023-02-06 DIAGNOSIS — F41.8 SITUATIONAL ANXIETY: ICD-10-CM

## 2023-02-06 PROCEDURE — 99213 OFFICE O/P EST LOW 20 MIN: CPT | Mod: 95 | Performed by: FAMILY MEDICINE

## 2023-02-06 RX ORDER — TRAZODONE HYDROCHLORIDE 150 MG/1
150 TABLET ORAL AT BEDTIME
Qty: 90 TABLET | Refills: 1 | Status: SHIPPED | OUTPATIENT
Start: 2023-02-06 | End: 2023-11-20

## 2023-02-06 RX ORDER — VENLAFAXINE HYDROCHLORIDE 150 MG/1
150 CAPSULE, EXTENDED RELEASE ORAL DAILY
Qty: 90 CAPSULE | Refills: 1 | Status: SHIPPED | OUTPATIENT
Start: 2023-02-06 | End: 2023-05-26

## 2023-02-06 NOTE — PROGRESS NOTES
"Tammy is a 42 year old who is being evaluated via a billable telephone visit.      What phone number would you like to be contacted at?   How would you like to obtain your AVS?     Distant Location (provider location):  Off-site    Assessment & Plan     Psychophysiological insomnia  Cont the trazodone changed to 150  Tab   - traZODone (DESYREL) 150 MG tablet; Take 1 tablet (150 mg) by mouth At Bedtime    Situational anxiety  Still ongoing stressors in the jessica ewill increase   - venlafaxine (EFFEXOR XR) 150 MG 24 hr capsule; Take 1 capsule (150 mg) by mouth daily    Type 2 diabetes mellitus without complication, without long-term current use of insulin (H)  Due   - Adult Eye  Referral; Future         BMI:   Estimated body mass index is 29.18 kg/m  as calculated from the following:    Height as of 2/4/23: 1.626 m (5' 4\").    Weight as of 2/4/23: 77.1 kg (170 lb).       See Patient Instructions        Radha Mei MD  [facility]    Subjective   Tammy is a 42 year old, presenting for the following health issues:  No chief complaint on file.      HPI     She is doing ok her family situation is bad and she has a lot of pressure from the family with a suicidal step daughter she thinks the medications are helping the trazodone does help and she is taking the 150 mg. She has been taking the lorazepam as needed fells that the effexor is helping and the trazodone is allowing her to get some sleep. We discussed increasing th eeffexor ands eh is ok with this       Review of Systems   Constitutional, HEENT, cardiovascular, pulmonary, gi and gu systems are negative, except as otherwise noted.      Objective           Vitals:  No vitals were obtained today due to virtual visit.    Physical Exam   healthy, alert and no distress  PSYCH: Alert and oriented times 3; coherent speech, normal   rate and volume, able to articulate logical thoughts, able   to abstract reason, no tangential thoughts, no " hallucinations   or delusions  Her affect is normal  RESP: No cough, no audible wheezing, able to talk in full sentences  Remainder of exam unable to be completed due to telephone visits              Phone call duration:15 minutes spent on the day of service with chart review, telephone call, counseling, and charting. Radha Mei M.D.

## 2023-02-08 DIAGNOSIS — F41.9 ANXIETY: ICD-10-CM

## 2023-02-08 DIAGNOSIS — F43.21 GRIEF REACTION: ICD-10-CM

## 2023-02-09 ENCOUNTER — TELEPHONE (OUTPATIENT)
Dept: FAMILY MEDICINE | Facility: CLINIC | Age: 43
End: 2023-02-09
Payer: COMMERCIAL

## 2023-02-09 DIAGNOSIS — E11.9 TYPE 2 DIABETES MELLITUS WITHOUT COMPLICATION, WITHOUT LONG-TERM CURRENT USE OF INSULIN (H): ICD-10-CM

## 2023-02-09 NOTE — TELEPHONE ENCOUNTER
New Medication Request        What medication are you requesting?: Metformin     Reason for medication request: high A1C levels     Have you taken this medication before?: Yes: but its been awhile more then 3 years     Controlled Substance Agreement on file:   CSA -- Patient Level:    CSA: None found at the patient level.         Patient offered an appointment? No already  Had one unless  wants another    Preferred Pharmacy: [facility] [place], [place] - [address]  [address] [facility] [address]  Phone: [phone] Fax: [phone] Alternate Fax: [phone], [phone]    [facility]  [address] [facility] [address]  Phone: [phone] Fax: [phone]    [facility] [place], [place] - [address]   [address] [facility] [address]  Phone: [phone] Fax: [phone]    [facility] [place] - [address] [facility] [address]  [address] [address] [facility] [address]  Phone: [phone] Fax: [phone]      Okay to leave a detailed message?: Yes at Cell number on file:    Telephone Information:   Mobile [phone]

## 2023-02-13 RX ORDER — LORAZEPAM 0.5 MG/1
TABLET ORAL
Qty: 60 TABLET | Refills: 0 | Status: SHIPPED | OUTPATIENT
Start: 2023-02-13 | End: 2023-11-20

## 2023-05-26 ENCOUNTER — TELEPHONE (OUTPATIENT)
Dept: FAMILY MEDICINE | Facility: CLINIC | Age: 43
End: 2023-05-26
Payer: COMMERCIAL

## 2023-05-26 DIAGNOSIS — E11.9 TYPE 2 DIABETES MELLITUS WITHOUT COMPLICATION, WITHOUT LONG-TERM CURRENT USE OF INSULIN (H): Primary | ICD-10-CM

## 2023-05-26 DIAGNOSIS — F41.8 SITUATIONAL ANXIETY: ICD-10-CM

## 2023-05-26 RX ORDER — VENLAFAXINE HYDROCHLORIDE 150 MG/1
150 CAPSULE, EXTENDED RELEASE ORAL DAILY
Qty: 90 CAPSULE | Refills: 1 | Status: SHIPPED | OUTPATIENT
Start: 2023-05-26 | End: 2023-11-20

## 2023-05-26 RX ORDER — VENLAFAXINE HYDROCHLORIDE 37.5 MG/1
37.5 CAPSULE, EXTENDED RELEASE ORAL DAILY
Qty: 90 CAPSULE | Refills: 1 | Status: SHIPPED | OUTPATIENT
Start: 2023-05-26 | End: 2023-11-20

## 2023-05-26 NOTE — TELEPHONE ENCOUNTER
Patient here with her  during his visit  They are under increased stress the past few months and he recently lost his job so are unsure if they are going to lose insurance in a few days  She is currently on venlafaxine which she feels has really helped with her anxiety (better than other medications) but wondering if there is room to increase  Also wants to mention she was unable to increase her metformin to the 1000mg/day (or 500mg two times daily) because of  GI issues. Asking about semaglutide      Discussed bumping her venlafaxine up - she would prefer to go more slowly so will send in 37.5mg vs 75mg    Also will have her continue on the 500mg daily of metformin as she does not have side effects with that and will try adding 0.25mg of semaglutide. Discussed insurance may be an issue    She understands that when they have insurance (which she says hopefully will only lapse for a month) that she will come back for a med/diabetic check.     If financial concerns remain an issue can refer to social work    Mary Wang PA-C

## 2023-06-29 ENCOUNTER — TELEPHONE (OUTPATIENT)
Dept: FAMILY MEDICINE | Facility: CLINIC | Age: 43
End: 2023-06-29
Payer: COMMERCIAL

## 2023-06-29 DIAGNOSIS — A60.00 GENITAL HERPES SIMPLEX, UNSPECIFIED SITE: ICD-10-CM

## 2023-06-29 RX ORDER — VALACYCLOVIR HYDROCHLORIDE 1 G/1
1000 TABLET, FILM COATED ORAL 2 TIMES DAILY
Qty: 14 TABLET | Refills: 0 | Status: SHIPPED | OUTPATIENT
Start: 2023-06-29 | End: 2023-11-20

## 2023-06-29 NOTE — TELEPHONE ENCOUNTER
Medication Question or Refill    Contacts       Type Contact Phone/Fax    06/29/2023 08:13 AM CDT Phone (Incoming) Tammy Joshi (Self) [phone] (M)      What medication are you calling about (include dose and sig)?: Pt asking for Rx for Valtrex for herpes outbreak.  Pt declines appt or UC.  Please call patient and advise.      Preferred Pharmacy:   [facility] [address] [place] [address] [address] [address] [address] [address]  Phone: [phone] Fax: [phone]    Controlled Substance Agreement on file:   CSA -- Patient Level:    CSA: None found at the patient level.       Who prescribed the medication?: ?    Do you need a refill? Yes    When did you use the medication last? Unknown    Patient offered an appointment? Yes: Pt Declined    Do you have any questions or concerns?  Yes:     Okay to leave a detailed message?: Yes at Cell number on file:    Telephone Information:   Mobile [phone]

## 2023-07-03 ENCOUNTER — TRANSFERRED RECORDS (OUTPATIENT)
Dept: HEALTH INFORMATION MANAGEMENT | Facility: CLINIC | Age: 43
End: 2023-07-03
Payer: COMMERCIAL

## 2023-07-03 LAB — RETINOPATHY: NEGATIVE

## 2023-08-01 NOTE — PROGRESS NOTES
Advanced Directives: On file  Vaccines: Up to date  Colonoscopy: 2015 due in 2025  Mammogram:  Ordered  PAP: na  Bone Density:  Normal 5 years ago. Discussed repeat. Order placed.  Cholesterol: Actively followed  FBS: Ordered  Glaucoma Scr: Recommend yearly  CT Lung: na  AAA Screen: na     Needs refill of lisinopril.    BP Readings from Last 3 Encounters:   11/10/16 124/58   06/13/16 131/79   04/09/16 140/80     POTASSIUM   Date Value Ref Range Status   04/08/2016 3.5 3.4 - 5.3 mmol/L Final   ]  CREATININE   Date Value Ref Range Status   04/08/2016 0.50* 0.52 - 1.04 mg/dL Final   ]  Prescription approved per [facility] Refill Protocol.

## 2023-09-27 ENCOUNTER — TELEPHONE (OUTPATIENT)
Dept: FAMILY MEDICINE | Facility: CLINIC | Age: 43
End: 2023-09-27

## 2023-09-27 NOTE — TELEPHONE ENCOUNTER
Patient Quality Outreach    Patient is due for the following:   Diabetes -  A1C, Microalbumin, Diabetic Follow-Up Visit, and Foot Exam  Breast Cancer Screening - Mammogram  Physical Preventive Adult Physical    Next Steps:   Schedule a office visit for Med check    Type of outreach:    Phone, left message for patient/parent to call back.    Next Steps:  Reach out within 90 days via Phone.    Max number of attempts reached: No. Will try again in 90 days if patient still on fail list.    Questions for provider review:    None           Silvia Sarmiento, [facility]  Chart routed to .

## 2023-11-20 ENCOUNTER — VIRTUAL VISIT (OUTPATIENT)
Dept: FAMILY MEDICINE | Facility: CLINIC | Age: 43
End: 2023-11-20
Payer: COMMERCIAL

## 2023-11-20 DIAGNOSIS — F43.21 GRIEF REACTION: ICD-10-CM

## 2023-11-20 DIAGNOSIS — F41.8 SITUATIONAL ANXIETY: ICD-10-CM

## 2023-11-20 DIAGNOSIS — F41.9 ANXIETY: ICD-10-CM

## 2023-11-20 DIAGNOSIS — E11.9 TYPE 2 DIABETES MELLITUS WITHOUT COMPLICATION, WITHOUT LONG-TERM CURRENT USE OF INSULIN (H): Primary | ICD-10-CM

## 2023-11-20 PROCEDURE — 99214 OFFICE O/P EST MOD 30 MIN: CPT | Mod: 95 | Performed by: PHYSICIAN ASSISTANT

## 2023-11-20 PROCEDURE — 96127 BRIEF EMOTIONAL/BEHAV ASSMT: CPT | Mod: 95 | Performed by: PHYSICIAN ASSISTANT

## 2023-11-20 RX ORDER — TIRZEPATIDE 2.5 MG/.5ML
2.5 INJECTION, SOLUTION SUBCUTANEOUS
Qty: 0.5 ML | Refills: 1 | Status: SHIPPED | OUTPATIENT
Start: 2023-11-20 | End: 2024-03-26

## 2023-11-20 RX ORDER — LORAZEPAM 0.5 MG/1
TABLET ORAL
Qty: 60 TABLET | Refills: 0 | Status: SHIPPED | OUTPATIENT
Start: 2023-11-20 | End: 2024-03-26

## 2023-11-20 RX ORDER — VENLAFAXINE HYDROCHLORIDE 150 MG/1
150 CAPSULE, EXTENDED RELEASE ORAL DAILY
Qty: 90 CAPSULE | Refills: 3 | Status: SHIPPED | OUTPATIENT
Start: 2023-11-20 | End: 2024-09-10

## 2023-11-20 ASSESSMENT — ANXIETY QUESTIONNAIRES
GAD7 TOTAL SCORE: 20
3. WORRYING TOO MUCH ABOUT DIFFERENT THINGS: NEARLY EVERY DAY
6. BECOMING EASILY ANNOYED OR IRRITABLE: MORE THAN HALF THE DAYS
5. BEING SO RESTLESS THAT IT IS HARD TO SIT STILL: NEARLY EVERY DAY
7. FEELING AFRAID AS IF SOMETHING AWFUL MIGHT HAPPEN: NEARLY EVERY DAY
GAD7 TOTAL SCORE: 20
1. FEELING NERVOUS, ANXIOUS, OR ON EDGE: NEARLY EVERY DAY
2. NOT BEING ABLE TO STOP OR CONTROL WORRYING: NEARLY EVERY DAY
IF YOU CHECKED OFF ANY PROBLEMS ON THIS QUESTIONNAIRE, HOW DIFFICULT HAVE THESE PROBLEMS MADE IT FOR YOU TO DO YOUR WORK, TAKE CARE OF THINGS AT HOME, OR GET ALONG WITH OTHER PEOPLE: EXTREMELY DIFFICULT

## 2023-11-20 ASSESSMENT — PATIENT HEALTH QUESTIONNAIRE - PHQ9
5. POOR APPETITE OR OVEREATING: NEARLY EVERY DAY
SUM OF ALL RESPONSES TO PHQ QUESTIONS 1-9: 12

## 2023-11-20 NOTE — PROGRESS NOTES
"Tammy is a 43 year old who is being evaluated via a billable telephone visit.      What phone number would you like to be contacted at? [phone]  How would you like to obtain your AVS? Sanchez    Distant Location (provider location):  On-site    Assessment & Plan     (E11.9) Type 2 diabetes mellitus without complication, without long-term current use of insulin (H)  (primary encounter diagnosis)  Comment: due for labs - orders placed. Will see if mounjaro is covered (she was able to get ozempic but could not tolerated) - seems to be less side effects with mounjaro  Plan: Lipid panel reflex to direct LDL Non-fasting,         Albumin Random Urine Quantitative with Creat         Ratio, HEMOGLOBIN A1C, metFORMIN (GLUCOPHAGE)         1000 MG tablet, Basic metabolic panel  (Ca, Cl,        CO2, Creat, Gluc, K, Na, BUN), tirzepatide         (MOUNJARO) 2.5 MG/0.5ML pen            (F41.9) Anxiety  Comment: refilled. Uses sparingly  Plan: LORazepam (ATIVAN) 0.5 MG tablet            (F43.21) Grief reaction  Comment:   Plan: LORazepam (ATIVAN) 0.5 MG tablet            (F41.8) Situational anxiety  Comment: still with family stressors but feels current dose is sufficient   Plan: venlafaxine (EFFEXOR XR) 150 MG 24 hr capsule            BMI:   Estimated body mass index is 29.18 kg/m  as calculated from the following:    Height as of 2/4/23: 1.626 m (5' 4\").    Weight as of 2/4/23: 77.1 kg (170 lb).       Depression Screening Follow Up        11/20/2023     5:06 PM   PHQ   PHQ-9 Total Score 12   Q9: Thoughts of better off dead/self-harm past 2 weeks Not at all       Follow Up Actions Taken  Depression Action Plan reviewed with patient.     MEDICATIONS:  Continue current medications without change    BRIA [facility] MIRANDA    Tunde Munoz is a 43 year old, presenting for the following health issues:  Recheck Medication (/)      11/20/2023     4:49 PM   Additional Questions   Roomed by " Dania Ramos,[facility]       Diabetes Follow-up    How often are you checking your blood sugar? Not at all  What concerns do you have today about your diabetes? Other: she was prescribed two metformin a day but she was getting a stomach ache so she started only doing one a day. She has recently started taking two a day due to feeling blood sugars elevated.   Do you have any of these symptoms? (Select all that apply)  No numbness or tingling in feet.  No redness, sores or blisters on feet.  No complaints of excessive thirst.  No reports of blurry vision.  No significant changes to weight.    BP Readings from Last 2 Encounters:   02/04/23 138/85   12/21/22 122/78     Hemoglobin A1C (%)   Date Value   12/21/2022 10.1 (H)   06/29/2021 5.6   07/20/2020 5.3     LDL Cholesterol Calculated (mg/dL)   Date Value   05/07/2019 29   03/26/2018     Cannot estimate LDL when triglyceride exceeds 400 mg/dL     LDL Cholesterol Direct (mg/dL)   Date Value   03/26/2018 68         Depression and Anxiety Follow-Up  How are you doing with your depression since your last visit? Improved overall but situational things make it worse  How are you doing with your anxiety since your last visit?  Improved overall but situational things make it worse  Are you having other symptoms that might be associated with depression or anxiety? Yes:  poor sleep  Have you had a significant life event? OTHER: family stress.    Do you have any concerns with your use of alcohol or other drugs? No    Social History     Tobacco Use    Smoking status: Never    Smokeless tobacco: Never   Vaping Use    Vaping Use: Never used   Substance Use Topics    Alcohol use: Yes     Comment: wine occasionally, none while pregnant    Drug use: No         1/29/2020     2:10 PM 8/27/2020    11:52 AM 12/21/2022     3:33 PM   PHQ   PHQ-9 Total Score 4 5 22   Q9: Thoughts of better off dead/self-harm past 2 weeks Not at all Not at all Several days   F/U: Thoughts of suicide or  self-harm   No   F/U: Safety concerns   No         10/10/2019     9:29 AM 1/29/2020     2:10 PM 8/27/2020    11:52 AM   FORTINO-7 SCORE   Total Score 2 3 20       Suicide Assessment Five-step Evaluation and Treatment (SAFE-T)    How many servings of fruits and vegetables do you eat daily?  2-3  On average, how many sweetened beverages do you drink each day (Examples: soda, juice, sweet tea, etc.  Do NOT count diet or artificially sweetened beverages)?   0  How many days per week do you exercise enough to make your heart beat faster? 3 or less  How many minutes a day do you exercise enough to make your heart beat faster? 20 - 29  How many days per week do you miss taking your medication? 0      Would like to continue the venlafaxine  Would like to continue metformin - for tami was just taking 1/day but then felt like she was having more sx of diabetes so did increase back to 2/day and symptoms went away and did tolerate the higher dose    Daughter is still struggling with mental health    Ozempic was making her horribly sick wasn't able to stay on that      Review of Systems   Remainder of ROS obtained and found to be negative other than that which was documented above        Objective           Vitals:  No vitals were obtained today due to virtual visit.    Physical Exam   healthy, alert, and no distress  PSYCH: Alert and oriented times 3; coherent speech, normal   rate and volume, able to articulate logical thoughts, able   to abstract reason, no tangential thoughts, no hallucinations   or delusions  Her affect is normal  RESP: No cough, no audible wheezing, able to talk in full sentences  Remainder of exam unable to be completed due to telephone visits        Phone call duration: 14 minutes

## 2024-02-20 ENCOUNTER — TELEPHONE (OUTPATIENT)
Dept: FAMILY MEDICINE | Facility: CLINIC | Age: 44
End: 2024-02-20
Payer: COMMERCIAL

## 2024-02-20 DIAGNOSIS — A60.04 HERPES SIMPLEX VULVOVAGINITIS: Primary | ICD-10-CM

## 2024-02-20 RX ORDER — VALACYCLOVIR HYDROCHLORIDE 500 MG/1
500 TABLET, FILM COATED ORAL 2 TIMES DAILY
Qty: 6 TABLET | Refills: 0 | Status: SHIPPED | OUTPATIENT
Start: 2024-02-20 | End: 2024-03-26

## 2024-02-20 NOTE — TELEPHONE ENCOUNTER
Medication Question or Refill    Contacts         Type Contact Phone/Fax    02/20/2024 11:12 AM CST Phone (Incoming) Tammy Joshi (Self) [phone] (M)            What medication are you calling about (include dose and sig)?: Medication for herpes    Preferred Pharmacy:   [facility] [address] [facility] [address] [facility]  [address] [facility] [address]  Phone: [phone] Fax: [phone] Alternate Fax: [phone], [phone]    [facility]  [address] [facility] [address]  Phone: [phone] Fax: [phone]    [facility] [address] [place] [address] [address]   [address] [facility] [address]  Phone: [phone] Fax: [phone]    [place], [place] - [address]  [address] [facility] [address]  Phone: [phone] Fax: [phone]      Controlled Substance Agreement on file:   CSA -- Patient Level:    CSA: None found at the patient level.       Who prescribed the medication?: Mary Wang    Do you need a refill? Yes    When did you use the medication last? A few months    Patient offered an appointment? No    Do you have any questions or concerns?  No      Okay to leave a detailed message?: Yes at Cell number on file:    Telephone Information:   Mobile [phone]

## 2024-02-20 NOTE — TELEPHONE ENCOUNTER
Refill of valtrex sent to [facility] pharmacy - there were 4 preferred pharmacies listed so I just picked the top one    Mary Wang PA-C

## 2024-02-20 NOTE — TELEPHONE ENCOUNTER
Noted. RN called patient. Let her know the medication was sent to the [facility] pharmacy. She wanted it sent to [facility]. RN explained how to get the medication transferred.  Update pharmacy in demographics.  Naomy Ayon RN on 2/20/2024 at 1:19 PM

## 2024-03-26 ENCOUNTER — OFFICE VISIT (OUTPATIENT)
Dept: FAMILY MEDICINE | Facility: CLINIC | Age: 44
End: 2024-03-26
Payer: COMMERCIAL

## 2024-03-26 ENCOUNTER — TELEPHONE (OUTPATIENT)
Dept: FAMILY MEDICINE | Facility: CLINIC | Age: 44
End: 2024-03-26

## 2024-03-26 VITALS
OXYGEN SATURATION: 96 % | TEMPERATURE: 97.4 F | DIASTOLIC BLOOD PRESSURE: 96 MMHG | WEIGHT: 186.8 LBS | SYSTOLIC BLOOD PRESSURE: 124 MMHG | HEIGHT: 64 IN | RESPIRATION RATE: 16 BRPM | BODY MASS INDEX: 31.89 KG/M2 | HEART RATE: 113 BPM

## 2024-03-26 DIAGNOSIS — E11.65 TYPE 2 DIABETES MELLITUS WITH HYPERGLYCEMIA, WITHOUT LONG-TERM CURRENT USE OF INSULIN (H): Primary | ICD-10-CM

## 2024-03-26 DIAGNOSIS — B37.2 YEAST DERMATITIS: ICD-10-CM

## 2024-03-26 DIAGNOSIS — F43.21 GRIEF REACTION: ICD-10-CM

## 2024-03-26 DIAGNOSIS — F41.9 ANXIETY: ICD-10-CM

## 2024-03-26 DIAGNOSIS — A60.04 HERPES SIMPLEX VULVOVAGINITIS: ICD-10-CM

## 2024-03-26 LAB
ANION GAP SERPL CALCULATED.3IONS-SCNC: 11 MMOL/L (ref 7–15)
BUN SERPL-MCNC: 6.9 MG/DL (ref 6–20)
CALCIUM SERPL-MCNC: 9 MG/DL (ref 8.6–10)
CHLORIDE SERPL-SCNC: 96 MMOL/L (ref 98–107)
CREAT SERPL-MCNC: 0.53 MG/DL (ref 0.51–0.95)
DEPRECATED HCO3 PLAS-SCNC: 28 MMOL/L (ref 22–29)
EGFRCR SERPLBLD CKD-EPI 2021: >90 ML/MIN/1.73M2
GLUCOSE SERPL-MCNC: 269 MG/DL (ref 70–99)
HBA1C MFR BLD: 10.5 % (ref 0–5.6)
POTASSIUM SERPL-SCNC: 3.6 MMOL/L (ref 3.4–5.3)
SODIUM SERPL-SCNC: 135 MMOL/L (ref 135–145)

## 2024-03-26 PROCEDURE — 36415 COLL VENOUS BLD VENIPUNCTURE: CPT | Performed by: FAMILY MEDICINE

## 2024-03-26 PROCEDURE — 83036 HEMOGLOBIN GLYCOSYLATED A1C: CPT | Performed by: FAMILY MEDICINE

## 2024-03-26 PROCEDURE — 80048 BASIC METABOLIC PNL TOTAL CA: CPT | Performed by: FAMILY MEDICINE

## 2024-03-26 PROCEDURE — 99214 OFFICE O/P EST MOD 30 MIN: CPT | Performed by: FAMILY MEDICINE

## 2024-03-26 RX ORDER — VALACYCLOVIR HYDROCHLORIDE 500 MG/1
500 TABLET, FILM COATED ORAL 2 TIMES DAILY
Qty: 12 TABLET | Refills: 2 | Status: SHIPPED | OUTPATIENT
Start: 2024-03-26

## 2024-03-26 RX ORDER — LORAZEPAM 0.5 MG/1
TABLET ORAL
Qty: 60 TABLET | Refills: 0 | Status: SHIPPED | OUTPATIENT
Start: 2024-03-26

## 2024-03-26 RX ORDER — LANCETS
EACH MISCELLANEOUS
Qty: 100 EACH | Refills: 6 | Status: SHIPPED | OUTPATIENT
Start: 2024-03-26

## 2024-03-26 NOTE — TELEPHONE ENCOUNTER
General Call    Contacts         Type Contact Phone/Fax    03/26/2024 01:10 PM CDT Phone (Incoming) Tammy Joshi (Self) [phone] (M)          Reason for Call:  Meli    What are your questions or concerns:  Insurance didn't cover Howieuro wants to see if doc will try Trulicity    Date of last appointment with provider: 03/26/2024    Okay to leave a detailed message?: Yes at Cell number on file:    Telephone Information:   Mobile [phone]

## 2024-03-26 NOTE — PATIENT INSTRUCTIONS
For your diabetes:  new meter and Mounjaro at your pharmacy.  We'll see if this is covered in the new year, if not we'll plan an alternate injectable agent.  Please plan on following up with myself or Mary in 3 months for your A1c.    For the vaginal concern:  refill of valtrex (enough for 2 outbreaks) at your pharmacy.  Next outbreak try clotrimazole for the itching burning.  If not effective please reach out so we can try something different.

## 2024-03-26 NOTE — PROGRESS NOTES
ASSESSMENT / PLAN:       ICD-10-CM    1. Type 2 diabetes mellitus with hyperglycemia, without long-term current use of insulin (H)  E11.65 HEMOGLOBIN A1C     Basic metabolic panel  (Ca, Cl, CO2, Creat, Gluc, K, Na, BUN)     blood glucose monitoring (NO BRAND SPECIFIED) meter device kit     blood glucose (NO BRAND SPECIFIED) test strip     thin (NO BRAND SPECIFIED) lancets     DISCONTINUED: tirzepatide (MOUNJARO) 2.5 MG/0.5ML pen     CANCELED: Albumin Random Urine Quantitative with Creat Ratio      2. Yeast dermatitis  B37.2       3. Herpes simplex vulvovaginitis  A60.04 valACYclovir (VALTREX) 500 MG tablet      4. Grief reaction  F43.21 LORazepam (ATIVAN) 0.5 MG tablet      5. Anxiety  F41.9 LORazepam (ATIVAN) 0.5 MG tablet        DM:  reviewed A1c, uncontrolled DM and possible contribution to vaginal symptoms.  Pt will to try GLP1 again, new prescription sent.  If not covered can try alternate, reviewed there are a few options.  Discussed importance of follow-up to make sure we are making progress at control to avoid long term consequences to uncontrolled blood sugars.  She verbalized understanding.  Will need in clinic follow-up in 3 months.    Genital rash:  pics appear like yeast.  Rash waxes and wanes without any change in product so feel irritant dermatitis less likely.  This does not appear to be herpes, would not recommend suppression at this time.  Trial of clotrimazole to entire area with herpes outbreaks.  Will need to monitor response with next outbreak for efficacy.   Might need to try a few different things to get resolution, encouraged persistence  pt verbalized understanding.      Tunde Munoz is a 43 year old, presenting for the following health issues:  Vaginal Problem (Herpes outbreak for the last 3 months ), Diabetes, and Tingling (Bilateral hand tingling)        3/26/2024    10:11 AM   Additional Questions   Roomed by Christine DICKINSON   Accompanied by self     History of Present Illness        Diabetes:   She presents for follow up of diabetes.    She is not checking blood glucose.        She is concerned about frequent infections.    She is not experiencing numbness or burning in feet, excessive thirst, blurry vision, weight changes or redness, sores or blisters on feet.           Reason for visit:  Female issue    She eats 2-3 servings of fruits and vegetables daily.She consumes 1 sweetened beverage(s) daily.She exercises with enough effort to increase her heart rate 10 to 19 minutes per day.  She exercises with enough effort to increase her heart rate 4 days per week.   She is taking medications regularly.     Was seen in Nov for DM.  Now taking 1000mg metformin daily and tolerating this just fine.  Was on Ozempic at one point but did not tolerate it due to stomach upset.  Was prescribed mounjaro but only covered for 1 month and stopped due to formulary.  Seemed to tolerate that one just fine.      Has not had a working meter for a while so not checking blood sugars.       Gets recurrent herpes outbreaks.  Feels like they last a long time and has trouble clearing.  Has had outbreaks for the last 12 years.  Will get an outbreak and get treatment but then will have persistent symptoms for 2-3 months.   Sores will resolved but will then get very red and itchy from her pubes to her buttocks.  Very red and itchy.  No vaginal discharge.  Has tried monistat, vagisil, witch hazel, Apple cider vinegar. Bath salts  ice is the only thing that helps    Currently still a bit red but on the tail end.  Last week was still pretty bad, now improving.      Brings pic when rash was flared.  Beefy red rash involving entire genital and perineal area with associated satellite lesions most c/w yeast    Does not use any new products between episodes, same detergent, no pads/liners, no new soaps.  Rash resolves without changing any products until next herpes episode        Objective    BP (!) 124/96   Pulse 113   Temp  "97.4  F (36.3  C) (Tympanic)   Resp 16   Ht 1.621 m (5' 3.8\")   Wt 84.7 kg (186 lb 12.8 oz)   LMP 03/22/2024   SpO2 96%   BMI 32.27 kg/m    Body mass index is 32.27 kg/m .  Physical Exam   GENERAL: alert and no distress  EYES: Eyes grossly normal to inspection, PERRL and conjunctivae and sclerae normal   (female): mildly erythematous female external genitalia, normal urethral meatus, normal vaginal mucosa  MS: no gross musculoskeletal defects noted, no edema  PSYCH: mentation appears normal, affect normal/bright    Epic reviewed        Signed Electronically by: Chiara Enriquez DO    "

## 2024-03-28 ENCOUNTER — TELEPHONE (OUTPATIENT)
Dept: FAMILY MEDICINE | Facility: CLINIC | Age: 44
End: 2024-03-28
Payer: COMMERCIAL

## 2024-03-28 NOTE — TELEPHONE ENCOUNTER
FYI - Status Update    Who is Calling: patient    Update: Patient would like to try Ozempic again as this is probably the only medication that her insurance will currently cover.      Does caller want a call/response back: Yes     Okay to leave a detailed message?: Yes at Cell number on file:    Telephone Information:   Mobile [phone]

## 2024-03-29 NOTE — TELEPHONE ENCOUNTER
Please give pt a call.  Has she talked with her insurance?  There are other GLP1 as well and I would rather not restart something she did not tolerate.  If nothing else is covered I would recommend we try a different class of medication rather then restarting the Oxempic    Dr. Chiara Enriquez, DO

## 2024-03-29 NOTE — TELEPHONE ENCOUNTER
Call placed to patient   Relayed Dr. Enriquez's message    Patient verbalized understanding  No further questions/concerns    Ba Luo, Clinic RN  [facility]

## 2024-04-03 ENCOUNTER — TELEPHONE (OUTPATIENT)
Dept: FAMILY MEDICINE | Facility: CLINIC | Age: 44
End: 2024-04-03
Payer: COMMERCIAL

## 2024-04-03 DIAGNOSIS — E11.9 TYPE 2 DIABETES MELLITUS WITHOUT COMPLICATION, WITHOUT LONG-TERM CURRENT USE OF INSULIN (H): Primary | ICD-10-CM

## 2024-04-03 NOTE — TELEPHONE ENCOUNTER
Pt is stating that her insurance keeps declining the diabetes shot - trulicity and Sherwin, she was hoping to try ozempic if that's an option. Please reach out to pt for care options.

## 2024-04-03 NOTE — TELEPHONE ENCOUNTER
We can try ozempic again - availability of this medication has been a challenge as well and it still looks like she will need a prior auth. Obviously if she is able to get it, she needs to watch for the same side effects. A different medication  might be needed altogether and it would be helpful if she hasn't done so already to have her call her insurance and find out what diabetes medications they do cover    Mary

## 2024-04-03 NOTE — TELEPHONE ENCOUNTER
Insurance will cover [facility] medicine at [facility] in [place] please send.

## 2024-04-05 RX ORDER — TIRZEPATIDE 2.5 MG/.5ML
2.5 INJECTION, SOLUTION SUBCUTANEOUS
Qty: 3 ML | Refills: 0 | Status: SHIPPED | OUTPATIENT
Start: 2024-04-05

## 2024-04-05 NOTE — TELEPHONE ENCOUNTER
Mary is out of the office. I sent starting dose mounjaro to [facility].  Yaritza Bonilla PA-C

## 2024-04-16 NOTE — TELEPHONE ENCOUNTER
Retail Pharmacy Prior Authorization Team   Phone: [phone]    PA Initiation    Medication: OZEMPIC (0.25 OR 0.5 MG/DOSE) 2 [facility]  Insurance Company: [employer] - Phone [phone] Fax [phone]  Pharmacy Filling the Rx: [facility] [address] [place] [address] [address]  Filling Pharmacy Phone: [phone]  Filling Pharmacy Fax:    Start Date: 4/16/2024    Medication was changed to Mounjaro.  Called [facility], patient picked up the medication on 04/08/2024.

## 2024-04-24 ENCOUNTER — TELEPHONE (OUTPATIENT)
Dept: FAMILY MEDICINE | Facility: CLINIC | Age: 44
End: 2024-04-24
Payer: COMMERCIAL

## 2024-04-24 DIAGNOSIS — E11.9 TYPE 2 DIABETES MELLITUS WITHOUT COMPLICATION, WITHOUT LONG-TERM CURRENT USE OF INSULIN (H): Primary | ICD-10-CM

## 2024-04-24 NOTE — TELEPHONE ENCOUNTER
Medication Question or Refill    Contacts         Type Contact Phone/Fax    04/24/2024 09:32 AM CDT Phone (Incoming) Tammy Joshi (Self) [phone] (M)            What medication are you calling about (include dose and sig)?: Monjero first level     Preferred Pharmacy:       [facility] [address] [place] [address] [address] [place] [address] [address] [facility] [address]  Phone: [phone] Fax: [phone]      Controlled Substance Agreement on file:   CSA -- Patient Level:    CSA: None found at the patient level.       Who prescribed the medication?: Mary Wang     Do you need a refill? Yes    When did you use the medication last? Currently on this     Patient offered an appointment? Yes: did not want an appointment     Do you have any questions or concerns?  Yes: Patient would like the next dose of Monjero sent to the pharmacy       Okay to leave a detailed message?: Yes at Cell number on file:    Telephone Information:   Mobile [phone]

## 2024-05-04 DIAGNOSIS — E11.9 TYPE 2 DIABETES MELLITUS WITHOUT COMPLICATION, WITHOUT LONG-TERM CURRENT USE OF INSULIN (H): ICD-10-CM

## 2024-05-06 ENCOUNTER — ALLIED HEALTH/NURSE VISIT (OUTPATIENT)
Dept: FAMILY MEDICINE | Facility: CLINIC | Age: 44
End: 2024-05-06
Payer: COMMERCIAL

## 2024-05-06 DIAGNOSIS — Z11.1 VISIT FOR MANTOUX TEST: Primary | ICD-10-CM

## 2024-05-06 PROCEDURE — 86580 TB INTRADERMAL TEST: CPT

## 2024-05-06 PROCEDURE — 99207 PR NO CHARGE NURSE ONLY: CPT

## 2024-05-06 RX ORDER — TIRZEPATIDE 2.5 MG/.5ML
2.5 INJECTION, SOLUTION SUBCUTANEOUS
OUTPATIENT
Start: 2024-05-06

## 2024-05-06 NOTE — PROGRESS NOTES
Subjective   Tammy is a 43 year old, presenting for the following health issues:  Mantoux Administration    HPI     Patient is here today for a Mantoux (TST) test placement.    Is there a current order in the chart? Yes    Reason for Mantoux (TST) in patient's own words: a new Job    Patient needs form signed? No - form not needed per patient.    Instructed patient to wait for 15 minutes post injection and to report any reactions immediately to staff.    Told patient to return to clinic in 48-72 hours to have Mantoux (TST) read.                            Objective    LMP 03/22/2024   There is no height or weight on file to calculate BMI.  Physical Exam               Signed Electronically by: AJRED GREEN/LPN

## 2024-05-08 ENCOUNTER — ALLIED HEALTH/NURSE VISIT (OUTPATIENT)
Dept: FAMILY MEDICINE | Facility: CLINIC | Age: 44
End: 2024-05-08
Payer: COMMERCIAL

## 2024-05-08 DIAGNOSIS — Z11.1 VISIT FOR MANTOUX TEST: Primary | ICD-10-CM

## 2024-05-08 PROCEDURE — 99207 PR NO CHARGE NURSE ONLY: CPT

## 2024-06-09 ENCOUNTER — APPOINTMENT (OUTPATIENT)
Dept: GENERAL RADIOLOGY | Facility: CLINIC | Age: 44
End: 2024-06-09
Attending: FAMILY MEDICINE
Payer: COMMERCIAL

## 2024-06-09 ENCOUNTER — HOSPITAL ENCOUNTER (EMERGENCY)
Facility: CLINIC | Age: 44
Discharge: HOME | End: 2024-06-09
Attending: FAMILY MEDICINE | Admitting: FAMILY MEDICINE
Payer: COMMERCIAL

## 2024-06-09 VITALS
DIASTOLIC BLOOD PRESSURE: 92 MMHG | WEIGHT: 170 LBS | OXYGEN SATURATION: 97 % | HEIGHT: 64 IN | TEMPERATURE: 98.2 F | BODY MASS INDEX: 29.02 KG/M2 | SYSTOLIC BLOOD PRESSURE: 141 MMHG | RESPIRATION RATE: 18 BRPM | HEART RATE: 102 BPM

## 2024-06-09 DIAGNOSIS — R05.1 ACUTE COUGH: ICD-10-CM

## 2024-06-09 LAB
FLUAV RNA SPEC QL NAA+PROBE: NEGATIVE
FLUBV RNA RESP QL NAA+PROBE: NEGATIVE
RSV RNA SPEC NAA+PROBE: NEGATIVE
SARS-COV-2 RNA RESP QL NAA+PROBE: NEGATIVE

## 2024-06-09 PROCEDURE — 99283 EMERGENCY DEPT VISIT LOW MDM: CPT | Performed by: FAMILY MEDICINE

## 2024-06-09 PROCEDURE — 71046 X-RAY EXAM CHEST 2 VIEWS: CPT

## 2024-06-09 PROCEDURE — 99284 EMERGENCY DEPT VISIT MOD MDM: CPT | Mod: 25 | Performed by: FAMILY MEDICINE

## 2024-06-09 PROCEDURE — 87637 SARSCOV2&INF A&B&RSV AMP PRB: CPT | Performed by: FAMILY MEDICINE

## 2024-06-09 RX ORDER — AZITHROMYCIN 250 MG/1
TABLET, FILM COATED ORAL
Qty: 6 TABLET | Refills: 0 | Status: SHIPPED | OUTPATIENT
Start: 2024-06-09 | End: 2024-07-23

## 2024-06-09 RX ORDER — ALBUTEROL SULFATE 90 UG/1
2 AEROSOL, METERED RESPIRATORY (INHALATION) EVERY 4 HOURS PRN
Qty: 8.5 G | Refills: 0 | Status: SHIPPED | OUTPATIENT
Start: 2024-06-09

## 2024-06-09 ASSESSMENT — ACTIVITIES OF DAILY LIVING (ADL): ADLS_ACUITY_SCORE: 35

## 2024-06-09 ASSESSMENT — COLUMBIA-SUICIDE SEVERITY RATING SCALE - C-SSRS
6. HAVE YOU EVER DONE ANYTHING, STARTED TO DO ANYTHING, OR PREPARED TO DO ANYTHING TO END YOUR LIFE?: NO
2. HAVE YOU ACTUALLY HAD ANY THOUGHTS OF KILLING YOURSELF IN THE PAST MONTH?: NO
1. IN THE PAST MONTH, HAVE YOU WISHED YOU WERE DEAD OR WISHED YOU COULD GO TO SLEEP AND NOT WAKE UP?: NO

## 2024-06-09 NOTE — ED PROVIDER NOTES
HPI   Patient is a 43-year-old female presenting with cough and congestion.  Per my chart review, the patient has diabetes and hyperlipidemia.  No medication changes recently.  No history of smoking.  She has not had to use an inhaler in the past.  No marijuana.    Patient has been sick over the past 3 to 4 weeks.  Her cough has more recently become productive with green sputum.  She feels chilled.  No chest or back pain.  No vomiting.  No diarrhea.  No lightheadedness or fainting.  No other known sick contacts.  No recent travel.  No antibiotics.      Allergies:  No Known Allergies  Problem List:    Patient Active Problem List    Diagnosis Date Noted    Obesity without serious comorbidity, unspecified classification, unspecified obesity type 07/27/2020     Priority: Medium    Hip pain, left 09/08/2017     Priority: Medium    Type 2 diabetes mellitus without complication (H) 10/25/2015     Priority: Medium    Hyperlipidemia with target LDL less than 100 08/06/2015     Priority: Medium     Diagnosis updated by automated process. Provider to review and confirm.      Depression 02/24/2014     Priority: Medium    Anxiety 07/21/2011     Priority: Medium    CARDIOVASCULAR SCREENING; LDL GOAL LESS THAN 160 10/31/2010     Priority: Medium    Hand numbness 10/14/2009     Priority: Medium      Past Medical History:    Past Medical History:   Diagnosis Date    Absence of menstruation     Female infertility of unspecified origin     MILD/NOS PREECLAMP-ANTEP 8/29/2005    Polycystic ovaries     PPH (postpartum hemorrhage) 11/2009     Past Surgical History:    Past Surgical History:   Procedure Laterality Date    D & C  1998    Missed Ab    LITHOTRIPSY  2011    Z HAND/FINGER SURGERY UNLISTED  7th grade    left index     Family History:    Family History   Adopted: Yes   Problem Relation Age of Onset    Unknown/Adopted Other         patient was adopted; knows a limited amount of medical history of birth parents     Social  "History:  Marital Status:   [2]  Social History     Tobacco Use    Smoking status: Never    Smokeless tobacco: Never   Vaping Use    Vaping status: Never Used   Substance Use Topics    Alcohol use: Yes     Comment: wine occasionally, none while pregnant    Drug use: No      Medications:    albuterol (PROAIR HFA/PROVENTIL HFA/VENTOLIN HFA) 108 (90 Base) MCG/ACT inhaler  azithromycin (ZITHROMAX Z-KEILY) 250 MG tablet  blood glucose (NO BRAND SPECIFIED) test strip  blood glucose monitoring (NO BRAND SPECIFIED) meter device kit  dulaglutide (TRULICITY) 0.75 MG/0.5ML pen  ibuprofen (ADVIL/MOTRIN) 200 MG capsule  LORazepam (ATIVAN) 0.5 MG tablet  metFORMIN (GLUCOPHAGE) 1000 MG tablet  semaglutide (OZEMPIC) 2 MG/3ML pen  thin (NO BRAND SPECIFIED) lancets  tirzepatide (MOUNJARO) 2.5 MG/0.5ML pen  tirzepatide (MOUNJARO) 5 MG/0.5ML pen  valACYclovir (VALTREX) 500 MG tablet  venlafaxine (EFFEXOR XR) 150 MG 24 hr capsule      Review of Systems   All other systems reviewed and are negative.      PE   BP: (!) 141/92  Pulse: 102  Temp: 98.2  F (36.8  C)  Resp: 18  Height: 162.6 cm (5' 4\")  Weight: 77.1 kg (170 lb)  SpO2: 97 %  Physical Exam  Vitals reviewed.   Constitutional:       Appearance: She is well-developed.      Comments: Tired appearing, cooperative though and sits up easily for examination.   HENT:      Head: Normocephalic and atraumatic.      Right Ear: External ear normal.      Left Ear: External ear normal.      Nose: Nose normal.      Mouth/Throat:      Mouth: Mucous membranes are moist.      Pharynx: Oropharynx is clear.   Eyes:      Extraocular Movements: Extraocular movements intact.      Conjunctiva/sclera: Conjunctivae normal.      Pupils: Pupils are equal, round, and reactive to light.   Cardiovascular:      Rate and Rhythm: Normal rate and regular rhythm.   Pulmonary:      Effort: Pulmonary effort is normal.      Comments: No respiratory distress.  Occasional coughing.  Mild rhonchi.  No " wheezing.  Musculoskeletal:         General: Normal range of motion.      Cervical back: Normal range of motion.   Skin:     General: Skin is warm and dry.   Neurological:      Mental Status: She is alert and oriented to person, place, and time.   Psychiatric:         Behavior: Behavior normal.         ED COURSE and [facility]   0722.  Patient with a cough that is productive.  She has chills.  Symptoms started about 3 to 4 weeks ago.  Chest x-ray pending.  No indication for beta agonist.    0816.  Chest x-ray unremarkable.  Azithromycin only if worsened as discussed.  Albuterol for wheezing, patient requesting this.      Electronic medical chart reviewed, including medical problems, medications, medical allergies, social history.  Recent hospitalizations and surgical procedures reviewed.  Recent clinic visits and consultations reviewed.  Recent labs and test results reviewed.  Nursing notes reviewed.    The patient, their parent if applicable, and/or their medical decision maker(s) and I have reviewed all of the available historical information, applicable PMH, physical exam findings, and objective diagnostic data gathered during this ED visit.  We then discussed all work-up options and then together agreed upon the course taken during this visit.  The ultimate disposition and plan was a cooperative decision made between myself and the patient, their parent if applicable, and/or their legal decision maker(s).  The risks and benefits of all decisions made during this visit were discussed to the best of my abilities given the circumstances, and all parties are understanding of the pertinent ramifications of these decisions.      LABS  Labs Ordered and Resulted from Time of ED Arrival to Time of ED Departure   INFLUENZA A/B, RSV, & SARS-COV2 PCR - Normal       Result Value    Influenza A PCR Negative      Influenza B PCR Negative      RSV PCR Negative      SARS CoV2 PCR Negative         IMAGING  Images reviewed by me.   Radiology report also reviewed.  XR Chest 2 Views   Final Result   IMPRESSION: Negative chest.          Procedures    Medications - No data to display      IMPRESSION       ICD-10-CM    1. Acute cough  R05.1                Medication List        Started      albuterol 108 (90 Base) MCG/ACT inhaler  Commonly known as: PROAIR HFA/PROVENTIL HFA/VENTOLIN HFA  2 puffs, Inhalation, EVERY 4 HOURS PRN     azithromycin 250 MG tablet  Commonly known as: Zithromax Z-Artie  2 tablets (500 mg) on the first day then 1 tablet (250 mg) on days two through five.                                  Jameson Obrien MD  06/09/24 0816

## 2024-06-09 NOTE — ED TRIAGE NOTES
Cough x1 month with thick green sputum. Having difficulty sleeping due to cough. SOB with coughing spells.     Triage Assessment (Adult)       Row Name 06/09/24 0712          Triage Assessment    Airway WDL WDL        Respiratory WDL    Respiratory WDL X;cough     Cough Frequency frequent     Cough Type congested;productive        Skin Circulation/Temperature WDL    Skin Circulation/Temperature WDL WDL        Cardiac WDL    Cardiac WDL WDL        Peripheral/Neurovascular WDL    Peripheral Neurovascular WDL WDL        Cognitive/Neuro/Behavioral WDL    Cognitive/Neuro/Behavioral WDL WDL

## 2024-06-09 NOTE — DISCHARGE INSTRUCTIONS
RETURN TO THE EMERGENCY ROOM FOR THE FOLLOWING:    Severely worsened breathing, repeated vomiting and dehydration, fainting, or at anytime for any concern.    FOLLOW UP:    With your primary clinic only as needed.    TREATMENT RECOMMENDATIONS:    Consider albuterol for coughing as needed.    Consider azithromycin if you have worsened coughing with production, new fever greater than 101, new chest pain or worsened breathing.    NURSE ADVICE LINE:  [phone] or [phone]

## 2024-06-11 ENCOUNTER — TELEPHONE (OUTPATIENT)
Dept: FAMILY MEDICINE | Facility: CLINIC | Age: 44
End: 2024-06-11
Payer: COMMERCIAL

## 2024-06-11 DIAGNOSIS — E11.9 TYPE 2 DIABETES MELLITUS WITHOUT COMPLICATION, WITHOUT LONG-TERM CURRENT USE OF INSULIN (H): ICD-10-CM

## 2024-06-13 NOTE — TELEPHONE ENCOUNTER
What dose? Would she like to continue to increase the dose as typically done so go up to 7.5mg (I believe she is at 5mg)?     Mary

## 2024-06-13 NOTE — TELEPHONE ENCOUNTER
Call placed to patient   No answer; voicemail left requesting call back to Clinic RN at [phone]    Ba Luo, Clinic RN  [facility]

## 2024-06-18 NOTE — TELEPHONE ENCOUNTER
Call placed to Patient.  No answer.  Left message with call back number for Patient to return call.  Benoit Reid RN

## 2024-06-19 NOTE — TELEPHONE ENCOUNTER
Call placed to patient   Relayed Jenn's message    Patient verbalized understanding  States she is tolerating the 5mg and would like to increase to 7.5mg  No further questions/concerns    Ba Luo, Clinic RN  [facility]

## 2024-07-15 ENCOUNTER — HOSPITAL ENCOUNTER (EMERGENCY)
Facility: CLINIC | Age: 44
Discharge: HOME | End: 2024-07-15
Attending: EMERGENCY MEDICINE | Admitting: EMERGENCY MEDICINE
Payer: COMMERCIAL

## 2024-07-15 ENCOUNTER — APPOINTMENT (OUTPATIENT)
Dept: GENERAL RADIOLOGY | Facility: CLINIC | Age: 44
End: 2024-07-15
Attending: EMERGENCY MEDICINE
Payer: COMMERCIAL

## 2024-07-15 VITALS
DIASTOLIC BLOOD PRESSURE: 88 MMHG | OXYGEN SATURATION: 98 % | TEMPERATURE: 98.8 F | BODY MASS INDEX: 30.73 KG/M2 | SYSTOLIC BLOOD PRESSURE: 137 MMHG | RESPIRATION RATE: 18 BRPM | HEART RATE: 96 BPM | HEIGHT: 64 IN | WEIGHT: 180 LBS

## 2024-07-15 DIAGNOSIS — R42 DIZZINESS: ICD-10-CM

## 2024-07-15 DIAGNOSIS — M25.552 LEFT HIP PAIN: ICD-10-CM

## 2024-07-15 DIAGNOSIS — M54.2 NECK PAIN ON LEFT SIDE: ICD-10-CM

## 2024-07-15 DIAGNOSIS — R51.9 ACUTE NONINTRACTABLE HEADACHE, UNSPECIFIED HEADACHE TYPE: ICD-10-CM

## 2024-07-15 DIAGNOSIS — R26.89 IMBALANCE: ICD-10-CM

## 2024-07-15 LAB
ANION GAP SERPL CALCULATED.3IONS-SCNC: 11 MMOL/L (ref 7–15)
BASOPHILS # BLD AUTO: 0.1 10E3/UL (ref 0–0.2)
BASOPHILS NFR BLD AUTO: 1 %
BUN SERPL-MCNC: 6.9 MG/DL (ref 6–20)
CALCIUM SERPL-MCNC: 8.9 MG/DL (ref 8.6–10)
CHLORIDE SERPL-SCNC: 101 MMOL/L (ref 98–107)
CREAT SERPL-MCNC: 0.58 MG/DL (ref 0.51–0.95)
CRP SERPL-MCNC: <3 MG/L
DEPRECATED HCO3 PLAS-SCNC: 26 MMOL/L (ref 22–29)
EGFRCR SERPLBLD CKD-EPI 2021: >90 ML/MIN/1.73M2
EOSINOPHIL # BLD AUTO: 0.1 10E3/UL (ref 0–0.7)
EOSINOPHIL NFR BLD AUTO: 2 %
ERYTHROCYTE [DISTWIDTH] IN BLOOD BY AUTOMATED COUNT: 12.8 % (ref 10–15)
GLUCOSE SERPL-MCNC: 221 MG/DL (ref 70–99)
HCT VFR BLD AUTO: 40.2 % (ref 35–47)
HGB BLD-MCNC: 14.5 G/DL (ref 11.7–15.7)
IMM GRANULOCYTES # BLD: 0 10E3/UL
IMM GRANULOCYTES NFR BLD: 0 %
LYMPHOCYTES # BLD AUTO: 2.2 10E3/UL (ref 0.8–5.3)
LYMPHOCYTES NFR BLD AUTO: 35 %
MCH RBC QN AUTO: 32.3 PG (ref 26.5–33)
MCHC RBC AUTO-ENTMCNC: 36.1 G/DL (ref 31.5–36.5)
MCV RBC AUTO: 90 FL (ref 78–100)
MONOCYTES # BLD AUTO: 0.5 10E3/UL (ref 0–1.3)
MONOCYTES NFR BLD AUTO: 9 %
NEUTROPHILS # BLD AUTO: 3.3 10E3/UL (ref 1.6–8.3)
NEUTROPHILS NFR BLD AUTO: 53 %
NRBC # BLD AUTO: 0 10E3/UL
NRBC BLD AUTO-RTO: 0 /100
PLATELET # BLD AUTO: 176 10E3/UL (ref 150–450)
POTASSIUM SERPL-SCNC: 4.1 MMOL/L (ref 3.4–5.3)
RBC # BLD AUTO: 4.49 10E6/UL (ref 3.8–5.2)
SODIUM SERPL-SCNC: 138 MMOL/L (ref 135–145)
WBC # BLD AUTO: 6.2 10E3/UL (ref 4–11)

## 2024-07-15 PROCEDURE — 250N000011 HC RX IP 250 OP 636: Performed by: EMERGENCY MEDICINE

## 2024-07-15 PROCEDURE — 258N000003 HC RX IP 258 OP 636: Performed by: EMERGENCY MEDICINE

## 2024-07-15 PROCEDURE — 86140 C-REACTIVE PROTEIN: CPT | Performed by: EMERGENCY MEDICINE

## 2024-07-15 PROCEDURE — 96375 TX/PRO/DX INJ NEW DRUG ADDON: CPT | Performed by: EMERGENCY MEDICINE

## 2024-07-15 PROCEDURE — 99284 EMERGENCY DEPT VISIT MOD MDM: CPT | Performed by: EMERGENCY MEDICINE

## 2024-07-15 PROCEDURE — 80048 BASIC METABOLIC PNL TOTAL CA: CPT | Performed by: EMERGENCY MEDICINE

## 2024-07-15 PROCEDURE — 99284 EMERGENCY DEPT VISIT MOD MDM: CPT | Mod: 25 | Performed by: EMERGENCY MEDICINE

## 2024-07-15 PROCEDURE — 85025 COMPLETE CBC W/AUTO DIFF WBC: CPT | Performed by: EMERGENCY MEDICINE

## 2024-07-15 PROCEDURE — 36415 COLL VENOUS BLD VENIPUNCTURE: CPT | Performed by: EMERGENCY MEDICINE

## 2024-07-15 PROCEDURE — 96374 THER/PROPH/DIAG INJ IV PUSH: CPT | Performed by: EMERGENCY MEDICINE

## 2024-07-15 PROCEDURE — 84443 ASSAY THYROID STIM HORMONE: CPT

## 2024-07-15 PROCEDURE — 96361 HYDRATE IV INFUSION ADD-ON: CPT | Performed by: EMERGENCY MEDICINE

## 2024-07-15 PROCEDURE — 73502 X-RAY EXAM HIP UNI 2-3 VIEWS: CPT

## 2024-07-15 RX ORDER — PREDNISONE 20 MG/1
TABLET ORAL
Qty: 5 TABLET | Refills: 0 | Status: SHIPPED | OUTPATIENT
Start: 2024-07-15 | End: 2024-07-22

## 2024-07-15 RX ORDER — DEXAMETHASONE SODIUM PHOSPHATE 10 MG/ML
10 INJECTION, SOLUTION INTRAMUSCULAR; INTRAVENOUS ONCE
Status: COMPLETED | OUTPATIENT
Start: 2024-07-15 | End: 2024-07-15

## 2024-07-15 RX ORDER — HYDROMORPHONE HYDROCHLORIDE 1 MG/ML
0.5 INJECTION, SOLUTION INTRAMUSCULAR; INTRAVENOUS; SUBCUTANEOUS EVERY 30 MIN PRN
Status: DISCONTINUED | OUTPATIENT
Start: 2024-07-15 | End: 2024-07-15 | Stop reason: HOSPADM

## 2024-07-15 RX ORDER — SODIUM CHLORIDE, SODIUM LACTATE, POTASSIUM CHLORIDE, CALCIUM CHLORIDE 600; 310; 30; 20 MG/100ML; MG/100ML; MG/100ML; MG/100ML
1000 INJECTION, SOLUTION INTRAVENOUS CONTINUOUS
Status: DISCONTINUED | OUTPATIENT
Start: 2024-07-15 | End: 2024-07-15 | Stop reason: HOSPADM

## 2024-07-15 RX ORDER — KETOROLAC TROMETHAMINE 15 MG/ML
10 INJECTION, SOLUTION INTRAMUSCULAR; INTRAVENOUS
Status: COMPLETED | OUTPATIENT
Start: 2024-07-15 | End: 2024-07-15

## 2024-07-15 RX ORDER — MORPHINE SULFATE 15 MG/1
7.5 TABLET ORAL EVERY 8 HOURS PRN
Qty: 6 TABLET | Refills: 0 | Status: SHIPPED | OUTPATIENT
Start: 2024-07-15 | End: 2024-07-23

## 2024-07-15 RX ADMIN — KETOROLAC TROMETHAMINE 10 MG: 15 INJECTION, SOLUTION INTRAMUSCULAR; INTRAVENOUS at 08:02

## 2024-07-15 RX ADMIN — SODIUM CHLORIDE, POTASSIUM CHLORIDE, SODIUM LACTATE AND CALCIUM CHLORIDE 1000 ML: 600; 310; 30; 20 INJECTION, SOLUTION INTRAVENOUS at 07:57

## 2024-07-15 RX ADMIN — HYDROMORPHONE HYDROCHLORIDE 0.5 MG: 1 INJECTION, SOLUTION INTRAMUSCULAR; INTRAVENOUS; SUBCUTANEOUS at 08:02

## 2024-07-15 RX ADMIN — DEXAMETHASONE SODIUM PHOSPHATE 10 MG: 10 INJECTION, SOLUTION INTRAMUSCULAR; INTRAVENOUS at 08:02

## 2024-07-15 ASSESSMENT — COLUMBIA-SUICIDE SEVERITY RATING SCALE - C-SSRS
1. IN THE PAST MONTH, HAVE YOU WISHED YOU WERE DEAD OR WISHED YOU COULD GO TO SLEEP AND NOT WAKE UP?: NO
2. HAVE YOU ACTUALLY HAD ANY THOUGHTS OF KILLING YOURSELF IN THE PAST MONTH?: NO
6. HAVE YOU EVER DONE ANYTHING, STARTED TO DO ANYTHING, OR PREPARED TO DO ANYTHING TO END YOUR LIFE?: NO

## 2024-07-15 ASSESSMENT — ENCOUNTER SYMPTOMS
RESPIRATORY NEGATIVE: 1
CONSTITUTIONAL NEGATIVE: 1
EYES NEGATIVE: 1
PSYCHIATRIC NEGATIVE: 1
HEMATOLOGIC/LYMPHATIC NEGATIVE: 1
CARDIOVASCULAR NEGATIVE: 1
ENDOCRINE NEGATIVE: 1
ALLERGIC/IMMUNOLOGIC NEGATIVE: 1
HEADACHES: 1
GASTROINTESTINAL NEGATIVE: 1
DIZZINESS: 1

## 2024-07-15 ASSESSMENT — ACTIVITIES OF DAILY LIVING (ADL)
ADLS_ACUITY_SCORE: 35

## 2024-07-15 NOTE — ED PROVIDER NOTES
History     Chief Complaint   Patient presents with    Hip Pain    Headache    Neck Pain     HPI  Tammy Joshi is a 44 year old female who presents with acute worsening of hip and neck pain.  On intake patient reported she has had headache and hip pain for several years but in the last week her symptoms worsened.  She also reports that she developed a headache and dizziness a day prior.    Reviewed the medical record.  Visit on 6/9/24-patient has a previous diagnosis of anxiety, depression, hyperlipidemia and type 2 diabetes.  He is also known to have left hip pain and obesity.  Patient is currently prescribed Mounjaro,     On examination patient arrived alone by car from home in [facility].  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  She also reports left paraspinal neck achy without stiffness.  She reports that she developed a headache yesterday and had an episode where she was clumsy and that she walked into another shoppers at [facility] around 4:30 PM.  No visual loss or disturbance no extremity weakness or numbness and no facial symptoms specifically numbness or speech changes..  She confirmed that she has not used Mounjaro for at least 5 to 6 months and currently only on metformin she has not had any fever or chills.  She reports no leg or foot pain and no numbness or weakness in her foot or leg.  Due to persistent symptoms now with an episode of headache which she describes gradual onset global, and her episode yesterday while shopping at [facility] she wanted to come in to be evaluated.  Confirm that she is known to have a history of disc disease in her neck but no prior history of hip surgery or hip injections reports she did start physical therapy in the distant past with last treatment over 5 years ago but was not given a diagnosis of hip arthritis.    Allergies:  No Known  Allergies    Problem List:    Patient Active Problem List    Diagnosis Date Noted    Obesity without serious comorbidity, unspecified classification, unspecified obesity type 07/27/2020     Priority: Medium    Hip pain, left 09/08/2017     Priority: Medium    Type 2 diabetes mellitus without complication (H) 10/25/2015     Priority: Medium    Hyperlipidemia with target LDL less than 100 08/06/2015     Priority: Medium     Diagnosis updated by automated process. Provider to review and confirm.      Depression 02/24/2014     Priority: Medium    Anxiety 07/21/2011     Priority: Medium    CARDIOVASCULAR SCREENING; LDL GOAL LESS THAN 160 10/31/2010     Priority: Medium    Hand numbness 10/14/2009     Priority: Medium        Past Medical History:    Past Medical History:   Diagnosis Date    Absence of menstruation     Female infertility of unspecified origin     MILD/NOS PREECLAMP-ANTEP 8/29/2005    Polycystic ovaries     PPH (postpartum hemorrhage) 11/2009       Past Surgical History:    Past Surgical History:   Procedure Laterality Date    D & C  1998    Missed Ab    LITHOTRIPSY  2011    ZZC HAND/FINGER SURGERY UNLISTED  7th grade    left index       Family History:    Family History   Adopted: Yes   Problem Relation Age of Onset    Unknown/Adopted Other         patient was adopted; knows a limited amount of medical history of birth parents       Social History:  Marital Status:   [2]  Social History     Tobacco Use    Smoking status: Never    Smokeless tobacco: Never   Vaping Use    Vaping status: Never Used   Substance Use Topics    Alcohol use: Yes     Comment: wine occasionally, none while pregnant    Drug use: No        Medications:    morphine (MSIR) 15 MG IR tablet  predniSONE (DELTASONE) 20 MG tablet  albuterol (PROAIR HFA/PROVENTIL HFA/VENTOLIN HFA) 108 (90 Base) MCG/ACT inhaler  azithromycin (ZITHROMAX Z-KEILY) 250 MG tablet  blood glucose (NO BRAND SPECIFIED) test strip  blood glucose monitoring (NO  "BRAND SPECIFIED) meter device kit  dulaglutide (TRULICITY) 0.75 MG/0.5ML pen  ibuprofen (ADVIL/MOTRIN) 200 MG capsule  LORazepam (ATIVAN) 0.5 MG tablet  metFORMIN (GLUCOPHAGE) 1000 MG tablet  semaglutide (OZEMPIC) 2 MG/3ML pen  thin (NO BRAND SPECIFIED) lancets  tirzepatide (MOUNJARO) 2.5 MG/0.5ML pen  tirzepatide (MOUNJARO) 5 MG/0.5ML pen  tirzepatide (MOUNJARO) 7.5 MG/0.5ML pen  valACYclovir (VALTREX) 500 MG tablet  venlafaxine (EFFEXOR XR) 150 MG 24 hr capsule          Review of Systems   Constitutional: Negative.    HENT: Negative.     Eyes: Negative.    Respiratory: Negative.     Cardiovascular: Negative.    Gastrointestinal: Negative.    Endocrine: Negative.    Genitourinary: Negative.    Musculoskeletal:         Left lateral neck pain and left hip pain x 1 week   Skin: Negative.    Allergic/Immunologic: Negative.    Neurological:  Positive for dizziness and headaches.   Hematological: Negative.    Psychiatric/Behavioral: Negative.     All other systems reviewed and are negative.      Physical Exam   BP: (!) 152/108  Pulse: 96  Temp: 98.8  F (37.1  C)  Resp: 18  Height: 162.6 cm (5' 4\")  Weight: 81.6 kg (180 lb)  SpO2: 98 %      Physical Exam  Constitutional:       General: She is not in acute distress.     Appearance: Normal appearance. She is not ill-appearing, toxic-appearing or diaphoretic.   HENT:      Head: Normocephalic and atraumatic.      Nose: Nose normal.   Eyes:      Extraocular Movements: Extraocular movements intact.      Pupils: Pupils are equal, round, and reactive to light.   Neck:     Cardiovascular:      Rate and Rhythm: Normal rate.   Musculoskeletal:      Cervical back: Normal range of motion.        Legs:    Skin:     Capillary Refill: Capillary refill takes less than 2 seconds.      Coloration: Skin is not jaundiced or pale.      Findings: No bruising, erythema, lesion or rash.   Neurological:      General: No focal deficit present.      Mental Status: She is oriented to person, place, " "and time.      Cranial Nerves: No cranial nerve deficit.      Sensory: No sensory deficit.      Motor: No weakness.      Coordination: Coordination normal.      Gait: Gait normal.      Deep Tendon Reflexes: Reflexes normal.   Psychiatric:         Mood and Affect: Mood normal.         Behavior: Behavior normal.         Thought Content: Thought content normal.         Judgment: Judgment normal.         ED Course        Procedures              Critical Care time:  none             ED medications:  Medications   lactated ringers BOLUS 1,000 mL (0 mLs Intravenous Stopped 7/15/24 1137)   ketorolac (TORADOL) injection 10 mg (10 mg Intravenous $Given 7/15/24 0802)   dexAMETHasone PF (DECADRON) injection 10 mg (10 mg Intravenous $Given 7/15/24 0802)        ED Vitals:  Vitals:    07/15/24 0653 07/15/24 0658 07/15/24 0728   BP: (!) 152/108 (!) 138/91 137/88   Pulse: 96     Resp: 18     Temp: 98.8  F (37.1  C)     TempSrc: Oral     SpO2: 98% 98% 98%   Weight: 81.6 kg (180 lb)     Height: 1.626 m (5' 4\")          ED labs and imaging:  Results for orders placed or performed during the hospital encounter of 07/15/24   Pelvis XR w/ unilateral hip left     Status: None    Narrative    XR PELVIS AND HIP LEFT 1 VIEW   7/15/2024 8:32 AM     HISTORY: Acute on chronic left hip x 1 week. Evaluate for acute bony  process  COMPARISON: None.       Impression    IMPRESSION: Normal hip joint spacing and alignment. No acute left hip  fracture. No displaced pelvic fracture.    ANTONIETA SEVILLA MD         SYSTEM ID:  [id number]   Basic metabolic panel     Status: Abnormal   Result Value Ref Range    Sodium 138 135 - 145 mmol/L    Potassium 4.1 3.4 - 5.3 mmol/L    Chloride 101 98 - 107 mmol/L    Carbon Dioxide (CO2) 26 22 - 29 mmol/L    Anion Gap 11 7 - 15 mmol/L    Urea Nitrogen 6.9 6.0 - 20.0 mg/dL    Creatinine 0.58 0.51 - 0.95 mg/dL    GFR Estimate >90 >60 mL/min/1.73m2    Calcium 8.9 8.6 - 10.0 mg/dL    Glucose 221 (H) 70 - 99 mg/dL   CRP " inflammation     Status: Normal   Result Value Ref Range    CRP Inflammation <3.00 <5.00 mg/L   CBC with platelets and differential     Status: None   Result Value Ref Range    WBC Count 6.2 4.0 - 11.0 10e3/uL    RBC Count 4.49 3.80 - 5.20 10e6/uL    Hemoglobin 14.5 11.7 - 15.7 g/dL    Hematocrit 40.2 35.0 - 47.0 %    MCV 90 78 - 100 fL    MCH 32.3 26.5 - 33.0 pg    MCHC 36.1 31.5 - 36.5 g/dL    RDW 12.8 10.0 - 15.0 %    Platelet Count 176 150 - 450 10e3/uL    % Neutrophils 53 %    % Lymphocytes 35 %    % Monocytes 9 %    % Eosinophils 2 %    % Basophils 1 %    % Immature Granulocytes 0 %    NRBCs per 100 WBC 0 <1 /100    Absolute Neutrophils 3.3 1.6 - 8.3 10e3/uL    Absolute Lymphocytes 2.2 0.8 - 5.3 10e3/uL    Absolute Monocytes 0.5 0.0 - 1.3 10e3/uL    Absolute Eosinophils 0.1 0.0 - 0.7 10e3/uL    Absolute Basophils 0.1 0.0 - 0.2 10e3/uL    Absolute Immature Granulocytes 0.0 <=0.4 10e3/uL    Absolute NRBCs 0.0 10e3/uL   CBC with platelets differential     Status: None    Narrative    The following orders were created for panel order CBC with platelets differential.  Procedure                               Abnormality         Status                     ---------                               -----------         ------                     CBC with platelets and d...[[id number]]                      Final result                 Please view results for these tests on the individual orders.            Assessments & Plan (with Medical Decision Making)   Assessment Summary and clinical Impression: 44-year-old female who presented with report of acute on chronic neck and hip pain and concern that she developed a headache with dizziness the day prior.  On intake she was afebrile.  Blood pressure was 152/108.  She was 98% on room.  Patient seen to have pain with external rotation over the trochanteric bursa suspicious for bursitis given atraumatic pain absence of warmth or redness and no lower leg or foot symptoms including  weakness or numbness.  With left parasternal neck tenderness without midline cervical thoracic spine tenderness and no extremity weakness or numbness and prior MRI imaging 2 years ago showing multilevel disc disease she was offered medications to manage her pain and discomfort and workup was broadened to ensure she was not presenting with an emergent musculoskeletal process. she described no neurologic deficits specifically visual change, facial numbness or weakness speech difficulty and suspicion for acute stroke syndrome or central cause for acute vestibular syndrome is low.  She was discharged with suspicion that her hip pain could be due to bursitis and that her acute on chronic neck pain may be related to known history of cervical disc disease.  Plan to trial prednisone and pain management plan with close clinic follow-up for recheck if persistent symptoms in  3 days .    ED course and plan:  Reviewed the medical record including visit on 6/9/24. MR cervical spine  from 1/26/2022.  See details outlined the radiology report in the medical record. She was offered medications to manage her pain and discomfort. Blood work today  was reassuring including normal CRP.  Glucose was 221, normal hemogram.  X-ray of pelvis and left hip was reviewed independently and the radiology report was also reviewed.  No acute bony process specifically fracture dislocation or evidence of significant arthritis.  After period of care patient reported some improvement in her symptoms with medications given to manage her discomfort.  Reviewed my suspicion that she could have bursitis.     She agreed to trial care with prednisone and pain management plan.  Prior to discharge she requested Percocet and we ultimately agreed to trial morphine.  She did report she was scheduled to be seen in clinic on 7/18/2024 for follow-up of her symptoms.  She was asked to keep this appointment in the event that her symptoms did not improve with the  therapy plan outlined to manage her symptoms.  Her exam did not raise concern for cervical artery dissection and history as reported until examination did not raise concern for acute stroke syndrome.  We discussed that the exact cause of her symptoms of dizziness yesterday while shopping at [employer] around 4:30 PM is not clear. We reviewed since she has not had  recurrence of symptoms neuroimaging was not pursued.  We discussed concerning symptoms including extremity weakness or numbness, fever or chills or intractable pain not managed with scheduled over-the-counter medications and morphine for pain        Disclaimer: This note consists of symbols derived from keyboarding, dictation and/or voice recognition software. As a result, there may be errors in the script that have gone undetected. Please consider this when interpreting information found in this chart.   I have reviewed the nursing notes.    I have reviewed the findings, diagnosis, plan and need for follow up with the patient.           Medical Decision Making  The patient's presentation was of high complexity (acute on chronic hip pain and neck pain.  Headache and dizziness).    The patient's evaluation involved:  ordering and/or review of 2 test(s) in this encounter (diagnostic imaging and lab)    The patient's management necessitated high risk (intravenous medications to manage symptoms reported, serial examination, ).        Discharge Medication List as of 7/15/2024 11:38 AM        START taking these medications    Details   morphine (MSIR) 15 MG IR tablet Take 0.5 tablets (7.5 mg) by mouth every 8 hours as needed for severe pain, Disp-6 tablet, R-0, E-Prescribe      predniSONE (DELTASONE) 20 MG tablet 1 tab daily for 3 days, then 1/2 tab daily for 4 days, Disp-5 tablet, R-0, E-Prescribe             Final diagnoses:   Neck pain on left side - Acute on chronic. Prior imaging- (1/6/22) IMPRESSION: 1. Diffuse degenerative change of the cervical spine as  detailed above. 2. Posterior disc herniations at the C5-C6 and C6-C7 levels. 3. Moderate spinal canal stenosis at C5-C6. No other significant spinal canal narrowing of the cervical spine. 4. Moderate neural foraminal stenosis on the left at C5-C6. No other significant neural foraminal narrowing of the cervical spine.   Left hip pain - Acute on chronic   Dizziness   Acute nonintractable headache, unspecified headache type       7/15/2024   [facility] EMERGENCY DEPT       Nicolas Roldan MD  07/15/24 3006

## 2024-07-15 NOTE — ED TRIAGE NOTES
Patient reports left hip pain for years but worse over the past week, patient reports neck pain for years as well but worse over the past week. Yesterday developed headache and dizziness.     Triage Assessment (Adult)       Row Name 07/15/24 0654          Triage Assessment    Airway WDL WDL        Respiratory WDL    Respiratory WDL WDL        Skin Circulation/Temperature WDL    Skin Circulation/Temperature WDL WDL        Cardiac WDL    Cardiac WDL WDL        Peripheral/Neurovascular WDL    Peripheral Neurovascular WDL WDL        Cognitive/Neuro/Behavioral WDL    Cognitive/Neuro/Behavioral WDL WDL

## 2024-07-15 NOTE — LETTER
July 15, 2024      To Whom It May Concern:      Tammy Joshi was seen in our Emergency Department today, 07/15/24.  Please excuse her from missing work due to needing to seek care.  If her symptoms persist or there are new concerns she may need to return to reevaluated.  She may also need to be seen in the clinic for reassessment if ongoing symptoms in 7/18/24.  This work note is valid until 7/17/24.    Sincerely,            Jordin Roldan MD         No

## 2024-07-15 NOTE — DISCHARGE INSTRUCTIONS
1)  Your evaluation today did not reveal any evidence to suggest an emergency condition such as infection based your blood work today and symptoms reported.  We have discussed your neck and hip pain and history of abnormal MRI cervical imaging from January 2022 and suspicion that your hip pain could be due to bursitis.  He reported feeling improved with medications given during your visit.    2) For home agreed to have the trial care with prednisone taper over the next 1 week morphine for additional pain needs over Percocet as requested Dilaudid to be able to take Tylenol 1000 mg every 8 hours Motrin ibuprofen with food for 100 mg every 12 hours.    3) It may be helpful to keep your upcoming clinic visit on 7/18/2024 if your symptoms persist for reevaluation and discussion of additional medication needs as reviewed.    4) You appear stable for discharge to home at this time however if you develop a fever, increasing pain, extremity weakness or numbness or recurrence of dizziness or new episodes of concern you should return to reevaluate

## 2024-07-18 ENCOUNTER — TELEPHONE (OUTPATIENT)
Dept: FAMILY MEDICINE | Facility: CLINIC | Age: 44
End: 2024-07-18

## 2024-07-18 ENCOUNTER — OFFICE VISIT (OUTPATIENT)
Dept: FAMILY MEDICINE | Facility: CLINIC | Age: 44
End: 2024-07-18
Payer: COMMERCIAL

## 2024-07-18 VITALS
DIASTOLIC BLOOD PRESSURE: 70 MMHG | OXYGEN SATURATION: 100 % | HEART RATE: 109 BPM | RESPIRATION RATE: 18 BRPM | BODY MASS INDEX: 30.73 KG/M2 | SYSTOLIC BLOOD PRESSURE: 118 MMHG | HEIGHT: 64 IN | WEIGHT: 180 LBS | TEMPERATURE: 97.2 F

## 2024-07-18 DIAGNOSIS — R26.89 IMBALANCE: ICD-10-CM

## 2024-07-18 DIAGNOSIS — M25.552 HIP PAIN, LEFT: Primary | ICD-10-CM

## 2024-07-18 DIAGNOSIS — M25.552 HIP PAIN, LEFT: ICD-10-CM

## 2024-07-18 DIAGNOSIS — M54.2 NECK PAIN: Primary | ICD-10-CM

## 2024-07-18 DIAGNOSIS — M54.16 LUMBAR RADICULOPATHY: ICD-10-CM

## 2024-07-18 DIAGNOSIS — R42 DIZZINESS: ICD-10-CM

## 2024-07-18 DIAGNOSIS — E11.9 TYPE 2 DIABETES MELLITUS WITHOUT COMPLICATION, WITHOUT LONG-TERM CURRENT USE OF INSULIN (H): ICD-10-CM

## 2024-07-18 DIAGNOSIS — F43.23 ADJUSTMENT DISORDER WITH MIXED ANXIETY AND DEPRESSED MOOD: ICD-10-CM

## 2024-07-18 LAB — TSH SERPL DL<=0.005 MIU/L-ACNC: 1.52 UIU/ML (ref 0.3–4.2)

## 2024-07-18 PROCEDURE — 99215 OFFICE O/P EST HI 40 MIN: CPT | Performed by: NURSE PRACTITIONER

## 2024-07-18 PROCEDURE — G2211 COMPLEX E/M VISIT ADD ON: HCPCS | Performed by: NURSE PRACTITIONER

## 2024-07-18 RX ORDER — GABAPENTIN 100 MG/1
100 CAPSULE ORAL 3 TIMES DAILY
Qty: 90 CAPSULE | Refills: 1 | Status: SHIPPED | OUTPATIENT
Start: 2024-07-18

## 2024-07-18 RX ORDER — BUSPIRONE HYDROCHLORIDE 5 MG/1
TABLET ORAL
Qty: 522 TABLET | Refills: 0 | Status: SHIPPED | OUTPATIENT
Start: 2024-07-18 | End: 2024-10-16

## 2024-07-18 RX ORDER — METHOCARBAMOL 500 MG/1
500 TABLET, FILM COATED ORAL 4 TIMES DAILY PRN
Qty: 60 TABLET | Refills: 1 | Status: SHIPPED | OUTPATIENT
Start: 2024-07-18 | End: 2024-07-23

## 2024-07-18 ASSESSMENT — PATIENT HEALTH QUESTIONNAIRE - PHQ9: SUM OF ALL RESPONSES TO PHQ QUESTIONS 1-9: 22

## 2024-07-18 ASSESSMENT — ANXIETY QUESTIONNAIRES
GAD7 TOTAL SCORE: 21
4. TROUBLE RELAXING: NEARLY EVERY DAY
7. FEELING AFRAID AS IF SOMETHING AWFUL MIGHT HAPPEN: NEARLY EVERY DAY
7. FEELING AFRAID AS IF SOMETHING AWFUL MIGHT HAPPEN: NEARLY EVERY DAY
2. NOT BEING ABLE TO STOP OR CONTROL WORRYING: NEARLY EVERY DAY
8. IF YOU CHECKED OFF ANY PROBLEMS, HOW DIFFICULT HAVE THESE MADE IT FOR YOU TO DO YOUR WORK, TAKE CARE OF THINGS AT HOME, OR GET ALONG WITH OTHER PEOPLE?: EXTREMELY DIFFICULT
1. FEELING NERVOUS, ANXIOUS, OR ON EDGE: NEARLY EVERY DAY
3. WORRYING TOO MUCH ABOUT DIFFERENT THINGS: NEARLY EVERY DAY
GAD7 TOTAL SCORE: 21
IF YOU CHECKED OFF ANY PROBLEMS ON THIS QUESTIONNAIRE, HOW DIFFICULT HAVE THESE PROBLEMS MADE IT FOR YOU TO DO YOUR WORK, TAKE CARE OF THINGS AT HOME, OR GET ALONG WITH OTHER PEOPLE: EXTREMELY DIFFICULT
6. BECOMING EASILY ANNOYED OR IRRITABLE: NEARLY EVERY DAY
5. BEING SO RESTLESS THAT IT IS HARD TO SIT STILL: NEARLY EVERY DAY

## 2024-07-18 ASSESSMENT — COLUMBIA-SUICIDE SEVERITY RATING SCALE - C-SSRS
2. IN THE PAST MONTH, HAVE YOU ACTUALLY HAD ANY THOUGHTS OF KILLING YOURSELF?: NO
6. HAVE YOU EVER DONE ANYTHING, STARTED TO DO ANYTHING, OR PREPARED TO DO ANYTHING TO END YOUR LIFE?: NO
1. WITHIN THE PAST MONTH, HAVE YOU WISHED YOU WERE DEAD OR WISHED YOU COULD GO TO SLEEP AND NOT WAKE UP?: YES

## 2024-07-18 ASSESSMENT — PAIN SCALES - GENERAL: PAINLEVEL: SEVERE PAIN (7)

## 2024-07-18 NOTE — PROGRESS NOTES
Assessment & Plan     Neck pain  Ongoing pain, MR in 2022 with diffuse degenerative changes as well as posterior disc herniations at C5-C6, C6-C7.  Also with moderate canal stenosis at C5-C6 and foraminal stenosis at C5-C6 as well. Did have ULICES which provided a few weeks of pain relief, but not sure she wants to do this again. Would recommend follow up with spine for further evaluation. Could certainly try robaxin, has used gabapentin in the past which maybe provided some relief, could certainly try this again.  - methocarbamol (ROBAXIN) 500 MG tablet; Take 1 tablet (500 mg) by mouth 4 times daily as needed for muscle spasms  - gabapentin (NEURONTIN) 100 MG capsule; Take 1 capsule (100 mg) by mouth 3 times daily  - Spine  Referral; Future    Hip pain, left  ? Referred pain from low back given pain v bursitis given TTP. Would recommend imaging of L spine to further evaluate this as xray is unremarkable. If MR unrevealing, could certainly refer to ortho for consideration of injection.  - methocarbamol (ROBAXIN) 500 MG tablet; Take 1 tablet (500 mg) by mouth 4 times daily as needed for muscle spasms  - gabapentin (NEURONTIN) 100 MG capsule; Take 1 capsule (100 mg) by mouth 3 times daily    Lumbar radiculopathy  Suspect hip pain may be referred low back pain. No red flags, nothing to suggest fracture, cauda equina, epidural abscess, other more malicious etiology. Further evaluate with MR.  - MR Lumbar Spine w/o Contrast; Future  - Spine  Referral; Future    Dizziness  Imbalance  Does have some reproduction of dizziness with head movement on exam today. Likely BPPV but given recent worsening of symptoms with imbalance, will obtain MRI to r/o structural lesion, CVA, other more concerning cause of symptoms. If negative, could consider vestibular PT.  - TSH with free T4 reflex; Future  - MR Brain w/o & w Contrast; Future      Adjustment disorder with mixed anxiety and depressed mood  Increasing symptoms  "recently. Will add buspar as anxiety seems to be main concern today. Could consider increasing venlafaxine if not improving. She is also willing to meet with [facility].  - busPIRone (BUSPAR) 5 MG tablet; Take 1 tablet (5 mg) by mouth 2 times daily for 3 days, THEN 2 tablets (10 mg) 2 times daily for 3 days, THEN 3 tablets (15 mg) 2 times daily for 84 days.  - Adult Mental Health  Referral; Future    Total time spent with patient was 40 minutes with greater than 50% spent in face-to-face counseling regarding the above plan.        BMI  Estimated body mass index is 30.9 kg/m  as calculated from the following:    Height as of this encounter: 1.626 m (5' 4\").    Weight as of this encounter: 81.6 kg (180 lb).       Depression Screening Follow Up              7/18/2024     8:50 AM   C-SSRS (Brief [place])   Within the last month, have you wished you were dead or wished you could go to sleep and not wake up? Yes   Within the last month, have you had any actual thoughts of killing yourself? No   Within the last month, have you ever done anything, started to do anything, or prepared to do anything to end your life? No               Follow Up Actions Taken  Scheduled appointment with [facility]    Discussed the following ways the patient can remain in a safe environment:  remove alcohol, remove drugs, and be around others    See Patient Instructions    Tunde Munoz is a 44 year old, presenting for the following health issues:  Depression, Anxiety, and Hip Pain (Patient was in ER for this as well 7/15, is not doing any better)        7/18/2024     6:43 AM   Additional Questions   Roomed by Nereida ACOSTA   Accompanied by self     History of Present Illness       Mental Health Follow-up:  Patient presents to follow-up on Depression & Anxiety.Patient's depression since last visit has been:  Worse  The patient is having other symptoms associated with depression.  Patient's anxiety since last visit has been:  Worse  The patient is " having other symptoms associated with anxiety.  Any significant life events: job concerns, financial concerns, housing concerns and health concerns  Patient is feeling anxious or having panic attacks.  Patient has no concerns about alcohol or drug use.    Reason for visit:  Pain in hip and neck dizziness headache depression anxiety    She eats 4 or more servings of fruits and vegetables daily.She consumes 2 sweetened beverage(s) daily.She exercises with enough effort to increase her heart rate 9 or less minutes per day.  She exercises with enough effort to increase her heart rate 3 or less days per week.   She is taking medications regularly.       Above HPI reviewed. Additionally, persistent neck pain that radiates to the left shoulder.  Did have an MRI in 2022 that indicated diffuse degenerative changes as well as posterior disc herniations at C5-C6, C6-C7.  Also with moderate canal stenosis at C5-C6 and foraminal stenosis at C5-C6 as well.  Did have an ULICES in 2022, notes that it had some pain relief for a couple weeks, but then pain quickly returned.  She also notes that she has undergone physical therapy for this pain without much relief.    She also complains of left hip pain.  This often begins in the left buttock and radiates to the mid left thigh.  Also complains of intermittent left-sided lower back pain.  No paresthesias, no saddle anesthesia, no lower extremity weakness.  She was seen in the emergency department for both this pain as well as the neck pain on 15th.  X-ray of the left hip and pelvis was essentially unremarkable.  She was prescribed MS IR for pain management, at that time, thought to be related to a bursitis of the left hip.    She also notes that she has had both lightheadedness and room spinning type dizziness since 14th.  She also notes a sense of imbalance.  She did discuss this with the emergency room physician, had no acute neurochanges at that time, so neuroimaging was deferred.  She  "has had vertigo-like symptoms in the past, and has tried Epley maneuver without any significant relief.  The symptoms are worsening, notes that she was so imbalanced earlier this week that she walked in to another customer at [facility].  Has had persistent headache over the last few days as well which is abnormal for her.    Also notes that her depression and anxiety symptoms are increasing.  In addition to the above complaints contributing to this, she notes that she has also recently had to file for bankruptcy.  She has taken venlafaxine 150 mg daily for quite some time, but notes that this is no longer as effective controlling her symptoms. No active SI.        Review of Systems  Constitutional, neuro, ENT, endocrine, pulmonary, cardiac, gastrointestinal, genitourinary, musculoskeletal, integument and psychiatric systems are negative, except as otherwise noted.      Objective    /70   Pulse 109   Temp 97.2  F (36.2  C) (Tympanic)   Resp 18   Ht 1.626 m (5' 4\")   Wt 81.6 kg (180 lb)   SpO2 100%   BMI 30.90 kg/m    Body mass index is 30.9 kg/m .  Physical Exam   General Appearance:  Alert, cooperative, no distress, appears stated age. Afebrile. Appears comfortable sitting in chair.  Integument: Warm, dry, no rashes or lesions.  HEENT: Atraumatic, normocephalic. Face nontraumatic. Conjunctiva clear, Lids normal. PERRL. EOMI. Tongue and uvula midline. Bilateral TMs clear  Neck: Supple. No Cspine tenderness.  TTP of left trapezius muscles with associated muscular spasm. Neck ROM intact and full for fwd and lateral flexion, extension, and lateral rotation.  Respiratory: No distress. Equal air entry to bilateral bases. Lungs clear to ausculation bilaterally. No crackles, wheezes, rhonchi or stridor.  Cardiovascular: Regular rate and rhythm, no murmur, rub or gallop. No obvious chest wall deformities.  GI: abdomen is soft, non-distended, and nontender  Musculoskeletal: Back: No C, T, or Lspine tenderness or " crepitus to palpation. Strength testing: shoulder abduction, fwd flexion 5/5 bilaterally. Elbow flexion/extension,  strength 5/5 bilaterally. Gait: observed, no antalgia or abnormalities. Steady gait. TTP of left greater trochanter, left proximal femur. There is also TTP of left paralumbar musculature. +left SLR. CMS intact.  Neurologic: Alert and orientated appropriately. No focal deficits. Sensation intact in distal UEs. DTRs: biceps 2+ bilaterally. Does have reproduction of dizziness with turning head from side to side on exam today, but cannot tolerate Adel Hallpike due to neck pain.  Psych: Normal mood and affect.              Signed Electronically by: PACO Ocampo CNP

## 2024-07-18 NOTE — TELEPHONE ENCOUNTER
Call placed to patient  Relayed Dr. Hitchcock's message    Patient verbalized understanding  No further questions/concerns    Offered sooner appointment Friday 7/26/2024, patient states she is unavailable that day, but has availability the remainder of the week  Forwarding for Jenn to review if patient can be worked in sooner for follow-up     Ba Luo, Clinic RN  [facility]

## 2024-07-18 NOTE — LETTER
July 18, 2024      Tammy Joshi  [address] [facility] [address]        To Whom It May Concern:    Tammy Joshi  was seen on 7/18/2024.  Please excuse her  until 7/25/2024 due to illness.        Sincerely,        PACO Ocampo CNP

## 2024-07-18 NOTE — PATIENT INSTRUCTIONS
Start gabapentin and methocarbamol for pain.  To schedule MRI of brain and lumbar spine, call [phone].  Someone will call to schedule with spine and counselor.    Start buspirone as discussed.  Possible side effects of antidepressants/anxiety meds, including but not limited to GI upset, disrupted sleep, loss of libido, worsening of mood or even possible risk of increased suicidal thoughts.   Often some of these things if not severe will improve after 1-2 weeks on medications but some may not see effects for 3-4 weeks,  if tolerable patients should continue meds and see if there is improvement.  If symptoms are intolerable or for any suicidal thoughts the medication should be stopped immediately and contact the clinic.       These medications should be used for 6-9 months before stopping, to avoid rebound symptoms.   Contact the clinic if having any problems tolerating these medications.  Take the medication daily and do not stop the medication abruptly.    Examples of Relaxation or Mindfulness Apps (available for download on Android and iOS)  Headspace  CBT-I : helps with anxiety and insomnia  Moodpath: helps with depression and/or anxiety  Mindfulness : learn mindfulness and meditation skills to help with depression and anxiety  PTSD : helps address trauma  Mindshift: helps teens and young adults who have depression or anxiety    Books to help with anxiety/depression  The Chemistry of Makayla by Julio Delatorre (also has workbook)  The Chemistry of Calm by Julio Delatorre    Examples of Online Support Options    Teez.by (https://[employer]/anxiety-depression-support/about): online anxiety and depression support group through the Anxiety and Depression Association of [place].  Mood Disorders Society of [place] Forum (http://www.mdsc.ca/forum/): online forums for a variety of topics including general mood disorders, bipolar disorder, depression, addiction, etc.    National Suicide Prevention  "Hotline: Call 8-300-285-TALK (8733)    Crisis Text Line:  Text to 824233    Disaster Distress Helpline: Call 1-961.918.7151 or Text \"TalkWithUs\" to 91957      "

## 2024-07-18 NOTE — TELEPHONE ENCOUNTER
Mary is out of the office today.  I am sorry she is not feeling better.  In addition to the 2 pain medications, prednisone and gabapentine,   and the muscle relaxer methocarbamol she can continue to take her ibuprofen, and she can add tylenol 650 mg 4 times a day as needed.  I suggest heat in the morning , and ice at the end of the day.,  Keep moving as that helps long term.  Keep her appointment with Mary and I added a referral for physical therapy    She was also prescribed gabapentine for pain today.  It appears she needs to give that a chance.  She started prednisone on 7/15.  That is just starting to kick in to help her pain.    Ignacio Hitchcock MD

## 2024-07-19 RX ORDER — TIRZEPATIDE 7.5 MG/.5ML
INJECTION, SOLUTION SUBCUTANEOUS
Qty: 2 ML | Refills: 0 | Status: SHIPPED | OUTPATIENT
Start: 2024-07-19 | End: 2024-08-16

## 2024-07-22 NOTE — TELEPHONE ENCOUNTER
Tammy calls back and states she tried the muscle relaxer over the weekend and it didn't help at all. Asking if Mary can send in some pain medication for her. Thank you!    Gilda Lawson RN

## 2024-07-23 ENCOUNTER — OFFICE VISIT (OUTPATIENT)
Dept: FAMILY MEDICINE | Facility: CLINIC | Age: 44
End: 2024-07-23
Payer: COMMERCIAL

## 2024-07-23 ENCOUNTER — TELEPHONE (OUTPATIENT)
Dept: FAMILY MEDICINE | Facility: CLINIC | Age: 44
End: 2024-07-23

## 2024-07-23 VITALS
HEART RATE: 119 BPM | SYSTOLIC BLOOD PRESSURE: 144 MMHG | OXYGEN SATURATION: 99 % | TEMPERATURE: 97.4 F | HEIGHT: 64 IN | BODY MASS INDEX: 30.9 KG/M2 | DIASTOLIC BLOOD PRESSURE: 86 MMHG

## 2024-07-23 DIAGNOSIS — M54.2 NECK PAIN: ICD-10-CM

## 2024-07-23 DIAGNOSIS — F33.1 MODERATE EPISODE OF RECURRENT MAJOR DEPRESSIVE DISORDER (H): ICD-10-CM

## 2024-07-23 DIAGNOSIS — M25.552 HIP PAIN, LEFT: Primary | ICD-10-CM

## 2024-07-23 PROCEDURE — 99214 OFFICE O/P EST MOD 30 MIN: CPT | Performed by: PHYSICIAN ASSISTANT

## 2024-07-23 PROCEDURE — G2211 COMPLEX E/M VISIT ADD ON: HCPCS | Performed by: PHYSICIAN ASSISTANT

## 2024-07-23 RX ORDER — OXYCODONE AND ACETAMINOPHEN 5; 325 MG/1; MG/1
1 TABLET ORAL EVERY 6 HOURS PRN
Qty: 27 TABLET | Refills: 0 | Status: SHIPPED | OUTPATIENT
Start: 2024-07-23 | End: 2024-08-05

## 2024-07-23 RX ORDER — VENLAFAXINE HYDROCHLORIDE 75 MG/1
75 CAPSULE, EXTENDED RELEASE ORAL DAILY
Qty: 90 CAPSULE | Refills: 1 | Status: SHIPPED | OUTPATIENT
Start: 2024-07-23

## 2024-07-23 NOTE — TELEPHONE ENCOUNTER
She will have to be seen - I have not seen or evaluated her and if she is requesting pain medications I need to better understand what is going on and get more of a history as to what response, etc.. she has gotten from the different medications she has already been given    I am packed full today but could try to work her in if needed as I am otherwise not back in the office until Friday    Mary Wang PA-C

## 2024-07-23 NOTE — PROGRESS NOTES
"  Assessment & Plan     (M25.552) Hip pain, left  (primary encounter diagnosis)  Comment: has a MRI of the brain and low back scheduled for next week. Given the hip pain has actually need a chronic issue that has since intensified for no apparent reason and we have no prior imaging outside of xrays, I do feel it is warranted to get a more dedicated hip image to further evaluate.   Plan: MR Hip Left w/o Contrast,         oxyCODONE-acetaminophen (PERCOCET) 5-325 MG         tablet            (M54.2) Neck pain  Comment: see comment above - has further imaging and an appt with a specialist already scheduled  Plan: oxyCODONE-acetaminophen (PERCOCET) 5-325 MG         tablet            Discussed pain management - she has no history of chronic pain medication use. No recent use other than current issue. All methods other than pain meds (mindi, prednisone, max tylenol and ibuprofen) are not helping. Agreed to prescribe for the short term until we get more information through imaging and specialists     (F33.1) Moderate episode of recurrent major depressive disorder (H)  Comment: was started on buspar but notes depression is more an issue right now not anxiety - will try to increase effexor given she has had a good response to this   Plan: venlafaxine (EFFEXOR XR) 75 MG 24 hr capsule                      BMI  Estimated body mass index is 30.9 kg/m  as calculated from the following:    Height as of this encounter: 1.626 m (5' 4\").    Weight as of 7/18/24: 81.6 kg (180 lb).     Tunde Munoz is a 44 year old, presenting for the following health issues:  Pain Management        7/23/2024     1:54 PM   Additional Questions   Roomed by MARGO Bravo     [facility]       ED/ Followup:    Facility:  [facility]  Date of visit: 7/15/2024  Reason for visit: Neck pain, Dizziness, Headache   Current Status: Her pain has been staying the same. She has an MRI scheduled for 8/1/24.    Still in a lot of discomfort  Not " "sleeping  Chronic headache  Depression worse - doesn't want to get out of bed  Tried medications prescribed -   Gabapentin - has not helped   Prednisone - did nothing  Maxing out on ibuprofen and tylenol    Has MRI of brain and lumbar spine scheduled for next week and an appt with ortho specialist the following week    Review of Systems  Remainder of ROS obtained and found to be negative other than that which was documented above        Objective    BP (!) 144/86   Pulse 119   Temp 97.4  F (36.3  C) (Tympanic)   Ht 1.626 m (5' 4\")   SpO2 99%   BMI 30.90 kg/m    Body mass index is 30.9 kg/m .  Physical Exam   GENERAL: alert and no distress  NEURO: Normal strength and tone, mentation intact and speech normal  PSYCH: mentation appears normal, affect normal    Diagnostic Tests:   Reviewed notes from previous ED visit and clinic visit        Signed Electronically by: Mary Wang PA-C    "

## 2024-07-23 NOTE — TELEPHONE ENCOUNTER
Patient had an appointment today with Mary TORRES and received new RX for percocet 5/325 mg # 27 tabs.    Patient states she is needing verbal authorization per her insurance company stating she is allowed to pay cash for her RX.    Called at [facility] pharmacy in [facility] who state her insurance will allow her to pay cash she has [employer] plan which is a private insurance company and not [place].    Verbal consent given for patient to pay cash price for her percocet 5325 mg # 27 tabs.     Julie Behrendt RN

## 2024-07-23 NOTE — TELEPHONE ENCOUNTER
Tammy calling back and asking to be worked in today, she called 5 days ago and has not heard back from anyone yet. Please call her with appointment time ASAP.

## 2024-07-23 NOTE — TELEPHONE ENCOUNTER
I was out of the office until yesterday so not able to access my schedule or discuss an appointment  She can be worked in today whenever is best for her understanding she might not be seen at that exact time given the schedule is full (and was full 5 days ago so it would not have made a difference)    Mary Wang PA-C

## 2024-08-02 ENCOUNTER — TELEPHONE (OUTPATIENT)
Dept: FAMILY MEDICINE | Facility: CLINIC | Age: 44
End: 2024-08-02
Payer: COMMERCIAL

## 2024-08-02 DIAGNOSIS — M25.552 HIP PAIN, LEFT: ICD-10-CM

## 2024-08-02 DIAGNOSIS — M54.2 NECK PAIN: ICD-10-CM

## 2024-08-02 NOTE — TELEPHONE ENCOUNTER
Medication Question or Refill        What medication are you calling about (include dose and sig)?:    Disp Refills Start End JAYNA   oxyCODONE-acetaminophen (PERCOCET) 5-325 MG tablet 27 tablet 0 7/23/2024 -- No       Preferred Pharmacy:       [facility] [id number] [place] [facility] [place] [address] [place] [address] [address] [facility] [address]  Phone: [phone] Fax: [phone]      Controlled Substance Agreement on file:   CSA -- Patient Level:    CSA: None found at the patient level.       Who prescribed the medication?: PCP    Do you need a refill? Yes    When did you use the medication last? today    Patient offered an appointment? No    Do you have any questions or concerns?  Yes: states she was told she can ask for a refill      Okay to leave a detailed message?: Yes at Cell number on file:    Telephone Information:   Mobile [phone]

## 2024-08-05 RX ORDER — OXYCODONE AND ACETAMINOPHEN 5; 325 MG/1; MG/1
1 TABLET ORAL EVERY 6 HOURS PRN
Qty: 25 TABLET | Refills: 0 | Status: SHIPPED | OUTPATIENT
Start: 2024-08-05 | End: 2024-08-16

## 2024-08-09 ENCOUNTER — HOSPITAL ENCOUNTER (OUTPATIENT)
Dept: MRI IMAGING | Facility: CLINIC | Age: 44
Discharge: HOME | End: 2024-08-09
Attending: NURSE PRACTITIONER
Payer: COMMERCIAL

## 2024-08-09 DIAGNOSIS — R42 DIZZINESS: ICD-10-CM

## 2024-08-09 DIAGNOSIS — R26.89 IMBALANCE: ICD-10-CM

## 2024-08-09 DIAGNOSIS — M25.552 HIP PAIN, LEFT: ICD-10-CM

## 2024-08-09 DIAGNOSIS — M54.16 LUMBAR RADICULOPATHY: ICD-10-CM

## 2024-08-09 PROCEDURE — 70551 MRI BRAIN STEM W/O DYE: CPT

## 2024-08-09 PROCEDURE — 73721 MRI JNT OF LWR EXTRE W/O DYE: CPT | Mod: 26 | Performed by: RADIOLOGY

## 2024-08-09 PROCEDURE — 73721 MRI JNT OF LWR EXTRE W/O DYE: CPT | Mod: LT

## 2024-08-09 PROCEDURE — 72148 MRI LUMBAR SPINE W/O DYE: CPT

## 2024-08-11 DIAGNOSIS — M54.2 NECK PAIN: ICD-10-CM

## 2024-08-11 DIAGNOSIS — M25.552 HIP PAIN, LEFT: ICD-10-CM

## 2024-08-11 DIAGNOSIS — E11.9 TYPE 2 DIABETES MELLITUS WITHOUT COMPLICATION, WITHOUT LONG-TERM CURRENT USE OF INSULIN (H): ICD-10-CM

## 2024-08-12 ENCOUNTER — HOSPITAL ENCOUNTER (EMERGENCY)
Facility: CLINIC | Age: 44
Discharge: HOME | End: 2024-08-12
Attending: EMERGENCY MEDICINE | Admitting: EMERGENCY MEDICINE
Payer: COMMERCIAL

## 2024-08-12 VITALS
DIASTOLIC BLOOD PRESSURE: 102 MMHG | TEMPERATURE: 98.4 F | WEIGHT: 175 LBS | HEART RATE: 98 BPM | SYSTOLIC BLOOD PRESSURE: 131 MMHG | BODY MASS INDEX: 29.88 KG/M2 | HEIGHT: 64 IN | OXYGEN SATURATION: 97 % | RESPIRATION RATE: 20 BRPM

## 2024-08-12 DIAGNOSIS — M25.552 HIP PAIN, LEFT: ICD-10-CM

## 2024-08-12 DIAGNOSIS — M54.2 NECK PAIN: ICD-10-CM

## 2024-08-12 DIAGNOSIS — M54.42 ACUTE MIDLINE LOW BACK PAIN WITH LEFT-SIDED SCIATICA: ICD-10-CM

## 2024-08-12 PROCEDURE — 96372 THER/PROPH/DIAG INJ SC/IM: CPT | Performed by: EMERGENCY MEDICINE

## 2024-08-12 PROCEDURE — 250N000012 HC RX MED GY IP 250 OP 636 PS 637: Performed by: EMERGENCY MEDICINE

## 2024-08-12 PROCEDURE — 99284 EMERGENCY DEPT VISIT MOD MDM: CPT | Performed by: EMERGENCY MEDICINE

## 2024-08-12 PROCEDURE — 250N000011 HC RX IP 250 OP 636: Performed by: EMERGENCY MEDICINE

## 2024-08-12 PROCEDURE — 99284 EMERGENCY DEPT VISIT MOD MDM: CPT | Mod: 25 | Performed by: EMERGENCY MEDICINE

## 2024-08-12 PROCEDURE — 250N000013 HC RX MED GY IP 250 OP 250 PS 637: Performed by: EMERGENCY MEDICINE

## 2024-08-12 RX ORDER — OXYCODONE AND ACETAMINOPHEN 5; 325 MG/1; MG/1
1 TABLET ORAL ONCE
Status: COMPLETED | OUTPATIENT
Start: 2024-08-12 | End: 2024-08-12

## 2024-08-12 RX ORDER — METHYLPREDNISOLONE 4 MG
TABLET, DOSE PACK ORAL
Qty: 21 TABLET | Refills: 0 | Status: SHIPPED | OUTPATIENT
Start: 2024-08-12

## 2024-08-12 RX ORDER — OXYCODONE HYDROCHLORIDE 5 MG/1
5 TABLET ORAL EVERY 6 HOURS PRN
Qty: 12 TABLET | Refills: 0 | Status: SHIPPED | OUTPATIENT
Start: 2024-08-12 | End: 2024-09-06

## 2024-08-12 RX ORDER — IBUPROFEN 600 MG/1
600 TABLET, FILM COATED ORAL ONCE
Status: COMPLETED | OUTPATIENT
Start: 2024-08-12 | End: 2024-08-12

## 2024-08-12 RX ORDER — PREDNISONE 20 MG/1
40 TABLET ORAL ONCE
Status: COMPLETED | OUTPATIENT
Start: 2024-08-12 | End: 2024-08-12

## 2024-08-12 RX ORDER — OXYCODONE AND ACETAMINOPHEN 5; 325 MG/1; MG/1
1 TABLET ORAL EVERY 6 HOURS PRN
Qty: 25 TABLET | Refills: 0 | Status: CANCELLED | OUTPATIENT
Start: 2024-08-12

## 2024-08-12 RX ADMIN — IBUPROFEN 600 MG: 600 TABLET, FILM COATED ORAL at 21:54

## 2024-08-12 RX ADMIN — HYDROMORPHONE HYDROCHLORIDE 1 MG: 1 INJECTION, SOLUTION INTRAMUSCULAR; INTRAVENOUS; SUBCUTANEOUS at 21:54

## 2024-08-12 RX ADMIN — PREDNISONE 40 MG: 20 TABLET ORAL at 21:54

## 2024-08-12 RX ADMIN — OXYCODONE HYDROCHLORIDE AND ACETAMINOPHEN 1 TABLET: 5; 325 TABLET ORAL at 21:54

## 2024-08-12 ASSESSMENT — COLUMBIA-SUICIDE SEVERITY RATING SCALE - C-SSRS
2. HAVE YOU ACTUALLY HAD ANY THOUGHTS OF KILLING YOURSELF IN THE PAST MONTH?: NO
1. IN THE PAST MONTH, HAVE YOU WISHED YOU WERE DEAD OR WISHED YOU COULD GO TO SLEEP AND NOT WAKE UP?: NO
6. HAVE YOU EVER DONE ANYTHING, STARTED TO DO ANYTHING, OR PREPARED TO DO ANYTHING TO END YOUR LIFE?: NO

## 2024-08-12 ASSESSMENT — ACTIVITIES OF DAILY LIVING (ADL): ADLS_ACUITY_SCORE: 35

## 2024-08-12 NOTE — TELEPHONE ENCOUNTER
Routing refill request to provider for review/approval because:  Drug not on the [facility] refill protocol     Pt made appt  on 9/3 for follow up with spine specialist.    Gabriella Brown RN

## 2024-08-13 NOTE — TELEPHONE ENCOUNTER
Medication request for refill of mounjaro 7.5mg - does she want to stay at this dose or increase to next dose (10mg)?    Mary

## 2024-08-13 NOTE — DISCHARGE INSTRUCTIONS
Continue home medications.   Follow up in clinic as planned.     Continue tylenol and ibuprofen.    Alternate these medications every three hours as needed for pain.  (For example, 1000mg tylenol at 8am, 600mg ibuprofen at 11am, 1000mg tylenol at 2pm, 600mg ibuprofen at 5pm, tylenol at 8pm...)    Oxycodone as needed for severe pains.    Steroid pack as prescribed.

## 2024-08-13 NOTE — ED PROVIDER NOTES
ED Provider Note  [facility]      History     Chief Complaint   Patient presents with    Back Pain     HPI  Tammy Joshi is a 44 year old female who presents to the emergency department with concerns regarding back pain, with some radiation into the left buttock, left hip, and left leg.  This has been ongoing over the past few weeks.  Patient now concerned because she has ongoing worsening pains that is causing increased amounts of weakness of the leg, with feelings as if she is not able to completely walk quite as well.  Did have recent MRI of the brain, left hip, and lumbar spine.  I reviewed those results from August 9, 3 days ago.  No evidence of acute abnormality on MRI of the brain.  MRI of the left hip was normal.  MRI of the lumbar spine is included below, and did have probable contact with the L3 nerve root with neural for aminal narrowing at L3-L4.  There is also mild right narrowing at L2-L3, with L4-L5 left narrowing as well.  Patient with no recent steroid use since about  4 weeks ago.  Not taking Tylenol, or ibuprofen.  Previously was taking Percocet prescribed by primary care provider.  No trauma, fall, or injury.  No bowel or bladder incontinence.  No fever.        Independent Historian:        Review of External Notes:  Reviewed MRI results: 8/9/24    MPRESSION:  1.  Mild degenerative changes of the lumbar spine. No high-grade spinal canal narrowing.  2.  Moderate left and mild right neuroforaminal narrowing at L3-L4. Probable contact with the undersurface of the exiting left L3 nerve root.  3.  Mild right neuroforaminal narrowing at L2-L3. Mild left neuroforaminal narrowing at L4-L5.  4.  Modic type II changes at L4-L5.         Allergies:  No Known Allergies    Problem List:    Patient Active Problem List    Diagnosis Date Noted    Obesity without serious comorbidity, unspecified classification, unspecified obesity type 07/27/2020     Priority: Medium    Hip pain,  left 09/08/2017     Priority: Medium    Type 2 diabetes mellitus without complication (H) 10/25/2015     Priority: Medium    Hyperlipidemia with target LDL less than 100 08/06/2015     Priority: Medium     Diagnosis updated by automated process. Provider to review and confirm.      Depression 02/24/2014     Priority: Medium    Anxiety 07/21/2011     Priority: Medium    CARDIOVASCULAR SCREENING; LDL GOAL LESS THAN 160 10/31/2010     Priority: Medium    Hand numbness 10/14/2009     Priority: Medium        Past Medical History:    Past Medical History:   Diagnosis Date    Absence of menstruation     Female infertility of unspecified origin     MILD/NOS PREECLAMP-ANTEP 8/29/2005    Polycystic ovaries     PPH (postpartum hemorrhage) 11/2009       Past Surgical History:    Past Surgical History:   Procedure Laterality Date    D & C  1998    Missed Ab    LITHOTRIPSY  2011    ZZC HAND/FINGER SURGERY UNLISTED  7th grade    left index       Family History:    Family History   Adopted: Yes   Problem Relation Age of Onset    Unknown/Adopted Other         patient was adopted; knows a limited amount of medical history of birth parents       Social History:  Marital Status:   [2]  Social History     Tobacco Use    Smoking status: Never    Smokeless tobacco: Never   Vaping Use    Vaping status: Never Used   Substance Use Topics    Alcohol use: Yes     Comment: wine occasionally, none while pregnant    Drug use: No        Medications:    methylPREDNISolone (MEDROL DOSEPAK) 4 MG tablet therapy pack  oxyCODONE (ROXICODONE) 5 MG tablet  albuterol (PROAIR HFA/PROVENTIL HFA/VENTOLIN HFA) 108 (90 Base) MCG/ACT inhaler  blood glucose (NO BRAND SPECIFIED) test strip  blood glucose monitoring (NO BRAND SPECIFIED) meter device kit  busPIRone (BUSPAR) 5 MG tablet  gabapentin (NEURONTIN) 100 MG capsule  ibuprofen (ADVIL/MOTRIN) 200 MG capsule  LORazepam (ATIVAN) 0.5 MG tablet  metFORMIN (GLUCOPHAGE) 1000 MG  "tablet  oxyCODONE-acetaminophen (PERCOCET) 5-325 MG tablet  thin (NO BRAND SPECIFIED) lancets  tirzepatide (MOUNJARO) 2.5 MG/0.5ML pen  tirzepatide (MOUNJARO) 5 MG/0.5ML pen  tirzepatide (MOUNJARO) 7.5 MG/0.5ML pen  valACYclovir (VALTREX) 500 MG tablet  venlafaxine (EFFEXOR XR) 150 MG 24 hr capsule  venlafaxine (EFFEXOR XR) 75 MG 24 hr capsule          Review of Systems  A medically appropriate review of systems was performed with pertinent positives and negatives noted in the HPI, and all other systems negative.    Physical Exam   Patient Vitals for the past 24 hrs:   BP Temp Temp src Pulse Resp SpO2 Height Weight   08/12/24 2117 (!) 157/90 98.4  F (36.9  C) Oral 118 20 97 % 1.626 m (5' 4\") 79.4 kg (175 lb)          Physical Exam  General: alert and in acute distress on arrival  Head: atraumatic, normocephalic  Lungs:  nonlabored  CV:  extremities warm and perfused  Back: Mild diffuse low back tenderness  Abd: nondistended  Skin: no rashes, no diaphoresis and skin color normal  Neuro: Patient awake, alert, speech is fluent,   Psychiatric: Anxious      ED Course                 Procedures                           No results found for this or any previous visit (from the past 24 hour(s)).    MEDICATIONS GIVEN IN THE EMERGENCY DEPARTMENT:  Medications   HYDROmorphone (DILAUDID) injection 1 mg (1 mg Intramuscular $Given 8/12/24 2154)   ibuprofen (ADVIL/MOTRIN) tablet 600 mg (600 mg Oral $Given 8/12/24 2154)   predniSONE (DELTASONE) tablet 40 mg (40 mg Oral $Given 8/12/24 2154)   oxyCODONE-acetaminophen (PERCOCET) 5-325 MG per tablet 1 tablet (1 tablet Oral $Given 8/12/24 2154)           Independent Interpretation (X-rays, CTs, rhythm strip):  None    Consultations/Discussion of Management or Tests:  None       Social Determinants of Health affecting care:         Assessments & Plan (with Medical Decision Making)  44 year old female who presents to the Emergency Department for evaluation of low back pain with radiation " into the left lower extremity.  Symptoms present over the past few weeks.  Patient extremely anxious upon arrival, does state that she has bilateral hand and feet numbness and tingling.  I feel this is anxiety related as patient is hyperventilating, tearful, crying in the room.  Her main concern is with regards to the low back pain with radiation towards the left hip, and left leg.  I feel that this is consistent with her L3 neural for aminal narrowing that was seen on recent MRI.  She is also concerned with regards to dizziness, however did have MRI of the brain 3 days ago which was negative, and has had symptoms over the past 3 weeks.  Has otherwise been eating and drinking okay.  Percocet was being taken at home, however has now run out of that medication.    Patient given analgesic medications during ED course, in addition to prednisone.  Will discharge home on steroid pack.  Follow-up recommended in primary care clinic, and patient already has spine appointment for next month.    No red flag symptoms on history, and no red flag signs on exam that would warrant additional imaging, and patient had recent MRI imaging 3 days ago.  Patient recommended more frequent Tylenol and ibuprofen as she has not been taking this secondary to not knowing if she could take this with other medications.  Will prescribe plain oxycodone, and recommended Tylenol, ibuprofen, and steroid pack, with follow-up with spine providers.  I feel symptoms are consistent with L3 radiculopathy.       I have reviewed the nursing notes.    I have reviewed the findings, diagnosis, plan and need for follow up with the patient.       Critical Care time:  none      NEW PRESCRIPTIONS STARTED AT TODAY'S ER VISIT  New Prescriptions    METHYLPREDNISOLONE (MEDROL DOSEPAK) 4 MG TABLET THERAPY PACK    Follow Package Directions    OXYCODONE (ROXICODONE) 5 MG TABLET    Take 1 tablet (5 mg) by mouth every 6 hours as needed for severe pain       Final diagnoses:    Acute midline low back pain with left-sided sciatica       8/12/2024   [facility] EMERGENCY DEPT       Omar Grissom MD  08/12/24 3346

## 2024-08-13 NOTE — ED NOTES
Pt received discharge paperwork, pt had no further questions for the RN of MD, pt ambulated out of the ED with family member, pt stated they were able to get a ride to take them home

## 2024-08-13 NOTE — ED TRIAGE NOTES
Patient reports low back pain, radiates down to left buttocks and up to upper back. Patient reports she can feel lumps under her skin. Patient had MRI on Friday regarding chronic back pain and was called today and told she has stenosis. Patient reports pain radiating up into upper back just started today. Normally takes percocet to control pain but has been out of it.     Triage Assessment (Adult)       Row Name 08/12/24 9545          Triage Assessment    Airway WDL WDL        Respiratory WDL    Respiratory WDL WDL        Skin Circulation/Temperature WDL    Skin Circulation/Temperature WDL WDL        Cardiac WDL    Cardiac WDL WDL        Peripheral/Neurovascular WDL    Peripheral Neurovascular WDL WDL        Cognitive/Neuro/Behavioral WDL    Cognitive/Neuro/Behavioral WDL WDL

## 2024-08-16 ENCOUNTER — TELEPHONE (OUTPATIENT)
Dept: FAMILY MEDICINE | Facility: CLINIC | Age: 44
End: 2024-08-16
Payer: COMMERCIAL

## 2024-08-16 DIAGNOSIS — M25.552 HIP PAIN, LEFT: ICD-10-CM

## 2024-08-16 DIAGNOSIS — M54.2 NECK PAIN: ICD-10-CM

## 2024-08-16 RX ORDER — OXYCODONE AND ACETAMINOPHEN 5; 325 MG/1; MG/1
1 TABLET ORAL EVERY 6 HOURS PRN
Qty: 25 TABLET | Refills: 0 | Status: SHIPPED | OUTPATIENT
Start: 2024-08-16 | End: 2024-09-03

## 2024-08-16 RX ORDER — TIRZEPATIDE 7.5 MG/.5ML
INJECTION, SOLUTION SUBCUTANEOUS
Qty: 2 ML | Refills: 0 | Status: SHIPPED | OUTPATIENT
Start: 2024-08-16 | End: 2024-09-12

## 2024-08-16 NOTE — TELEPHONE ENCOUNTER
Medication Question or Refill    Contacts       Contact Date/Time Type Contact Phone/Fax    08/16/2024 11:50 AM CDT Phone (Incoming) Tammy Joshi (Self) [phone] (M)            What medication are you calling about (include dose and sig)?: Percocet     Preferred Pharmacy:       [facility] [address] [place] [address] [address] [place] [address] [address] [facility] [address]  Phone: [phone] Fax: [phone]      Controlled Substance Agreement on file:   CSA -- Patient Level:    CSA: None found at the patient level.       Who prescribed the medication?: CECILE Goss     Do you need a refill? Yes    When did you use the medication last? Around 1-2am    Patient offered an appointment? No    Do you have any questions or concerns?  Yes: n/a      Okay to leave a detailed message?: Yes at Cell number on file:    Telephone Information:   Mobile [phone]

## 2024-08-19 NOTE — TELEPHONE ENCOUNTER
Patient calling in regards to refill. Unable to refill at pharmacy.    Spoke with pharmacy-patient received Oxycodone on 08/12 from Dr. Grissom, Percocet sent on 08/16 from Mary Wang. Pharmacist assuming it is unable to be filled due to multiple orders. Per pharmacist, patient could pay out of pocket. However, needs to be approved by Mary Wang and patient.     Spoke with [facility]- a quantity limit exceeded PA is required. RN at [place] location to huddle with Mary Wang to discuss next steps.     Wan DICKINSON RN  [facility]

## 2024-08-19 NOTE — TELEPHONE ENCOUNTER
Okay with paying cash     We need to check with patient and figure out where she is at with her spine referral. The longer she remains on this medication the harder it will be to get off and if the pain is still that severe she should be seen sooner vs later.     Mary Wang PA-C

## 2024-08-19 NOTE — TELEPHONE ENCOUNTER
RN tried to meet with provider. In room with patient.   Routing encounter and will try to follow up in person again.   Naomy Ayon RN on 8/19/2024 at 4:08 PM

## 2024-08-19 NOTE — TELEPHONE ENCOUNTER
Noted. RN called the pharmacy and gave the OK for cash as this will be the quickest way she will be able to get it. Pharmacist said it isn't too expensive.Did let them know if for some this is not affordable we can try for the quantity limit PA.     RN will call tomorrow about patients spine referral.  Naomy Ayon RN on 8/19/2024 at 6:01 PM

## 2024-08-20 NOTE — TELEPHONE ENCOUNTER
Patient is scheduled for 9/3/2024 with Jameson Randall DO.  I'll postpone this until the 4th to review notes.   Naomy Ayon RN on 8/20/2024 at 10:57 AM

## 2024-09-03 ENCOUNTER — OFFICE VISIT (OUTPATIENT)
Dept: PHYSICAL MEDICINE AND REHAB | Facility: CLINIC | Age: 44
End: 2024-09-03
Payer: COMMERCIAL

## 2024-09-03 VITALS
DIASTOLIC BLOOD PRESSURE: 69 MMHG | WEIGHT: 181 LBS | SYSTOLIC BLOOD PRESSURE: 136 MMHG | HEART RATE: 120 BPM | BODY MASS INDEX: 31.07 KG/M2

## 2024-09-03 DIAGNOSIS — M25.552 HIP PAIN, LEFT: ICD-10-CM

## 2024-09-03 DIAGNOSIS — M79.18 MYOFASCIAL PAIN: ICD-10-CM

## 2024-09-03 DIAGNOSIS — M48.02 CERVICAL STENOSIS OF SPINAL CANAL: ICD-10-CM

## 2024-09-03 DIAGNOSIS — M51.26 LUMBAR DISC HERNIATION: Primary | ICD-10-CM

## 2024-09-03 DIAGNOSIS — M54.16 LUMBAR RADICULAR PAIN: ICD-10-CM

## 2024-09-03 PROCEDURE — 99205 OFFICE O/P NEW HI 60 MIN: CPT | Performed by: PHYSICAL MEDICINE & REHABILITATION

## 2024-09-03 RX ORDER — PREGABALIN 25 MG/1
CAPSULE ORAL
Qty: 60 CAPSULE | Refills: 1 | Status: SHIPPED | OUTPATIENT
Start: 2024-09-03 | End: 2024-09-30

## 2024-09-03 RX ORDER — OXYCODONE AND ACETAMINOPHEN 5; 325 MG/1; MG/1
1 TABLET ORAL 3 TIMES DAILY PRN
Qty: 12 TABLET | Refills: 0 | Status: SHIPPED | OUTPATIENT
Start: 2024-09-03 | End: 2024-09-06

## 2024-09-03 ASSESSMENT — PAIN SCALES - GENERAL: PAINLEVEL: EXTREME PAIN (8)

## 2024-09-03 NOTE — LETTER
9/3/2024      Tammy Joshi  [address] [facility] [address]      Dear Colleague,    Thank you for referring your patient, Tammy Joshi, to the [facility]. Please see a copy of my visit note below.    Assessment/Plan:      Tammy was seen today for back pain and neck pain.    Diagnoses and all orders for this visit:    Lumbar disc herniation  -     PAIN Transforaminal ULICES Inj Lumbosacral One Level Left; Future  -     Physical Therapy  Referral; Future    Lumbar radicular pain  -     PAIN Transforaminal ULICES Inj Lumbosacral One Level Left; Future  -     pregabalin (LYRICA) 25 MG capsule; 25 mg at bedtime x3 days, then increase to 25 mg twice daily  -     Physical Therapy  Referral; Future    Hip pain, left  -     oxyCODONE-acetaminophen (PERCOCET) 5-325 MG tablet; Take 1 tablet by mouth 3 times daily as needed for severe pain.  -     Physical Therapy  Referral; Future    Myofascial pain  -     pregabalin (LYRICA) 25 MG capsule; 25 mg at bedtime x3 days, then increase to 25 mg twice daily  -     Physical Therapy  Referral; Future    Cervical stenosis of spinal canal  -     Physical Therapy  Referral; Future  -     MR Cervical Spine w/o Contrast; Future    Other orders  -     Spine  Referral         Assessment: Pleasant 44 year old female with history of diabetes, mild hypertension, anxiety with:    1.  Subacute on chronic left hip and gluteal pain.  She has a left L3-4 foraminal disc herniation with annular tear contacting the L3 nerve and this could represent proximal radicular pain.  This pain is quite severe in the hip but she also has significantly reduced range of motion of the hip and hip flexion internal and external rotation although negative hip MRI.  Her pain could represent radicular pain.  Multiple trips to the emergency department severe pain despite trial of gabapentin, oxycodone, oxycodone/acetaminophen and  Medrol Dosepak.    2.  Significant myofascial pain left gluteal region.    3.  History of cervical spine pain left cervical spine pain with C5-6 left paracentral disc herniation resulting in moderate central stenosis.  Having increased  headaches recently.      Discussion:    1.  We discussed the diagnosis and treatment options.  Severe left hip pain a trip to the emergency department despite medications as a Medrol Dosepak oxycodone/acetaminophen oxycodone.  We discussed medication options along with interventional options PT.  I have also discussed cervical spine pain.  Her left hip pain is much more severe.  Her pain is likely radicular in nature and will left gluteal region and low back although cannot exclude primary hip pathology such as labral tear.  Her presentation is more consistent with a hip pathology but her MRI is very clean of the hip and she does have a foraminal disc herniation we will treat this like a radicular pain.      2.  Start physical therapy for left hip although I do not believe she would be able to tolerate PT without interventions.    3.  Recommend a left L 3-4 TFESI this will be ordered.    4.  Trial diclofenac 50 mg twice daily for pain as needed.    5.  Start pregabalin 25 mg at bedtime increasing to twice daily after 3 days.    6.  Will provide short course of oxycodone/acetaminophen.  She was just given 25 tablets about 2 weeks ago and states she is out of this medication.  This is a one-time prescription from my office for 12 tablets to be taken as needed if her pain becomes quite severe.  If she needs further prescriptions she would need to return to her PCP who has been prescribing this already.  Again her pain is somewhat atypical for lumbar radicular pain but will treat this as an acute disc herniation for her.   checked and revealed the recent prescription from PCP as well as emergency department.    7.  Will update MRI of the cervical spine to evaluate for progress  cervical stenosis which can be resulting in her multiple issues as well.  No hyperreflexia on exam although given diabetes mellitus this could be masking her presentation.    8.  Follow-up at earliest convenience for injection.  Again we will try to expedite this as I do not believe she would tolerate any PT without injection and she has been to the emergency department on at least 2 occasions.    It was our pleasure caring for your patient today, if there any questions or concerns please do not hesitate to contact us.    Over 60 minutes were spent on the date of the encounter performing chart review, patient visit and documentation in addition to any procedure.    Subjective:   Patient ID: Tammy Joshi is a 44 year old female.    History of Present Illness: Patient presents at the request of Danyelle Tovar for an evaluation of left-sided cervical spine pain as well as left gluteal lateral hip pain and low back pain.  The most significant pain is the left hip gluteal pain.  She initially has had this for several years 15 years plus or minus without any injury.  Was told she had muscle pain several times and reported to physical therapy which did not help.  Over the past couple of years she has had significant increased pain in the gluteal region which has become constant no specific injury.  Worse with any standing walking gives her deep left gluteal pain lateral hip pain anterior lateral hip pain starting at the PSIS.  No specific pain in the lumbar spine with her history somewhat nebulous.  Pain is a 10/10 at worst 8/10 today 3/10 at best.  Has been to the emergency department on several occasions for thisRecent increase in pain.  I did review their notes as well most recently August 12 they endorsed back pain at that time and the left hip.  She also has complaint of weakness in her leg and her leg will give out with walking at times.  At that visit they did provide oxycodone 5 mg every 6 hours as needed and  a Medrol Dosepak which were not helpful.  Has been on gabapentin as well.  Is on oxycodone/SymlinPen per PCP given 25 tablets a couple of weeks ago and is out of those.  States that it takes the edge off on taking them 3 times a day.  Pain is a 10/10 at worst 8/10 today 3/10 at best.  Has had MRI of the low back as well as left hip and is here today for evaluation.  No recent physical therapy has seen a chiropractor without benefit.    She also complains of chronic left-sided cervical spine pain with turning her head to the left this has been chronic for a couple of years has had a cervical injection in the past as well.  No arm pain or paresthesias.  Better with rest.  She has endorsed some numbness and tingling in the hands intermittently but not regularly.  The hip pain is the most severe issue for her.      Imaging: MRI report and images were personally reviewed and discussed with the patient.  A plastic model was utilized during the discussion.  MRI of the lumbar spine and left hip were personally reviewed.  Lumbar spine shows some relatively mild degenerative changes L3-4 L4-5 left foraminal disc extrusion with annular tear abutting the underside of the left L3 nerve with moderate left foraminal stenosis only mild right foraminal stenosis.  No central stenosis.  At L4-5 left foraminal disc protrusion with annular tear no right foraminal stenosis and mild left foraminal stenosis.  Normal disc height L5-S1 with moderate facet arthropathy L5-S1.    Left hip MRI reveals no joint effusion.  Normal marrow signal.  No full-thickness tendon tear or tendon retraction.  No subchondral edema or full-thickness cartilage loss.      MRI cervical spine from 2022 was reviewed showing facet arthropathy C5-6 with posterior disc osteophyte complex superimposed moderate left paracentral disc herniation with moderate spinal stenosis and compression of the left spinal cord no spinal cord signal abnormality.  Facet arthropathy C6-7  with a broad-based disc osteophyte and small central disc herniation/protrusion.  No spinal stenosis.       Review of Systems: Complains of joint pain muscle pain muscle fatigue, poor balance falls dizziness, insomnia anxiety depression excessive tiredness.  Denies fever, weight loss, waking, headache, change in vision, chest pain, shortness of breath, Eric pain, nausea vomiting, bowel or bladder incontinence, skin issues, bruising issues.  Remainder of 12 point review systems negative unless listed above.      Current Outpatient Medications   Medication Sig Dispense Refill     albuterol (PROAIR HFA/PROVENTIL HFA/VENTOLIN HFA) 108 (90 Base) MCG/ACT inhaler Inhale 2 puffs into the lungs every 4 hours as needed for shortness of breath or wheezing 8.5 g 0     blood glucose (NO BRAND SPECIFIED) test strip Use to test blood sugar 1 times daily or as directed. To accompany: Blood Glucose Monitor Brands: per insurance. 100 strip 6     blood glucose monitoring (NO BRAND SPECIFIED) meter device kit Use to test blood sugar 1 times daily or as directed. Preferred blood glucose meter OR supplies to accompany: Blood Glucose Monitor Brands: per insurance. 1 kit 0     busPIRone (BUSPAR) 5 MG tablet Take 1 tablet (5 mg) by mouth 2 times daily for 3 days, THEN 2 tablets (10 mg) 2 times daily for 3 days, THEN 3 tablets (15 mg) 2 times daily for 84 days. 522 tablet 0     ibuprofen (ADVIL/MOTRIN) 200 MG capsule Take 200 mg by mouth every 4 hours as needed for fever       LORazepam (ATIVAN) 0.5 MG tablet TAKE 1 TO 2 TABLETS BY MOUTH EVERY 6 HOURS AS NEEDED FOR ANXIETY . DO NOT EXCEED 4 PER 24 HOURS 60 tablet 0     metFORMIN (GLUCOPHAGE) 1000 MG tablet Take 1 tablet (1,000 mg) by mouth 2 times daily (with meals) 180 tablet 3     methylPREDNISolone (MEDROL DOSEPAK) 4 MG tablet therapy pack Follow Package Directions 21 tablet 0     oxyCODONE (ROXICODONE) 5 MG tablet Take 1 tablet (5 mg) by mouth every 6 hours as needed for severe pain  12 tablet 0     oxyCODONE-acetaminophen (PERCOCET) 5-325 MG tablet Take 1 tablet by mouth 3 times daily as needed for severe pain. 12 tablet 0     pregabalin (LYRICA) 25 MG capsule 25 mg at bedtime x3 days, then increase to 25 mg twice daily 60 capsule 1     thin (NO BRAND SPECIFIED) lancets Use with lanceting device. To accompany: Blood Glucose Monitor Brands: per insurance. 100 each 6     tirzepatide (MOUNJARO) 2.5 MG/0.5ML pen Inject 2.5 mg Subcutaneous every 7 days 3 mL 0     tirzepatide (MOUNJARO) 5 MG/0.5ML pen Inject 5 mg Subcutaneous every 7 days 0.5 mL 0     tirzepatide (MOUNJARO) 7.5 MG/0.5ML pen INJECT 7.5 MG SUBCUTANEOUS EVERY 7 DAYS 2 mL 0     valACYclovir (VALTREX) 500 MG tablet Take 1 tablet (500 mg) by mouth 2 times daily 12 tablet 2     venlafaxine (EFFEXOR XR) 150 MG 24 hr capsule Take 1 capsule (150 mg) by mouth daily 90 capsule 3     venlafaxine (EFFEXOR XR) 75 MG 24 hr capsule Take 1 capsule (75 mg) by mouth daily With the 150mg for a total of 225mg daily 90 capsule 1     gabapentin (NEURONTIN) 100 MG capsule Take 1 capsule (100 mg) by mouth 3 times daily (Patient not taking: Reported on 7/23/2024) 90 capsule 1     No current facility-administered medications for this visit.       Past Medical History:   Diagnosis Date     Absence of menstruation      Anxiety      Depressive disorder      Diabetes (H)      Female infertility of unspecified origin      MILD/NOS PREECLAMP-ANTEP 08/29/2005     Polycystic ovaries      PPH (postpartum hemorrhage) 11/01/2009       Family History   Adopted: Yes   Problem Relation Age of Onset     Unknown/Adopted Other         patient was adopted; knows a limited amount of medical history of birth parents         Social History     Socioeconomic History     Marital status:      Spouse name: None     Number of children: 3     Years of education: None     Highest education level: None   Occupational History     Occupation: Stay at Home Mom-     Employer:  [employer]   Tobacco Use     Smoking status: Never     Smokeless tobacco: Never   Vaping Use     Vaping status: Never Used   Substance and Sexual Activity     Alcohol use: Yes     Comment: wine occasionally, none while pregnant     Drug use: No     Sexual activity: Yes     Partners: Male     Birth control/protection: None   Other Topics Concern     Parent/sibling w/ CABG, MI or angioplasty before 65F 55M? Yes     Comment: adopted- unknown     Social Determinants of Health     Interpersonal Safety: Low Risk  (3/26/2024)    Interpersonal Safety      Do you feel physically and emotionally safe where you currently live?: Yes      Within the past 12 months, have you been hit, slapped, kicked or otherwise physically hurt by someone?: No      Within the past 12 months, have you been humiliated or emotionally abused in other ways by your partner or ex-partner?: No   Social history: Works as a special .  No tobacco or alcohol per report.      The following portions of the patient's history were reviewed and updated as appropriate: allergies, current medications, past family history, past medical history, past social history, past surgical history and problem list.    Oswestry (CRISS) Questionnaire         No data to display                Neck Disability Index:      2/8/2022    12:00 PM   Neck Disability Index (  [employer]. 1991. All rights reserved.; used with permission)   SECTION 1 - PAIN INTENSITY 3   SECTION 2 - PERSONAL CARE 3   SECTION 3 - LIFTING 5   SECTION 4 - READING 3   SECTION 5 - HEADACHES 3   SECTION 6 - CONCENTRATION 2   SECTION 7 - WORK 4   SECTION 8 - DRIVING 3   SECTION 9 - SLEEPING 5   SECTION 10 - RECREATION 3   Count 10   Sum 34   Raw Score: /50 34   Neck Disability Index Score: (%) 68 %          PHQ-2 Score:         3/26/2024    10:04 AM 12/21/2022     3:33 PM   PHQ-2 ( 1999 [employer])   Q1: Little interest or pleasure in doing things 0 2   Q2: Feeling down, depressed or hopeless 0 3    PHQ-2 Score 0 5   Q1: Little interest or pleasure in doing things Not at all More than half the days   Q2: Feeling down, depressed or hopeless Not at all Nearly every day   PHQ-2 Score 0 5                  Objective:   Physical Exam:    /69   Pulse 120   Wt 181 lb (82.1 kg)   BMI 31.07 kg/m    Body mass index is 31.07 kg/m .      General:  Well-appearing female in no acute distress.  Pleasant,   cooperative, and interactive throughout the examination and interview.  CV: No lower extremity edema.  2+ dorsalis pedis pulses bilaterally.  Lymphatics: No cervical lymphadenopathy palpated.  Eyes: sclera clear.  Skin: No rashes or lesions seen head neck arms legs face..  Respirations unlabored.  MSK: Gait is antalgic left lower extremity with limited left hip extension and limited stance phase on the left.  Difficulty heel toe walk due to pain.     Negative Romberg.  Spine: normal AP curves of the C, T, and L spine.  Decreased range of motion lumbar spine secondary to gluteal and hip pain.  Decreased range of motion cervical spine rotation to left greater than right pain.  Palpation: Tenderness to palpation over left gluteal region greater trochanter left PSIS, left mid to upper cervical paraspinals.  Extremities: Full range of motion of the shoulders and abduction, elbows, and wrists with no effusions or tenderness to palpation.  Negative arm drop, empty can, and Speed's test bilaterally.  Significantly reduced range of motion left hip from supine in flexion less than 90 internal extra rotation from seated and supine.  Quite guarded.  Interestingly cannot sit at 90 degrees of hip flexion without difficulty however supine I cannot flex her hip to 90 degrees.  Full range of motion of the   knees, and ankles from a seated position with no effusions or tenderness to palpation.    Neurologic exam: Mental status: Patient is alert and oriented with normal affect.  Attention, knowledge, memory, and language are intact.   Normal coordination throughout the examination.  Reflexes are 2+ and symmetric biceps, triceps, brachioradialis, patellar, and Achilles with down-going toes and Negative Layne's.  Sensation is intact to light touch throughout the upper and lower extremities bilaterally.  Manual muscle testing reveals 5 out of 5 strength in the shoulder abductors, elbow   flexors/extensors, wrist extensors, interosseous, and finger flexors; 5 out of 5   in the hip flexors, knee flexors/extensors, ankle plantar flexors, ankle   dorsiflexors, and EHL.  Some giveaway to pain in the left hip flexors.  Normal muscle bulk and tone.  Negative   Spurling's test bilaterally.  Cannot assess seated or supine straight leg raise with certainty given significant left hip reduced range of motion        Again, thank you for allowing me to participate in the care of your patient.        Sincerely,        Jameson Randall, DO

## 2024-09-03 NOTE — PATIENT INSTRUCTIONS
An MRI was ordered for you today.  You will be contacted by scheduling within 3 days.    If you are not contacted, please call Radiology at [phone].   A Left lumbar epidural injection has been ordered today. Please schedule this injection at least 2 weeks from now to allow time for insurance prior authorization. On the day of your injection, you cannot be sick or taking antibiotics. If you become sick and are prescribed, please call the clinic so your injection can be rescheduled for once you have completed your antibiotics. You will need to bring a  with you for your injection. If you have any questions or concerns prior to your injection, please do not hesitate to call the nurse navigation line at [phone].   Start Lyrica 25 mg at bedtime x 3 days then increase to twice daily for nerve pain  Diclofenac (which is an anti-inflammatory) medication is prescribed today. Take 1 tablet 2 times a day as needed for pain.This medication should be taken with food and water to prevent any stomach upset. Do not take ibuprofen/Advil/Motrin/Aleve  while you take Diclofenac. Please call if you have any side effects.    Oxycodone/acetaminophen  was prescribed for you today Please lock this medication up when you are not taking it. Do not share this medication with other people. Do not increase the dose without permission from your physician. Do not drink alcohol while you take this medication as this can lead to death. Do not take other pain medications without approval from your physician or this can also lead to death. If you need a refill of this medication, you must come in to clinic by appointment. Please call if you have any questions on how to take this medication. This is a short term medication.

## 2024-09-03 NOTE — PROGRESS NOTES
Assessment/Plan:      Tammy was seen today for back pain and neck pain.    Diagnoses and all orders for this visit:    Lumbar disc herniation  -     PAIN Transforaminal ULICES Inj Lumbosacral One Level Left; Future  -     Physical Therapy  Referral; Future    Lumbar radicular pain  -     PAIN Transforaminal ULICES Inj Lumbosacral One Level Left; Future  -     pregabalin (LYRICA) 25 MG capsule; 25 mg at bedtime x3 days, then increase to 25 mg twice daily  -     Physical Therapy  Referral; Future    Hip pain, left  -     oxyCODONE-acetaminophen (PERCOCET) 5-325 MG tablet; Take 1 tablet by mouth 3 times daily as needed for severe pain.  -     Physical Therapy  Referral; Future    Myofascial pain  -     pregabalin (LYRICA) 25 MG capsule; 25 mg at bedtime x3 days, then increase to 25 mg twice daily  -     Physical Therapy  Referral; Future    Cervical stenosis of spinal canal  -     Physical Therapy  Referral; Future  -     MR Cervical Spine w/o Contrast; Future    Other orders  -     Spine  Referral         Assessment: Pleasant 44 year old female with history of diabetes, mild hypertension, anxiety with:    1.  Subacute on chronic left hip and gluteal pain.  She has a left L3-4 foraminal disc herniation with annular tear contacting the L3 nerve and this could represent proximal radicular pain.  This pain is quite severe in the hip but she also has significantly reduced range of motion of the hip and hip flexion internal and external rotation although negative hip MRI.  Her pain could represent radicular pain.  Multiple trips to the emergency department severe pain despite trial of gabapentin, oxycodone, oxycodone/acetaminophen and Medrol Dosepak.    2.  Significant myofascial pain left gluteal region.    3.  History of cervical spine pain left cervical spine pain with C5-6 left paracentral disc herniation resulting in moderate central stenosis.  Having increased   headaches recently.      Discussion:    1.  We discussed the diagnosis and treatment options.  Severe left hip pain a trip to the emergency department despite medications as a Medrol Dosepak oxycodone/acetaminophen oxycodone.  We discussed medication options along with interventional options PT.  I have also discussed cervical spine pain.  Her left hip pain is much more severe.  Her pain is likely radicular in nature and will left gluteal region and low back although cannot exclude primary hip pathology such as labral tear.  Her presentation is more consistent with a hip pathology but her MRI is very clean of the hip and she does have a foraminal disc herniation we will treat this like a radicular pain.      2.  Start physical therapy for left hip although I do not believe she would be able to tolerate PT without interventions.    3.  Recommend a left L 3-4 TFESI this will be ordered.    4.  Trial diclofenac 50 mg twice daily for pain as needed.    5.  Start pregabalin 25 mg at bedtime increasing to twice daily after 3 days.    6.  Will provide short course of oxycodone/acetaminophen.  She was just given 25 tablets about 2 weeks ago and states she is out of this medication.  This is a one-time prescription from my office for 12 tablets to be taken as needed if her pain becomes quite severe.  If she needs further prescriptions she would need to return to her PCP who has been prescribing this already.  Again her pain is somewhat atypical for lumbar radicular pain but will treat this as an acute disc herniation for her.   checked and revealed the recent prescription from PCP as well as emergency department.    7.  Will update MRI of the cervical spine to evaluate for progress cervical stenosis which can be resulting in her multiple issues as well.  No hyperreflexia on exam although given diabetes mellitus this could be masking her presentation.    8.  Follow-up at earliest convenience for injection.  Again we will  try to expedite this as I do not believe she would tolerate any PT without injection and she has been to the emergency department on at least 2 occasions.    It was our pleasure caring for your patient today, if there any questions or concerns please do not hesitate to contact us.    Over 60 minutes were spent on the date of the encounter performing chart review, patient visit and documentation in addition to any procedure.    Subjective:   Patient ID: Tammy Joshi is a 44 year old female.    History of Present Illness: Patient presents at the request of Danyelle Tovar for an evaluation of left-sided cervical spine pain as well as left gluteal lateral hip pain and low back pain.  The most significant pain is the left hip gluteal pain.  She initially has had this for several years 15 years plus or minus without any injury.  Was told she had muscle pain several times and reported to physical therapy which did not help.  Over the past couple of years she has had significant increased pain in the gluteal region which has become constant no specific injury.  Worse with any standing walking gives her deep left gluteal pain lateral hip pain anterior lateral hip pain starting at the [facility].  No specific pain in the lumbar spine with her history somewhat nebulous.  Pain is a 10/10 at worst 8/10 today 3/10 at best.  Has been to the emergency department on several occasions for thisRecent increase in pain.  I did review their notes as well most recently August 12 they endorsed back pain at that time and the left hip.  She also has complaint of weakness in her leg and her leg will give out with walking at times.  At that visit they did provide oxycodone 5 mg every 6 hours as needed and a Medrol Dosepak which were not helpful.  Has been on gabapentin as well.  Is on oxycodone/SymlinPen per PCP given 25 tablets a couple of weeks ago and is out of those.  States that it takes the edge off on taking them 3 times a day.  Pain is  a 10/10 at worst 8/10 today 3/10 at best.  Has had MRI of the low back as well as left hip and is here today for evaluation.  No recent physical therapy has seen a chiropractor without benefit.    She also complains of chronic left-sided cervical spine pain with turning her head to the left this has been chronic for a couple of years has had a cervical injection in the past as well.  No arm pain or paresthesias.  Better with rest.  She has endorsed some numbness and tingling in the hands intermittently but not regularly.  The hip pain is the most severe issue for her.      Imaging: MRI report and images were personally reviewed and discussed with the patient.  A plastic model was utilized during the discussion.  MRI of the lumbar spine and left hip were personally reviewed.  Lumbar spine shows some relatively mild degenerative changes L3-4 L4-5 left foraminal disc extrusion with annular tear abutting the underside of the left L3 nerve with moderate left foraminal stenosis only mild right foraminal stenosis.  No central stenosis.  At L4-5 left foraminal disc protrusion with annular tear no right foraminal stenosis and mild left foraminal stenosis.  Normal disc height L5-S1 with moderate facet arthropathy L5-S1.    Left hip MRI reveals no joint effusion.  Normal marrow signal.  No full-thickness tendon tear or tendon retraction.  No subchondral edema or full-thickness cartilage loss.      MRI cervical spine from 2022 was reviewed showing facet arthropathy C5-6 with posterior disc osteophyte complex superimposed moderate left paracentral disc herniation with moderate spinal stenosis and compression of the left spinal cord no spinal cord signal abnormality.  Facet arthropathy C6-7 with a broad-based disc osteophyte and small central disc herniation/protrusion.  No spinal stenosis.       Review of Systems: Complains of joint pain muscle pain muscle fatigue, poor balance falls dizziness, insomnia anxiety depression  excessive tiredness.  Denies fever, weight loss, waking, headache, change in vision, chest pain, shortness of breath, Eric pain, nausea vomiting, bowel or bladder incontinence, skin issues, bruising issues.  Remainder of 12 point review systems negative unless listed above.      Current Outpatient Medications   Medication Sig Dispense Refill    albuterol (PROAIR HFA/PROVENTIL HFA/VENTOLIN HFA) 108 (90 Base) MCG/ACT inhaler Inhale 2 puffs into the lungs every 4 hours as needed for shortness of breath or wheezing 8.5 g 0    blood glucose (NO BRAND SPECIFIED) test strip Use to test blood sugar 1 times daily or as directed. To accompany: Blood Glucose Monitor Brands: per insurance. 100 strip 6    blood glucose monitoring (NO BRAND SPECIFIED) meter device kit Use to test blood sugar 1 times daily or as directed. Preferred blood glucose meter OR supplies to accompany: Blood Glucose Monitor Brands: per insurance. 1 kit 0    busPIRone (BUSPAR) 5 MG tablet Take 1 tablet (5 mg) by mouth 2 times daily for 3 days, THEN 2 tablets (10 mg) 2 times daily for 3 days, THEN 3 tablets (15 mg) 2 times daily for 84 days. 522 tablet 0    ibuprofen (ADVIL/MOTRIN) 200 MG capsule Take 200 mg by mouth every 4 hours as needed for fever      LORazepam (ATIVAN) 0.5 MG tablet TAKE 1 TO 2 TABLETS BY MOUTH EVERY 6 HOURS AS NEEDED FOR ANXIETY . DO NOT EXCEED 4 PER 24 HOURS 60 tablet 0    metFORMIN (GLUCOPHAGE) 1000 MG tablet Take 1 tablet (1,000 mg) by mouth 2 times daily (with meals) 180 tablet 3    methylPREDNISolone (MEDROL DOSEPAK) 4 MG tablet therapy pack Follow Package Directions 21 tablet 0    oxyCODONE (ROXICODONE) 5 MG tablet Take 1 tablet (5 mg) by mouth every 6 hours as needed for severe pain 12 tablet 0    oxyCODONE-acetaminophen (PERCOCET) 5-325 MG tablet Take 1 tablet by mouth 3 times daily as needed for severe pain. 12 tablet 0    pregabalin (LYRICA) 25 MG capsule 25 mg at bedtime x3 days, then increase to 25 mg twice daily 60  capsule 1    thin (NO BRAND SPECIFIED) lancets Use with lanceting device. To accompany: Blood Glucose Monitor Brands: per insurance. 100 each 6    tirzepatide (MOUNJARO) 2.5 MG/0.5ML pen Inject 2.5 mg Subcutaneous every 7 days 3 mL 0    tirzepatide (MOUNJARO) 5 MG/0.5ML pen Inject 5 mg Subcutaneous every 7 days 0.5 mL 0    tirzepatide (MOUNJARO) 7.5 MG/0.5ML pen INJECT 7.5 MG SUBCUTANEOUS EVERY 7 DAYS 2 mL 0    valACYclovir (VALTREX) 500 MG tablet Take 1 tablet (500 mg) by mouth 2 times daily 12 tablet 2    venlafaxine (EFFEXOR XR) 150 MG 24 hr capsule Take 1 capsule (150 mg) by mouth daily 90 capsule 3    venlafaxine (EFFEXOR XR) 75 MG 24 hr capsule Take 1 capsule (75 mg) by mouth daily With the 150mg for a total of 225mg daily 90 capsule 1    gabapentin (NEURONTIN) 100 MG capsule Take 1 capsule (100 mg) by mouth 3 times daily (Patient not taking: Reported on 7/23/2024) 90 capsule 1     No current facility-administered medications for this visit.       Past Medical History:   Diagnosis Date    Absence of menstruation     Anxiety     Depressive disorder     Diabetes (H)     Female infertility of unspecified origin     MILD/NOS PREECLAMP-ANTEP 08/29/2005    Polycystic ovaries     PPH (postpartum hemorrhage) 11/01/2009       Family History   Adopted: Yes   Problem Relation Age of Onset    Unknown/Adopted Other         patient was adopted; knows a limited amount of medical history of birth parents         Social History     Socioeconomic History    Marital status:      Spouse name: None    Number of children: 3    Years of education: None    Highest education level: None   Occupational History    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 Yes     Comment: adopted- unknown     Social Determinants of Health     Interpersonal Safety: Low Risk  (3/26/2024)    Interpersonal Safety     Do you feel physically and emotionally safe where you currently live?: Yes     Within the past 12 months, have you been hit, slapped, kicked or otherwise physically hurt by someone?: No     Within the past 12 months, have you been humiliated or emotionally abused in other ways by your partner or ex-partner?: No   Social history: Works as a special .  No tobacco or alcohol per report.      The following portions of the patient's history were reviewed and updated as appropriate: allergies, current medications, past family history, past medical history, past social history, past surgical history and problem list.    Oswestry (CRISS) Questionnaire         No data to display                Neck Disability Index:      2/8/2022    12:00 PM   Neck Disability Index (  [employer]. 1991. All rights reserved.; used with permission)   SECTION 1 - PAIN INTENSITY 3   SECTION 2 - PERSONAL CARE 3   SECTION 3 - LIFTING 5   SECTION 4 - READING 3   SECTION 5 - HEADACHES 3   SECTION 6 - CONCENTRATION 2   SECTION 7 - WORK 4   SECTION 8 - DRIVING 3   SECTION 9 - SLEEPING 5   SECTION 10 - RECREATION 3   Count 10   Sum 34   Raw Score: /50 34   Neck Disability Index Score: (%) 68 %          PHQ-2 Score:         3/26/2024    10:04 AM 12/21/2022     3:33 PM   PHQ-2 ( 1999 [employer])   Q1: Little interest or pleasure in doing things 0 2   Q2: Feeling down, depressed or hopeless 0 3   PHQ-2 Score 0 5   Q1: Little interest or pleasure in doing things Not at all More than half the days   Q2: Feeling down, depressed or hopeless Not at all Nearly every day   PHQ-2 Score 0 5                  Objective:   Physical Exam:    /69   Pulse 120   Wt 181 lb  (82.1 kg)   BMI 31.07 kg/m    Body mass index is 31.07 kg/m .      General:  Well-appearing female in no acute distress.  Pleasant,   cooperative, and interactive throughout the examination and interview.  CV: No lower extremity edema.  2+ dorsalis pedis pulses bilaterally.  Lymphatics: No cervical lymphadenopathy palpated.  Eyes: sclera clear.  Skin: No rashes or lesions seen head neck arms legs face..  Respirations unlabored.  MSK: Gait is antalgic left lower extremity with limited left hip extension and limited stance phase on the left.  Difficulty heel toe walk due to pain.     Negative Romberg.  Spine: normal AP curves of the C, T, and L spine.  Decreased range of motion lumbar spine secondary to gluteal and hip pain.  Decreased range of motion cervical spine rotation to left greater than right pain.  Palpation: Tenderness to palpation over left gluteal region greater trochanter left PSIS, left mid to upper cervical paraspinals.  Extremities: Full range of motion of the shoulders and abduction, elbows, and wrists with no effusions or tenderness to palpation.  Negative arm drop, empty can, and Speed's test bilaterally.  Significantly reduced range of motion left hip from supine in flexion less than 90 internal extra rotation from seated and supine.  Quite guarded.  Interestingly cannot sit at 90 degrees of hip flexion without difficulty however supine I cannot flex her hip to 90 degrees.  Full range of motion of the   knees, and ankles from a seated position with no effusions or tenderness to palpation.    Neurologic exam: Mental status: Patient is alert and oriented with normal affect.  Attention, knowledge, memory, and language are intact.  Normal coordination throughout the examination.  Reflexes are 2+ and symmetric biceps, triceps, brachioradialis, patellar, and Achilles with down-going toes and Negative Layne's.  Sensation is intact to light touch throughout the upper and lower extremities bilaterally.   Manual muscle testing reveals 5 out of 5 strength in the shoulder abductors, elbow   flexors/extensors, wrist extensors, interosseous, and finger flexors; 5 out of 5   in the hip flexors, knee flexors/extensors, ankle plantar flexors, ankle   dorsiflexors, and EHL.  Some giveaway to pain in the left hip flexors.  Normal muscle bulk and tone.  Negative   Spurling's test bilaterally.  Cannot assess seated or supine straight leg raise with certainty given significant left hip reduced range of motion

## 2024-09-05 ENCOUNTER — RADIOLOGY INJECTION OFFICE VISIT (OUTPATIENT)
Dept: PHYSICAL MEDICINE AND REHAB | Facility: CLINIC | Age: 44
End: 2024-09-05
Attending: PHYSICAL MEDICINE & REHABILITATION
Payer: COMMERCIAL

## 2024-09-05 VITALS
HEART RATE: 110 BPM | OXYGEN SATURATION: 98 % | SYSTOLIC BLOOD PRESSURE: 130 MMHG | DIASTOLIC BLOOD PRESSURE: 90 MMHG | TEMPERATURE: 97.3 F | RESPIRATION RATE: 16 BRPM

## 2024-09-05 DIAGNOSIS — M54.16 LUMBAR RADICULAR PAIN: ICD-10-CM

## 2024-09-05 DIAGNOSIS — E11.9 TYPE 2 DIABETES MELLITUS WITHOUT COMPLICATION, WITHOUT LONG-TERM CURRENT USE OF INSULIN (H): Primary | ICD-10-CM

## 2024-09-05 DIAGNOSIS — M51.26 LUMBAR DISC HERNIATION: ICD-10-CM

## 2024-09-05 LAB — GLUCOSE SERPL-MCNC: 203 MG/DL (ref 70–99)

## 2024-09-05 PROCEDURE — 82962 GLUCOSE BLOOD TEST: CPT | Performed by: PAIN MEDICINE

## 2024-09-05 PROCEDURE — 64483 NJX AA&/STRD TFRM EPI L/S 1: CPT | Mod: LT | Performed by: PAIN MEDICINE

## 2024-09-05 RX ORDER — LIDOCAINE HYDROCHLORIDE 10 MG/ML
INJECTION, SOLUTION EPIDURAL; INFILTRATION; INTRACAUDAL; PERINEURAL
Status: COMPLETED | OUTPATIENT
Start: 2024-09-05 | End: 2024-09-05

## 2024-09-05 RX ORDER — DEXAMETHASONE SODIUM PHOSPHATE 10 MG/ML
INJECTION, SOLUTION INTRAMUSCULAR; INTRAVENOUS
Status: COMPLETED | OUTPATIENT
Start: 2024-09-05 | End: 2024-09-05

## 2024-09-05 RX ADMIN — LIDOCAINE HYDROCHLORIDE 2 ML: 10 INJECTION, SOLUTION EPIDURAL; INFILTRATION; INTRACAUDAL; PERINEURAL at 14:50

## 2024-09-05 RX ADMIN — DEXAMETHASONE SODIUM PHOSPHATE 10 MG: 10 INJECTION, SOLUTION INTRAMUSCULAR; INTRAVENOUS at 14:50

## 2024-09-05 ASSESSMENT — PAIN SCALES - GENERAL
PAINLEVEL: SEVERE PAIN (7)
PAINLEVEL: SEVERE PAIN (7)

## 2024-09-05 NOTE — PATIENT INSTRUCTIONS
DISCHARGE INSTRUCTIONS    During office hours (8:00 a.m.- 4:00 p.m.) questions or concerns may be answered  by calling [facility] Navigation Nurses at  [phone].  Messages received after hours will be returned the following business day.      In the case of an emergency, please dial 911 or seek assistance at the nearest Emergency Room/Urgent Care facility.     All Patients:    You may experience an increase in your symptoms for the first 2 days (It may take anywhere between 2 days- 2 weeks for the steroid to have maximum effect).    You may use ice on the injection site, as frequently as 20 minutes each hour if needed.    You may take your pain medicine.    You may continue taking your regular medication after your injection. If you have had a Medial Branch Block you may resume pain medication once your pain diary is completed.    You may shower. No swimming, tub bath or hot tub for 48 hours.  You may remove your bandaid/bandage as soon as you are home.    You may resume light activities, as tolerated.    Resume your usual diet as tolerated.    If you were told to hold any blood thinning medications you may resume taking them 24 hours after your procedure as prescribed.    It is strongly advised that you do not drive for 1-3 hours post injection.    If you have had oral sedation:  Do not drive for 8 hours post injection.      If you have had IV sedation:  Do not drive for 24 hours post injection.  Do not operate hazardous machinery or make important personal/business decisions for 24 hours.      POSSIBLE STEROID SIDE EFFECTS (If steroid/cortisone was used for your procedure)    -If you experience these symptoms, it should only last for a short period    Swelling of the legs              Skin redness (flushing)     Mouth (oral) irritation   Blood sugar (glucose) levels            Sweats                    Mood changes  Headache  Sleeplessness  Weakened immune system for up to 14 days, which could increase  the risk of emelia the COVID-19 virus and/or experiencing more severe symptoms of the disease, if exposed.  Decreased effectiveness of the flu vaccine if given within 2 weeks of the steroid.         POSSIBLE PROCEDURE SIDE EFFECTS  -Call the Spine Center if you are concerned  Increased Pain           Increased numbness/tingling      Nausea/Vomiting          Bruising/bleeding at site      Redness or swelling                                              Difficulty walking      Weakness           Fever greater than 100.5    *In the event of a severe headache after an epidural steroid injection that is relieved by lying down, please call the [facility] to speak with a clinical staff member*     Please be advised that your blood glucose levels may be increased for the next 5-7 days as a result of the steroid you received with your injection today.  It is recommended that you monitor your blood glucose levels closely over the next week and direct any additional questions regarding your blood glucose management to your primary doctor.

## 2024-09-05 NOTE — TELEPHONE ENCOUNTER
She picked up qty 12 from Dr. Randall on 9/3 (2 days ago). Per his note I see that future refills will come back to PCP but at this point will wait until she requests a refill  Mary Wang PA-C

## 2024-09-06 ENCOUNTER — MYC MEDICAL ADVICE (OUTPATIENT)
Dept: FAMILY MEDICINE | Facility: CLINIC | Age: 44
End: 2024-09-06
Payer: COMMERCIAL

## 2024-09-06 DIAGNOSIS — F41.8 SITUATIONAL ANXIETY: ICD-10-CM

## 2024-09-06 DIAGNOSIS — M25.552 HIP PAIN, LEFT: ICD-10-CM

## 2024-09-06 RX ORDER — OXYCODONE AND ACETAMINOPHEN 5; 325 MG/1; MG/1
1 TABLET ORAL 3 TIMES DAILY PRN
Qty: 30 TABLET | Refills: 0 | Status: SHIPPED | OUTPATIENT
Start: 2024-09-06 | End: 2024-09-23

## 2024-09-08 DIAGNOSIS — E11.9 TYPE 2 DIABETES MELLITUS WITHOUT COMPLICATION, WITHOUT LONG-TERM CURRENT USE OF INSULIN (H): ICD-10-CM

## 2024-09-10 RX ORDER — VENLAFAXINE HYDROCHLORIDE 150 MG/1
150 CAPSULE, EXTENDED RELEASE ORAL DAILY
Qty: 90 CAPSULE | Refills: 3 | Status: SHIPPED | OUTPATIENT
Start: 2024-09-10

## 2024-09-12 ENCOUNTER — HOSPITAL ENCOUNTER (OUTPATIENT)
Dept: MRI IMAGING | Facility: CLINIC | Age: 44
Discharge: HOME | End: 2024-09-12
Attending: PHYSICAL MEDICINE & REHABILITATION | Admitting: PHYSICAL MEDICINE & REHABILITATION
Payer: COMMERCIAL

## 2024-09-12 DIAGNOSIS — M48.02 CERVICAL STENOSIS OF SPINAL CANAL: ICD-10-CM

## 2024-09-12 PROCEDURE — 72141 MRI NECK SPINE W/O DYE: CPT

## 2024-09-12 RX ORDER — TIRZEPATIDE 7.5 MG/.5ML
INJECTION, SOLUTION SUBCUTANEOUS
Qty: 2 ML | Refills: 0 | Status: SHIPPED | OUTPATIENT
Start: 2024-09-12

## 2024-09-22 ENCOUNTER — MYC MEDICAL ADVICE (OUTPATIENT)
Dept: FAMILY MEDICINE | Facility: CLINIC | Age: 44
End: 2024-09-22
Payer: COMMERCIAL

## 2024-09-22 DIAGNOSIS — M25.552 HIP PAIN, LEFT: Primary | ICD-10-CM

## 2024-09-22 DIAGNOSIS — M54.16 LUMBAR RADICULOPATHY: ICD-10-CM

## 2024-09-22 DIAGNOSIS — M79.18 MYOFASCIAL PAIN: ICD-10-CM

## 2024-09-23 ENCOUNTER — THERAPY VISIT (OUTPATIENT)
Dept: PHYSICAL THERAPY | Facility: CLINIC | Age: 44
End: 2024-09-23
Attending: PHYSICAL MEDICINE & REHABILITATION
Payer: COMMERCIAL

## 2024-09-23 DIAGNOSIS — M25.552 HIP PAIN, LEFT: ICD-10-CM

## 2024-09-23 DIAGNOSIS — M51.26 LUMBAR DISC HERNIATION: ICD-10-CM

## 2024-09-23 DIAGNOSIS — M54.16 LUMBAR RADICULAR PAIN: ICD-10-CM

## 2024-09-23 DIAGNOSIS — M79.18 MYOFASCIAL PAIN: ICD-10-CM

## 2024-09-23 DIAGNOSIS — M48.02 CERVICAL STENOSIS OF SPINAL CANAL: ICD-10-CM

## 2024-09-23 PROCEDURE — 97110 THERAPEUTIC EXERCISES: CPT | Mod: GP | Performed by: PHYSICAL THERAPIST

## 2024-09-23 PROCEDURE — 97162 PT EVAL MOD COMPLEX 30 MIN: CPT | Mod: GP | Performed by: PHYSICAL THERAPIST

## 2024-09-23 PROCEDURE — 97530 THERAPEUTIC ACTIVITIES: CPT | Mod: GP | Performed by: PHYSICAL THERAPIST

## 2024-09-23 RX ORDER — OXYCODONE AND ACETAMINOPHEN 5; 325 MG/1; MG/1
1 TABLET ORAL 3 TIMES DAILY PRN
Qty: 30 TABLET | Refills: 0 | Status: SHIPPED | OUTPATIENT
Start: 2024-09-23 | End: 2024-10-03

## 2024-09-23 NOTE — PROGRESS NOTES
PHYSICAL THERAPY EVALUATION  Type of Visit: Evaluation        Fall Risk Screen:  Fall screen completed by: PT  Have you fallen 2 or more times in the past year?: No  Have you fallen and had an injury in the past year?: No  Is patient a fall risk?: No    Subjective   Pt has had L hip pain that comes and goes, pain has been constant for several months with increased intensity.  Pt has had MRI on lumbar spine and hip indicating lumbar disc herniation.  Pt reports pain is typically on L gluteal area.  Pt reports intensity fluctuates, worse with transfers, sleeping on L side, prolonged walking.  Pt will get numbness/tingling into B feet and hands.      Pt reports neck symptoms over the past 2 years.  Pt reports having a neck injection, chiropractic, and physical therapy with some relief.  Pt reports radiating symptoms into top of shoulder.  Pt reports migraines are occurring 1x/week, lasting about 24 hours.  With migraines patient gets light and noise sensitivity, auras, and feelings of unsteadiness.            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   Pain is Relieved By: otc medications and rest  Pain Progression: Worsened  INTEGUMENTARY (edema, incisions): WNL  POSTURE:  Tendency to lean to the R in seated and standing positions.   GAIT:   Weightbearing Status: WBAT  Assistive Device(s): None  Gait Deviations: WNL  BALANCE/PROPRIOCEPTION: WNL  WEIGHTBEARING ALIGNMENT: WNL  NON-WEIGHTBEARING ALIGNMENT: WNL   ROM:  L hip ROM: flex: 100, ER: 10, IR: 5, Abd: 20.    PELVIC/SI SCREEN: WNL  STRENGTH:  Guarded L LE but 5/5  MYOTOMES: WNL  DTR S: WNL  CORD SIGNS: WNL  DERMATOMES: WNL  NEURAL TENSION: Lumbar WNL  FLEXIBILITY: Tight L hip flexors, L upper trap.   FUNCTIONAL TESTS: Double Leg Squat: Anterior knee translation, Knee valgus, Hip internal rotation, and Improper use of glutes/hips  PALPATION:  Allodynia throughout L  hip flexors, adductors and glutes.  Tender to palpation at L Upper trap.       Assessment & Plan   CLINICAL IMPRESSIONS  Medical Diagnosis: Lumbar disc herniation, lumbar radiculopathy, left hip pain, myofascial pain, cervical canal of spinal stenosis.    Treatment Diagnosis: Lumbar disc herniation, lumbar radiculopathy, left hip pain, myofascial pain, cervical canal of spinal stenosis.   Impression/Assessment: Patient is a 44 year old female with Low back, L hip, and neck complaints.  The following significant findings have been identified: Pain, Decreased ROM/flexibility, Decreased joint mobility, Decreased strength, Decreased proprioception, Impaired muscle performance, Decreased activity tolerance, and Impaired posture. These impairments interfere with their ability to perform self care tasks, work tasks, recreational activities, household chores, and community mobility as compared to previous level of function.     Clinical Decision Making (Complexity):  Clinical Presentation:  Evolving/Changing  Clinical Presentation Rationale: based on medical and personal factors listed in PT evaluation  Clinical Decision Making (Complexity): Moderate complexity    PLAN OF CARE  Treatment Interventions:  Modalities: Cryotherapy, E-stim, Hot Pack  Interventions: Gait Training, Manual Therapy, Neuromuscular Re-education, Therapeutic Activity, Therapeutic Exercise    Long Term Goals     PT Goal 1  Goal Identifier: Symptom modification  Goal Description: Pt will demonstrated abilities to manage symptoms of neck, back, and hip pain through modalities, desensitization techniques, and positional activities in order to maintain pain levels of 3/10 or less.  Target Date: 11/05/24  PT Goal 2  Goal Identifier: Hip ROM  Goal Description: Pt will demonstrate L hip ROM to improve tolerance to functional mobility tasks such as walking, stairs, and transfers.  Target Date: 11/19/24      Frequency of Treatment: 1x/week  Duration of Treatment: 8 weeks    Recommended Referrals to Other Professionals: none.  Education Assessment:   Learner/Method: Patient  Education Comments: HEP    Risks and benefits of evaluation/treatment have been explained.   Patient/Family/caregiver agrees with Plan of Care.     Evaluation Time:     PT Eval, Moderate Complexity Minutes (50296): 25     Signing Clinician: Jeffery Barnes PT

## 2024-09-30 ENCOUNTER — THERAPY VISIT (OUTPATIENT)
Dept: PHYSICAL THERAPY | Facility: CLINIC | Age: 44
End: 2024-09-30
Attending: PHYSICAL MEDICINE & REHABILITATION
Payer: COMMERCIAL

## 2024-09-30 ENCOUNTER — HOSPITAL ENCOUNTER (OUTPATIENT)
Dept: GENERAL RADIOLOGY | Facility: CLINIC | Age: 44
Discharge: HOME | End: 2024-09-30
Attending: PHYSICAL MEDICINE & REHABILITATION | Admitting: PHYSICAL MEDICINE & REHABILITATION
Payer: COMMERCIAL

## 2024-09-30 ENCOUNTER — OFFICE VISIT (OUTPATIENT)
Dept: PHYSICAL MEDICINE AND REHAB | Facility: CLINIC | Age: 44
End: 2024-09-30
Payer: COMMERCIAL

## 2024-09-30 VITALS — SYSTOLIC BLOOD PRESSURE: 134 MMHG | HEART RATE: 123 BPM | DIASTOLIC BLOOD PRESSURE: 80 MMHG

## 2024-09-30 DIAGNOSIS — M89.8X5 PAIN OF LEFT FEMUR: Primary | ICD-10-CM

## 2024-09-30 DIAGNOSIS — M89.8X5 PAIN OF LEFT FEMUR: ICD-10-CM

## 2024-09-30 DIAGNOSIS — M25.552 HIP PAIN, LEFT: Primary | ICD-10-CM

## 2024-09-30 DIAGNOSIS — M51.26 LUMBAR DISC HERNIATION: ICD-10-CM

## 2024-09-30 DIAGNOSIS — M79.18 MYOFASCIAL PAIN: ICD-10-CM

## 2024-09-30 DIAGNOSIS — M48.02 CERVICAL STENOSIS OF SPINAL CANAL: ICD-10-CM

## 2024-09-30 DIAGNOSIS — M54.16 LUMBAR RADICULAR PAIN: ICD-10-CM

## 2024-09-30 PROCEDURE — 95886 MUSC TEST DONE W/N TEST COMP: CPT | Performed by: PHYSICAL MEDICINE & REHABILITATION

## 2024-09-30 PROCEDURE — 95909 NRV CNDJ TST 5-6 STUDIES: CPT | Performed by: PHYSICAL MEDICINE & REHABILITATION

## 2024-09-30 PROCEDURE — 97110 THERAPEUTIC EXERCISES: CPT | Mod: GP | Performed by: PHYSICAL THERAPIST

## 2024-09-30 PROCEDURE — 99214 OFFICE O/P EST MOD 30 MIN: CPT | Mod: 25 | Performed by: PHYSICAL MEDICINE & REHABILITATION

## 2024-09-30 PROCEDURE — 73552 X-RAY EXAM OF FEMUR 2/>: CPT | Mod: LT

## 2024-09-30 RX ORDER — PREGABALIN 25 MG/1
CAPSULE ORAL
Qty: 60 CAPSULE | Refills: 1 | Status: SHIPPED | OUTPATIENT
Start: 2024-09-30

## 2024-09-30 ASSESSMENT — PAIN SCALES - GENERAL: PAINLEVEL: EXTREME PAIN (8)

## 2024-09-30 NOTE — PROGRESS NOTES
[facility]  [address] [place] [address] [address]  [place] [address]  Office: [phone] Fax: [phone]    Electromyography and Nerve Conduction Study Report        Indication: Patient presents at the request of Dr. Jameson Randall for left lower extremity EMG.  She has left lower extremity pain gluteal pain lateral anterior thigh.  On exam, 2+ patellar and Achilles reflexes bilaterally, normal sensation to light touch of the bilateral lower extremities medial malleolus lateral malleolus and dorsal foot bilaterally.  Giveaway weakness of the left hip flexors 5/5 knee flexors extensors ankle plantar flexors dorsiflexors and EHL.        Pt Exam Discussion (Communication Barriers):  Electromyography and nerve conduction testing, including associated discomfort, risks, benefits, and alternatives was discussed with the patient prior to the procedure.  No learning/ communication barriers; patient verbalized understanding of procedure.  Informed consent was obtained.           Pt Assessment:  Testing was successfully completed; patient tolerated testing well.        Blood Thinners: None Skin Temperature: Warmed 32.9                   EMG/NCS  results:     Nerve Conduction Studies  Motor Sites      Segment Distal Latency Neg. Amp CV F-Latency F-Estimate Comment   Site  (ms) (mV) (m/s) (ms) (ms)    Left Fibular (EDB) Motor   Ankle Ankle-EDB 3.8 2.3       Knee Knee-Ankle 11.5 2.3 48      Left Tibial (AH) Motor   Ankle Ankle-AH 2.8 8.2       Knee Knee-Ankle 10.9 8.4 50        Sensory Sites      Onset Lat Peak Lat Amp CV Comment   Site (ms) (ms) ( V) (m/s)    Left Lat Femoral Cutaneous Sensory   Groin-Lateral Thigh *NR *NR *NR *NR    Right Lat Femoral Cutaneous Sensory   Groin-Lateral Thigh *NR *NR *NR *NR    Left Sural Sensory   B-Ankle 3.2 3.7 7 -        NCS Waveforms:    Motor         Sensory             Electromyography     Side Muscle Nerve Root Ins Act Fibs Psw Fasc Recrt Dur Amp Poly Comment    Left AntTibialis Dp Br Fibular L4-5 Nml Nml Nml Nml Nml Nml Nml 0    Left Gastroc Tibial S1-2 Nml Nml Nml Nml Nml Nml Nml 0    Left VastusLat Femoral L2-4 Nml Nml Nml Nml Nml Nml Nml 0    Left TensorFascLat SupGluteal L4-5, S1 Nml Nml Nml Nml Nml Nml Nml 0    Left RectFemoris Femoral L2-4 Nml Nml Nml Nml Nml Nml Nml 0          Comment NCS: Normal study  1.  Normal nerve conduction studies left lower extremity.  This includes normal left sural SNAP, peroneal and tibial CMAP's, and absent bilateral lateral femoral cutaneous SNAPs which is likely normal for patient given diabetes and age.    Comment EMG: Normal study  1.  Normal needle EMG left lower extremity.    Interpretation: Normal study    1. There is no electrodiagnostic evidence of lumbosacral radiculopathy, lumbosacral plexopathy, or focal neuropathy in the left lower extremity.    The testing was completed in its entirety by the physician.      It was our pleasure caring for your patient today, if there any questions or concerns please do not hesitate to contact us.       denies pain/discomfort (Rating = 0)

## 2024-09-30 NOTE — PROGRESS NOTES
Assessment/Plan:      Tammy was seen today for back pain.    Diagnoses and all orders for this visit:    Pain of left femur  -     CT Pelvis Soft Tissue wo Contrast; Future  -     CT Femur Thigh Left w/o Contrast; Future  -     XR Femur Left 2 Views; Future    Lumbar radicular pain  -     pregabalin (LYRICA) 25 MG capsule; 50 mg twice daily  -     EMG; Future    Myofascial pain  -     pregabalin (LYRICA) 25 MG capsule; 50 mg twice daily    Lumbar disc herniation    Cervical stenosis of spinal canal         Assessment: Pleasant 44 year old female with history of diabetes, mild hypertension, anxiety with:     1.  Subacute on chronic left hip and gluteal pain.  She has a small left L3-4 foraminal disc herniation with annular tear contacting the L3 nerve and this could represent proximal radicular pain.  This pain is quite severe in the hip and femur.  Some limited range of motion of the hip likely related to pain.  negative hip MRI.  Her pain could represent radicular pain.  Multiple trips to the emergency department severe pain despite trial of gabapentin, oxycodone, oxycodone/acetaminophen and Medrol Dosepak.  No improvement with physical therapy, in fact physical therapy is very painful for her.  No improvement following a left L3-4 TFESI.  No improvement with local anesthetic or steroid.     2.  Significant myofascial pain left gluteal region.     3.  History of cervical spine pain left cervical spine pain with C5-6 left paracentral disc herniation resulting in moderate central stenosis.  Continues to have disc osteophyte complex to the left results in moderate to severe central stenosis with compression of the spinal cord in the left lateral recess and severe left foraminal stenosis.  Has been having increased  headaches recently.       Discussion:    1.  We discussed the diagnosis and treatment options for the neck as well as lumbar spine.  The left femur pain is difficult to interpret.  Feels pain deep along  the bone in the femur worse with standing and walking and does wake her up at night as well and I am concerned that this is only a small disc herniation and  may not be radicular need to evaluate for other cause in the femur.  Hip MRI has been negative.  We discussed options of further imaging of the left femur hip along with EMG.    2.  EMG left lower extremity to evaluate.  This was added on today.  Please see separate note.  This was a normal study in the left lower extremity.  I did speak with her after the EMG to discuss the results today.    3.    plain films of the left femur ordered    4.  CT scan of the pelvis and left femur to evaluate for abnormality.  If negative will obtain MRI of the femur.  CT scan can likely be done in a more timely fashion.    5.  Increase pregabalin to 50 mg twice daily.    6.  Is taking Percocet per PCP who is sent to pain clinic and I agree with second opinion from pain provider as her current pain is disproportionate to the MRI findings and need second opinion.    7.  For the cervical spine can consider cervical epidural or neurosurgical evaluation but she states her neck pain is minimal compared to left leg.    8.  She will continue physical therapy for now.  She has an appointment today.  She will see if it is too painful to continue as she is in severe pain with standing walking.      9.  Follow-up 1 month     It was our pleasure caring for your patient today, if there any questions or concerns please do not hesitate to contact us.     Over 30  minutes were spent on the date of the encounter performing chart review, patient visit and documentation in addition to any procedure.     Subjective:   Patient ID: Tammy Joshi is a 44 year old female.    History of Present Illness: Patient presents for follow-up of left lower extremity radicular pain gluteal pain and left thigh pain to the lateral thigh to the knee anterior thigh deep within the knee as well as buttock.  Worse  with sitting standing walking but also pain at night with laying on the left leg will wake her up at night.  Nothing makes the pain better.  Taking oxycodone/acetaminophen which helps make the pain tolerable.  Pregabalin is not that helpful at this point.  Left L3-4 TFESI no improvement initially with local anesthetic or with steroid.  Pain is a 10/10 at worst 8/10 today 6/10 at best.  Pain is deep within the bone standing is very difficult for her physical therapy x 2 visits increased pain.  Also taking Advil.  PT is making the pain worse.    She also continues to have neck pain left side patiently down the left arm feels weak intermittently but the neck pain is not as severe as the left leg pain.  Has had MRI.      Imaging: MRI report and images were personally reviewed and discussed with the patient.  A plastic model was utilized during the discussion.  MRI of the cervical spine personally reviewed.  This reveals disc osteophyte complex C3-4 through C6-7.  C3-4 facet arthropathy results in mild bilateral foraminal stenosis.  C4-5 moderate left mild right foraminal stenosis no central stenosis.  C5-6 large left foraminal disc protrusion with annular tear facet arthropathy results in moderate severe central stenosis flattening of the cord and severe left moderate right foraminal stenosis.    MRI lumbar spine reveals normal disc heights throughout the lumbar spine.  L4-5 left foraminal disc protrusion tear no right foraminal stenosis.  L3-4 left foraminal disc protrusion moderate facet arthropathy contact of the left L3 nerve.    Review of Systems: Complains of paresthesias weakness headaches.  Denies bowel or bladder incontinence, falls, fevers, unintentional weight loss.      Prior interventions:  1.  Left L3-4 TFESI.    Current Outpatient Medications   Medication Sig Dispense Refill    albuterol (PROAIR HFA/PROVENTIL HFA/VENTOLIN HFA) 108 (90 Base) MCG/ACT inhaler Inhale 2 puffs into the lungs every 4 hours as  needed for shortness of breath or wheezing 8.5 g 0    blood glucose (NO BRAND SPECIFIED) test strip Use to test blood sugar 1 times daily or as directed. To accompany: Blood Glucose Monitor Brands: per insurance. 100 strip 6    blood glucose monitoring (NO BRAND SPECIFIED) meter device kit Use to test blood sugar 1 times daily or as directed. Preferred blood glucose meter OR supplies to accompany: Blood Glucose Monitor Brands: per insurance. 1 kit 0    busPIRone (BUSPAR) 5 MG tablet Take 1 tablet (5 mg) by mouth 2 times daily for 3 days, THEN 2 tablets (10 mg) 2 times daily for 3 days, THEN 3 tablets (15 mg) 2 times daily for 84 days. 522 tablet 0    diclofenac (VOLTAREN) 50 MG EC tablet Take 1 tablet (50 mg) by mouth 2 times daily as needed for moderate pain. 60 tablet 1    gabapentin (NEURONTIN) 100 MG capsule Take 1 capsule (100 mg) by mouth 3 times daily (Patient not taking: Reported on 7/23/2024) 90 capsule 1    ibuprofen (ADVIL/MOTRIN) 200 MG capsule Take 200 mg by mouth every 4 hours as needed for fever      LORazepam (ATIVAN) 0.5 MG tablet TAKE 1 TO 2 TABLETS BY MOUTH EVERY 6 HOURS AS NEEDED FOR ANXIETY . DO NOT EXCEED 4 PER 24 HOURS 60 tablet 0    metFORMIN (GLUCOPHAGE) 1000 MG tablet Take 1 tablet (1,000 mg) by mouth 2 times daily (with meals) 180 tablet 3    methylPREDNISolone (MEDROL DOSEPAK) 4 MG tablet therapy pack Follow Package Directions 21 tablet 0    oxyCODONE-acetaminophen (PERCOCET) 5-325 MG tablet Take 1 tablet by mouth 3 times daily as needed for severe pain. 30 tablet 0    pregabalin (LYRICA) 25 MG capsule 25 mg at bedtime x3 days, then increase to 25 mg twice daily 60 capsule 1    thin (NO BRAND SPECIFIED) lancets Use with lanceting device. To accompany: Blood Glucose Monitor Brands: per insurance. 100 each 6    tirzepatide (MOUNJARO) 2.5 MG/0.5ML pen Inject 2.5 mg Subcutaneous every 7 days 3 mL 0    tirzepatide (MOUNJARO) 5 MG/0.5ML pen Inject 5 mg Subcutaneous every 7 days 0.5 mL 0     tirzepatide (MOUNJARO) 7.5 MG/0.5ML pen INJECT 7.5 MG SUBCUTANEOUS EVERY 7 DAYS 2 mL 0    valACYclovir (VALTREX) 500 MG tablet Take 1 tablet (500 mg) by mouth 2 times daily 12 tablet 2    venlafaxine (EFFEXOR XR) 150 MG 24 hr capsule TAKE 1 CAPSULE BY MOUTH EVERY DAY 90 capsule 3    venlafaxine (EFFEXOR XR) 75 MG 24 hr capsule Take 1 capsule (75 mg) by mouth daily With the 150mg for a total of 225mg daily 90 capsule 1     No current facility-administered medications for this visit.       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           Objective:   Physical Exam:    /80   Pulse (!) 123   There is no height or weight on file to calculate BMI.      General: Alert and oriented with normal affect. Attention, knowledge, memory, and language are intact. No acute distress.   Eyes: Sclerae are clear.  Respirations: Unlabored. CV: No lower extremity edema.     Gait: Antalgic left lower extremity.  Tearful at times with movement of the left hip.  Tenderness through the left gluteal region greater trochanter buttock lateral thigh.  Full flexion internal/external rotation today from supine but tearful.  Sensation is intact to light touch throughout the  lower extremities.  Reflexes are   2+ patellar and Achilles     Manual muscle testing reveals:  Right /Left out of 5     5/5 hip flexors-giveaway to pain  5/5 knee flexors  5/5 knee extensors  5/5 ankle plantar flexors  5/5 ankle dorsiflexors  5/5  EHL

## 2024-09-30 NOTE — LETTER
9/30/2024      Tammy Joshi  [address] [facility] [address]      Dear Colleague,    Thank you for referring your patient, Tammy Joshi, to the [facility]. Please see a copy of my visit note below.    Assessment/Plan:      Tammy was seen today for back pain.    Diagnoses and all orders for this visit:    Pain of left femur  -     CT Pelvis Soft Tissue wo Contrast; Future  -     CT Femur Thigh Left w/o Contrast; Future  -     XR Femur Left 2 Views; Future    Lumbar radicular pain  -     pregabalin (LYRICA) 25 MG capsule; 50 mg twice daily  -     EMG; Future    Myofascial pain  -     pregabalin (LYRICA) 25 MG capsule; 50 mg twice daily    Lumbar disc herniation    Cervical stenosis of spinal canal         Assessment: Pleasant 44 year old female with history of diabetes, mild hypertension, anxiety with:     1.  Subacute on chronic left hip and gluteal pain.  She has a small left L3-4 foraminal disc herniation with annular tear contacting the L3 nerve and this could represent proximal radicular pain.  This pain is quite severe in the hip and femur.  Some limited range of motion of the hip likely related to pain.  negative hip MRI.  Her pain could represent radicular pain.  Multiple trips to the emergency department severe pain despite trial of gabapentin, oxycodone, oxycodone/acetaminophen and Medrol Dosepak.  No improvement with physical therapy, in fact physical therapy is very painful for her.  No improvement following a left L3-4 TFESI.  No improvement with local anesthetic or steroid.     2.  Significant myofascial pain left gluteal region.     3.  History of cervical spine pain left cervical spine pain with C5-6 left paracentral disc herniation resulting in moderate central stenosis.  Continues to have disc osteophyte complex to the left results in moderate to severe central stenosis with compression of the spinal cord in the left lateral recess and severe left foraminal  stenosis.  Has been having increased  headaches recently.       Discussion:    1.  We discussed the diagnosis and treatment options for the neck as well as lumbar spine.  The left femur pain is difficult to interpret.  Feels pain deep along the bone in the femur worse with standing and walking and does wake her up at night as well and I am concerned that this is only a small disc herniation and  may not be radicular need to evaluate for other cause in the femur.  Hip MRI has been negative.  We discussed options of further imaging of the left femur hip along with EMG.    2.  EMG left lower extremity to evaluate.  This was added on today.  Please see separate note.  This was a normal study in the left lower extremity.  I did speak with her after the EMG to discuss the results today.    3.    plain films of the left femur ordered    4.  CT scan of the pelvis and left femur to evaluate for abnormality.  If negative will obtain MRI of the femur.  CT scan can likely be done in a more timely fashion.    5.  Increase pregabalin to 50 mg twice daily.    6.  Is taking Percocet per PCP who is sent to pain clinic and I agree with second opinion from pain provider as her current pain is disproportionate to the MRI findings and need second opinion.    7.  For the cervical spine can consider cervical epidural or neurosurgical evaluation but she states her neck pain is minimal compared to left leg.    8.  She will continue physical therapy for now.  She has an appointment today.  She will see if it is too painful to continue as she is in severe pain with standing walking.      9.  Follow-up 1 month     It was our pleasure caring for your patient today, if there any questions or concerns please do not hesitate to contact us.     Over 30  minutes were spent on the date of the encounter performing chart review, patient visit and documentation in addition to any procedure.     Subjective:   Patient ID: Tammy Joshi is a 44 year old  female.    History of Present Illness: Patient presents for follow-up of left lower extremity radicular pain gluteal pain and left thigh pain to the lateral thigh to the knee anterior thigh deep within the knee as well as buttock.  Worse with sitting standing walking but also pain at night with laying on the left leg will wake her up at night.  Nothing makes the pain better.  Taking oxycodone/acetaminophen which helps make the pain tolerable.  Pregabalin is not that helpful at this point.  Left L3-4 TFESI no improvement initially with local anesthetic or with steroid.  Pain is a 10/10 at worst 8/10 today 6/10 at best.  Pain is deep within the bone standing is very difficult for her physical therapy x 2 visits increased pain.  Also taking Advil.  PT is making the pain worse.    She also continues to have neck pain left side patiently down the left arm feels weak intermittently but the neck pain is not as severe as the left leg pain.  Has had MRI.      Imaging: MRI report and images were personally reviewed and discussed with the patient.  A plastic model was utilized during the discussion.  MRI of the cervical spine personally reviewed.  This reveals disc osteophyte complex C3-4 through C6-7.  C3-4 facet arthropathy results in mild bilateral foraminal stenosis.  C4-5 moderate left mild right foraminal stenosis no central stenosis.  C5-6 large left foraminal disc protrusion with annular tear facet arthropathy results in moderate severe central stenosis flattening of the cord and severe left moderate right foraminal stenosis.    MRI lumbar spine reveals normal disc heights throughout the lumbar spine.  L4-5 left foraminal disc protrusion tear no right foraminal stenosis.  L3-4 left foraminal disc protrusion moderate facet arthropathy contact of the left L3 nerve.    Review of Systems: Complains of paresthesias weakness headaches.  Denies bowel or bladder incontinence, falls, fevers, unintentional weight  loss.      Prior interventions:  1.  Left L3-4 TFESI.    Current Outpatient Medications   Medication Sig Dispense Refill     albuterol (PROAIR HFA/PROVENTIL HFA/VENTOLIN HFA) 108 (90 Base) MCG/ACT inhaler Inhale 2 puffs into the lungs every 4 hours as needed for shortness of breath or wheezing 8.5 g 0     blood glucose (NO BRAND SPECIFIED) test strip Use to test blood sugar 1 times daily or as directed. To accompany: Blood Glucose Monitor Brands: per insurance. 100 strip 6     blood glucose monitoring (NO BRAND SPECIFIED) meter device kit Use to test blood sugar 1 times daily or as directed. Preferred blood glucose meter OR supplies to accompany: Blood Glucose Monitor Brands: per insurance. 1 kit 0     busPIRone (BUSPAR) 5 MG tablet Take 1 tablet (5 mg) by mouth 2 times daily for 3 days, THEN 2 tablets (10 mg) 2 times daily for 3 days, THEN 3 tablets (15 mg) 2 times daily for 84 days. 522 tablet 0     diclofenac (VOLTAREN) 50 MG EC tablet Take 1 tablet (50 mg) by mouth 2 times daily as needed for moderate pain. 60 tablet 1     gabapentin (NEURONTIN) 100 MG capsule Take 1 capsule (100 mg) by mouth 3 times daily (Patient not taking: Reported on 7/23/2024) 90 capsule 1     ibuprofen (ADVIL/MOTRIN) 200 MG capsule Take 200 mg by mouth every 4 hours as needed for fever       LORazepam (ATIVAN) 0.5 MG tablet TAKE 1 TO 2 TABLETS BY MOUTH EVERY 6 HOURS AS NEEDED FOR ANXIETY . DO NOT EXCEED 4 PER 24 HOURS 60 tablet 0     metFORMIN (GLUCOPHAGE) 1000 MG tablet Take 1 tablet (1,000 mg) by mouth 2 times daily (with meals) 180 tablet 3     methylPREDNISolone (MEDROL DOSEPAK) 4 MG tablet therapy pack Follow Package Directions 21 tablet 0     oxyCODONE-acetaminophen (PERCOCET) 5-325 MG tablet Take 1 tablet by mouth 3 times daily as needed for severe pain. 30 tablet 0     pregabalin (LYRICA) 25 MG capsule 25 mg at bedtime x3 days, then increase to 25 mg twice daily 60 capsule 1     thin (NO BRAND SPECIFIED) lancets Use with lanceting  device. To accompany: Blood Glucose Monitor Brands: per insurance. 100 each 6     tirzepatide (MOUNJARO) 2.5 MG/0.5ML pen Inject 2.5 mg Subcutaneous every 7 days 3 mL 0     tirzepatide (MOUNJARO) 5 MG/0.5ML pen Inject 5 mg Subcutaneous every 7 days 0.5 mL 0     tirzepatide (MOUNJARO) 7.5 MG/0.5ML pen INJECT 7.5 MG SUBCUTANEOUS EVERY 7 DAYS 2 mL 0     valACYclovir (VALTREX) 500 MG tablet Take 1 tablet (500 mg) by mouth 2 times daily 12 tablet 2     venlafaxine (EFFEXOR XR) 150 MG 24 hr capsule TAKE 1 CAPSULE BY MOUTH EVERY DAY 90 capsule 3     venlafaxine (EFFEXOR XR) 75 MG 24 hr capsule Take 1 capsule (75 mg) by mouth daily With the 150mg for a total of 225mg daily 90 capsule 1     No current facility-administered medications for this visit.       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           Objective:   Physical Exam:    /80   Pulse (!) 123   There is no height or weight on file to calculate BMI.      General: Alert and oriented with normal affect. Attention, knowledge, memory, and language are intact. No acute distress.   Eyes: Sclerae are clear.  Respirations: Unlabored. CV: No lower extremity edema.     Gait: Antalgic left lower extremity.  Tearful at times with movement of the left hip.  Tenderness through the left gluteal region greater trochanter buttock lateral thigh.  Full flexion internal/external rotation today from supine but tearful.  Sensation is intact to light touch throughout the  lower extremities.  Reflexes are   2+ patellar  and Achilles     Manual muscle testing reveals:  Right /Left out of 5     5/5 hip flexors-giveaway to pain  5/5 knee flexors  5/5 knee extensors  5/5 ankle plantar flexors  5/5 ankle dorsiflexors  5/5  EHL     [facility]  [address] [place] [address] [address]  [place] [address]  Office: [phone] Fax: [phone]    Electromyography and Nerve Conduction Study Report        Indication: Patient presents at the request of Dr. Jameson Randall for left lower extremity EMG.  She has left lower extremity pain gluteal pain lateral anterior thigh.  On exam, 2+ patellar and Achilles reflexes bilaterally, normal sensation to light touch of the bilateral lower extremities medial malleolus lateral malleolus and dorsal foot bilaterally.  Giveaway weakness of the left hip flexors 5/5 knee flexors extensors ankle plantar flexors dorsiflexors and EHL.        Pt Exam Discussion (Communication Barriers):  Electromyography and nerve conduction testing, including associated discomfort, risks, benefits, and alternatives was discussed with the patient prior to the procedure.  No learning/ communication barriers; patient verbalized understanding of procedure.  Informed consent was obtained.           Pt Assessment:  Testing was successfully completed; patient tolerated testing well.        Blood Thinners: None Skin Temperature: Warmed 32.9                   EMG/NCS  results:     Nerve Conduction Studies  Motor Sites      Segment Distal Latency Neg. Amp CV F-Latency F-Estimate Comment   Site  (ms) (mV) (m/s) (ms) (ms)    Left Fibular (EDB) Motor   Ankle Ankle-EDB 3.8 2.3       Knee Knee-Ankle 11.5 2.3 48      Left Tibial (AH) Motor   Ankle Ankle-AH 2.8 8.2       Knee Knee-Ankle 10.9 8.4 50        Sensory Sites      Onset Lat Peak Lat Amp CV Comment   Site (ms) (ms) ( V) (m/s)    Left Lat Femoral Cutaneous Sensory   Groin-Lateral Thigh *NR *NR *NR *NR    Right Lat Femoral Cutaneous Sensory   Groin-Lateral Thigh  *NR *NR *NR *NR    Left Sural Sensory   B-Ankle 3.2 3.7 7 -        NCS Waveforms:    Motor         Sensory             Electromyography     Side Muscle Nerve Root Ins Act Fibs Psw Fasc Recrt Dur Amp Poly Comment   Left AntTibialis Dp Br Fibular L4-5 Nml Nml Nml Nml Nml Nml Nml 0    Left Gastroc Tibial S1-2 Nml Nml Nml Nml Nml Nml Nml 0    Left VastusLat Femoral L2-4 Nml Nml Nml Nml Nml Nml Nml 0    Left TensorFascLat SupGluteal L4-5, S1 Nml Nml Nml Nml Nml Nml Nml 0    Left RectFemoris Femoral L2-4 Nml Nml Nml Nml Nml Nml Nml 0          Comment NCS: Normal study  1.  Normal nerve conduction studies left lower extremity.  This includes normal left sural SNAP, peroneal and tibial CMAP's, and absent bilateral lateral femoral cutaneous SNAPs which is likely normal for patient given diabetes and age.    Comment EMG: Normal study  1.  Normal needle EMG left lower extremity.    Interpretation: Normal study    1. There is no electrodiagnostic evidence of lumbosacral radiculopathy, lumbosacral plexopathy, or focal neuropathy in the left lower extremity.    The testing was completed in its entirety by the physician.      It was our pleasure caring for your patient today, if there any questions or concerns please do not hesitate to contact us.        Again, thank you for allowing me to participate in the care of your patient.        Sincerely,        Jameson Randall,

## 2024-09-30 NOTE — PATIENT INSTRUCTIONS
An CT and Xray was ordered for you today.  Please call Radiology at [phone].    EMG today with Dr Randall  Increase pregabalin to 50mg twice daily.

## 2024-10-03 RX ORDER — OXYCODONE AND ACETAMINOPHEN 5; 325 MG/1; MG/1
1 TABLET ORAL 3 TIMES DAILY PRN
Qty: 90 TABLET | Refills: 0 | Status: SHIPPED | OUTPATIENT
Start: 2024-10-03

## 2024-10-08 ENCOUNTER — HOSPITAL ENCOUNTER (EMERGENCY)
Facility: CLINIC | Age: 44
Discharge: HOME | End: 2024-10-09
Attending: STUDENT IN AN ORGANIZED HEALTH CARE EDUCATION/TRAINING PROGRAM | Admitting: STUDENT IN AN ORGANIZED HEALTH CARE EDUCATION/TRAINING PROGRAM
Payer: COMMERCIAL

## 2024-10-08 ENCOUNTER — HOSPITAL ENCOUNTER (OUTPATIENT)
Dept: CT IMAGING | Facility: HOSPITAL | Age: 44
Discharge: HOME | End: 2024-10-08
Attending: PHYSICAL MEDICINE & REHABILITATION
Payer: COMMERCIAL

## 2024-10-08 VITALS
RESPIRATION RATE: 18 BRPM | OXYGEN SATURATION: 98 % | TEMPERATURE: 98.9 F | HEART RATE: 85 BPM | SYSTOLIC BLOOD PRESSURE: 154 MMHG | DIASTOLIC BLOOD PRESSURE: 96 MMHG

## 2024-10-08 DIAGNOSIS — M89.8X5 PAIN OF LEFT FEMUR: ICD-10-CM

## 2024-10-08 DIAGNOSIS — M79.662 PAIN OF LEFT LOWER LEG: ICD-10-CM

## 2024-10-08 PROCEDURE — 99284 EMERGENCY DEPT VISIT MOD MDM: CPT | Performed by: STUDENT IN AN ORGANIZED HEALTH CARE EDUCATION/TRAINING PROGRAM

## 2024-10-08 PROCEDURE — 72192 CT PELVIS W/O DYE: CPT

## 2024-10-08 PROCEDURE — 73700 CT LOWER EXTREMITY W/O DYE: CPT | Mod: LT

## 2024-10-08 PROCEDURE — 99284 EMERGENCY DEPT VISIT MOD MDM: CPT | Mod: 25 | Performed by: STUDENT IN AN ORGANIZED HEALTH CARE EDUCATION/TRAINING PROGRAM

## 2024-10-08 RX ORDER — OXYCODONE HYDROCHLORIDE 5 MG/1
10 TABLET ORAL ONCE
Status: COMPLETED | OUTPATIENT
Start: 2024-10-09 | End: 2024-10-09

## 2024-10-09 ENCOUNTER — APPOINTMENT (OUTPATIENT)
Dept: ULTRASOUND IMAGING | Facility: CLINIC | Age: 44
End: 2024-10-09
Attending: STUDENT IN AN ORGANIZED HEALTH CARE EDUCATION/TRAINING PROGRAM
Payer: COMMERCIAL

## 2024-10-09 PROCEDURE — 250N000013 HC RX MED GY IP 250 OP 250 PS 637: Performed by: STUDENT IN AN ORGANIZED HEALTH CARE EDUCATION/TRAINING PROGRAM

## 2024-10-09 PROCEDURE — 93971 EXTREMITY STUDY: CPT | Mod: LT

## 2024-10-09 RX ADMIN — OXYCODONE HYDROCHLORIDE 10 MG: 5 TABLET ORAL at 00:06

## 2024-10-09 ASSESSMENT — ACTIVITIES OF DAILY LIVING (ADL): ADLS_ACUITY_SCORE: 35

## 2024-10-09 NOTE — ED TRIAGE NOTES
"Pt here with left leg pain, been ongoing for months. Has had multiple MRIs and CTs done. Had CT done at [facility] today. Pt reports a new \"sharp stabbing pain\" On the top of left thigh. Pt states this pain is new. Reports having percocet for pain at home, but denies taking it \"I am supposed to be seen in the pain clinic for being tapered off narcs\" Writer offered tylenol/ibuprofen pt states \"no that dosnt do anything for me\"         "

## 2024-10-09 NOTE — ED PROVIDER NOTES
History     Chief Complaint   Patient presents with    Leg Pain     HPI  Tammy Joshi is a 44 year old female who type 2 diabetes, hypertension, anxiety, chronic left hip and leg pain who presents to the emergency department for evaluation of leg pain.  Patient has had pain for several months.  Has been following with her primary doctor and is also been seen in this ER for it.  Visits have been reviewed.  She has had a recent x-ray that was negative.  She has had MRI of the lumbar spine, hip and brain and those results were reviewed.  MRI of the hip and brain were normal.  Had some narrowing in L2-L3, L3-L4 and L4-L5.  She had a CT of the pelvis and left femur today that have not been read.  She has been treated with epidural steroid injections, steroid packs, Percocet and physical therapy.  Patient states that typically she has low back pain and pain in her left hip and gluteal area.  However, tonight she has been having pain in the top of her thigh which is unusual.  She states that she does not ever have pain in this area.  She is concerned that something else could be going on such as a blood clot.  She states that the pain seems to come and go and is sharp and debilitating.  She states that nothing seems to make it better or worse.  She has not taken anything for pain.  She has Percocet at home, but she was told that she should try to taper off of it so she tried not taking any.  She states Tylenol and ibuprofen do not help.  She is taking pregabalin.  She denies any weakness.  No numbness or tingling.  No loss of bowel or bladder function.  No fevers.  No falls or trauma.  No rashes.  No history of blood clots.  No pain or swelling in the lower leg    Allergies:  No Known Allergies    Problem List:    Patient Active Problem List    Diagnosis Date Noted    Lumbar disc herniation 09/23/2024     Priority: Medium    Lumbar radicular pain 09/23/2024     Priority: Medium    Myofascial pain 09/23/2024      Priority: Medium    Cervical stenosis of spinal canal 09/23/2024     Priority: Medium    Obesity, unspecified 07/27/2020     Priority: Medium    Hip pain, left 09/08/2017     Priority: Medium    Type 2 diabetes mellitus without complication (H) 10/25/2015     Priority: Medium    Hyperlipidemia with target LDL less than 100 08/06/2015     Priority: Medium     Diagnosis updated by automated process. Provider to review and confirm.      Depression 02/24/2014     Priority: Medium    Anxiety 07/21/2011     Priority: Medium    CARDIOVASCULAR SCREENING; LDL GOAL LESS THAN 160 10/31/2010     Priority: Medium    Hand numbness 10/14/2009     Priority: Medium        Past Medical History:    Past Medical History:   Diagnosis Date    Absence of menstruation     Anxiety     Depressive disorder     Diabetes (H)     Female infertility of unspecified origin     MILD/NOS PREECLAMP-ANTEP 08/29/2005    Polycystic ovaries     PPH (postpartum hemorrhage) 11/01/2009       Past Surgical History:    Past Surgical History:   Procedure Laterality Date    D & C  1998    Missed Ab    LITHOTRIPSY  2011    ZZC HAND/FINGER SURGERY UNLISTED  7th grade    left index       Family History:    Family History   Adopted: Yes   Problem Relation Age of Onset    Unknown/Adopted Other         patient was adopted; knows a limited amount of medical history of birth parents       Social History:  Marital Status:   [2]  Social History     Tobacco Use    Smoking status: Never    Smokeless tobacco: Never   Vaping Use    Vaping status: Never Used   Substance Use Topics    Alcohol use: Yes     Comment: wine occasionally, none while pregnant    Drug use: No        Medications:    albuterol (PROAIR HFA/PROVENTIL HFA/VENTOLIN HFA) 108 (90 Base) MCG/ACT inhaler  blood glucose (NO BRAND SPECIFIED) test strip  blood glucose monitoring (NO BRAND SPECIFIED) meter device kit  busPIRone (BUSPAR) 5 MG tablet  diclofenac (VOLTAREN) 50 MG EC tablet  gabapentin (NEURONTIN)  100 MG capsule  ibuprofen (ADVIL/MOTRIN) 200 MG capsule  LORazepam (ATIVAN) 0.5 MG tablet  metFORMIN (GLUCOPHAGE) 1000 MG tablet  methylPREDNISolone (MEDROL DOSEPAK) 4 MG tablet therapy pack  oxyCODONE-acetaminophen (PERCOCET) 5-325 MG tablet  pregabalin (LYRICA) 25 MG capsule  thin (NO BRAND SPECIFIED) lancets  tirzepatide (MOUNJARO) 2.5 MG/0.5ML pen  tirzepatide (MOUNJARO) 5 MG/0.5ML pen  tirzepatide (MOUNJARO) 7.5 MG/0.5ML pen  valACYclovir (VALTREX) 500 MG tablet  venlafaxine (EFFEXOR XR) 150 MG 24 hr capsule  venlafaxine (EFFEXOR XR) 75 MG 24 hr capsule          Review of Systems  See HPI  Physical Exam   BP: (!) 154/96  Pulse: 85  Temp: 98.9  F (37.2  C)  Resp: 18  SpO2: 98 %      Physical Exam  BP (!) 154/96   Pulse 85   Temp 98.9  F (37.2  C) (Oral)   Resp 18   SpO2 98%   General: alert, interactive, in no apparent distress  Head: atraumatic  Nose: no rhinorrhea or epistaxis  Ears: no external auditory canal discharge or bleeding.    Eyes: Sclera nonicteric. Conjunctiva noninjected. PERRL, EOMI  Mouth: no tonsillar erythema, edema, or exudate.  Moist mucous membranes  Neck: supple, moving spontaneously no midline cervical tenderness  Lungs: No increased work of breathing.  Clear to auscultation bilaterally.  CV: RRR, peripheral pulses palpable and symmetric  Abdomen: soft, nt, nd, no guarding or rebound.   Musculoskeletal: No midline lumbar tenderness.  Extremities are warm and well-perfused.  There is tenderness on palpation of the left anterior thigh.  There is no erythema or swelling.  No skin changes.  No signs of injuries.  Skin: no rash or diaphoresis  Neuro: CN II-XII grossly intact, strength 5/5 in UE and LEs bilaterally, sensation intact to light touch in UE and LEs bilaterally;     ED Course        Procedures             Critical Care time:  none             Results for orders placed or performed during the hospital encounter of 10/08/24 (from the past 24 hour(s))   US Lower Extremity Venous  Duplex Left    Narrative    EXAM: US LOWER EXTREMITY VENOUS DUPLEX LEFT  LOCATION: [facility]  DATE: 10/9/2024    INDICATION: Left lower extremity pain, swelling and edema.  COMPARISON: None.  TECHNIQUE: Venous Duplex ultrasound of the left lower extremity with and without compression, augmentation and duplex. Color flow and spectral Doppler with waveform analysis performed.    FINDINGS: Exam includes the common femoral, femoral, popliteal, and contralateral common femoral veins as well as segmentally visualized deep calf veins and greater saphenous vein.     LEFT: No deep vein thrombosis. No superficial thrombophlebitis. No popliteal cyst.      Impression    IMPRESSION:  1.  No deep venous thrombosis in the left lower extremity.       Medications   oxyCODONE (ROXICODONE) tablet 10 mg (10 mg Oral $Given 10/9/24 0006)       Assessments & Plan (with Medical Decision Making)     I have reviewed the nursing notes.    I have reviewed the findings, diagnosis, plan and need for follow up with the patient.          Medical Decision Making  Tammy Joshi is a 44 year old female who type 2 diabetes, hypertension, anxiety, chronic left hip and leg pain who presents to the emergency department for evaluation of leg pain.  Vital signs reviewed notable for mild hypertension otherwise reassuring.  Patient has a reassuring exam.  She has a normal neurological exam.  She does not have any swelling or skin changes.  Suspect likely radicular pain from her known back and hip issues, but do think we need to rule out DVT given that this is a change in the type of pain and she is never had an ultrasound.  In the meantime, given oxycodone for pain.  Ultrasound is reviewed and is negative for DVT or other acute findings.  I discussed with radiology and they were able to read the patient's femur CT and pelvis and these are reviewed by me and are negative for any acute pathology.  Patient was reassessed and  feeling better after the oxycodone, still having some pain, but much better.  Explained to her that I do not know exactly what is causing her pain, but I suspect its likely due to her hip and back issues.  I have a low suspicion for acute emergent pathology.  I recommended close outpatient follow-up.  Return precautions discussed.        New Prescriptions    No medications on file       Final diagnoses:   Pain of left lower leg       10/8/2024   [facility] EMERGENCY DEPT       Luis Aguero MD  10/09/24 0110

## 2024-10-09 NOTE — DISCHARGE INSTRUCTIONS
Your ultrasound was normal.  I do not know exactly what is causing your pain, but I suspect it is related to your ongoing hip and back issues.  You should continue to follow-up with your regular doctor.  It is okay to take the Percocet as needed for pain.  Return with new or worsening symptoms.

## 2024-10-13 DIAGNOSIS — F43.23 ADJUSTMENT DISORDER WITH MIXED ANXIETY AND DEPRESSED MOOD: ICD-10-CM

## 2024-10-14 ENCOUNTER — OFFICE VISIT (OUTPATIENT)
Dept: FAMILY MEDICINE | Facility: CLINIC | Age: 44
End: 2024-10-14
Payer: COMMERCIAL

## 2024-10-14 VITALS
DIASTOLIC BLOOD PRESSURE: 86 MMHG | WEIGHT: 184 LBS | OXYGEN SATURATION: 97 % | SYSTOLIC BLOOD PRESSURE: 132 MMHG | HEART RATE: 112 BPM | HEIGHT: 64 IN | BODY MASS INDEX: 31.41 KG/M2 | TEMPERATURE: 98.1 F

## 2024-10-14 DIAGNOSIS — M25.552 HIP PAIN, LEFT: ICD-10-CM

## 2024-10-14 DIAGNOSIS — M79.662 PAIN OF LEFT LOWER LEG: ICD-10-CM

## 2024-10-14 DIAGNOSIS — G43.909 MIGRAINE WITHOUT STATUS MIGRAINOSUS, NOT INTRACTABLE, UNSPECIFIED MIGRAINE TYPE: Primary | ICD-10-CM

## 2024-10-14 DIAGNOSIS — E11.9 TYPE 2 DIABETES MELLITUS WITHOUT COMPLICATION, WITHOUT LONG-TERM CURRENT USE OF INSULIN (H): ICD-10-CM

## 2024-10-14 DIAGNOSIS — F11.20 CONTINUOUS OPIOID DEPENDENCE (H): ICD-10-CM

## 2024-10-14 DIAGNOSIS — M54.12 CERVICAL RADICULOPATHY: ICD-10-CM

## 2024-10-14 DIAGNOSIS — Z12.31 VISIT FOR SCREENING MAMMOGRAM: ICD-10-CM

## 2024-10-14 PROCEDURE — 90656 IIV3 VACC NO PRSV 0.5 ML IM: CPT | Performed by: PHYSICIAN ASSISTANT

## 2024-10-14 PROCEDURE — 99214 OFFICE O/P EST MOD 30 MIN: CPT | Mod: 25 | Performed by: PHYSICIAN ASSISTANT

## 2024-10-14 PROCEDURE — 90471 IMMUNIZATION ADMIN: CPT | Performed by: PHYSICIAN ASSISTANT

## 2024-10-14 RX ORDER — AMITRIPTYLINE HYDROCHLORIDE 10 MG/1
10-30 TABLET ORAL AT BEDTIME
Qty: 90 TABLET | Refills: 1 | Status: SHIPPED | OUTPATIENT
Start: 2024-10-14

## 2024-10-14 ASSESSMENT — PATIENT HEALTH QUESTIONNAIRE - PHQ9
10. IF YOU CHECKED OFF ANY PROBLEMS, HOW DIFFICULT HAVE THESE PROBLEMS MADE IT FOR YOU TO DO YOUR WORK, TAKE CARE OF THINGS AT HOME, OR GET ALONG WITH OTHER PEOPLE: EXTREMELY DIFFICULT
SUM OF ALL RESPONSES TO PHQ QUESTIONS 1-9: 16
SUM OF ALL RESPONSES TO PHQ QUESTIONS 1-9: 16

## 2024-10-14 ASSESSMENT — ENCOUNTER SYMPTOMS: HEADACHES: 1

## 2024-10-14 NOTE — PROGRESS NOTES
Assessment & Plan     (G43.909) Migraine without status migrainosus, not intractable, unspecified migraine type  (primary encounter diagnosis)  Comment: possibly triggered or irritated by neck issues but at this point trial of daily preventative medication necessary and would favor TCA given ongoing pain issues as well. Discussed possible side effects - specifically watching how she feels the next morning. Start with 10mg  Plan: amitriptyline (ELAVIL) 10 MG tablet            (E11.9) Type 2 diabetes mellitus without complication, without long-term current use of insulin (H)  Comment: only on metformin. Stopped mounjaro given availability which I think could be better now although discussed that if not, would try a different medication that we know she can get consistently as getting her A1c controlled is necessary. Would argue that poorly controlled diabetes is not going to be helpful with her pain issues and general health.   Plan: Tirzepatide 5 MG/0.5ML SOAJ            (M54.12) Cervical radiculopathy  Comment:   Plan: following with spine and neurosurgery    (M25.552) Hip pain, left  Comment:   Plan: following with spine and neurosurgery    (M79.662) Pain of left lower leg  Comment:   Plan: following with spine and neurosurgery. Additional imaging scheduled    (Z12.31) Visit for screening mammogram  Comment:   Plan: MA Screening Bilateral w/ Olegario            (F11.20) Continuous opioid dependence (H)  Comment:   Plan: trying to minimize use but ongoing pain issues with unclear etiology. Further imaging scheduled. She does have an appointment with the pain clinic at the end of the month as well      We did discuss FMLA for work - currently she is trying NOT to miss but there are days when pain is severe it is difficult to do her job. She can discuss with employer and if forms needed to be completed she can message me              BMI  Estimated body mass index is 31.58 kg/m  as calculated from the following:     "Height as of this encounter: 1.626 m (5' 4\").    Weight as of this encounter: 83.5 kg (184 lb).     Depression Screening Follow Up        10/14/2024     2:01 PM   PHQ   PHQ-9 Total Score 16   Q9: Thoughts of better off dead/self-harm past 2 weeks Not at all         Follow Up Actions Taken  Crisis resource information provided in After Visit Summary           Tunde Munoz is a 44 year old, presenting for the following health issues:  Headache        10/14/2024     2:08 PM   Additional Questions   Roomed by MARGO Bravo     History of Present Illness       Headaches:   Since the patient's last clinic visit, headaches are: worsened  The patient is getting headaches:  About half of the days  She is not able to do normal daily activities when she has a migraine.  The patient is taking the following rescue/relief medications:  Ibuprofen (Advil, Motrin), Naproxyn (Aleve) and other   Patient states \"I get only a small amount of relief\" from the rescue/relief medications.   The patient is taking the following medications to prevent migraines:  No medications to prevent migraines  In the past 4 weeks, the patient has gone to an Urgent Care or Emergency Room 1 time times due to headaches.    Reason for visit:  Increased pain weakness headaches  Symptom onset:  3-7 days ago   She is taking medications regularly.     -She has been having ongoing pain with her left hip that keeps getting worse.     Ongoing issues with neck, arm, head, left hip and left leg pain  Has had several different images and met with different providers. Also several trips to the ED because pain is not manageable  Currently working with Dr. Randall regarding the neck and left hip symptoms - given pain is now in anterior thigh, plan is for a MRI of the femur  Thus far in terms of her hip and leg pain - unable to figure out source. Lumbar MRI not noticeably abnormal but if hip/femur MRI don't show anything then may assume it is from the lumbar " "spine  Per patient it's thought that her arm symptoms and headaches are due to her findings in the cervical spine and would consider a procedure to correct the disc?     Overall she continues to have significant pain that is disrupting her quality of life  She notes that going to work is difficult some days and is worried about her job - she is a     Headaches are becoming more constant/frequent  Currently not taking anything prescription for headaches    Not checking sugars - knows they will \"not be good\"   Taking metformin and was on mounjaro but then was having a hard time finding it in pharmacies so kept having to start and stop and that was not easy on her stomach so she just stopped taking it    Review of Systems  Remainder of ROS obtained and found to be negative other than that which was documented above        Objective    /86   Pulse 112   Temp 98.1  F (36.7  C) (Tympanic)   Ht 1.626 m (5' 4\")   Wt 83.5 kg (184 lb)   SpO2 97%   BMI 31.58 kg/m    Body mass index is 31.58 kg/m .  Physical Exam   GENERAL: alert and no distress  PSYCH: mentation appears normal, affect normal/bright            Signed Electronically by: Mary Wang PA-C    "

## 2024-10-15 ENCOUNTER — PATIENT OUTREACH (OUTPATIENT)
Dept: CARE COORDINATION | Facility: CLINIC | Age: 44
End: 2024-10-15
Payer: COMMERCIAL

## 2024-10-15 RX ORDER — BUSPIRONE HYDROCHLORIDE 5 MG/1
15 TABLET ORAL 2 TIMES DAILY
Qty: 180 TABLET | Refills: 5 | Status: SHIPPED | OUTPATIENT
Start: 2024-10-15 | End: 2024-10-31

## 2024-10-17 ENCOUNTER — MYC MEDICAL ADVICE (OUTPATIENT)
Dept: FAMILY MEDICINE | Facility: CLINIC | Age: 44
End: 2024-10-17
Payer: COMMERCIAL

## 2024-10-19 ENCOUNTER — HOSPITAL ENCOUNTER (OUTPATIENT)
Dept: MRI IMAGING | Facility: CLINIC | Age: 44
Discharge: HOME | End: 2024-10-19
Attending: PHYSICAL MEDICINE & REHABILITATION | Admitting: PHYSICAL MEDICINE & REHABILITATION
Payer: COMMERCIAL

## 2024-10-19 DIAGNOSIS — M89.8X5 PAIN OF LEFT FEMUR: ICD-10-CM

## 2024-10-19 PROCEDURE — 73718 MRI LOWER EXTREMITY W/O DYE: CPT | Mod: 26 | Performed by: RADIOLOGY

## 2024-10-19 PROCEDURE — 73718 MRI LOWER EXTREMITY W/O DYE: CPT | Mod: LT

## 2024-10-21 ENCOUNTER — MYC MEDICAL ADVICE (OUTPATIENT)
Dept: PHYSICAL MEDICINE AND REHAB | Facility: CLINIC | Age: 44
End: 2024-10-21
Payer: COMMERCIAL

## 2024-10-21 DIAGNOSIS — M25.552 HIP PAIN, LEFT: ICD-10-CM

## 2024-10-21 DIAGNOSIS — M89.8X5 PAIN OF LEFT FEMUR: Primary | ICD-10-CM

## 2024-10-21 NOTE — TELEPHONE ENCOUNTER
"Per PSP Jameson Randall, DO: \"I reviewed the MRI of your femur which shows the hip and the entirety of your femur.  There is no change in the bone marrow signal to suggest fracture.  No atrophy or signal change within the musculature to suggest nerve or muscle damage.  I also am able to see the lower portion of your pelvis which appears unremarkable.  I am not sure what is causing your pain.    I am going to schedule an ultrasound of your left leg to evaluate the blood flow.\"    Patient did see the results as well as plan through [facility]. She has since made the appointment for US. Please address her question regarding MRI with contrast.   "

## 2024-10-25 ENCOUNTER — HOSPITAL ENCOUNTER (OUTPATIENT)
Dept: ULTRASOUND IMAGING | Facility: CLINIC | Age: 44
Discharge: HOME | End: 2024-10-25
Attending: PHYSICAL MEDICINE & REHABILITATION | Admitting: PHYSICAL MEDICINE & REHABILITATION
Payer: COMMERCIAL

## 2024-10-25 ENCOUNTER — MYC MEDICAL ADVICE (OUTPATIENT)
Dept: FAMILY MEDICINE | Facility: CLINIC | Age: 44
End: 2024-10-25
Payer: COMMERCIAL

## 2024-10-25 DIAGNOSIS — M89.8X5 PAIN OF LEFT FEMUR: ICD-10-CM

## 2024-10-25 DIAGNOSIS — E11.9 TYPE 2 DIABETES MELLITUS WITHOUT COMPLICATION, WITHOUT LONG-TERM CURRENT USE OF INSULIN (H): ICD-10-CM

## 2024-10-25 PROCEDURE — 93926 LOWER EXTREMITY STUDY: CPT | Mod: LT

## 2024-10-26 DIAGNOSIS — M54.16 LUMBAR RADICULAR PAIN: ICD-10-CM

## 2024-10-26 DIAGNOSIS — M51.26 LUMBAR DISC HERNIATION: ICD-10-CM

## 2024-10-30 ENCOUNTER — OFFICE VISIT (OUTPATIENT)
Dept: PHYSICAL MEDICINE AND REHAB | Facility: CLINIC | Age: 44
End: 2024-10-30
Payer: COMMERCIAL

## 2024-10-30 VITALS
HEART RATE: 101 BPM | SYSTOLIC BLOOD PRESSURE: 131 MMHG | BODY MASS INDEX: 31.24 KG/M2 | DIASTOLIC BLOOD PRESSURE: 78 MMHG | HEIGHT: 64 IN | WEIGHT: 183 LBS

## 2024-10-30 DIAGNOSIS — M25.552 HIP PAIN, LEFT: ICD-10-CM

## 2024-10-30 DIAGNOSIS — M51.26 LUMBAR DISC HERNIATION: ICD-10-CM

## 2024-10-30 DIAGNOSIS — M48.02 CERVICAL STENOSIS OF SPINAL CANAL: ICD-10-CM

## 2024-10-30 DIAGNOSIS — G57.12 LATERAL FEMORAL CUTANEOUS NEUROPATHY, LEFT: ICD-10-CM

## 2024-10-30 DIAGNOSIS — M89.8X5 PAIN OF LEFT FEMUR: Primary | ICD-10-CM

## 2024-10-30 DIAGNOSIS — M79.18 MYOFASCIAL PAIN: ICD-10-CM

## 2024-10-30 PROCEDURE — 99214 OFFICE O/P EST MOD 30 MIN: CPT | Performed by: PHYSICAL MEDICINE & REHABILITATION

## 2024-10-30 ASSESSMENT — PAIN SCALES - GENERAL: PAINLEVEL_OUTOF10: SEVERE PAIN (7)

## 2024-10-30 NOTE — PROGRESS NOTES
Assessment/Plan:      Tammy was seen today for back pain.    Diagnoses and all orders for this visit:    Pain of left femur    Hip pain, left    Myofascial pain    Lateral femoral cutaneous neuropathy, left  -     PAIN US Lateral Femoral Cutaneous Nerve Inj; Future    Cervical stenosis of spinal canal  -     Adult Neurosurgery  Referral; Future    Lumbar disc herniation         Assessment: Pleasant 44 year old female with history of diabetes, mild hypertension, anxiety with:     1.  Subacute on chronic left lateral hip/thigh and gluteal pain.  She has a small left L3-4 foraminal disc herniation with annular tear contacting the L3 nerve and this could represent proximal radicular pain.  This pain is quite severe in the hip and femur.  Some limited range of motion of the hip likely related to pain.  negative hip MRI.  Negative MRI of the hip and femur.   Multiple trips to the emergency department severe pain despite trial of gabapentin, oxycodone, oxycodone/acetaminophen and Medrol Dosepak.  No improvement with physical therapy, in fact physical therapy is very painful for her.  No improvement following a left L3-4 TFESI.  No improvement with local anesthetic or steroid.  May have some slight improvement with pregabalin 50 mg twice daily.  Tolerating well with no side effects.  Her left lateral thigh pain could also be related to lateral femoral cutaneous neuropathy although not classic distribution.     2.  Significant myofascial pain left gluteal region.     3.  History of cervical spine pain left cervical spine pain with C5-6 left paracentral disc herniation resulting in moderate central stenosis.  Continues to have disc osteophyte complex to the left results in moderate to severe central stenosis with compression of the spinal cord in the left lateral recess and severe left foraminal stenosis.  Has been having increased  headaches recently.  I question if this could be resulting in some of her left leg  pain given the lateral recess stenosis compression of the spinal cord although no myelopathic findings on exam or spinal cord signal abnormality.           Discussion:    1.  I discussed the diagnosis and treatment options.  Continues to have significant neck pain with left arm pain and also having left leg pain.  Unknown etiology with regards to the left leg painShe has basically had a negative workup with negative EMG MRI CT of the femur and hip and only has a small foraminal disc protrusion at L3-4 on the left but no improvement with a lumbar epidural.  Pain in the leg is worse with any movement and more in lateral femoral cutaneous distribution.  We discussed further treatment options.    2.  Recommend left lateral femoral cutaneous nerve injection under ultrasound.  This is for diagnostic and therapeutic purposes.  Given her pain distribution, I am not sure if this would be 100% effective for her but at least we can eliminate lateral femoral cutaneous neuropathy from the differential if this is not helpful.    3.  She has been scheduled to have MR arthrogram of the left hip will table this for now as her pain is more diffuse than would be expected from a labral tear.    4.  Continue with Lyrica 50 mg twice daily can consider increasing this to 3 times daily however seeing pain clinic tomorrow and recommend a having their input.    5.  Would have her see neurosurgery to discuss the cervical stenosis.  I question if this leg pain could be an atypical presentation for the stenosis and compression of the spinal cord as I am finding it difficult to find any other source of pain in the leg.    6.  Follow-up with me as needed after injection.      It was our pleasure caring for your patient today, if there any questions or concerns please do not hesitate to contact us.      Subjective:   Patient ID: Tammy Joshi is a 44 year old female.    History of Present Illness: Patient presents for follow-up of left lateral  leg pain gluteal pain.  Most of her pain is in the lateral leg and she points to the lateral femoral cutaneous distribution today.  Still worse with any moving better with sleeping.  Pain is a 10/10 at worst 7/10 today 5/10 at best.  Touching the leg also was painful or moving the hip.    She also has increased neck pain left arm pain and weakness through the arm.  She is taking pregabalin twice a day symptoms forgetting the morning dose but pain may be slightly improved since her last visit.  Has had CT and MRI of the femur wondering what the next steps are in her care.  MR arthrogram was ordered as well.      Imaging: Ultrasound arterial duplex of the left lower extremity normal.    MRI of the left femur shows no marrow signal abnormality no soft tissue masses.  Denervation/atrophy.  Imaging and report personally reviewed.    CT scan of the left femur was also reviewed showing no significant bony abnormality.  MRI cervical spine reviewed again today showing C5-6 left paracentral disc osteophyte with spinal cord compression.      Review of Systems: Pertinent positives: Complains of headaches, weakness, difficulty swallowing.  Denies paresthesias bowel or bladder incontinence, falls, fevers unintentional weight loss.       Current Outpatient Medications   Medication Sig Dispense Refill    albuterol (PROAIR HFA/PROVENTIL HFA/VENTOLIN HFA) 108 (90 Base) MCG/ACT inhaler Inhale 2 puffs into the lungs every 4 hours as needed for shortness of breath or wheezing 8.5 g 0    amitriptyline (ELAVIL) 10 MG tablet Take 1-3 tablets (10-30 mg) by mouth at bedtime. 90 tablet 1    blood glucose (NO BRAND SPECIFIED) test strip Use to test blood sugar 1 times daily or as directed. To accompany: Blood Glucose Monitor Brands: per insurance. 100 strip 6    blood glucose monitoring (NO BRAND SPECIFIED) meter device kit Use to test blood sugar 1 times daily or as directed. Preferred blood glucose meter OR supplies to accompany: Blood  Glucose Monitor Brands: per insurance. 1 kit 0    busPIRone (BUSPAR) 5 MG tablet Take 3 tablets (15 mg) by mouth 2 times daily. 180 tablet 5    diclofenac (VOLTAREN) 50 MG EC tablet TAKE 1 TABLET (50 MG) BY MOUTH 2 TIMES DAILY AS NEEDED FOR MODERATE PAIN. 60 tablet 1    gabapentin (NEURONTIN) 100 MG capsule Take 1 capsule (100 mg) by mouth 3 times daily (Patient not taking: Reported on 7/23/2024) 90 capsule 1    ibuprofen (ADVIL/MOTRIN) 200 MG capsule Take 200 mg by mouth every 4 hours as needed for fever      LORazepam (ATIVAN) 0.5 MG tablet TAKE 1 TO 2 TABLETS BY MOUTH EVERY 6 HOURS AS NEEDED FOR ANXIETY . DO NOT EXCEED 4 PER 24 HOURS 60 tablet 0    metFORMIN (GLUCOPHAGE) 1000 MG tablet Take 1 tablet (1,000 mg) by mouth 2 times daily (with meals) 180 tablet 3    oxyCODONE-acetaminophen (PERCOCET) 5-325 MG tablet Take 1 tablet by mouth 3 times daily as needed for severe pain. Max 3/day; 90/month until established with pain clinic 90 tablet 0    pregabalin (LYRICA) 25 MG capsule 50 mg twice daily 60 capsule 1    thin (NO BRAND SPECIFIED) lancets Use with lanceting device. To accompany: Blood Glucose Monitor Brands: per insurance. 100 each 6    Tirzepatide 5 MG/0.5ML SOAJ Inject 0.5 mLs (5 mg) subcutaneously every 7 days. 2 mL 0    [START ON 11/11/2024] Tirzepatide 7.5 MG/0.5ML SOAJ Inject 0.5 mLs (7.5 mg) subcutaneously every 7 days. 2 mL 0    valACYclovir (VALTREX) 500 MG tablet Take 1 tablet (500 mg) by mouth 2 times daily 12 tablet 2    venlafaxine (EFFEXOR XR) 150 MG 24 hr capsule TAKE 1 CAPSULE BY MOUTH EVERY DAY 90 capsule 3    venlafaxine (EFFEXOR XR) 75 MG 24 hr capsule Take 1 capsule (75 mg) by mouth daily With the 150mg for a total of 225mg daily 90 capsule 1     No current facility-administered medications for this visit.       Past Medical History:   Diagnosis Date    Absence of menstruation     Anxiety     Depressive disorder     Diabetes (H)     Female infertility of unspecified origin     MILD/NOS  "PREECLAMP-ANTEP 08/29/2005    Polycystic ovaries     PPH (postpartum hemorrhage) 11/01/2009       The following portions of the patient's history were reviewed and updated as appropriate: allergies, current medications, past family history, past medical history, past social history, past surgical history and problem list.           Objective:   Physical Exam:    /78   Pulse 101   Ht 5' 4\" (1.626 m)   Wt 183 lb (83 kg)   BMI 31.41 kg/m    There is no height or weight on file to calculate BMI.      General: Alert and oriented with normal affect. Attention, knowledge, memory, and language are intact. No acute distress.   Eyes: Sclerae are clear.  Respirations: Unlabored. CV: No lower extremity edema.     Gait:  Nonantalgic  Pain with any flexion internal/external rotation of the left hip from supine seated with her hip at 90 has improved range of motion internal extra rotation compared to supine.  Sensation is intact to light touch throughout the upper and lower extremities.  Reflexes are 2+ and symmetric in the biceps triceps and brachioradialis with negative Hoffmans. 2+ patellar and Achilles     Manual muscle testing reveals:  Right /Left out of 5  5/5 shoulder abductors  5/5 elbow flexors  5/5 elbow extensors  5/5 wrist extensors  5/5 interosseus  5/5 finger flexors     5/5 knee flexors  5/5 knee extensors  5/5 ankle plantar flexors  5/5 ankle dorsiflexors  5/5  EHL    "

## 2024-10-30 NOTE — LETTER
10/30/2024      Tammy Joshi  [address] [facility] [address]      Dear Colleague,    Thank you for referring your patient, Tammy Joshi, to the [facility]. Please see a copy of my visit note below.    Assessment/Plan:      Tammy was seen today for back pain.    Diagnoses and all orders for this visit:    Pain of left femur    Hip pain, left    Myofascial pain    Lateral femoral cutaneous neuropathy, left  -     PAIN US Lateral Femoral Cutaneous Nerve Inj; Future    Cervical stenosis of spinal canal  -     Adult Neurosurgery  Referral; Future    Lumbar disc herniation         Assessment: Pleasant 44 year old female with history of diabetes, mild hypertension, anxiety with:     1.  Subacute on chronic left lateral hip/thigh and gluteal pain.  She has a small left L3-4 foraminal disc herniation with annular tear contacting the L3 nerve and this could represent proximal radicular pain.  This pain is quite severe in the hip and femur.  Some limited range of motion of the hip likely related to pain.  negative hip MRI.  Negative MRI of the hip and femur.   Multiple trips to the emergency department severe pain despite trial of gabapentin, oxycodone, oxycodone/acetaminophen and Medrol Dosepak.  No improvement with physical therapy, in fact physical therapy is very painful for her.  No improvement following a left L3-4 TFESI.  No improvement with local anesthetic or steroid.  May have some slight improvement with pregabalin 50 mg twice daily.  Tolerating well with no side effects.  Her left lateral thigh pain could also be related to lateral femoral cutaneous neuropathy although not classic distribution.     2.  Significant myofascial pain left gluteal region.     3.  History of cervical spine pain left cervical spine pain with C5-6 left paracentral disc herniation resulting in moderate central stenosis.  Continues to have disc osteophyte complex to the left results in  moderate to severe central stenosis with compression of the spinal cord in the left lateral recess and severe left foraminal stenosis.  Has been having increased  headaches recently.  I question if this could be resulting in some of her left leg pain given the lateral recess stenosis compression of the spinal cord although no myelopathic findings on exam or spinal cord signal abnormality.           Discussion:    1.  I discussed the diagnosis and treatment options.  Continues to have significant neck pain with left arm pain and also having left leg pain.  Unknown etiology with regards to the left leg painShe has basically had a negative workup with negative EMG MRI CT of the femur and hip and only has a small foraminal disc protrusion at L3-4 on the left but no improvement with a lumbar epidural.  Pain in the leg is worse with any movement and more in lateral femoral cutaneous distribution.  We discussed further treatment options.    2.  Recommend left lateral femoral cutaneous nerve injection under ultrasound.  This is for diagnostic and therapeutic purposes.  Given her pain distribution, I am not sure if this would be 100% effective for her but at least we can eliminate lateral femoral cutaneous neuropathy from the differential if this is not helpful.    3.  She has been scheduled to have MR arthrogram of the left hip will table this for now as her pain is more diffuse than would be expected from a labral tear.    4.  Continue with Lyrica 50 mg twice daily can consider increasing this to 3 times daily however seeing pain clinic tomorrow and recommend a having their input.    5.  Would have her see neurosurgery to discuss the cervical stenosis.  I question if this leg pain could be an atypical presentation for the stenosis and compression of the spinal cord as I am finding it difficult to find any other source of pain in the leg.    6.  Follow-up with me as needed after injection.      It was our pleasure caring  for your patient today, if there any questions or concerns please do not hesitate to contact us.      Subjective:   Patient ID: Tammy Joshi is a 44 year old female.    History of Present Illness: Patient presents for follow-up of left lateral leg pain gluteal pain.  Most of her pain is in the lateral leg and she points to the lateral femoral cutaneous distribution today.  Still worse with any moving better with sleeping.  Pain is a 10/10 at worst 7/10 today 5/10 at best.  Touching the leg also was painful or moving the hip.    She also has increased neck pain left arm pain and weakness through the arm.  She is taking pregabalin twice a day symptoms forgetting the morning dose but pain may be slightly improved since her last visit.  Has had CT and MRI of the femur wondering what the next steps are in her care.  MR arthrogram was ordered as well.      Imaging: Ultrasound arterial duplex of the left lower extremity normal.    MRI of the left femur shows no marrow signal abnormality no soft tissue masses.  Denervation/atrophy.  Imaging and report personally reviewed.    CT scan of the left femur was also reviewed showing no significant bony abnormality.  MRI cervical spine reviewed again today showing C5-6 left paracentral disc osteophyte with spinal cord compression.      Review of Systems: Pertinent positives: Complains of headaches, weakness, difficulty swallowing.  Denies paresthesias bowel or bladder incontinence, falls, fevers unintentional weight loss.       Current Outpatient Medications   Medication Sig Dispense Refill     albuterol (PROAIR HFA/PROVENTIL HFA/VENTOLIN HFA) 108 (90 Base) MCG/ACT inhaler Inhale 2 puffs into the lungs every 4 hours as needed for shortness of breath or wheezing 8.5 g 0     amitriptyline (ELAVIL) 10 MG tablet Take 1-3 tablets (10-30 mg) by mouth at bedtime. 90 tablet 1     blood glucose (NO BRAND SPECIFIED) test strip Use to test blood sugar 1 times daily or as directed. To  accompany: Blood Glucose Monitor Brands: per insurance. 100 strip 6     blood glucose monitoring (NO BRAND SPECIFIED) meter device kit Use to test blood sugar 1 times daily or as directed. Preferred blood glucose meter OR supplies to accompany: Blood Glucose Monitor Brands: per insurance. 1 kit 0     busPIRone (BUSPAR) 5 MG tablet Take 3 tablets (15 mg) by mouth 2 times daily. 180 tablet 5     diclofenac (VOLTAREN) 50 MG EC tablet TAKE 1 TABLET (50 MG) BY MOUTH 2 TIMES DAILY AS NEEDED FOR MODERATE PAIN. 60 tablet 1     gabapentin (NEURONTIN) 100 MG capsule Take 1 capsule (100 mg) by mouth 3 times daily (Patient not taking: Reported on 7/23/2024) 90 capsule 1     ibuprofen (ADVIL/MOTRIN) 200 MG capsule Take 200 mg by mouth every 4 hours as needed for fever       LORazepam (ATIVAN) 0.5 MG tablet TAKE 1 TO 2 TABLETS BY MOUTH EVERY 6 HOURS AS NEEDED FOR ANXIETY . DO NOT EXCEED 4 PER 24 HOURS 60 tablet 0     metFORMIN (GLUCOPHAGE) 1000 MG tablet Take 1 tablet (1,000 mg) by mouth 2 times daily (with meals) 180 tablet 3     oxyCODONE-acetaminophen (PERCOCET) 5-325 MG tablet Take 1 tablet by mouth 3 times daily as needed for severe pain. Max 3/day; 90/month until established with pain clinic 90 tablet 0     pregabalin (LYRICA) 25 MG capsule 50 mg twice daily 60 capsule 1     thin (NO BRAND SPECIFIED) lancets Use with lanceting device. To accompany: Blood Glucose Monitor Brands: per insurance. 100 each 6     Tirzepatide 5 MG/0.5ML SOAJ Inject 0.5 mLs (5 mg) subcutaneously every 7 days. 2 mL 0     [START ON 11/11/2024] Tirzepatide 7.5 MG/0.5ML SOAJ Inject 0.5 mLs (7.5 mg) subcutaneously every 7 days. 2 mL 0     valACYclovir (VALTREX) 500 MG tablet Take 1 tablet (500 mg) by mouth 2 times daily 12 tablet 2     venlafaxine (EFFEXOR XR) 150 MG 24 hr capsule TAKE 1 CAPSULE BY MOUTH EVERY DAY 90 capsule 3     venlafaxine (EFFEXOR XR) 75 MG 24 hr capsule Take 1 capsule (75 mg) by mouth daily With the 150mg for a total of 225mg daily  "90 capsule 1     No current facility-administered medications for this visit.       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           Objective:   Physical Exam:    /78   Pulse 101   Ht 5' 4\" (1.626 m)   Wt 183 lb (83 kg)   BMI 31.41 kg/m    There is no height or weight on file to calculate BMI.      General: Alert and oriented with normal affect. Attention, knowledge, memory, and language are intact. No acute distress.   Eyes: Sclerae are clear.  Respirations: Unlabored. CV: No lower extremity edema.     Gait:  Nonantalgic  Pain with any flexion internal/external rotation of the left hip from supine seated with her hip at 90 has improved range of motion internal extra rotation compared to supine.  Sensation is intact to light touch throughout the upper and lower extremities.  Reflexes are 2+ and symmetric in the biceps triceps and brachioradialis with negative Hoffmans. 2+ patellar and Achilles     Manual muscle testing reveals:  Right /Left out of 5  5/5 shoulder abductors  5/5 elbow flexors  5/5 elbow extensors  5/5 wrist extensors  5/5 interosseus  5/5 finger flexors     5/5 knee flexors  5/5 knee extensors  5/5 ankle plantar flexors  5/5 ankle dorsiflexors  5/5  EHL        Again, thank you for allowing me to participate in the care of your patient.        Sincerely,        Jameson Randall, DO  "

## 2024-10-30 NOTE — PATIENT INSTRUCTIONS
A Left thigh nerve injections has been ordered today. Please schedule this injection at least 1-2 weeks from now to allow time for insurance prior authorization. On the day of your injection, you cannot be sick or taking antibiotics. If you become sick and are prescribed, please call the clinic so your injection can be rescheduled for once you have completed your antibiotics.  It is recommended that you do not have a vaccination within 2 weeks of your procedure if it will contain steroids, as this may make the vaccination less effective.  You will need to bring a  with you for your injection. If you have any questions or concerns prior to your injection, please do not hesitate to call the nurse navigation line at [phone].   See Neurosurgery for an opinion  See the pain clinic for an opinion.  Until then continue with Lyrica 50mg twice daily. We or the pain clinic can consider increasing to three times daily.

## 2024-10-31 ENCOUNTER — OFFICE VISIT (OUTPATIENT)
Dept: PALLIATIVE MEDICINE | Facility: CLINIC | Age: 44
End: 2024-10-31
Attending: PHYSICIAN ASSISTANT
Payer: COMMERCIAL

## 2024-10-31 VITALS — HEART RATE: 112 BPM | SYSTOLIC BLOOD PRESSURE: 118 MMHG | DIASTOLIC BLOOD PRESSURE: 80 MMHG

## 2024-10-31 DIAGNOSIS — M79.18 MYOFASCIAL PAIN: ICD-10-CM

## 2024-10-31 DIAGNOSIS — M25.552 HIP PAIN, LEFT: ICD-10-CM

## 2024-10-31 DIAGNOSIS — M54.12 CERVICAL RADICULOPATHY: Primary | ICD-10-CM

## 2024-10-31 DIAGNOSIS — G89.4 CHRONIC PAIN SYNDROME: Primary | ICD-10-CM

## 2024-10-31 DIAGNOSIS — M54.16 LUMBAR RADICULOPATHY: ICD-10-CM

## 2024-10-31 DIAGNOSIS — Z51.81 ENCOUNTER FOR THERAPEUTIC DRUG MONITORING: ICD-10-CM

## 2024-10-31 LAB
AMPHETAMINES UR QL SCN: ABNORMAL
BARBITURATES UR QL SCN: ABNORMAL
BENZODIAZ UR QL SCN: ABNORMAL
BZE UR QL SCN: ABNORMAL
CANNABINOIDS UR QL SCN: ABNORMAL
CREAT UR-MCNC: 179 MG/DL
ETHANOL UR QL SCN: NORMAL
FENTANYL UR QL: ABNORMAL
OPIATES UR QL SCN: ABNORMAL
PCP QUAL URINE (ROCHE): ABNORMAL

## 2024-10-31 PROCEDURE — 80307 DRUG TEST PRSMV CHEM ANLYZR: CPT | Performed by: NURSE PRACTITIONER

## 2024-10-31 PROCEDURE — G2211 COMPLEX E/M VISIT ADD ON: HCPCS | Performed by: NURSE PRACTITIONER

## 2024-10-31 PROCEDURE — 99205 OFFICE O/P NEW HI 60 MIN: CPT | Performed by: NURSE PRACTITIONER

## 2024-10-31 PROCEDURE — G0480 DRUG TEST DEF 1-7 CLASSES: HCPCS | Mod: 90 | Performed by: NURSE PRACTITIONER

## 2024-10-31 RX ORDER — PREGABALIN 75 MG/1
CAPSULE ORAL
Qty: 60 CAPSULE | Refills: 0 | Status: SHIPPED | OUTPATIENT
Start: 2024-10-31

## 2024-10-31 ASSESSMENT — PAIN SCALES - GENERAL: PAINLEVEL_OUTOF10: EXTREME PAIN (8)

## 2024-10-31 NOTE — PROGRESS NOTES
"[facility] Pain Management   Comprehensive Pain Evaluation    Date of Visit: 10/31/2024    CHIEF COMPLAINT:    Chief Complaint   Patient presents with    Pain          REASON FOR VISIT:    Tammy Joshi is a 44 year old female who is seen today at the request of Mary GOMEZ) for evaluation of her pain issues and recommendations for management; with specific emphasis on:  Lumbar radiculopathy       Subjective    HISTORY OF PRESENT ILLNESS:  Onset/Progression:   Tammy Joshi is a 44 year old female with history of neck and left hip pain.    Has tried amitriptyline for one week; didn't feel improvement in headaches and stopped.   Uses Percocet 5/325mg - takes 1 tab at 7am, 1 tab mid afternoon, 1 tab at bedtime; feels benefit in 30-40 mins; improves pain from 8/10 to 5/10; effect lasts for 4 hours before pain increases.   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     Pain Problem #1: Left Hip Pain  Pain is focalized to lateral left hip from head of trochanter to proximal knee.  Pain started \"several years ago\" but got worse over the past 4-6 months.   Intermittent numbness/tingling into left leg.   Hard to find a comfortable position.  Pain is worse with moving/bending of leg.   Denies low back pain.   Feels weakness in left leg when descending stairs.     Pain Problem #2: Neck Pain   No known trigger of neck pain.   Pain is left-sided from neck into shoulder.   Experiences numbness and weakness into left arm to elbow.   Intermittent tingling in fingers bilaterally.   Headaches 2-3x/week; photosensitivity, nausea, visual changes; tried Imitrex without improvement.          Pain quality: aching, numbness, pins and needles, radiating, sharp, dull aching, numbness    Pain timing: Constant    Pain score today: Extreme Pain (8)   Pain rating: intensity ranges from 5/10 to 9/10, and averages 8/10 on a 0-10 scale.  Aggravating factors include: laying " down, standing, sitting, walking, exercise   Relieving factors include: medication, nothing relieves my pain         Past Pain Treatments:   Pain Clinic:   No    Physical therapy: Yes  - 3 weeks ago (for left leg); 2 years ago (for neck)   Psychologist: No   Chiropractor: Yes - several months ago   Acupuncture: No   Pharmacotherapy:    Opioids: Yes     Non-opioids:  Yes  TENs Unit/electric stim: No   Heat/Cold: Yes - heat helps sometimes (hot bath)     Injections/clinic procedures: Yes   Left L3-L4 TFLESI  9/05/24  C6-C7 interlaminar PRABHJOT 3/03/22     Surgeries related to pain: No       Current Pain Relevant Medications:    Amitriptyline 10mg (not taking it right now)   Percocet 5/325mg - 1 tab TID   Pregabalin 25mg - 2 caps (50mg) BID   Ibuprofen 200mg - takes 2 caps (400mg) PRN; not daily     Other Relevant Medications:   Lorazepam 0.5mg (not using since starting percocet)   Venlafaxine 75mg + 150mg daily     Previous Pain Relevant Medications: (H--helped; HI--Helped initially; [facility]--Somewhat helpful; NH--No help; W--worse; SE--side effects; A--allergy; ?--Unsure if helpful)   Opioids: oxycodone (H), morphine (H), hydrocodone (GI upset), Tramadol (NH)   NSAIDs: Ibuprofen (H) and Ketorolac (NH)   Muscle relaxants: cyclobenzaprine (NH), tizanidine (NH)   Neuroleptics: Gabapentin (? H), Pregabalin (H)    Pain Antidepressants/ Anxiolytics: Venlafexine(H), Amitriptyline (? H), Buspirone (NH)   Sleep Aids: Trazodone (H)    Migraine Medications: Sumatriptan tablets (NH)     Topical: diclofenac gel, lidocaine patches, CBD, and Biofreeze    Adjuvant medications: Acetaminophen (NH), THC gummies (NH for pain but relaxes); CBD tincture (NH)          Review of [place] Prescription Monitoring Program ():  reviewed on 10/31/24. No concern for abuse or misuse of controlled medications based on this report. Controlled substances prescribed in the past 6 months include: (Percocet 5/325mg, Pregabalin 25mg, Gabapentin 100mg)    Current MME: 22.5 / day    Current controlled substance medications, if any, are being prescribed by: PCP    Primary Care Provider is Mary Wang         Past Medical History   She  has a past medical history of Absence of menstruation, Anxiety, Depressive disorder, Diabetes (H), Female infertility of unspecified origin, MILD/NOS PREECLAMP-ANTEP (08/29/2005), Polycystic ovaries, and PPH (postpartum hemorrhage) (11/01/2009).   Past Surgical History   She  has a past surgical history that includes d & c (1998); HAND/FINGER SURGERY UNLISTED (7th grade); and lithotripsy (2011).   Social History   Social History     Tobacco Use    Smoking status: Never    Smokeless tobacco: Never   Vaping Use    Vaping status: Never Used   Substance Use Topics    Alcohol use: Yes     Comment: wine occasionally, none while pregnant    Drug use: No      Social History     Social History Narrative    Not on file        Current Outpatient Medications   Medication Sig Dispense Refill    blood glucose (NO BRAND SPECIFIED) test strip Use to test blood sugar 1 times daily or as directed. To accompany: Blood Glucose Monitor Brands: per insurance. 100 strip 6    blood glucose monitoring (NO BRAND SPECIFIED) meter device kit Use to test blood sugar 1 times daily or as directed. Preferred blood glucose meter OR supplies to accompany: Blood Glucose Monitor Brands: per insurance. 1 kit 0    ibuprofen (ADVIL/MOTRIN) 200 MG capsule Take 200 mg by mouth every 4 hours as needed for fever      metFORMIN (GLUCOPHAGE) 1000 MG tablet Take 1 tablet (1,000 mg) by mouth 2 times daily (with meals) 180 tablet 3    oxyCODONE-acetaminophen (PERCOCET) 5-325 MG tablet Take 1 tablet by mouth 3 times daily as needed for severe pain. Max 3/day; 90/month until established with pain clinic 90 tablet 0    pregabalin (LYRICA) 25 MG capsule 50 mg twice daily 60 capsule 1    thin (NO BRAND SPECIFIED) lancets Use with lanceting device. To accompany: Blood  Glucose Monitor Brands: per insurance. 100 each 6    Tirzepatide 5 MG/0.5ML SOAJ Inject 0.5 mLs (5 mg) subcutaneously every 7 days. 2 mL 0    venlafaxine (EFFEXOR XR) 150 MG 24 hr capsule TAKE 1 CAPSULE BY MOUTH EVERY DAY 90 capsule 3    venlafaxine (EFFEXOR XR) 75 MG 24 hr capsule Take 1 capsule (75 mg) by mouth daily With the 150mg for a total of 225mg daily 90 capsule 1    albuterol (PROAIR HFA/PROVENTIL HFA/VENTOLIN HFA) 108 (90 Base) MCG/ACT inhaler Inhale 2 puffs into the lungs every 4 hours as needed for shortness of breath or wheezing (Patient not taking: Reported on 10/31/2024) 8.5 g 0    amitriptyline (ELAVIL) 10 MG tablet Take 1-3 tablets (10-30 mg) by mouth at bedtime. (Patient not taking: Reported on 10/31/2024) 90 tablet 1    busPIRone (BUSPAR) 5 MG tablet Take 3 tablets (15 mg) by mouth 2 times daily. (Patient not taking: Reported on 10/31/2024) 180 tablet 5    diclofenac (VOLTAREN) 50 MG EC tablet TAKE 1 TABLET (50 MG) BY MOUTH 2 TIMES DAILY AS NEEDED FOR MODERATE PAIN. (Patient not taking: Reported on 10/31/2024) 60 tablet 1    gabapentin (NEURONTIN) 100 MG capsule Take 1 capsule (100 mg) by mouth 3 times daily (Patient not taking: Reported on 7/23/2024) 90 capsule 1    LORazepam (ATIVAN) 0.5 MG tablet TAKE 1 TO 2 TABLETS BY MOUTH EVERY 6 HOURS AS NEEDED FOR ANXIETY . DO NOT EXCEED 4 PER 24 HOURS (Patient not taking: Reported on 10/31/2024) 60 tablet 0    [START ON 11/11/2024] Tirzepatide 7.5 MG/0.5ML SOAJ Inject 0.5 mLs (7.5 mg) subcutaneously every 7 days. (Patient not taking: Reported on 10/31/2024) 2 mL 0    valACYclovir (VALTREX) 500 MG tablet Take 1 tablet (500 mg) by mouth 2 times daily (Patient not taking: Reported on 10/31/2024) 12 tablet 2     No current facility-administered medications for this visit.     No Known Allergies     Medications and Allergies reviewed. Yes        REVIEW OF SYSTEMS:   ROS: 10 point ROS neg other than the symptoms noted above in the HPI.     The patient  otherwise denies red flag symptoms, such as: thunderclap headache, bowel or bladder incontinence, parasthesias, weakness, saddle anesthesia, unintentional weight loss, or fever/chills/sweats.     Objective   OBJECTIVE    Vitals:    10/31/24 1425   BP: 118/80   Pulse: 112         Appearance:   A&O. Patient is appropriate. Patient is in NAD.      HHENT:  Normocephalic, atraumatic. Eyes without conjunctival injection or jaundice. Neck supple.     Pulmonary:  Normal effort; no cough or audible wheeze.       Skin: No obvious rash, lesions, or petechiae of exposed skin.    Psych:  Alert, without lethargy or stupor. Speech fluent. Mood normal. Able to follow commands without difficulty. Appropriate judgement and behavior.    Tearful or anxious affect: NO   Suicidal or homicidal ideation: NO        MSK:    Gait pattern:  Patient has an antalgic gait pattern:YES  Gait favors the neither side.  Mobility and/or assistive devices? NO      Unable to walk on the heels and toes.      Strength:   Deltoid: R: 5/5  L: 5/5  Biceps: R: 5/5  L: 5/5  Triceps: R: 5/5  L: 5/5  Intrinsic hand: R: 5/5  L: 5/5    Knee ext: R: 5/5  L: 5/5  Knee flex: R: 5/5  L: 5/5  Dorsiflexion: R: 5/5  L: 5/5  Plantarflexion: R: 5/5  L: 5/5  Great toe ext:  R: 5/5  L: 5/5       Neurological:   Deep Tendon Reflex exam:   Biceps:  R:  2/4   L: 2/4   Patella:  R:  2/4   L: 2/4    Sensory exam:  (Upper and lower extremities )   Light touch: abnormal left upper arm     Vibration: normal    Allodynia: absent   Dysethesia: absent    Hyperalgesia: absent     Cervical spine:  Range of motion by visual estimation   Flex:  60 degrees (0 - 60); painful   Ext: 40 degrees (0 - 75); painful   Rotation to right: 70 degrees (0 - 80); painful   Rotation to left: 60 degrees (0 - 80); painful   Tenderness in the cervical spine at midline. Yes  Tenderness in the cervical paraspinal muscles. Yes    Thoracic spine:   Kyphosis. No   Tenderness in the thoracic spine at midline.  No  Tenderness in the thoracic paraspinal muscles. No    Lumbar/Sacral spine:  Range of motion by visual estimation   Forward Flexion:  50 degrees (0 - 60); painful    Ext: 15 degrees (0 - 25); painful   Rotation/ext to right: painful   Rotation/ext to left: painful   Lordosis. Yes  Tenderness in the lumbar spine at midline. No  Tenderness in the lumbar paraspinal muscles.No  Straight leg exam:  Right: negative    Left:  positive  Neural Slump test:  Right: negative    Left:  positive  Tenderness over SI joint:     Right: positive    Left:  positive  Tenderness over Trochanteric Bursa:    Right: negative    Left: positive        Diagnostic Testing:  Labs:      Lab Results   Component Value Date    CR 0.58 07/15/2024    GFRESTIMATED >90 07/15/2024    ALKPHOS 55 06/16/2020    AST 26 06/16/2020    ALT 21 06/16/2020              Imaging:     MRI Cervical spine w.o contrast 9/12/24  FINDINGS:   There is straightening the normal cervical curvature with otherwise normal alignment. Normal vertebral body heights and marrow signal. No abnormal cord signal. No extraspinal abnormality.     Craniovertebral junction and C1-C2: Normal.     C2-C3: Normal disc height. No herniation. Bilateral facet hypertrophy. No spinal canal or neural foraminal stenosis.      C3-C4: Disc osteophyte complex. Bilateral facet hypertrophy. No spinal canal stenosis. Mild bilateral neural foraminal stenosis.      C4-C5: Disc osteophyte complex. Bilateral facet hypertrophy. No spinal canal stenosis. Moderate left and mild right neural foraminal stenosis.      C5-C6: Disc osteophyte complex with large left foraminal disc protrusion and annular fissure. Lateral facet hypertrophy. Moderate to severe spinal canal stenosis with flattening of ventral cervical spinal cord. Severe left and moderate right neural   foraminal stenosis.      C6-C7: Disc osteophyte complex. Bilateral facet hypertrophy. Mild spinal canal stenosis. Mild bilateral neural foraminal  stenosis.      C7-T1: Normal disc height. No herniation. Bilateral facet hypertrophy. No spinal canal or neural foraminal stenosis.                                                                      IMPRESSION:  1.  Multilevel disc degeneration and facet hypertrophy as described above and not significantly changed from prior. This is greatest at C5-6 where there is moderate to severe spinal canal stenosis with flattening of ventral cord and severe left neural   foraminal stenosis.       MRI Lumbar spine w.o contrast 8/09/24  FINDINGS:   Nomenclature is based on 5 lumbar type vertebral bodies. The conus ends at proximal L2. Normal sagittal alignment. Vertebral body heights are maintained. Nonpathologic marrow signal. Modic type II changes at L4-L5. The posterior paraspinal soft tissues   are grossly within normal limits. Normal diameter of the distal abdominal. The visualized bony pelvis grossly within normal limits.     T12-L1: Normal disc height and signal. No herniation. Mild bilateral facet arthropathy. No spinal canal or neural foraminal stenosis.      L1-L2: Normal disc height and signal. Small broad-based disc bulge with superimposed right foraminal disc protrusion. Mild bilateral facet arthropathy. No spinal canal or neural foraminal stenosis.     L2-L3: Normal intervertebral disc height and signal. Broad-based disc bulge with superimposed right foraminal disc protrusion. Mild bilateral facet arthropathy. No spinal canal narrowing. Mild right neuroforaminal narrowing. No left neuroforaminal   narrowing.      L3-L4: Normal intervertebral disc height and signal. Broad-based disc bulge with superimposed left foraminal disc extrusion. Moderate bilateral facet arthropathy. No spinal canal narrowing. Mild right neuroforaminal narrowing. Moderate left   neuroforaminal narrowing. Probable contact with the undersurface of the exiting left L3 nerve root.     L4-L5: Normal intervertebral disc height and signal.  Broad-based disc bulge with superimposed left foraminal disc protrusion and annular fissure. Mild bilateral facet arthropathy. No spinal canal narrowing. No right neuroforaminal narrowing. Mild left   neuroforaminal narrowing.     L5-S1: Normal disc height and signal. Shallow broad-based disc bulge. Moderate bilateral facet arthropathy. No spinal canal or neural foraminal stenosis.                                                                      IMPRESSION:  1.  Mild degenerative changes of the lumbar spine. No high-grade spinal canal narrowing.  2.  Moderate left and mild right neuroforaminal narrowing at L3-L4. Probable contact with the undersurface of the exiting left L3 nerve root.  3.  Mild right neuroforaminal narrowing at L2-L3. Mild left neuroforaminal narrowing at L4-L5.  4.  Modic type II changes at L4-L5.           Assessment & Plan      VISIT DIAGNOSIS:   1. Chronic pain syndrome (Primary)    2. Hip pain, left  - Pain Management  Referral  - pregabalin (LYRICA) 75 MG capsule; Take 1 cap (75mg) at bedtime for 5-7 days, then take 75mg in AM and 75mg at bedtime. (Continue 50mg in AM until gone)  Dispense: 60 capsule; Refill: 0  - Adult Pain Clinic Follow-Up Order; Future    3. Lumbar radiculopathy  - Pain Management  Referral  - pregabalin (LYRICA) 75 MG capsule; Take 1 cap (75mg) at bedtime for 5-7 days, then take 75mg in AM and 75mg at bedtime. (Continue 50mg in AM until gone)  Dispense: 60 capsule; Refill: 0  - Adult Pain Clinic Follow-Up Order; Future    4. Myofascial pain  - Pain Management  Referral  - pregabalin (LYRICA) 75 MG capsule; Take 1 cap (75mg) at bedtime for 5-7 days, then take 75mg in AM and 75mg at bedtime. (Continue 50mg in AM until gone)  Dispense: 60 capsule; Refill: 0  - Adult Pain Clinic Follow-Up Order; Future    5. Encounter for therapeutic drug monitoring  - Urine Drug Screen Buprenorphine Urine Qualitative [id number], Ethanol Urine Qualitative [id number],  Oxycodone Urine Qualitative [id number]; Future  - Adult Pain Clinic Follow-Up Order; Future        ASSESSMENT:    Visit discussion: Tammy Joshi is a 44 year old female with a past medical history significant for cervical and lumbar stenosis who presents with complaints of neck and left leg pain.  Patient presents today to address a comprehensive pain management plan as recommended by her primary care provider.  We discussed the process of establishing care within the pain management program; requirements for UDS and CSA before prescribing any opioid medications.  Patient is currently getting an opioid prescription from her primary care provider and advised to continue requesting refills from that provider as we work to establish care in pain management.  We discussed the benefits of various  nonopioid medications to address neuropathic pain.  She currently feels some benefit from pregabalin and is interested in a dose escalation.  Review of renal function indicates there is no limit on pregabalin dosing to safe daily limit.  Increase pregabalin by 25 mg a day for the next 7 days from 50 mg to 75 to achieve a total dose of 75 mg twice daily.  Urine drug screen was ordered today.  Patient advised to return to clinic in 2 to 3 weeks to review controlled substance agreement.  We also discussed the potential benefit of cervical and upper trapezius trigger point injections for neck pain.  We will address this at a future visit.  Patient's questions were answered to her satisfaction.  She verbalizes understanding and agreement with plan.     PLAN:    Medication Management:   - TAPER Pregabalin as follows:    Take 50mg in AM and 75mg at bedtime for 7 days, then   Take 75mg in AM and 75mg at bedtime     Continue Current Treatment Plan with:   - Existing medications, as prescribed by your Primary Care Provider        New Orders:   - Urine drug screen to confirm status of controlled substance use.     You reported last use  of THC gummies two days ago.     Return to Clinic:   -  30-minute In-Person Appointment with Zaria Hansen DNP, APRN, FNP-C in 2-3 weeks, or sooner if needed      Future Considerations:   At next visit, we will review the Controlled Substance Agreement (CSA).     ___________________________________________________________________    BILLING TIME DOCUMENTATION:   TOTAL TIME includes:   Time spent preparing to see the patient: 10 minutes (reviewing records and tests)  Time spend face to face with the patient: 58 minutes  Time spent ordering tests, medications, procedures and referrals: 0 minutes  Time spent Referring and communicating with other healthcare professionals: 0 minutes  Documenting clinical information in Epic: 5 minutes    The total TIME spent on this patient on the day of the appointment was 73 minutes.   ___________________________________________________________________    Review of Electronic Chart, Relevant Records/History: Today I have also reviewed available medical information in the patient's medical record at [facility] ([facility]) and Care Everywhere (if available), including relevant provider notes, laboratory work, and imaging. Please see the [facility] [place] health questionnaire which the patient completed and reviewed with me in detail.     PACO Mansfield, ETHAN, CYRUS   [facility]

## 2024-10-31 NOTE — CONFIDENTIAL NOTE
NEUROSURGERY - NEW PREVISIT PLANNING    Referring Provider: Jameson Randall DO    OVN 10/30/2024   Reason For Visit: M48.02 (ICD-10-CM) - Cervical stenosis of spinal canal   Additional Information:  c5-6 stenosis       IMAGING STATUS/LOCATION DATE/TYPE   MRI PACS 09/12/2024  Cervical  [facility]    08/09/2024  Lumbar  [facility]   CT N/A    XRAY *same day XR 11/04/2024  Cervical  [facility]   NOTES STATUS/LOCATION DATE/TYPE   Other specialist OVN: N/A    EMG Encounters / Phys Med 9/30/2024   INJECTION Imaging 09/05/2024 lumbar sacral ULICES    03/03/2022 cervical [facility]   PHYSICAL THERAPY Encounters 2017, 2022, 2024   SURGERY N/A

## 2024-10-31 NOTE — PATIENT INSTRUCTIONS
PATIENT INSTRUCTIONS:     I am recommending a multidisciplinary treatment plan to help this patient better manage her pain. The following recommendations were given to the patient. Diagnosis, treatment options, risks, benefits, and alternatives were discussed; all questions were answered. Self-care instructions were given. The patient expressed understanding of the plan for management.   Medication Management:   - TAPER Pregabalin as follows:    Take 50mg in AM and 75mg at bedtime for 7 days, then   Take 75mg in AM and 75mg at bedtime     Continue Current Treatment Plan with:   - Existing medications, as prescribed by your Primary Care Provider        New Orders:   - Urine drug screen to confirm status of controlled substance use.     You reported last use of THC gummies two days ago.     Return to Clinic:   -  30-minute In-Person Appointment with Zaria Hansen, ETHAN, PACO, CYRUS in 2-3 weeks, or sooner if needed      ----------------------------------------------------------------  Clinic Number:  [phone]   Call with any questions about your care and for scheduling assistance.   Calls are returned Monday through Friday between 8 AM and 4:30 PM. We usually get back to you within 2 business days depending on the issue/request.    If we are prescribing your medications:  For opioid medication refills, call the clinic or send a [employer] message 7 days in advance.  Please include:  Name of requested medication  Name of the pharmacy.  For non-opioid medications, call your pharmacy directly to request a refill. Please allow 3-4 days to be processed.   Per [place] State Law:  All controlled substance prescriptions must be filled within 30 days of being written.    For those controlled substances allowing refills, pickup must occur within 30 days of last fill.      We believe regular attendance is key to your success in our program!    Any time you are unable to keep your appointment we ask that you call us at least 24  hours in advance to cancel.This will allow us to offer the appointment time to another patient.   Multiple missed appointments may lead to dismissal from the clinic.

## 2024-11-01 ENCOUNTER — MYC REFILL (OUTPATIENT)
Dept: FAMILY MEDICINE | Facility: CLINIC | Age: 44
End: 2024-11-01
Payer: COMMERCIAL

## 2024-11-01 DIAGNOSIS — M25.552 HIP PAIN, LEFT: ICD-10-CM

## 2024-11-01 LAB — OXYCODONE UR QL: ABNORMAL

## 2024-11-03 ENCOUNTER — HEALTH MAINTENANCE LETTER (OUTPATIENT)
Age: 44
End: 2024-11-03

## 2024-11-03 DIAGNOSIS — E11.9 TYPE 2 DIABETES MELLITUS WITHOUT COMPLICATION, WITHOUT LONG-TERM CURRENT USE OF INSULIN (H): ICD-10-CM

## 2024-11-04 ENCOUNTER — TELEPHONE (OUTPATIENT)
Dept: NEUROSURGERY | Facility: CLINIC | Age: 44
End: 2024-11-04

## 2024-11-04 ENCOUNTER — PRE VISIT (OUTPATIENT)
Dept: NEUROSURGERY | Facility: CLINIC | Age: 44
End: 2024-11-04

## 2024-11-04 ENCOUNTER — HOSPITAL ENCOUNTER (OUTPATIENT)
Dept: RADIOLOGY | Facility: HOSPITAL | Age: 44
Discharge: HOME | End: 2024-11-04
Attending: NEUROLOGICAL SURGERY | Admitting: NEUROLOGICAL SURGERY
Payer: COMMERCIAL

## 2024-11-04 ENCOUNTER — OFFICE VISIT (OUTPATIENT)
Dept: NEUROSURGERY | Facility: CLINIC | Age: 44
End: 2024-11-04
Attending: PHYSICAL MEDICINE & REHABILITATION
Payer: COMMERCIAL

## 2024-11-04 VITALS
DIASTOLIC BLOOD PRESSURE: 88 MMHG | WEIGHT: 183.3 LBS | OXYGEN SATURATION: 96 % | SYSTOLIC BLOOD PRESSURE: 139 MMHG | HEIGHT: 64 IN | HEART RATE: 101 BPM | BODY MASS INDEX: 31.29 KG/M2

## 2024-11-04 DIAGNOSIS — M48.02 CERVICAL STENOSIS OF SPINAL CANAL: ICD-10-CM

## 2024-11-04 DIAGNOSIS — M54.16 LUMBAR RADICULOPATHY: Primary | ICD-10-CM

## 2024-11-04 DIAGNOSIS — M54.12 CERVICAL RADICULOPATHY: ICD-10-CM

## 2024-11-04 PROCEDURE — 99205 OFFICE O/P NEW HI 60 MIN: CPT | Performed by: NEUROLOGICAL SURGERY

## 2024-11-04 PROCEDURE — 72050 X-RAY EXAM NECK SPINE 4/5VWS: CPT

## 2024-11-04 RX ORDER — OXYCODONE AND ACETAMINOPHEN 5; 325 MG/1; MG/1
1 TABLET ORAL 3 TIMES DAILY PRN
Qty: 90 TABLET | Refills: 0 | Status: SHIPPED | OUTPATIENT
Start: 2024-11-04

## 2024-11-04 RX ORDER — MELOXICAM 15 MG/1
15 TABLET ORAL DAILY
Qty: 14 TABLET | Refills: 0 | Status: SHIPPED | OUTPATIENT
Start: 2024-11-04

## 2024-11-04 RX ORDER — CYCLOBENZAPRINE HCL 10 MG
10 TABLET ORAL 2 TIMES DAILY PRN
Qty: 28 TABLET | Refills: 0 | Status: SHIPPED | OUTPATIENT
Start: 2024-11-04

## 2024-11-04 ASSESSMENT — PAIN SCALES - GENERAL: PAINLEVEL_OUTOF10: SEVERE PAIN (7)

## 2024-11-04 NOTE — NURSING NOTE
"Tammy Joshi is a 44 year old female who presents for:  Chief Complaint   Patient presents with    Consult     New - pain in left side of the neck and the left hip. Numbness in the left upper arm . Pain lvl 7.        Initial Vitals:  /88   Pulse 101   Ht 5' 4\" (1.626 m)   Wt 183 lb 4.8 oz (83.1 kg)   SpO2 96%   BMI 31.46 kg/m   Estimated body mass index is 31.46 kg/m  as calculated from the following:    Height as of this encounter: 5' 4\" (1.626 m).    Weight as of this encounter: 183 lb 4.8 oz (83.1 kg).. Body surface area is 1.94 meters squared. BP completed using cuff size: regular  Severe Pain (7)    Nursing Comments:       Ariana Perez    "

## 2024-11-04 NOTE — LETTER
11/4/2024      Tammy Joshi  [address] [facility] [address]      Dear Colleague,    Thank you for referring your patient, Tammy Joshi, to the [facility]. Please see a copy of my visit note below.    NEUROSURGERY CONSULTATION NOTE  Neurosurgery was asked to see this patient by Jameson Randall DO for evaluation of midline axial neck pain and left leg pain.       CONSULTATION ASSESSMENT AND PLAN:    Ms. Joshi is a 44-year-old right-handed female with significant past medical history of diabetes mellitus type 2 on oral hypoglycemic agents, anxiety disorder, depressive disorder, polycystic ovarian disease presented to the clinic today for evaluation of midline axial neck pain and left hip pain of approximately 2 years duration without preceding trauma.  Patient has primarily midline axial neck pain with no evidence of pain radiating along her left upper extremity.  She has mild tingling and numbness along left C6 dermatome.  She has left biceps weakness of 4+ by 5.  She has no hyperreflexia and negative Layne's.  Spurling's test is negative.    In terms of her lower extremity examination, he has a tender point along the inferior aspect of her left gluteal region with no positive Tinel's sign.  She has no focal weakness in her lower extremity.    I explained the MRI cervical spine and MRI lumbar spine to the patient and showed her the images.  I explained to her that she has multilevel degenerative disc disease primarily at C5-6 and the cervical spine which is causing left lateral recess stenosis.  I explained to her that C5-6 is the likely cause of her symptoms involving her left upper extremity.  I discussed the natural history of multilevel degenerative disc disease and management options including conservative treatment with physical therapy and nonsurgical interventions.  I also discussed the role of decompression with and without fusion.  I briefly discussed the  procedure of anterior cervical discectomy and fusion and arthroplasty.  Since patient has minimal left upper extremity symptoms, I would recommend to start with conservative treatment with physical therapy, meloxicam 15 mg 1 at bedtime and Flexeril 10 mg twice daily.  I will also get left upper extremity and left lower extremity EMG/NCV studies.    I will also refer her for left SI joint injection with positive Mu's on the left side.  I also explained to the patient that she has L3-4 foraminal extraforaminal disc herniation causing impingement on the exiting left L3 nerve root.  However the distribution of her leg pain is not corroborating with the L3 distribution.  We will get left lower extremity EMG/NCV studies.    I will follow her with above-mentioned test and 4 to 6 weeks to finalize the management plan.    Patient agreed with the plan.  All the questions were answered and patient sounded understanding.  She can contact us if there are any further questions or concerns or worsening neurological deficits.I spent more than 60 minutes in this apt, examining the pt, reviewing the scans, reviewing notes from chart, discussing treatment options with risks and benefits and coordinating care. This note was created in part by the use of Dragon voice recognition system. Inadvertent grammatical errors and typographical errors may  have occurred due to inherent limitation of voice recognition software.  Reasonable attempts made to avoid errors, but this document may contain an error not identified before finalizing.  Please contact me for any clarification needed.     Tani Muñiz MD      HPI:    Ms. Joshi is a 44-year-old right-handed female with significant past medical history of diabetes mellitus type 2 on oral hypoglycemic agents, anxiety disorder, depressive disorder, polycystic ovarian disease presented to the clinic today for evaluation of midline axial neck pain and left hip pain of approximately 2 years  duration without preceding trauma.    Patient started noticing midline axial neck pain and left paraspinal pain 2 years ago without preceding trauma after getting up from sleep.  She also started noticing tingling and numbness along the outer aspect of the shoulder and upper arm for last 4 to 5 months or so.  She reports that her pain is sharp in nature, 9 x 10 on VAS, aggravated with movement and relieves with nothing.    She reports weakness in the left upper extremity in the form of difficulty in lifting heavy objects with the left arm.  She denies dropping things with the left hand.  She also reports imbalance on walking with history of falls with no traumatic brain injury.  She denies any bladder or bowel symptoms.    Patient did physical therapy many years ago and also underwent C6-7 interlaminar epidural steroid injection on 3/3/2022 with no significant improvement in her symptoms.  She also underwent chiropractic manipulation with no significant improvement.    She also started noticing pain along the inferior aspect of her left buttock to the lateral aspect of left thigh with no radiation of pain below the knee.  She reports that her pain is gradually worsening since then.  She underwent extensive workup with the spine medicine team with no positive findings.    She is a non-smoker, occasionally consumes alcohol and denies use of recreational drugs.    She denies any other significant cardiac or pulmonary history.  She is not on antiplatelets or anticoagulants.    Past Medical History:   Diagnosis Date     Absence of menstruation      Anxiety      Depressive disorder      Diabetes (H)      Female infertility of unspecified origin      MILD/NOS PREECLAMP-ANTEP 08/29/2005     Polycystic ovaries      PPH (postpartum hemorrhage) 11/01/2009       Past Surgical History:   Procedure Laterality Date     D & C  1998    Missed Ab     LITHOTRIPSY  2011     [facility] HAND/FINGER SURGERY UNLISTED  7th grade    left index        REVIEW OF SYSTEMS:  Review Of Systems  Skin: negative  Eyes: negative  Ears/Nose/Throat: negative  Respiratory: No shortness of breath, dyspnea on exertion, cough, or hemoptysis  Cardiovascular: negative  Gastrointestinal: negative  Genitourinary: negative  Musculoskeletal: negative  Neurologic: Midline axial neck pain and left arm pain.  Psychiatric: negative  Hematologic/Lymphatic/Immunologic: negative  Endocrine: negative    MEDICATIONS:    Current Outpatient Medications   Medication Sig Dispense Refill     blood glucose (NO BRAND SPECIFIED) test strip Use to test blood sugar 1 times daily or as directed. To accompany: Blood Glucose Monitor Brands: per insurance. 100 strip 6     blood glucose monitoring (NO BRAND SPECIFIED) meter device kit Use to test blood sugar 1 times daily or as directed. Preferred blood glucose meter OR supplies to accompany: Blood Glucose Monitor Brands: per insurance. 1 kit 0     ibuprofen (ADVIL/MOTRIN) 200 MG capsule Take 200 mg by mouth every 4 hours as needed for fever       metFORMIN (GLUCOPHAGE) 1000 MG tablet Take 1 tablet (1,000 mg) by mouth 2 times daily (with meals) 180 tablet 3     oxyCODONE-acetaminophen (PERCOCET) 5-325 MG tablet Take 1 tablet by mouth 3 times daily as needed for severe pain. Max 3/day; 90/month until established with pain clinic 90 tablet 0     pregabalin (LYRICA) 75 MG capsule Take 1 cap (75mg) at bedtime for 5-7 days, then take 75mg in AM and 75mg at bedtime. (Continue 50mg in AM until gone) 60 capsule 0     thin (NO BRAND SPECIFIED) lancets Use with lanceting device. To accompany: Blood Glucose Monitor Brands: per insurance. 100 each 6     [START ON 11/11/2024] Tirzepatide 7.5 MG/0.5ML SOAJ Inject 0.5 mLs (7.5 mg) subcutaneously every 7 days. 2 mL 0     venlafaxine (EFFEXOR XR) 150 MG 24 hr capsule TAKE 1 CAPSULE BY MOUTH EVERY DAY 90 capsule 3     venlafaxine (EFFEXOR XR) 75 MG 24 hr capsule Take 1 capsule (75 mg) by mouth daily With the 150mg for  a total of 225mg daily 90 capsule 1     albuterol (PROAIR HFA/PROVENTIL HFA/VENTOLIN HFA) 108 (90 Base) MCG/ACT inhaler Inhale 2 puffs into the lungs every 4 hours as needed for shortness of breath or wheezing (Patient not taking: Reported on 11/4/2024) 8.5 g 0     amitriptyline (ELAVIL) 10 MG tablet Take 1-3 tablets (10-30 mg) by mouth at bedtime. (Patient not taking: Reported on 11/4/2024) 90 tablet 1     diclofenac (VOLTAREN) 50 MG EC tablet TAKE 1 TABLET (50 MG) BY MOUTH 2 TIMES DAILY AS NEEDED FOR MODERATE PAIN. (Patient not taking: Reported on 10/30/2024) 60 tablet 1     LORazepam (ATIVAN) 0.5 MG tablet TAKE 1 TO 2 TABLETS BY MOUTH EVERY 6 HOURS AS NEEDED FOR ANXIETY . DO NOT EXCEED 4 PER 24 HOURS (Patient not taking: Reported on 11/4/2024) 60 tablet 0     Tirzepatide 5 MG/0.5ML SOAJ Inject 0.5 mLs (5 mg) subcutaneously every 7 days. 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 Yes     Comment: adopted- unknown     Social Drivers of Health     Interpersonal Safety: Low Risk  (3/26/2024)    Interpersonal Safety      Do  "you feel physically and emotionally safe where you currently live?: Yes      Within the past 12 months, have you been hit, slapped, kicked or otherwise physically hurt by someone?: No      Within the past 12 months, have you been humiliated or emotionally abused in other ways by your partner or ex-partner?: No         FAMILY HISTORY:  Family History   Adopted: Yes   Problem Relation Age of Onset     Unknown/Adopted Other         patient was adopted; knows a limited amount of medical history of birth parents        PHYSICAL EXAM:   Constitutional: /88   Pulse 101   Ht 5' 4\" (1.626 m)   Wt 183 lb 4.8 oz (83.1 kg)   SpO2 96%   BMI 31.46 kg/m       Mental Status: A & O in no acute distress.  Affect is appropriate.  Speech is fluent.  Recent and remote memory are intact.  Attention span and concentration are normal.        Cranial Nerves:   CN1: grossly intact per patient recall.   CN2: No funduscopic exam performed.   CN3,4 & 6: Pupillary light response, lateral and vertical gaze normal.  No nystagmus.  Visual fields are full to confrontation.   CN5: Intact to touch   CN7: No facial weakness, smile, facial symmetry intact.   CN8: Intact to spoken voice.   CN9&10: Gag reflex, uvula midline, palate rises with phonation.   CN11: Shoulder shrug 5/5 intact bilaterally.   CN12: Tongue midline and moves freely from side to side.     Motor: No pronator drift of upper extremity.   Normal bulk and tone all muscle groups of upper and lower extremities.       Delt Bi Tri Hand Flex/  Ext Iliopsoas Quadriceps Tibialis Anterior EHL Gastroc     C5 C6 C7 C8/T1 L2 L3 L4 L5 S1   R 5 5 5 5 5 5 5 5 5   L 5 4+ 5 5 5 5 5 5 5     Sensory: Sensations reduced to approximately 80% to touch along left C6 dermatome     Coordination; finger to nose, rapid alternating movements smooth and rhythmic.   Romberg intact.   Heel/toe/tandem gait intact.    Antalgic gait.     Reflexes;                       Right              Left  Brachioradialis " (C5,6)      2+                 2+  Biceps   (C5,6)                 2+                 2+  Triceps  (C7,8)                 2+                2+  Knee (L3,4)                      1+                1+  Ankle jerk (S1,2)              1+                1+    No hoffmans/clonus.  Spurling's test negative  Mu's test positive on the left  Bilateral SLR terminally restricted    Left paraspinal tenderness present in the cervical spine.    Left paraspinal tenderness in the lumbar spine.    There is a tender point along the inferior gluteal aspect.  There are no other signs of inflammation over the tenderness.  Tinel's sign negative.    IMAGING:  I personally reviewed all radiographic images and agree with the neuroradiology report of multilevel disc degeneration greatest at C5-6 causing moderate to severe central spinal canal stenosis with spinal cord flattening and severe left neural foraminal stenosis.    9/12/2024 MRI cervical spine:  IMPRESSION:  1.  Multilevel disc degeneration and facet hypertrophy as described above and not significantly changed from prior. This is greatest at C5-6 where there is moderate to severe spinal canal stenosis with flattening of ventral cord and severe left neural   foraminal stenosis.    Cc:   Mary Wang                Again, thank you for allowing me to participate in the care of your patient.        Sincerely,        Tani Muñiz MD   [place]

## 2024-11-04 NOTE — PROGRESS NOTES
NEUROSURGERY CONSULTATION NOTE  Neurosurgery was asked to see this patient by Jameson Randall DO for evaluation of midline axial neck pain and left leg pain.       CONSULTATION ASSESSMENT AND PLAN:    Ms. Joshi is a 44-year-old right-handed female with significant past medical history of diabetes mellitus type 2 on oral hypoglycemic agents, anxiety disorder, depressive disorder, polycystic ovarian disease presented to the clinic today for evaluation of midline axial neck pain and left hip pain of approximately 2 years duration without preceding trauma.  Patient has primarily midline axial neck pain with no evidence of pain radiating along her left upper extremity.  She has mild tingling and numbness along left C6 dermatome.  She has left biceps weakness of 4+ by 5.  She has no hyperreflexia and negative Layne's.  Spurling's test is negative.    In terms of her lower extremity examination, he has a tender point along the inferior aspect of her left gluteal region with no positive Tinel's sign.  She has no focal weakness in her lower extremity.    I explained the MRI cervical spine and MRI lumbar spine to the patient and showed her the images.  I explained to her that she has multilevel degenerative disc disease primarily at C5-6 and the cervical spine which is causing left lateral recess stenosis.  I explained to her that C5-6 is the likely cause of her symptoms involving her left upper extremity.  I discussed the natural history of multilevel degenerative disc disease and management options including conservative treatment with physical therapy and nonsurgical interventions.  I also discussed the role of decompression with and without fusion.  I briefly discussed the procedure of anterior cervical discectomy and fusion and arthroplasty.  Since patient has minimal left upper extremity symptoms, I would recommend to start with conservative treatment with physical therapy, meloxicam 15 mg 1 at bedtime and Flexeril  10 mg twice daily.  I will also get left upper extremity and left lower extremity EMG/NCV studies.    I will also refer her for left SI joint injection with positive Mu's on the left side.  I also explained to the patient that she has L3-4 foraminal extraforaminal disc herniation causing impingement on the exiting left L3 nerve root.  However the distribution of her leg pain is not corroborating with the L3 distribution.  We will get left lower extremity EMG/NCV studies.    I will follow her with above-mentioned test and 4 to 6 weeks to finalize the management plan.    Patient agreed with the plan.  All the questions were answered and patient sounded understanding.  She can contact us if there are any further questions or concerns or worsening neurological deficits.I spent more than 60 minutes in this apt, examining the pt, reviewing the scans, reviewing notes from chart, discussing treatment options with risks and benefits and coordinating care. This note was created in part by the use of Dragon voice recognition system. Inadvertent grammatical errors and typographical errors may  have occurred due to inherent limitation of voice recognition software.  Reasonable attempts made to avoid errors, but this document may contain an error not identified before finalizing.  Please contact me for any clarification needed.     Tani Muñiz MD      HPI:    Ms. Joshi is a 44-year-old right-handed female with significant past medical history of diabetes mellitus type 2 on oral hypoglycemic agents, anxiety disorder, depressive disorder, polycystic ovarian disease presented to the clinic today for evaluation of midline axial neck pain and left hip pain of approximately 2 years duration without preceding trauma.    Patient started noticing midline axial neck pain and left paraspinal pain 2 years ago without preceding trauma after getting up from sleep.  She also started noticing tingling and numbness along the outer aspect of  the shoulder and upper arm for last 4 to 5 months or so.  She reports that her pain is sharp in nature, 9 x 10 on VAS, aggravated with movement and relieves with nothing.    She reports weakness in the left upper extremity in the form of difficulty in lifting heavy objects with the left arm.  She denies dropping things with the left hand.  She also reports imbalance on walking with history of falls with no traumatic brain injury.  She denies any bladder or bowel symptoms.    Patient did physical therapy many years ago and also underwent C6-7 interlaminar epidural steroid injection on 3/3/2022 with no significant improvement in her symptoms.  She also underwent chiropractic manipulation with no significant improvement.    She also started noticing pain along the inferior aspect of her left buttock to the lateral aspect of left thigh with no radiation of pain below the knee.  She reports that her pain is gradually worsening since then.  She underwent extensive workup with the spine medicine team with no positive findings.    She is a non-smoker, occasionally consumes alcohol and denies use of recreational drugs.    She denies any other significant cardiac or pulmonary history.  She is not on antiplatelets or anticoagulants.    Past Medical History:   Diagnosis Date    Absence of menstruation     Anxiety     Depressive disorder     Diabetes (H)     Female infertility of unspecified origin     MILD/NOS PREECLAMP-ANTEP 08/29/2005    Polycystic ovaries     PPH (postpartum hemorrhage) 11/01/2009       Past Surgical History:   Procedure Laterality Date    D & C  1998    Missed Ab    LITHOTRIPSY  2011    [facility] HAND/FINGER SURGERY UNLISTED  7th grade    left index       REVIEW OF SYSTEMS:  Review Of Systems  Skin: negative  Eyes: negative  Ears/Nose/Throat: negative  Respiratory: No shortness of breath, dyspnea on exertion, cough, or hemoptysis  Cardiovascular: negative  Gastrointestinal: negative  Genitourinary:  negative  Musculoskeletal: negative  Neurologic: Midline axial neck pain and left arm pain.  Psychiatric: negative  Hematologic/Lymphatic/Immunologic: negative  Endocrine: negative    MEDICATIONS:    Current Outpatient Medications   Medication Sig Dispense Refill    blood glucose (NO BRAND SPECIFIED) test strip Use to test blood sugar 1 times daily or as directed. To accompany: Blood Glucose Monitor Brands: per insurance. 100 strip 6    blood glucose monitoring (NO BRAND SPECIFIED) meter device kit Use to test blood sugar 1 times daily or as directed. Preferred blood glucose meter OR supplies to accompany: Blood Glucose Monitor Brands: per insurance. 1 kit 0    ibuprofen (ADVIL/MOTRIN) 200 MG capsule Take 200 mg by mouth every 4 hours as needed for fever      metFORMIN (GLUCOPHAGE) 1000 MG tablet Take 1 tablet (1,000 mg) by mouth 2 times daily (with meals) 180 tablet 3    oxyCODONE-acetaminophen (PERCOCET) 5-325 MG tablet Take 1 tablet by mouth 3 times daily as needed for severe pain. Max 3/day; 90/month until established with pain clinic 90 tablet 0    pregabalin (LYRICA) 75 MG capsule Take 1 cap (75mg) at bedtime for 5-7 days, then take 75mg in AM and 75mg at bedtime. (Continue 50mg in AM until gone) 60 capsule 0    thin (NO BRAND SPECIFIED) lancets Use with lanceting device. To accompany: Blood Glucose Monitor Brands: per insurance. 100 each 6    [START ON 11/11/2024] Tirzepatide 7.5 MG/0.5ML SOAJ Inject 0.5 mLs (7.5 mg) subcutaneously every 7 days. 2 mL 0    venlafaxine (EFFEXOR XR) 150 MG 24 hr capsule TAKE 1 CAPSULE BY MOUTH EVERY DAY 90 capsule 3    venlafaxine (EFFEXOR XR) 75 MG 24 hr capsule Take 1 capsule (75 mg) by mouth daily With the 150mg for a total of 225mg daily 90 capsule 1    albuterol (PROAIR HFA/PROVENTIL HFA/VENTOLIN HFA) 108 (90 Base) MCG/ACT inhaler Inhale 2 puffs into the lungs every 4 hours as needed for shortness of breath or wheezing (Patient not taking: Reported on 11/4/2024) 8.5 g 0     amitriptyline (ELAVIL) 10 MG tablet Take 1-3 tablets (10-30 mg) by mouth at bedtime. (Patient not taking: Reported on 11/4/2024) 90 tablet 1    diclofenac (VOLTAREN) 50 MG EC tablet TAKE 1 TABLET (50 MG) BY MOUTH 2 TIMES DAILY AS NEEDED FOR MODERATE PAIN. (Patient not taking: Reported on 10/30/2024) 60 tablet 1    LORazepam (ATIVAN) 0.5 MG tablet TAKE 1 TO 2 TABLETS BY MOUTH EVERY 6 HOURS AS NEEDED FOR ANXIETY . DO NOT EXCEED 4 PER 24 HOURS (Patient not taking: Reported on 11/4/2024) 60 tablet 0    Tirzepatide 5 MG/0.5ML SOAJ Inject 0.5 mLs (5 mg) subcutaneously every 7 days. 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 Yes     Comment: adopted- unknown     Social Drivers of Health     Interpersonal Safety: Low Risk  (3/26/2024)    Interpersonal Safety     Do you feel physically and emotionally safe where you currently live?: Yes     Within the past 12 months, have you been hit, slapped, kicked or otherwise physically hurt by someone?: No     Within the past 12 months, have you been humiliated or emotionally abused in other ways by your  "partner or ex-partner?: No         FAMILY HISTORY:  Family History   Adopted: Yes   Problem Relation Age of Onset    Unknown/Adopted Other         patient was adopted; knows a limited amount of medical history of birth parents        PHYSICAL EXAM:   Constitutional: /88   Pulse 101   Ht 5' 4\" (1.626 m)   Wt 183 lb 4.8 oz (83.1 kg)   SpO2 96%   BMI 31.46 kg/m       Mental Status: A & O in no acute distress.  Affect is appropriate.  Speech is fluent.  Recent and remote memory are intact.  Attention span and concentration are normal.        Cranial Nerves:   CN1: grossly intact per patient recall.   CN2: No funduscopic exam performed.   CN3,4 & 6: Pupillary light response, lateral and vertical gaze normal.  No nystagmus.  Visual fields are full to confrontation.   CN5: Intact to touch   CN7: No facial weakness, smile, facial symmetry intact.   CN8: Intact to spoken voice.   CN9&10: Gag reflex, uvula midline, palate rises with phonation.   CN11: Shoulder shrug 5/5 intact bilaterally.   CN12: Tongue midline and moves freely from side to side.     Motor: No pronator drift of upper extremity.   Normal bulk and tone all muscle groups of upper and lower extremities.       Delt Bi Tri Hand Flex/  Ext Iliopsoas Quadriceps Tibialis Anterior EHL Gastroc     C5 C6 C7 C8/T1 L2 L3 L4 L5 S1   R 5 5 5 5 5 5 5 5 5   L 5 4+ 5 5 5 5 5 5 5     Sensory: Sensations reduced to approximately 80% to touch along left C6 dermatome     Coordination; finger to nose, rapid alternating movements smooth and rhythmic.   Romberg intact.   Heel/toe/tandem gait intact.    Antalgic gait.     Reflexes;                       Right              Left  Brachioradialis (C5,6)      2+                 2+  Biceps   (C5,6)                 2+                 2+  Triceps  (C7,8)                 2+                2+  Knee (L3,4)                      1+                1+  Ankle jerk (S1,2)              1+                1+    No hoffmans/clonus.  Spurling's " test negative  Mu's test positive on the left  Bilateral SLR terminally restricted    Left paraspinal tenderness present in the cervical spine.    Left paraspinal tenderness in the lumbar spine.    There is a tender point along the inferior gluteal aspect.  There are no other signs of inflammation over the tenderness.  Tinel's sign negative.    IMAGING:  I personally reviewed all radiographic images and agree with the neuroradiology report of multilevel disc degeneration greatest at C5-6 causing moderate to severe central spinal canal stenosis with spinal cord flattening and severe left neural foraminal stenosis.    9/12/2024 MRI cervical spine:  IMPRESSION:  1.  Multilevel disc degeneration and facet hypertrophy as described above and not significantly changed from prior. This is greatest at C5-6 where there is moderate to severe spinal canal stenosis with flattening of ventral cord and severe left neural   foraminal stenosis.    Cc:   Mary Wang

## 2024-11-04 NOTE — TELEPHONE ENCOUNTER
Procedure ordered? YES    What insurance are we billing for this procedure?  COMMERCIAL/GENERIC - [employer]  IF SCHEDULING AT [facility] PLEASE SCHEDULE AT LEAST 7-10 BUSINESS DAYS OUT SO A PA CAN BE OBTAINED    Is a  PAIN  order available to link to injection appointment or does one need to be transcribed?  YES:     PAIN Joint Injection Sacroiliac Joint Left      If needed, route to CN to assist in doing so.    Is  needed?: No   If YES, please initiate in-person  request.    Will patient have a ?  Yes    All fluoro procedures require a .  These US procedures also require a : piriformis, pudendal, scalene, & carpal tunnel.    Is patient taking any blood thinners (i.e. Plavix, coumadin, jantoven, warfarin, heparin, Xarelto, Pradaxa, Eliquis, Brilinta, or Effient, etc)? No   If YES, schedule out 2 weeks, and route to RN pool to determine if medication hold is needed.    Is patient taking aspirin? No   For CERVICAL or INTERLAMINAR procedures, route message to Care Navigation so they can seek approval from managing provider to hold aspirin for 6 days prior to the procedure.    Does patient have an allergy to contrast dye or iodine?  No  If YES, OK to schedule. Route to RN pool AND add allergy information to appointment notes    Is patient diabetic? Yes If YES, blood glucose level will be checked on day of procedure and will need to be 300mg/dL or below (for steroid injections).    Does patient have an active infection or treated for one within the past week? No   If YES, do NOT schedule and route to Care Navigation.     Is patient currently taking any antibiotics or have an active infection?  No  For patients on chronic, preventative, or prophylactic antibiotics, procedures may be scheduled.   For patients on antibiotics for active or recent infection: antibiotic course must have been completed and symptom-free of infection to safely proceed with injection.  Send  to Care Navigation if unsure.    Is patient actively being treated for cancer or immunocompromised? No  If YES, do NOT schedule and route to RN pool.     Any chance of pregnancy? NO   If YES, do NOT schedule and route to RN pool.    Have you had any vaccines in the last 2 weeks? NO  If YES, please route to Care Navigation to discuss before scheduling.     Reminders:  -  If you are started on any steroids or antibiotics between now and your appointment, you must contact us because it may affect our ability to perform your procedure.   reviewed and discussed briefly    -  Informed patient that it is OK to take normal medications before the procedure and must hold blood thinners as instructed.  reviewed and discussed briefly    -  Patients scheduled for MBBs should not take pain meds prior to injection and pain rating needs to be 5/10 or greater the day of the procedure.    -  Informed cervical injection patients that an IV will be placed as a precautionary measure.  reviewed and not discussed    -  Patients should eat a light meal prior to their procedure appointment.  reviewed and discussed briefly    -  All radiofrequency ablations are in a 40 minute time slot and not to be scheduled until in-basket message has been sent by the procedure team that the PA has been  received.  reviewed and not discussed     -  Spinal cord stimulator trial scheduling is coordinated by the procedure team.    -  Care Navigation (#[phone]) is available to assist patients with additional questions.  reviewed and discussed briefly    -  Cervical TFESIs with Dr. Solitario only.

## 2024-11-05 LAB — BUPRENORPHINE UR QL: NORMAL

## 2024-11-06 ENCOUNTER — MYC MEDICAL ADVICE (OUTPATIENT)
Dept: FAMILY MEDICINE | Facility: CLINIC | Age: 44
End: 2024-11-06
Payer: COMMERCIAL

## 2024-11-06 DIAGNOSIS — E11.65 TYPE 2 DIABETES MELLITUS WITH HYPERGLYCEMIA, WITHOUT LONG-TERM CURRENT USE OF INSULIN (H): ICD-10-CM

## 2024-11-06 LAB
CANNABINOIDS UR CFM-MCNC: 274 NG/ML
CARBOXYTHC/CREAT UR: 153 NG/MG CREAT
NOROXYCODONE UR CFM-MCNC: 4700 NG/MG CREAT
NOROXYMORPHONE/CREAT UR CFM: 773 NG/MG CREAT
OXYCODONE UR CFM-MCNC: 2203 NG/MG CREAT
OXYCODONE UR QL CFM: NORMAL
OXYMORPHONE UR CFM-MCNC: 1453 NG/MG CREAT

## 2024-11-06 RX ORDER — LANCETS
EACH MISCELLANEOUS
Qty: 100 EACH | Refills: 2 | Status: SHIPPED | OUTPATIENT
Start: 2024-11-06

## 2024-11-06 NOTE — TELEPHONE ENCOUNTER
Refills sent to the pharmacy       Requested Prescriptions   Signed Prescriptions Disp Refills    blood glucose (NO BRAND SPECIFIED) test strip 100 strip 2     Sig: Use to test blood sugar 1 times daily or as directed. To accompany: Blood Glucose Monitor Brands: per insurance.       There is no refill protocol information for this order       blood glucose monitoring (NO BRAND SPECIFIED) meter device kit 1 kit 0     Sig: Use to test blood sugar 1 times daily or as directed. Preferred blood glucose meter OR supplies to accompany: Blood Glucose Monitor Brands: per insurance.       There is no refill protocol information for this order       thin (NO BRAND SPECIFIED) lancets 100 each 2     Sig: Use with lanceting device. To accompany: Blood Glucose Monitor Brands: per insurance.       There is no refill protocol information for this order              Ba Luo RN 11/06/24 12:17 PM

## 2024-11-11 DIAGNOSIS — E11.9 TYPE 2 DIABETES MELLITUS WITHOUT COMPLICATION, WITHOUT LONG-TERM CURRENT USE OF INSULIN (H): ICD-10-CM

## 2024-11-11 RX ORDER — TIRZEPATIDE 5 MG/.5ML
INJECTION, SOLUTION SUBCUTANEOUS
OUTPATIENT
Start: 2024-11-11

## 2024-11-25 ENCOUNTER — OFFICE VISIT (OUTPATIENT)
Dept: PALLIATIVE MEDICINE | Facility: CLINIC | Age: 44
End: 2024-11-25
Payer: COMMERCIAL

## 2024-11-25 ENCOUNTER — MYC MEDICAL ADVICE (OUTPATIENT)
Dept: FAMILY MEDICINE | Facility: CLINIC | Age: 44
End: 2024-11-25

## 2024-11-25 ENCOUNTER — MYC MEDICAL ADVICE (OUTPATIENT)
Dept: PALLIATIVE MEDICINE | Facility: CLINIC | Age: 44
End: 2024-11-25

## 2024-11-25 VITALS — DIASTOLIC BLOOD PRESSURE: 92 MMHG | SYSTOLIC BLOOD PRESSURE: 135 MMHG | HEART RATE: 93 BPM

## 2024-11-25 DIAGNOSIS — M25.552 HIP PAIN, LEFT: ICD-10-CM

## 2024-11-25 DIAGNOSIS — M54.16 LUMBAR RADICULOPATHY: ICD-10-CM

## 2024-11-25 DIAGNOSIS — Z51.81 ENCOUNTER FOR THERAPEUTIC DRUG MONITORING: ICD-10-CM

## 2024-11-25 DIAGNOSIS — M54.16 LUMBAR RADICULOPATHY: Primary | ICD-10-CM

## 2024-11-25 DIAGNOSIS — M79.18 MYOFASCIAL PAIN: ICD-10-CM

## 2024-11-25 DIAGNOSIS — F11.90 CHRONIC, CONTINUOUS USE OF OPIOIDS: ICD-10-CM

## 2024-11-25 DIAGNOSIS — E11.9 TYPE 2 DIABETES MELLITUS WITHOUT COMPLICATION, WITHOUT LONG-TERM CURRENT USE OF INSULIN (H): ICD-10-CM

## 2024-11-25 PROCEDURE — G2211 COMPLEX E/M VISIT ADD ON: HCPCS | Performed by: NURSE PRACTITIONER

## 2024-11-25 PROCEDURE — 99215 OFFICE O/P EST HI 40 MIN: CPT | Performed by: NURSE PRACTITIONER

## 2024-11-25 PROCEDURE — 99417 PROLNG OP E/M EACH 15 MIN: CPT | Performed by: NURSE PRACTITIONER

## 2024-11-25 RX ORDER — PREGABALIN 100 MG/1
100 CAPSULE ORAL 2 TIMES DAILY
Qty: 60 CAPSULE | Refills: 0 | Status: SHIPPED | OUTPATIENT
Start: 2024-11-25

## 2024-11-25 ASSESSMENT — PAIN SCALES - PAIN ENJOYMENT GENERAL ACTIVITY SCALE (PEG): PEG_TOTALSCORE: INCOMPLETE

## 2024-11-25 ASSESSMENT — PAIN SCALES - GENERAL: PAINLEVEL_OUTOF10: SEVERE PAIN (7)

## 2024-11-25 NOTE — PATIENT INSTRUCTIONS
PATIENT INSTRUCTIONS:     I am recommending a multidisciplinary treatment plan to help this patient better manage her pain. The following recommendations were given to the patient. Diagnosis, treatment options, risks, benefits, and alternatives were discussed; all questions were answered. Self-care instructions were given. The patient expressed understanding of the plan for management.   Medication Management:   - CONTINUE taking Percocet 5/325mg until you are due for the next refill, then we will transition to Norco 7.5/325mg - 1 tab every 6 hours, max 3 per day   - TAPER Pregabalin  as follows:  Take 75mg in AM and 75mg at bedtime for 7 days, then take 75mg in AM and 100mg at bedtime for 7 days, then take 100mg in AM and 100mg at bedtime.   We signed a controlled substance agreement today.   Return to Clinic:   -  30-minute In-Person Appointment with Zaria Hansen DNP, PACO, [facility] in 4-6 weeks, or sooner if needed    Future Considerations:   We may consider Buprenorphine micro dosing in the future.        ----------------------------------------------------------------  Clinic Number:  [phone]   Call with any questions about your care and for scheduling assistance.   Calls are returned Monday through Friday between 8 AM and 4:30 PM. We usually get back to you within 2 business days depending on the issue/request.    If we are prescribing your medications:  For opioid medication refills, call the clinic or send a TSC[facility] message 7 days in advance.  Please include:  Name of requested medication  Name of the pharmacy.  For non-opioid medications, call your pharmacy directly to request a refill. Please allow 3-4 days to be processed.   Per [place] State Law:  All controlled substance prescriptions must be filled within 30 days of being written.    For those controlled substances allowing refills, pickup must occur within 30 days of last fill.      We believe regular attendance is key to your success in our program!     Any time you are unable to keep your appointment we ask that you call us at least 24 hours in advance to cancel.This will allow us to offer the appointment time to another patient.   Multiple missed appointments may lead to dismissal from the clinic.

## 2024-11-25 NOTE — LETTER
Opioid / Opioid Plus Controlled Substance Agreement    This is an agreement between you and your provider about the safe and appropriate use of controlled substance/opioids prescribed by your care team. Controlled substances are medicines that can cause physical and mental dependence (abuse).    There are strict laws about having and using these medicines. We here at [facility] are committing to working with you in your efforts to get better. To support you in this work, we ll help you schedule regular office appointments for medicine refills. If we must cancel or change your appointment for any reason, we ll make sure you have enough medicine to last until your next appointment.     As a Provider, I will:  Listen carefully to your concerns and treat you with respect.   Recommend a treatment plan that I believe is in your best interest. This plan may involve therapies other than opioid pain medication.   Talk with you often about the possible benefits, and the risk of harm of any medicine that we prescribe for you.   Provide a plan on how to taper (discontinue or go off) using this medicine if the decision is made to stop its use.    As a Patient, I understand that opioid(s):   Are a controlled substance prescribed by my care team to help me function or work and manage my condition(s).   Are strong medicines and can cause serious side effects such as:  Drowsiness, which can seriously affect my driving ability  A lower breathing rate, enough to cause death  Harm to my thinking ability   Depression   Abuse of and addiction to this medicine  Need to be taken exactly as prescribed. Combining opioids with certain medicines or chemicals (such as illegal drugs, sedatives, sleeping pills, and benzodiazepines) can be dangerous or even fatal. If I stop opioids suddenly, I may have severe withdrawal symptoms.  Do not work for all types of pain nor for all patients. If they re not helpful, I may be asked to stop  them.        The risks, benefits and side effects of these medicine(s) were explained to me. I agree that:  I will take part in other treatments as advised by my care team. This may be psychiatry or counseling, physical therapy, behavioral therapy, group treatment or a referral to a specialist.     I will keep all my appointments. I understand that this is part of the monitoring of opioids. My care team may require an office visit for EVERY opioid/controlled substance refill. If I miss appointments or don t follow instructions, my care team may stop my medicine.    I will take my medicines as prescribed. I will not change the dose or schedule unless my care team tells me to. There will be no refills if I run out early.     I may be asked to come to the clinic and complete a urine drug test or complete a pill count at any time. If I don t give a urine sample or participate in a pill count, the care team may stop my medicine.    I will only receive prescriptions from this clinic for chronic pain. If I am treated by another provider for acute pain issues, I will tell them that I am taking opioid pain medication for chronic pain and that I have a treatment agreement with this provider. I will inform my [facility] care team within one business day if I am given a prescription for any pain medication by another healthcare provider. My [facility] care team can contact other providers and pharmacists about my use of any medicines.    It is up to me to make sure that I don t run out of my medicines on weekends or holidays. If my care team is willing to refill my opioid prescription without a visit, I must request refills only during office hours. Refills may take up to 3 business days to process. I will use one pharmacy to fill all my opioid and other controlled substance prescriptions. I will notify the clinic about any changes to my insurance or medication availability.    I am responsible for my  prescriptions. If the medicine/prescription is lost, stolen or destroyed, it will not be replaced. I also agree not to share controlled substance medicines with anyone.    I am aware I should not use any illegal or recreational drugs. I agree not to drink alcohol unless my care team says I can.       If I enroll in the [place] Medical Cannabis program, I will tell my care team prior to my next refill.     I will tell my care team right away if I become pregnant, have a new medical problem treated outside of my regular clinic, or have a change in my medications.    I understand that this medicine can affect my thinking, judgment and reaction time. Alcohol and drugs affect the brain and body, which can affect the safety of my driving. Being under the influence of alcohol or drugs can affect my decision-making, behaviors, personal safety, and the safety of others. Driving while impaired (DWI) can occur if a person is driving, operating, or in physical control of a car, motorcycle, boat, snowmobile, ATV, motorbike, off-road vehicle, or any other motor vehicle ([place] Statute 169A.20). I understand the risk if I choose to drive or operate any vehicle or machinery.    I understand that if I do not follow any of the conditions above, my prescriptions or treatment may be stopped or changed.          Opioids  What You Need to Know    What are opioids?   Opioids are pain medicines that must be prescribed by a doctor. They are also known as narcotics.     Examples are:   morphine (MS Contin, Gogo)  oxycodone (Oxycontin)  oxycodone and acetaminophen (Percocet)  hydrocodone and acetaminophen (Vicodin, Norco)   fentanyl patch (Duragesic)   hydromorphone (Dilaudid)   methadone  codeine (Tylenol #3)     What do opioids do well?   Opioids are best for severe short-term pain such as after a surgery or injury. They may work well for cancer pain. They may help some people with long-lasting (chronic) pain.     What do opioids NOT do  well?   Opioids never get rid of pain entirely, and they don t work well for most patients with chronic pain. Opioids don t reduce swelling, one of the causes of pain.                                    Other ways to manage chronic pain and improve function include:     Treat the health problem that may be causing pain  Anti-inflammation medicines, which reduce swelling and tenderness, such as ibuprofen (Advil, Motrin) or naproxen (Aleve)  Acetaminophen (Tylenol)  Antidepressants and anti-seizure medicines, especially for nerve pain  Topical treatments such as patches or creams  Injections or nerve blocks  Chiropractic or osteopathic treatment  Acupuncture, massage, deep breathing, meditation, visual imagery, aromatherapy  Use heat or ice at the pain site  Physical therapy   Exercise  Stop smoking  Take part in therapy       Risks and side effects     Talk to your doctor before you start or decide to keep taking opioids. Possible side effects include:    Lowering your breathing rate enough to cause death  Overdose, including death, especially if taking higher than prescribed doses  Worse depression symptoms; less pleasure in things you usually enjoy  Feeling tired or sluggish  Slower thoughts or cloudy thinking  Being more sensitive to pain over time; pain is harder to control  Trouble sleeping or restless sleep  Changes in hormone levels (for example, less testosterone)  Changes in sex drive or ability to have sex  Constipation  Unsafe driving  Itching and sweating  Dizziness  Nausea, throwing up and dry mouth    What else should I know about opioids?    Opioids may lead to dependence, tolerance, or addiction.    Dependence means that if you stop or reduce the medicine too quickly, you will have withdrawal symptoms. These include loose poop (diarrhea), jitters, flu-like symptoms, nervousness and tremors. Dependence is not the same as addiction.                     Tolerance means needing higher doses over time to  get the same effect. This may increase the chance of serious side effects.    Addiction is when people improperly use a substance that harms their body, their mind or their relations with others. Use of opiates can cause a relapse of addiction if you have a history of drug or alcohol abuse.    People who have used opioids for a long time may have a lower quality of life, worse depression, higher levels of pain and more visits to doctors.    You can overdose on opioids. Take these steps to lower your risk of overdose:    Recognize the signs:  Signs of overdose include decrease or loss of consciousness (blackout), slowed breathing, trouble waking up and blue lips. If someone is worried about overdose, they should call 911.    Talk to your doctor about Narcan (naloxone).   If you are at risk for overdose, you may be given a prescription for Narcan. This medicine very quickly reverses the effects of opioids.   If you overdose, a friend or family member can give you Narcan while waiting for the ambulance. They need to know the signs of overdose and how to give Narcan.     Don't use alcohol or street drugs.   Taking them with opioids can cause death.    Do not take any of these medicines unless your doctor says it s OK. Taking these with opioids can cause death:  Benzodiazepines, such as lorazepam (Ativan), alprazolam (Xanax) or diazepam (Valium)  Muscle relaxers, such as cyclobenzaprine (Flexeril)  Sleeping pills like zolpidem (Ambien)   Other opioids      How to keep you and other people safe while taking opioids:    Never share your opioids with others.  Opioid medicines are regulated by the Drug Enforcement Agency (CASH). Selling or sharing medications is a criminal act.    2. Be sure to store opioids in a secure place, locked up if possible. Young children can easily swallow them and overdose.    3. When you are traveling with your medicines, keep them in the original bottles. If you use a pill box, be sure you also  carry a copy of your medicine list from your clinic or pharmacy.    4. Safe disposal of opioids    Most pharmacies have places to get rid of medicine, called disposal kiosks. Medicine disposal options are also available in every [facility]. Search your county and  medication disposal  to find more options. You can find more details at:  [email or web address]     I agree that my provider, clinic care team, and pharmacy may work with any city, state or federal law enforcement agency that investigates the misuse, sale, or other diversion of my controlled medicine. I will allow my provider to discuss my care with, or share a copy of, this agreement with any other treating provider, pharmacy or emergency room where I receive care.    I have read this agreement and have asked questions about anything I did not understand.    _______________________________________________________  Patient Signature - Tammy Joshi _____________________                   Date     _______________________________________________________  Provider Signature - TERELL YEBOAH, DNP   _____________________                   Date     _______________________________________________________  Witness Signature (required if provider not present while patient signing)   _____________________                   Date

## 2024-11-25 NOTE — PROGRESS NOTES
[facility]         Date of Visit: 11/24/2024    CHIEF COMPLAINT:   Chief Complaint   Patient presents with    Pain       Subjective   SUBJECTIVE    INTERVAL HISTORY:   Tammy Joshi is a 44 year old female seen for INITIAL EVALUATION on 10/31/24.  They are returning today for lumbar radiculopathy.         Recommendations/ Plan at the last visit included:  Visit discussion: Tammy Joshi is a 44 year old female with a past medical history significant for cervical and lumbar stenosis who presents with complaints of neck and left leg pain.  Patient presents today to address a comprehensive pain management plan as recommended by her primary care provider.  We discussed the process of establishing care within the pain management program; requirements for UDS and CSA before prescribing any opioid medications.  Patient is currently getting an opioid prescription from her primary care provider and advised to continue requesting refills from that provider as we work to establish care in pain management.  We discussed the benefits of various  nonopioid medications to address neuropathic pain.  She currently feels some benefit from pregabalin and is interested in a dose escalation.  Review of renal function indicates there is no limit on pregabalin dosing to safe daily limit.  Increase pregabalin by 25 mg a day for the next 7 days from 50 mg to 75 to achieve a total dose of 75 mg twice daily.  Urine drug screen was ordered today.  Patient advised to return to clinic in 2 to 3 weeks to review controlled substance agreement.  We also discussed the potential benefit of cervical and upper trapezius trigger point injections for neck pain.  We will address this at a future visit.  Patient's questions were answered to her satisfaction.  She verbalizes understanding and agreement with plan.     PLAN:    Medication Management:   - TAPER Pregabalin as follows:    Take 50mg in AM and 75mg at bedtime  "for 7 days, then   Take 75mg in AM and 75mg at bedtime   Continue Current Treatment Plan with:   - Existing medications, as prescribed by your Primary Care Provider    New Orders:   - Urine drug screen to confirm status of controlled substance use.     You reported last use of THC gummies two days ago.   Return to Clinic:   -  30-minute In-Person Appointment with Zaria Hansen, ETHAN, APRN, ELLYC in 2-3 weeks, or sooner if needed    Future Considerations:   At next visit, we will review the Controlled Substance Agreement (CSA).          Interval history from last visit on 10/31/24:   Onset/Progression:   Tammy Joshi is a 44 year old female with history of neck and left hip pain.    Has tried amitriptyline for one week; didn't feel improvement in headaches and stopped.   Uses Percocet 5/325mg - takes 1 tab at 7am, 1 tab mid afternoon, 1 tab at bedtime; feels benefit in 30-40 mins; improves pain from 8/10 to 5/10; effect lasts for 4 hours before pain increases.   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      Pain Problem #1: Left Hip Pain  Pain is focalized to lateral left hip from head of trochanter to proximal knee.  Pain started \"several years ago\" but got worse over the past 4-6 months.   Intermittent numbness/tingling into left leg.   Hard to find a comfortable position.  Pain is worse with moving/bending of leg.   Denies low back pain.   Feels weakness in left leg when descending stairs.     Pain Problem #2: Neck Pain   No known trigger of neck pain.   Pain is left-sided from neck into shoulder.   Experiences numbness and weakness into left arm to elbow.   Intermittent tingling in fingers bilaterally.   Headaches 2-3x/week; photosensitivity, nausea, visual changes; tried Imitrex without improvement.    Pain quality: aching, numbness, pins and needles, radiating, sharp, dull aching, numbness    Pain timing: Constant    Pain score today: Extreme Pain (8)   Pain rating: " "intensity ranges from 5/10 to 9/10, and averages 8/10 on a 0-10 scale.  Aggravating factors include: laying down, standing, sitting, walking, exercise   Relieving factors include: medication, nothing relieves my pain          Since the last visit, Tammy Joshi reports:   Pain score today: Severe Pain (7)   Pain rating: intensity ranges from 4/10 to 9/10, and averages 7/10 on a 0-10 scale.   Currently taking Pregabalin 50mg in AM and 75 mg at bedtime; feels slight benefit.   Percocet 5/325mg - takes 1 tab at 7:30am, 1 tab in early afternoon, 1 tab at bedtime; feels benefit in 30-40 minutes; feels relief for \"a few hours\"; lowers pain from 7/10 to 5/10.       REVIEW OF SYSTEMS:   ROS: 10 point ROS neg other than the symptoms noted above in the HPI.     The patient otherwise denies red flag symptoms, such as: thunderclap headache, bowel or bladder incontinence, parasthesias, weakness, saddle anesthesia, unintentional weight loss, or fever/chills/sweats.     Medical History: Any changes in medical history since they were last seen? No    Medications and Allergies reviewed. Yes     Review of [place] Prescription Monitoring Program ():  reviewed on 11/25/24. No concern for abuse or misuse of controlled medications based on this report. Controlled substances prescribed in the past 6 months include: (Percocet 5/325mg, Pregabalin 75mg, Pregabalin 25mg, Lorazepam 0.5mg)     Current MME: 22.5 / day    Current PDMP reportable controlled substance medications, if any, are being prescribed by: PCP   Primary Care Provider is Mary Wang       CSA due date: 11/25/24   UDS due date:  10/31/24      THE 4 As OF OPIOID MAINTENANCE ANALGESIA    Analgesia: Is pain relief clinically significant? Yes  Activity: Is patient functional and able to perform Activities of Daily Living? Yes  Adverse effects: Is patient free from adverse side effects from opiates? Yes  Adherence to Rx protocol: Is patient adhering to " Controlled Substance Agreement and taking medications ONLY as ordered? Yes    Is Narcan prescribed for opiate use >50 MME daily or concurrent use of opiates and benzodiazepines? Yes          Objective    OBJECTIVE:    Physical Exam  Vitals:    11/25/24 1555   BP: (!) 135/92   Pulse: 93        Appearance:   A&O. Patient is appropriate.   Patient is in NAD.      HHENT:  Normocephalic, atraumatic. Eyes without conjunctival injection or jaundice. Neck supple. No obvious neck masses.     Pulmonary:  Normal effort; no cough or audible wheeze      Skin: No obvious rash, lesions, or petechiae of exposed skin.    Neuro: Cranial nerves grossly intact.  Mentation and speech appropriate for age.     Psych:  Alert, without lethargy or stupor. Speech fluent. Appropriate affect. Mood normal. Able to follow commands without difficulty. Normal mood, judgement and behavior.          Diagnostic Tests/ Imaging/ Labs resulted since last visit:   None       Assessment & Plan      VISIT DIAGNOSIS:   1. Lumbar radiculopathy (Primary)  - Adult Pain Clinic Follow-Up Order  - pregabalin (LYRICA) 100 MG capsule; Take 1 capsule (100 mg) by mouth 2 times daily.  Dispense: 60 capsule; Refill: 0  - Adult Pain Clinic Follow-Up Order; Future    2. Myofascial pain  - Adult Pain Clinic Follow-Up Order  - pregabalin (LYRICA) 100 MG capsule; Take 1 capsule (100 mg) by mouth 2 times daily.  Dispense: 60 capsule; Refill: 0  - Adult Pain Clinic Follow-Up Order; Future    3. Encounter for therapeutic drug monitoring  - Adult Pain Clinic Follow-Up Order  - Adult Pain Clinic Follow-Up Order; Future  - naloxone (NARCAN) 4 MG/0.1ML nasal spray; Spray 1 spray (4 mg) into one nostril alternating nostrils as needed for opioid reversal. every 2-3 minutes until assistance arrives  Dispense: 2 each; Refill: 2    4. Hip pain, left  - Adult Pain Clinic Follow-Up Order  - Adult Pain Clinic Follow-Up Order; Future    5. Chronic, continuous use of opioids  - naloxone  (NARCAN) 4 MG/0.1ML nasal spray; Spray 1 spray (4 mg) into one nostril alternating nostrils as needed for opioid reversal. every 2-3 minutes until assistance arrives  Dispense: 2 each; Refill: 2      ASSESSMENT:   Visit discussion: Tammy Joshi is seen in follow up today at the pain clinic for chronic low back, neck and hip pain.  Patient had a neurosurgery appointment with recommendation to avoid disc replacement in cervical spine until absolutely necessary.  She is scheduled for an injection in the near future.  Today we discussed results of the urine drug screen and the controlled substance prescribers agreement.  We will move forward with transferring pain management prescribing into the pain clinic.  Patient currently has Percocet 5/325 mg and should be due for refill approximately 12/4/2024.  Patient is advised to go home and count the number of Percocet tablets she has remaining.  Report that through [email or web address] and I will process a prescription for Norco 7.5/325 mg 1 tab every 6 hours max 3/day to be filled at the date of next refill.  We will increase pregabalin from 50 mg in a.m. and 75 mg in p.m. to a total dose of 100 mg twice daily by slowly tapering 25 mg every 7 days.  Prescription for pregabalin 100 mg twice daily was sent to pharmacy.  Patient will need 21 tablets of pregabalin 75 mg to achieve full taper.  She will go home and count tab quantity and report if she requires a short bridge of pregabalin 75 mg.  Follow-up in clinic in 4 to 6 weeks to reassess pain control.  Patient verbalizes understanding and agreement with plan.     PLAN:    Medication Management:   - CONTINUE taking Percocet 5/325mg until you are due for the next refill, then we will transition to Norco 7.5/325mg - 1 tab every 6 hours, max 3 per day   - TAPER Pregabalin  as follows:  Take 75mg in AM and 75mg at bedtime for 7 days, then take 75mg in AM and 100mg at bedtime for 7 days, then take 100mg in AM and 100mg at bedtime.   We  signed a controlled substance agreement today.     Return to Clinic:   -  30-minute In-Person Appointment with Zaria Hansen DNP, APRN, FNP-C in 4-6 weeks, or sooner if needed      Future Considerations:   We may consider Buprenorphine micro dosing in the future.          ___________________________________________________________________    BILLING TIME DOCUMENTATION:   TOTAL TIME includes:   Time spent preparing to see the patient: 3 minutes (reviewing records and tests)  Time spend face to face with the patient: 57 minutes  Time spent ordering tests, medications, procedures and referrals: 0 minutes  Time spent Referring and communicating with other healthcare professionals: 0 minutes  Documenting clinical information in Epic: 3 minutes    The total TIME spent on this patient on the day of the appointment was 63 minutes.     ___________________________________________________________________    Review of Electronic Chart: Today I have also reviewed available medical information in the patient's medical record at [facility] ([facility]) and Care Everywhere (if available), including relevant provider notes, laboratory work, and imaging.     PACO Mansfield, ETHAN, CYRUS   [facility]

## 2024-11-26 RX ORDER — HYDROCODONE BITARTRATE AND ACETAMINOPHEN 7.5; 325 MG/1; MG/1
1 TABLET ORAL EVERY 6 HOURS PRN
Qty: 45 TABLET | Refills: 0 | Status: SHIPPED | OUTPATIENT
Start: 2024-11-26

## 2024-11-26 RX ORDER — PREGABALIN 75 MG/1
CAPSULE ORAL
Qty: 9 CAPSULE | Refills: 0 | Status: SHIPPED | OUTPATIENT
Start: 2024-11-26

## 2024-11-26 NOTE — TELEPHONE ENCOUNTER
"11/26/2024 2:31 PM     MEDICATION REQUEST:     This is a patient of mine. PDMP and Chart have been reviewed; refill request is appropriate.    Patient replied: \"I wanted to let you know I counted the meds like you asked. I have 27 percoset left and 12 of the pregabalin. Thank you, Tammy Joshi\"     Patient will need Pregabalin 75mg #21 tabs to achieve upward taper. Sending bridge for 9 tabs.    Patient will switch to Norco 7.5/325mg after this current Percocet 5/325mg script is done. Patient has 9 days of Percocet remaining as of 11/26/24.     New order for [place] will start on 12/05/24     Refilled for 15 day supply.  Script e-Prescribed to pharmacy    PACO Mansfield, DNP, FNP-C   "

## 2024-12-03 DIAGNOSIS — G43.909 MIGRAINE WITHOUT STATUS MIGRAINOSUS, NOT INTRACTABLE, UNSPECIFIED MIGRAINE TYPE: ICD-10-CM

## 2024-12-03 RX ORDER — AMITRIPTYLINE HYDROCHLORIDE 10 MG/1
10-30 TABLET ORAL AT BEDTIME
Qty: 90 TABLET | Refills: 1 | Status: SHIPPED | OUTPATIENT
Start: 2024-12-03

## 2024-12-10 ENCOUNTER — TELEPHONE (OUTPATIENT)
Dept: NEUROSURGERY | Facility: CLINIC | Age: 44
End: 2024-12-10
Payer: COMMERCIAL

## 2024-12-10 NOTE — TELEPHONE ENCOUNTER
Message was left for patient that appointment with Dr. Muñiz on 12-16-24 has been cancelled for the following reason:    patient needs to do PT, an EMG, and an SI joint injection, prior to seeing Dr. Muñiz for a surgical consult.     Patient advised to call [phone] if there are questions.

## 2024-12-18 ENCOUNTER — MYC MEDICAL ADVICE (OUTPATIENT)
Dept: PALLIATIVE MEDICINE | Facility: CLINIC | Age: 44
End: 2024-12-18
Payer: COMMERCIAL

## 2024-12-18 DIAGNOSIS — M79.18 MYOFASCIAL PAIN: ICD-10-CM

## 2024-12-18 DIAGNOSIS — M54.16 LUMBAR RADICULOPATHY: ICD-10-CM

## 2024-12-18 NOTE — TELEPHONE ENCOUNTER
Medication refill information reviewed.     norco last due:  Fill 12/03/24, start 12/05/24 (15 day supply)   Due date:  12/20/24    Tammy EATON Pain Nurse (supporting Zaria Hansen DNP)8 hours ago (12:41 AM)   Hi,  I have been taking the hydrocodone as we discussed.  It has been helping to reduce the pain. I'm not sure wether you want to do a refill of that or go back to the oxycodone since we were going to try going back and forth for awhile.  I was given a 15 day supply and that is almost here. Are you able to do a refill on either the hydrocodone or the oxycodone. I use [facility] [place]. Thank you,  Tammy Joshi       Prescriptions prepped for review.     Elodia RN-BSN  [facility]

## 2024-12-18 NOTE — TELEPHONE ENCOUNTER
Routed to the MA pool to process refill(s) of hydrocodsone.  ______________________________________      Tammy EATON Pain Nurse (supporting Zaria Hansen, ETHAN)8 hours ago (12:41 AM)   Hi,  I have been taking the hydrocodone as we discussed.  It has been helping to reduce the pain. I'm not sure wether you want to do a refill of that or go back to the oxycodone since we were going to try going back and forth for awhile.  I was given a 15 day supply and that is almost here. Are you able to do a refill on either the hydrocodone or the oxycodone. [facility] [place]. Thank you,  Tammy Joshi

## 2024-12-18 NOTE — TELEPHONE ENCOUNTER
Received request for a refill(s) of HYDROcodone-acetaminophen (NORCO) 7.5-325 MG per tablet     Last dispensed from pharmacy on 12/03/24    Patient's last office/virtual visit by prescribing provider on 11/25/24  Next office/virtual appointment scheduled for 01/06/25    Last urine drug screen date 10/31/24  Current opioid agreement on file (completed within the last year) Yes Date of opioid agreement: 11/25/24    E-prescribe to pharmacy-  [facility] [id number] [place], [place] - [address]        Will route to nursing [place] for review and preparation of prescription(s).

## 2024-12-19 RX ORDER — HYDROCODONE BITARTRATE AND ACETAMINOPHEN 7.5; 325 MG/1; MG/1
1 TABLET ORAL EVERY 6 HOURS PRN
Qty: 45 TABLET | Refills: 0 | Status: SHIPPED | OUTPATIENT
Start: 2024-12-19

## 2025-01-05 NOTE — PROGRESS NOTES
[facility]         Date of Visit: Jan 6, 2025     CHIEF COMPLAINT:   Chief Complaint   Patient presents with    Pain       Subjective   SUBJECTIVE    INTERVAL HISTORY:   Tammy Joshi is a 44 year old female seen for INITIAL EVALUATION on 10/31/24; last seen for FOLLOW UP on 11/25/24.  They are returning today for lumbar radiculopathy.         Recommendations/ Plan at the last visit included:  Visit discussion: Tammy Joshi is seen in follow up today at the pain clinic for chronic low back, neck and hip pain.  Patient had a neurosurgery appointment with recommendation to avoid disc replacement in cervical spine until absolutely necessary.  She is scheduled for an injection in the near future.  Today we discussed results of the urine drug screen and the controlled substance prescribers agreement.  We will move forward with transferring pain management prescribing into the pain clinic.  Patient currently has Percocet 5/325 mg and should be due for refill approximately 12/4/2024.  Patient is advised to go home and count the number of Percocet tablets she has remaining.  Report that through [employer] and I will process a prescription for Norco 7.5/325 mg 1 tab every 6 hours max 3/day to be filled at the date of next refill.  We will increase pregabalin from 50 mg in a.m. and 75 mg in p.m. to a total dose of 100 mg twice daily by slowly tapering 25 mg every 7 days.  Prescription for pregabalin 100 mg twice daily was sent to pharmacy.  Patient will need 21 tablets of pregabalin 75 mg to achieve full taper.  She will go home and count tab quantity and report if she requires a short bridge of pregabalin 75 mg.  Follow-up in clinic in 4 to 6 weeks to reassess pain control.  Patient verbalizes understanding and agreement with plan.     PLAN:    Medication Management:   - CONTINUE taking Percocet 5/325mg until you are due for the next refill, then we will transition to Norco 7.5/325mg - 1 tab  "every 6 hours, max 3 per day   - TAPER Pregabalin  as follows:  Take 75mg in AM and 75mg at bedtime for 7 days, then take 75mg in AM and 100mg at bedtime for 7 days, then take 100mg in AM and 100mg at bedtime.   We signed a controlled substance agreement today.   Return to Clinic:   -  30-minute In-Person Appointment with Zaria Hansen, ETHAN, APRN, ELLYC in 4-6 weeks, or sooner if needed    Future Considerations:   We may consider Buprenorphine micro dosing in the future.       Interval history from last visit on 11/25/24:   Pain score today: Severe Pain (7)   Pain rating: intensity ranges from 4/10 to 9/10, and averages 7/10 on a 0-10 scale.   Currently taking Pregabalin 50mg in AM and 75 mg at bedtime; feels slight benefit.   Percocet 5/325mg - takes 1 tab at 7:30am, 1 tab in early afternoon, 1 tab at bedtime; feels benefit in 30-40 minutes; feels relief for \"a few hours\"; lowers pain from 7/10 to 5/10.         Since the last visit, Tammy Joshi reports:   Pain score today: Severe Pain (7)   Pain rating: intensity ranges from 5/10 to 8/10, and averages 6/10 on a 0-10 scale.   Felt some initial benefit from Norco 7.5/325mg but not as effective as Percocet 5/325mg.   Taking Pregabalin 100mg BID; no side effects noted.   Feels some benefit from Pregabalin dosing; occasionally more sleepy.  Has a lump in left upper leg; distal to GT band that is painful to touch.   Has been referred to Orthopedics for left hip pain.   She did not have Left SI joint injection; was sent MyChart communication from Dr. Muñiz's office with instruction to complete PT, EMG and SI injection before follow up.         REVIEW OF SYSTEMS:   ROS: 10 point ROS neg other than the symptoms noted above in the HPI.     The patient otherwise denies red flag symptoms, such as: thunderclap headache, bowel or bladder incontinence, parasthesias, weakness, saddle anesthesia, unintentional weight loss, or fever/chills/sweats.     Medical History: Any " changes in medical history since they were last seen? No    Medications and Allergies reviewed. Yes     Review of [place] Prescription Monitoring Program ():  reviewed on 1/06/25. No concern for abuse or misuse of controlled medications based on this report. Controlled substances prescribed in the past 6 months include: (Norco 7.5/325mg, Pregabalin 100mg, Percocet 5/325mg)     Current MME: 22.5 / day    Current PDMP reportable controlled substance medications, if any, are being prescribed by: Pain Mgmt   Primary Care Provider is Mary Wang       CSA due date: 11/25/25   UDS due date:  10/31/25     THE 4 As OF OPIOID MAINTENANCE ANALGESIA    Analgesia: Is pain relief clinically significant? Yes - but limited   Activity: Is patient functional and able to perform Activities of Daily Living? Yes  Adverse effects: Is patient free from adverse side effects from opiates? Yes  Adherence to Rx protocol: Is patient adhering to Controlled Substance Agreement and taking medications ONLY as ordered? Yes    Is Narcan prescribed for opiate use >50 MME daily or concurrent use of opiates and benzodiazepines? N/A           Objective    OBJECTIVE:    Physical Exam  Vitals:    01/06/25 1452   BP: 120/85   Pulse: 109        Appearance:   A&O. Patient is appropriate.   Patient is in NAD.      HHENT:  Normocephalic, atraumatic. Eyes without conjunctival injection or jaundice. Neck supple. No obvious neck masses.     Pulmonary:  Normal effort; no cough or audible wheeze      Skin: No obvious rash, lesions, or petechiae of exposed skin.    Neuro: Cranial nerves grossly intact.  Mentation and speech appropriate for age.     Psych:  Alert, without lethargy or stupor. Speech fluent. Appropriate affect. Mood normal. Able to follow commands without difficulty. Normal mood, judgement and behavior.            Diagnostic Tests/ Imaging/ Labs resulted since last visit:   None       Assessment & Plan      VISIT DIAGNOSIS:   1.  Chronic pain syndrome (Primary)  - buprenorphine (BUTRANS) 7.5 MCG/HR WK patch; Place 1 patch onto the skin every 7 days. Rotate patch location  Dispense: 4 patch; Refill: 0  - Adult Pain Clinic Follow-Up Order; Future    2. Hip pain, left  - Adult Pain Clinic Follow-Up Order  - Adult Pain Clinic Follow-Up Order; Future    3. Lumbar radiculopathy  - Adult Pain Clinic Follow-Up Order  - buprenorphine (BUTRANS) 7.5 MCG/HR WK patch; Place 1 patch onto the skin every 7 days. Rotate patch location  Dispense: 4 patch; Refill: 0  - Adult Pain Clinic Follow-Up Order; Future    4. Myofascial pain  - Adult Pain Clinic Follow-Up Order  - buprenorphine (BUTRANS) 7.5 MCG/HR WK patch; Place 1 patch onto the skin every 7 days. Rotate patch location  Dispense: 4 patch; Refill: 0  - Adult Pain Clinic Follow-Up Order; Future    5. Encounter for therapeutic drug monitoring  - Adult Pain Clinic Follow-Up Order  - Adult Pain Clinic Follow-Up Order; Future    6. Chronic, continuous use of opioids  - buprenorphine (BUTRANS) 7.5 MCG/HR WK patch; Place 1 patch onto the skin every 7 days. Rotate patch location  Dispense: 4 patch; Refill: 0  - Adult Pain Clinic Follow-Up Order; Future      ASSESSMENT:   Visit discussion: Tammy Joshi is seen in follow up today at the pain clinic for chronic pain syndrome and lumbar radiculopathy.  Patient is not noted significant improvement in pain control with opioid rotation to Norco 7.5/325 mg.  We discussed potential benefit of buprenorphine micro dosing and she would like to move forward with this option if approved by insurance.  Risks, benefits and side effects of buprenorphine were discussed for both the buccal film and transdermal patch.  Patient would like to proceed with transdermal patches approved.  Considering that she is already taking daily opioid dosing, initial prescription for Butrans 7.5 mcg/h was sent to pharmacy.  Patient is advised to continue current short acting opioids until  buprenorphine is approved.  She has recently refilled Norco 7.5/325 mg tabs and has a 12-day supply remaining as of today.  If Butrans is delayed for prior authorization, then okay to switch to Percocet 5/325 mg max 3/day at next refill request.  Otherwise, I advise starting Butrans  7.5 mcg/h for 1 full week then start downward taper off short acting opioids as of week to patch application.  Patient had several questions regarding insurance authorization and is concerned about not having opioid medication for pain control.  I did encourage her that we will continue current short acting opioids until insurance has approved buprenorphine.  Patient was advised that it may take several weeks to optimize buprenorphine dosing and we will work through this incrementally.  Return to clinic in 6 weeks to reassess pain control.  Patient was encouraged to continue with orthopedic evaluation for left hip.  She is also was encouraged to contact neurosurgery for clarification on order to proceed with SI joint injection.  Patient verbalizes understanding and agreement with plan.      PLAN:    Medication Management:   - Pending Prior Authorization: START Butrans 7.5mcg/hr patch - place ONE patch on skin every 7 days; remove old patch before placing new one.  - After one week on Butrans patch, we will start a taper off the short acting opioids.     Continue Current Treatment Plan with:   Orthopedics for evaluation of left hip pain.        Return to Clinic:   -  30-minute In-Person Appointment with Zaria Hansen, ETHAN, APRN, FNP-C in 6 weeks, or sooner if needed      Future Considerations:   If Butrans is denied by your insurance, we will appeal for approval. In the meantime, you may continue Norco 7.5/325mg for the next 12 days. If you have not started Butrans by the time your Norco prescription runs out, it is okay to switch back to Percocet 5/325mg at previous dose.         ___________________________________________________________________    BILLING TIME DOCUMENTATION:   TOTAL TIME includes:   Time spent preparing to see the patient: 2 minutes (reviewing records and tests)  Time spend face to face with the patient: 39 minutes  Time spent ordering tests, medications, procedures and referrals: 0 minutes  Time spent Referring and communicating with other healthcare professionals: 0 minutes  Documenting clinical information in Epic: 4 minutes    The total TIME spent on this patient on the day of the appointment was 45 minutes.     ___________________________________________________________________    Review of Electronic Chart: Today I have also reviewed available medical information in the patient's medical record at [facility] ([facility]) and Care Everywhere (if available), including relevant provider notes, laboratory work, and imaging.     Zarai Hansen, PACO, DNP, FNP-C   [facility]

## 2025-01-06 ENCOUNTER — PATIENT OUTREACH (OUTPATIENT)
Dept: CARE COORDINATION | Facility: CLINIC | Age: 45
End: 2025-01-06

## 2025-01-06 ENCOUNTER — OFFICE VISIT (OUTPATIENT)
Dept: PALLIATIVE MEDICINE | Facility: CLINIC | Age: 45
End: 2025-01-06
Attending: NURSE PRACTITIONER
Payer: COMMERCIAL

## 2025-01-06 VITALS — SYSTOLIC BLOOD PRESSURE: 120 MMHG | DIASTOLIC BLOOD PRESSURE: 85 MMHG | HEART RATE: 109 BPM

## 2025-01-06 DIAGNOSIS — G89.4 CHRONIC PAIN SYNDROME: Primary | ICD-10-CM

## 2025-01-06 DIAGNOSIS — Z51.81 ENCOUNTER FOR THERAPEUTIC DRUG MONITORING: ICD-10-CM

## 2025-01-06 DIAGNOSIS — F11.90 CHRONIC, CONTINUOUS USE OF OPIOIDS: ICD-10-CM

## 2025-01-06 DIAGNOSIS — M25.552 HIP PAIN, LEFT: ICD-10-CM

## 2025-01-06 DIAGNOSIS — M54.16 LUMBAR RADICULOPATHY: ICD-10-CM

## 2025-01-06 DIAGNOSIS — M79.18 MYOFASCIAL PAIN: ICD-10-CM

## 2025-01-06 PROCEDURE — G2211 COMPLEX E/M VISIT ADD ON: HCPCS | Performed by: NURSE PRACTITIONER

## 2025-01-06 PROCEDURE — 99215 OFFICE O/P EST HI 40 MIN: CPT | Performed by: NURSE PRACTITIONER

## 2025-01-06 RX ORDER — BUPRENORPHINE 7.5 UG/H
1 PATCH TRANSDERMAL
Qty: 4 PATCH | Refills: 0 | Status: SHIPPED | OUTPATIENT
Start: 2025-01-06

## 2025-01-06 ASSESSMENT — PAIN SCALES - GENERAL: PAINLEVEL_OUTOF10: SEVERE PAIN (7)

## 2025-01-06 NOTE — PATIENT INSTRUCTIONS
PATIENT INSTRUCTIONS:     I am recommending a multidisciplinary treatment plan to help this patient better manage her pain. The following recommendations were given to the patient. Diagnosis, treatment options, risks, benefits, and alternatives were discussed; all questions were answered. Self-care instructions were given. The patient expressed understanding of the plan for management.   Medication Management:   - Pending Prior Authorization: START Butrans 7.5mcg/hr patch - place ONE patch on skin every 7 days; remove old patch before placing new one.  - After one week on Butrans patch, we will start a taper off the short acting opioids.     Continue Current Treatment Plan with:   Orthopedics for evaluation of left hip pain.        Return to Clinic:   -  30-minute In-Person Appointment with Zaria Hansen, ETHAN, APRN, ELLYC in 6 weeks, or sooner if needed      Future Considerations:   If Butrans is denied by your insurance, we will appeal for approval. In the meantime, you may continue Norco 7.5/325mg for the next 12 days. If you have not started Butrans by the time your Norco prescription runs out, it is okay to switch back to Percocet 5/325mg at previous dose.      Administration of Butrans    Butrans is for transdermal use (on intact skin) only.   Each Butrans patch is intended to be worn for 7 days  Do not to use Butrans if the pouch seal is broken or the patch is cut, damaged, or changed in any way  Do not to cut the Butrans patch  Apply immediately after removal from the individually sealed pouch  Apply Butrans to the upper outer arm, upper chest, upper back or the side of the chest. These 4 sites (each present on both sides of the body) provide 8 possible application sites. Rotate Butrans among the 8 described skin sites. After Butrans removal, wait a minimum of 21 days before reapplying to the same skin site          For the use of 2 patches, remove your current patch, and apply the 2 new patches adjacent to  one another at a different application site  Apply Butrans to a hairless or nearly hairless skin site.   If none are available, the hair at the site should be clipped, not shaven.   Do not apply Butrans to irritated skin.   If the application site must be cleaned, clean the site with water only. Do not use soaps, alcohol, oils, lotions, or abrasive devices.   Allow the skin to dry before applying Butrans  It is OK to bathe or shower with the patch on. Lightly pat it dry.   If problems with adhesion of Butrans occur, the edges may be taped with first aid tape.   If problems with lack of adhesion continue, the patch may be covered with waterproof or semipermeable adhesive dressings (Tegaderm) suitable for 7 days of wear.   If Butrans falls off during the 7-day dosing interval, dispose of the transdermal system properly and place a new Butrans patch on at a different skin site.   Dispose of used patches by folding the adhesive side of the patch to itself, then flushing the patch down the toilet immediately upon removal.   Unused patches should be removed from their pouches, the protective liners removed, the patches folded so that the adhesive side of the patch adheres to itself, and immediately flushed down the toilet  Dispose of any patches remaining from a prescription as soon as they are no longer needed    Butrans is supplied in cartons containing 4 individually packaged systems and   a pouch containing 4 Patch-Disposal Units             ----------------------------------------------------------------  [facility] Number:  [phone]   Call with any questions about your care and for scheduling assistance.   Calls are returned Monday through Friday between 8 AM and 4:30 PM. We usually get back to you within 2 business days depending on the issue/request.    If we are prescribing your medications:  For opioid medication refills, call the clinic or send a [employer] message 7 days in advance.  Please include:  Name of  requested medication  Name of the pharmacy.  For non-opioid medications, call your pharmacy directly to request a refill. Please allow 3-4 days to be processed.   Per [place] State Law:  All controlled substance prescriptions must be filled within 30 days of being written.    For those controlled substances allowing refills, pickup must occur within 30 days of last fill.      We believe regular attendance is key to your success in our program!    Any time you are unable to keep your appointment we ask that you call us at least 24 hours in advance to cancel.This will allow us to offer the appointment time to another patient.   Multiple missed appointments may lead to dismissal from the clinic.

## 2025-01-08 ENCOUNTER — PATIENT OUTREACH (OUTPATIENT)
Dept: CARE COORDINATION | Facility: CLINIC | Age: 45
End: 2025-01-08
Payer: COMMERCIAL

## 2025-01-13 ENCOUNTER — MYC REFILL (OUTPATIENT)
Dept: FAMILY MEDICINE | Facility: CLINIC | Age: 45
End: 2025-01-13
Payer: COMMERCIAL

## 2025-01-13 ENCOUNTER — MYC MEDICAL ADVICE (OUTPATIENT)
Dept: FAMILY MEDICINE | Facility: CLINIC | Age: 45
End: 2025-01-13
Payer: COMMERCIAL

## 2025-01-13 ENCOUNTER — MYC MEDICAL ADVICE (OUTPATIENT)
Dept: PALLIATIVE MEDICINE | Facility: CLINIC | Age: 45
End: 2025-01-13
Payer: COMMERCIAL

## 2025-01-13 DIAGNOSIS — E11.65 TYPE 2 DIABETES MELLITUS WITH HYPERGLYCEMIA, WITHOUT LONG-TERM CURRENT USE OF INSULIN (H): ICD-10-CM

## 2025-01-13 DIAGNOSIS — M54.16 LUMBAR RADICULOPATHY: ICD-10-CM

## 2025-01-13 DIAGNOSIS — M79.18 MYOFASCIAL PAIN: ICD-10-CM

## 2025-01-14 NOTE — TELEPHONE ENCOUNTER
Mary,    Please see patient update and request to discontinue Metformin.    Thank you  Wan DICKINSON RN  [facility]

## 2025-01-14 NOTE — TELEPHONE ENCOUNTER
Per patient [email or web address] message:  Tammy EATON Pain Nurse (supporting Zaria Hansen DNP)18 hours ago (1:54 PM)     Hi,  I started my buprenorphine patch but I will still need 1 more refill of the Norco because if I follow that tapering plan you sent I will need more than I have.  I will run out on Saturday. Thank you,  Tammy Joshi

## 2025-01-14 NOTE — TELEPHONE ENCOUNTER
Received request for a refill(s) of HYDROcodone-acetaminophen (NORCO) 7.5-325 MG per tablet      Last dispensed from pharmacy on 01/03/25    Hi, I started my buprenorphine patch but I will still need 1 more refill of the Norco because if I follow that tapering plan you sent I will need more than I have. I will run out on Saturday. Thank you, Tammy Joshi       Patient's last office/virtual visit by prescribing provider on 01/06/25  Next office/virtual appointment scheduled for 02/20/25    Last urine drug screen date 10/31/24  Current opioid agreement on file (completed within the last year) Yes Date of opioid agreement: 11/25/24    [facility] [id number] [place] [facility] [place], [place] - [facility] [address]       Will route to nursing [place] for review and preparation of prescription(s).

## 2025-01-15 NOTE — TELEPHONE ENCOUNTER
Medication refill information reviewed.     norco last due:  Fill now, start 01/04/2025 (15 day supply)     Per Zaria in the 1/9 mychart encounter:    Zaria Hansen, DNP to Tammy Joshi     1/10/25 12:49 PM  Tammy,   Here is the taper plan:     Week 1:   Start Butrans 7.5mcg/hr patch - apply ONE patch to clean skin every 7 days, rotate patch location.   Continue Norco 7.5/325mg - 1 tab every 6 hours; max 3 per day     Week 2:  Apply new Butrans 7.5mcg/hr patch.   Take Norco 7.5/325mg - 1 tab every 8 hours; max 2 per day     Week 3:   Apply new Butrans 7.5mcg/hr patch.  Take Norco 7.5/325mg - 0.5-1 tab every 8 hours; max 1 per day.     Week 4:  Apply new Butrans 7.5mcg/hr patch.  Stop Norco.  _________________________________    Mychart sent to pt:  You  Tammy JoshiJust now (12:54 PM)   Hi Tammy,  Are you currently on norco a max of 3 tabs/day?  What day did you begin the butrans?  When are you due to change to the 2nd patch?        Elodia, RN-BSN  [facility]   [phone]

## 2025-01-16 RX ORDER — HYDROCODONE BITARTRATE AND ACETAMINOPHEN 7.5; 325 MG/1; MG/1
TABLET ORAL
Qty: 24 TABLET | Refills: 0 | Status: SHIPPED | OUTPATIENT
Start: 2025-01-16 | End: 2025-02-01

## 2025-01-16 NOTE — TELEPHONE ENCOUNTER
1/16/2025 4:33 PM     REFILL REQUEST:     This is a patient of mine. PDMP and Chart have been reviewed; refill request is appropriate.  Refilled for taper to discontinue.         Script e-Prescribed to pharmacy    PACO [place], ETHAN, FNP-C

## 2025-01-16 NOTE — TELEPHONE ENCOUNTER
This has been routed to [facility] for refill. Teams message sent.    Bushra Bundy RN

## 2025-01-23 ENCOUNTER — OFFICE VISIT (OUTPATIENT)
Dept: ORTHOPEDICS | Facility: CLINIC | Age: 45
End: 2025-01-23
Attending: PHYSICAL MEDICINE & REHABILITATION
Payer: COMMERCIAL

## 2025-01-23 VITALS — BODY MASS INDEX: 28.15 KG/M2 | WEIGHT: 164 LBS

## 2025-01-23 DIAGNOSIS — M70.62 TROCHANTERIC BURSITIS, LEFT HIP: ICD-10-CM

## 2025-01-23 DIAGNOSIS — E11.9 TYPE 2 DIABETES MELLITUS WITHOUT COMPLICATION, WITHOUT LONG-TERM CURRENT USE OF INSULIN (H): ICD-10-CM

## 2025-01-23 DIAGNOSIS — M76.02 GLUTEAL TENDINITIS OF LEFT BUTTOCK: ICD-10-CM

## 2025-01-23 DIAGNOSIS — M76.32 IT BAND SYNDROME, LEFT: ICD-10-CM

## 2025-01-23 DIAGNOSIS — M25.552 HIP PAIN, LEFT: Primary | ICD-10-CM

## 2025-01-23 RX ORDER — LIDOCAINE HYDROCHLORIDE 10 MG/ML
2 INJECTION, SOLUTION INFILTRATION; PERINEURAL
Status: COMPLETED | OUTPATIENT
Start: 2025-01-23 | End: 2025-01-23

## 2025-01-23 RX ORDER — TRIAMCINOLONE ACETONIDE 40 MG/ML
40 INJECTION, SUSPENSION INTRA-ARTICULAR; INTRAMUSCULAR
Status: COMPLETED | OUTPATIENT
Start: 2025-01-23 | End: 2025-01-23

## 2025-01-23 RX ORDER — BUPIVACAINE HYDROCHLORIDE 5 MG/ML
2 INJECTION, SOLUTION PERINEURAL
Status: COMPLETED | OUTPATIENT
Start: 2025-01-23 | End: 2025-01-23

## 2025-01-23 RX ADMIN — TRIAMCINOLONE ACETONIDE 40 MG: 40 INJECTION, SUSPENSION INTRA-ARTICULAR; INTRAMUSCULAR at 16:17

## 2025-01-23 RX ADMIN — LIDOCAINE HYDROCHLORIDE 2 ML: 10 INJECTION, SOLUTION INFILTRATION; PERINEURAL at 16:17

## 2025-01-23 RX ADMIN — BUPIVACAINE HYDROCHLORIDE 2 ML: 5 INJECTION, SOLUTION PERINEURAL at 16:17

## 2025-01-23 NOTE — LETTER
2025      Tammy Joshi  [address] [facility] [address]      Dear Colleague,    Thank you for referring your patient, Tammy Joshi, to the [facility] [place]. Please see a copy of my visit note below.    Tammy Joshi  :  1980  DOS: 2025  MRN: [id number]    Sports Medicine Clinic Visit    PCP: Mary Wang    Tammy Joshi is a 44 year old female who is seen in consultation at the request of  Jameson Randall D.O.presenting with left hip and lateral thigh pain    Injury: Gradual onset of pain over the past 8 month(s).  Pain located over left posterolateral hip, proximal lateral thigh, nonradiating.  Reports constant pain.  Additional Features:  Positive: instability and weakness.  Symptoms are better with Heat.  Symptoms are worse with: walking, laying on left side, sitting, physical therapy.  Other evaluation and/or treatments so far consists of: physical therapy, heat, ice, lumbar epidural steroid injections, narcotic pain medication.  Recent imaging completed: MRI left femur completed 10/19/24, CT left femur completed 10/8/24, XR left femur completed 24, left lower extremity EMG completed 24.  Prior History of related problems: chronic left hip pain    Social History: currently employed as     Review of Systems  Musculoskeletal: as above  Remainder of review of systems is negative including constitutional, CV, pulmonary, GI, Skin and Neurologic except as noted in HPI or medical history.    Past Medical History:   Diagnosis Date     Absence of menstruation      Anxiety      Depressive disorder      Diabetes (H)      Female infertility of unspecified origin      MILD/NOS PREECLAMP-ANTEP 2005     Polycystic ovaries      PPH (postpartum hemorrhage) 2009     Past Surgical History:   Procedure Laterality Date     D & C      Missed Ab     LITHOTRIPSY       [facility] HAND/FINGER SURGERY UNLISTED   7th grade    left index     Family History   Adopted: Yes   Problem Relation Age of Onset     Unknown/Adopted Other         patient was adopted; knows a limited amount of medical history of birth parents       Objective  Wt 74.4 kg (164 lb)   BMI 28.15 kg/m      General: healthy, alert and in no distress    HEENT: no scleral icterus or conjunctival erythema   Skin: no suspicious lesions or rash. No jaundice.   CV: regular rhythm by palpation, 2+ distal pulses, no pedal edema    Resp: normal respiratory effort without conversational dyspnea   Psych: normal mood and affect    Gait: mildly antalgic, appropriate coordination and balance   Neuro: normal light touch sensory exam of the extremities. Motor strength as noted below     Left hip exam    Inspection:        no edema or ecchymosis in hip area    ROM:       Full active and passive ROM     Strength:        flexion 5/5       extension 5/5       abduction 5/5       adduction 5/5    Tender:        greater trochanter       SI joint       Lateral aspect of vastus lateralis       IT band       Gluteus medius    Non Tender:        remainder of hip area       illiac crest    Sensation:        grossly intact in hip and thigh       Somewhat hypersensitive along the lateral thigh, neg Tinel's over LFCN    Skin:       well perfused       capillary refill brisk    Special Tests:        neg (-) COLBY       neg FADIR       neg (-) scour       painful Ashok    Radiology  Exam: MRI of the left femur dated 10/19/2024.     COMPARISON: 9/30/2024.     CLINICAL HISTORY: Pain of femur.     TECHNIQUE: Multiplanar, multisequence MR imaging of the left femur was  obtained using standard sequences in 3 orthogonal planes without the  use of intravenous or intra-articular gadolinium contrast.     FINDINGS:     No marrow signal abnormalities to suggest fracture or stress changes.  No marrow infiltration.     Minimal fluid in the left knee joint. No significant joint effusion in  either hip.      No soft tissue masses or fluid collections. The muscle bulk is intact  without significant atrophy or soft tissue edema.     No full-thickness tendon tear or tendon retraction.                                                                      IMPRESSION:  1. No marrow signal are visualized to suggest fracture or marrow  infiltration.  2. No soft tissue masses or fluid collections.     MURALI HOLLAND MD   =========================  EXAM: CT PELVIS SOFT TISSUE WO CONTRAST, CT FEMUR THIGH LEFT W/O CONTRAST  LOCATION: [facility]  DATE: 10/8/2024     INDICATION: Left gluteal pain femur pain, eval for intra abdominal cause  COMPARISON: None.  TECHNIQUE: CT scan of the pelvis was performed without IV contrast. Multiplanar reformats were obtained. Dose reduction techniques were used.  CONTRAST: None.     FINDINGS:     PELVIS ORGANS: Included small and large bowel are unremarkable. The bladder is unremarkable. The within limits of a noncontrast exam the uterus and adnexa are unremarkable.      MUSCULOSKELETAL: The bones are well-mineralized. The bony pelvis is intact the SI joints are patent. The hip joints are well aligned. The left femur is intact the included portions of the proximal left tibia and fibula are unremarkable. The knee joints   well aligned. No acute fracture or malalignment is identified. No erosive or destructive bony changes are identified.     The musculotendinous and soft tissue structures of the included left leg and hip are unremarkable.                                                                      IMPRESSION:  1.  Unremarkable noncontrast CT of the pelvis and left thigh.   2.  No worrisome osseous lesion of the bony pelvis or left femur.  =======================================  EXAM: XR FEMUR LEFT 2 VIEWS  LOCATION: [facility]  DATE: 09/30/2024     INDICATION: Left femur pain.  COMPARISON: Left hip MRI dated 08/09/2024.                                                                       IMPRESSION: Intact left femur. There is no evidence of an acute fracture. No suspicious lytic or blastic lesion identified. No abnormal soft tissue calcification.  ===============================    Comment EMG: Normal study  1.  Normal needle EMG left lower extremity.     Interpretation: Normal study     1. There is no electrodiagnostic evidence of lumbosacral radiculopathy, lumbosacral plexopathy, or focal neuropathy in the left lower extremity.    Assessment:  1. Hip pain, left    2. Gluteal tendinitis of left buttock    3. Trochanteric bursitis, left hip    4. It band syndrome, left    5. Type 2 diabetes mellitus without complication, without long-term current use of insulin (H)        Plan:  Discussed the assessment with the patient.  Follow up: 3 weeks with me  Has seen many specialties and had extensive workup thus far: multiple MRIs of hip, lumbar spine and femur, multiple CTs, XR, EMG, blood work  No clear concern for autoimmune process, inflammatory markers were negative in the past, could workup further but defer for now  Clinically she has pain currently consistent with trochanteric bursitis > IT band syndrome > possible meralgia paresthetica  Agree with PM&R that there are not classic signs of LFCN neuropathy, but still on ddx  Many tx tried so far including pain medication, oral steroid, epidural injection, nerve pain medications, PT, pain mgmt consultation  Reviewed option for diagnostic and hopefully therapeutic injection trial, will start with trochanteric bursa which was performed today under US guidance  Can consider LFCN hydrodissection/block vs TPI in the future  Plan to restart PT when pain improving, defer for now, dry needling could be considered if the pain continues to show signs of being primarily muscular  MR, CT and XR images independently visualized and reviewed with patient today in clinic  Oral Tylenol and topical Voltaren gel  reviewed as safe OTC options, reviewed safe dosing strategies  Expectations and goals of CSI reviewed  Often 2-3 days for steroid effect, and can take up to two weeks for maximum effect  We discussed modified progressive pain-free activity as tolerated  Do not overuse in first two weeks if feeling better due to concern for vulnerability while steroid is working  Supportive care reviewed  All questions were answered today  Contact us with additional questions or concerns  Signs and sx of concern reviewed    Thanks very much for sending this nice lady to us, I will keep you updated with her progress      Esteban Lema DO, [facility]  Sports Medicine Physician  [facility]    Large Joint Injection/Arthocentesis: L greater trochanteric bursa    Date/Time: 1/23/2025 4:17 PM    Performed by: Esteban Lema DO  Authorized by: Esteban Lema DO    Indications:  Pain  Needle Size:  25 G  Guidance: landmark guided    Approach:  Posterolateral  Location:  Hip      Site:  L greater trochanteric bursa  Medications:  2 mL lidocaine 1 %; 40 mg triamcinolone 40 MG/ML; 2 mL BUPivacaine 0.5 %  Outcome:  Tolerated well, no immediate complications  Procedure discussed: discussed risks, benefits, and alternatives    Consent Given by:  Patient  Timeout: timeout called immediately prior to procedure    Prep: patient was prepped and draped in usual sterile fashion     Ultrasound images of procedure were permanently stored.         Disclaimer: This note consists of symbols derived from keyboarding, dictation and/or voice recognition software. As a result, there may be errors in the script that have gone undetected. Please consider this when interpreting information found in this chart.      Again, thank you for allowing me to participate in the care of your patient.        Sincerely,        Esteban Lema DO    Electronically signed

## 2025-01-23 NOTE — PROGRESS NOTES
Tammy Joshi  :  1980  DOS: 2025  MRN: [id number]    Sports Medicine Clinic Visit    PCP: Mary Wang    Tammy Joshi is a 44 year old female who is seen in consultation at the request of  Jameson Randall D.O.presenting with left hip and lateral thigh pain    Injury: Gradual onset of pain over the past 8 month(s).  Pain located over left posterolateral hip, proximal lateral thigh, nonradiating.  Reports constant pain.  Additional Features:  Positive: instability and weakness.  Symptoms are better with Heat.  Symptoms are worse with: walking, laying on left side, sitting, physical therapy.  Other evaluation and/or treatments so far consists of: physical therapy, heat, ice, lumbar epidural steroid injections, narcotic pain medication.  Recent imaging completed: MRI left femur completed 10/19/24, CT left femur completed 10/8/24, XR left femur completed 24, left lower extremity EMG completed 24.  Prior History of related problems: chronic left hip pain    Social History: currently employed as     Review of Systems  Musculoskeletal: as above  Remainder of review of systems is negative including constitutional, CV, pulmonary, GI, Skin and Neurologic except as noted in HPI or medical history.    Past Medical History:   Diagnosis Date    Absence of menstruation     Anxiety     Depressive disorder     Diabetes (H)     Female infertility of unspecified origin     MILD/NOS PREECLAMP-ANTEP 2005    Polycystic ovaries     PPH (postpartum hemorrhage) 2009     Past Surgical History:   Procedure Laterality Date    D & C      Missed Ab    LITHOTRIPSY      [facility] HAND/FINGER SURGERY UNLISTED  7th grade    left index     Family History   Adopted: Yes   Problem Relation Age of Onset    Unknown/Adopted Other         patient was adopted; knows a limited amount of medical history of birth parents       Objective  Wt 74.4 kg (164 lb)   BMI 28.15 kg/m       General: healthy, alert and in no distress    HEENT: no scleral icterus or conjunctival erythema   Skin: no suspicious lesions or rash. No jaundice.   CV: regular rhythm by palpation, 2+ distal pulses, no pedal edema    Resp: normal respiratory effort without conversational dyspnea   Psych: normal mood and affect    Gait: mildly antalgic, appropriate coordination and balance   Neuro: normal light touch sensory exam of the extremities. Motor strength as noted below     Left hip exam    Inspection:        no edema or ecchymosis in hip area    ROM:       Full active and passive ROM     Strength:        flexion 5/5       extension 5/5       abduction 5/5       adduction 5/5    Tender:        greater trochanter       SI joint       Lateral aspect of vastus lateralis       IT band       Gluteus medius    Non Tender:        remainder of hip area       illiac crest    Sensation:        grossly intact in hip and thigh       Somewhat hypersensitive along the lateral thigh, neg Tinel's over LFCN    Skin:       well perfused       capillary refill brisk    Special Tests:        neg (-) COLBY       neg FADIR       neg (-) scour       painful Ashok    Radiology  Exam: MRI of the left femur dated 10/19/2024.     COMPARISON: 9/30/2024.     CLINICAL HISTORY: Pain of femur.     TECHNIQUE: Multiplanar, multisequence MR imaging of the left femur was  obtained using standard sequences in 3 orthogonal planes without the  use of intravenous or intra-articular gadolinium contrast.     FINDINGS:     No marrow signal abnormalities to suggest fracture or stress changes.  No marrow infiltration.     Minimal fluid in the left knee joint. No significant joint effusion in  either hip.     No soft tissue masses or fluid collections. The muscle bulk is intact  without significant atrophy or soft tissue edema.     No full-thickness tendon tear or tendon retraction.                                                                       IMPRESSION:  1. No marrow signal are visualized to suggest fracture or marrow  infiltration.  2. No soft tissue masses or fluid collections.     MURALI HOLLAND MD   =========================  EXAM: CT PELVIS SOFT TISSUE WO CONTRAST, CT FEMUR THIGH LEFT W/O CONTRAST  LOCATION: [facility]  DATE: 10/8/2024     INDICATION: Left gluteal pain femur pain, eval for intra abdominal cause  COMPARISON: None.  TECHNIQUE: CT scan of the pelvis was performed without IV contrast. Multiplanar reformats were obtained. Dose reduction techniques were used.  CONTRAST: None.     FINDINGS:     PELVIS ORGANS: Included small and large bowel are unremarkable. The bladder is unremarkable. The within limits of a noncontrast exam the uterus and adnexa are unremarkable.      MUSCULOSKELETAL: The bones are well-mineralized. The bony pelvis is intact the SI joints are patent. The hip joints are well aligned. The left femur is intact the included portions of the proximal left tibia and fibula are unremarkable. The knee joints   well aligned. No acute fracture or malalignment is identified. No erosive or destructive bony changes are identified.     The musculotendinous and soft tissue structures of the included left leg and hip are unremarkable.                                                                      IMPRESSION:  1.  Unremarkable noncontrast CT of the pelvis and left thigh.   2.  No worrisome osseous lesion of the bony pelvis or left femur.  =======================================  EXAM: XR FEMUR LEFT 2 VIEWS  LOCATION: [facility]  DATE: 09/30/2024     INDICATION: Left femur pain.  COMPARISON: Left hip MRI dated 08/09/2024.                                                                      IMPRESSION: Intact left femur. There is no evidence of an acute fracture. No suspicious lytic or blastic lesion identified. No abnormal soft tissue  calcification.  ===============================    Comment EMG: Normal study  1.  Normal needle EMG left lower extremity.     Interpretation: Normal study     1. There is no electrodiagnostic evidence of lumbosacral radiculopathy, lumbosacral plexopathy, or focal neuropathy in the left lower extremity.    Assessment:  1. Hip pain, left    2. Gluteal tendinitis of left buttock    3. Trochanteric bursitis, left hip    4. It band syndrome, left    5. Type 2 diabetes mellitus without complication, without long-term current use of insulin (H)        Plan:  Discussed the assessment with the patient.  Follow up: 3 weeks with me  Has seen many specialties and had extensive workup thus far: multiple MRIs of hip, lumbar spine and femur, multiple CTs, XR, EMG, blood work  No clear concern for autoimmune process, inflammatory markers were negative in the past, could workup further but defer for now  Clinically she has pain currently consistent with trochanteric bursitis > IT band syndrome > possible meralgia paresthetica  Agree with PM&R that there are not classic signs of LFCN neuropathy, but still on ddx  Many tx tried so far including pain medication, oral steroid, epidural injection, nerve pain medications, PT, pain mgmt consultation  Reviewed option for diagnostic and hopefully therapeutic injection trial, will start with trochanteric bursa which was performed today under US guidance  Can consider LFCN hydrodissection/block vs TPI in the future  Plan to restart PT when pain improving, defer for now, dry needling could be considered if the pain continues to show signs of being primarily muscular  MR, CT and XR images independently visualized and reviewed with patient today in clinic  Oral Tylenol and topical Voltaren gel reviewed as safe OTC options, reviewed safe dosing strategies  Expectations and goals of CSI reviewed  Often 2-3 days for steroid effect, and can take up to two weeks for maximum effect  We discussed  modified progressive pain-free activity as tolerated  Do not overuse in first two weeks if feeling better due to concern for vulnerability while steroid is working  Supportive care reviewed  All questions were answered today  Contact us with additional questions or concerns  Signs and sx of concern reviewed    Thanks very much for sending this nice lady to us, I will keep you updated with her progress      Esteban Lema DO, [facility]  Sports Medicine Physician  [facility]    Large Joint Injection/Arthocentesis: L greater trochanteric bursa    Date/Time: 1/23/2025 4:17 PM    Performed by: Esteban Lema DO  Authorized by: Esteban Lema DO    Indications:  Pain  Needle Size:  25 G  Guidance: landmark guided    Approach:  Posterolateral  Location:  Hip      Site:  L greater trochanteric bursa  Medications:  2 mL lidocaine 1 %; 40 mg triamcinolone 40 MG/ML; 2 mL BUPivacaine 0.5 %  Outcome:  Tolerated well, no immediate complications  Procedure discussed: discussed risks, benefits, and alternatives    Consent Given by:  Patient  Timeout: timeout called immediately prior to procedure    Prep: patient was prepped and draped in usual sterile fashion     Ultrasound images of procedure were permanently stored.         Disclaimer: This note consists of symbols derived from keyboarding, dictation and/or voice recognition software. As a result, there may be errors in the script that have gone undetected. Please consider this when interpreting information found in this chart.

## 2025-02-02 ENCOUNTER — MYC MEDICAL ADVICE (OUTPATIENT)
Dept: FAMILY MEDICINE | Facility: CLINIC | Age: 45
End: 2025-02-02
Payer: COMMERCIAL

## 2025-02-02 DIAGNOSIS — E11.9 TYPE 2 DIABETES MELLITUS WITHOUT COMPLICATION, WITHOUT LONG-TERM CURRENT USE OF INSULIN (H): ICD-10-CM

## 2025-02-03 ENCOUNTER — LAB (OUTPATIENT)
Dept: LAB | Facility: CLINIC | Age: 45
End: 2025-02-03
Payer: COMMERCIAL

## 2025-02-03 DIAGNOSIS — E11.9 TYPE 2 DIABETES MELLITUS WITHOUT COMPLICATION, WITHOUT LONG-TERM CURRENT USE OF INSULIN (H): ICD-10-CM

## 2025-02-03 LAB
EST. AVERAGE GLUCOSE BLD GHB EST-MCNC: 114 MG/DL
HBA1C MFR BLD: 5.6 % (ref 0–5.6)

## 2025-02-03 PROCEDURE — 83036 HEMOGLOBIN GLYCOSYLATED A1C: CPT

## 2025-02-03 PROCEDURE — 36415 COLL VENOUS BLD VENIPUNCTURE: CPT

## 2025-02-04 DIAGNOSIS — M79.18 MYOFASCIAL PAIN: ICD-10-CM

## 2025-02-04 DIAGNOSIS — M54.16 LUMBAR RADICULOPATHY: ICD-10-CM

## 2025-02-04 NOTE — TELEPHONE ENCOUNTER
Received fax from pharmacy requesting refill(s) for     pregabalin (LYRICA) 100 MG capsule    Date last filled 11/25/2024    Last Appt Date:01/06/2025    Next Appt scheduled: 02/24/2025    Pharmacy:   [id number] [place] [address] ,    Will route for processing    Daria Samuel MA  [facility] [place]

## 2025-02-05 RX ORDER — PREGABALIN 100 MG/1
100 CAPSULE ORAL 2 TIMES DAILY
Qty: 60 CAPSULE | Refills: 1 | Status: SHIPPED | OUTPATIENT
Start: 2025-02-05

## 2025-02-05 NOTE — TELEPHONE ENCOUNTER
2/5/2025 4:58 PM     REFILL REQUEST:     This is a patient of mine. PDMP and Chart have been reviewed; refill request is appropriate.    Refilled for 60 day supply.  Script e-Prescribed to pharmacy    PACO Mansfield, ETHAN, FNP-C

## 2025-02-19 NOTE — PROGRESS NOTES
[facility]         Date of Visit: Feb 20, 2025     CHIEF COMPLAINT:   Chief Complaint   Patient presents with    Pain       Subjective   SUBJECTIVE    INTERVAL HISTORY:   Tammy Joshi is a 44 year old female seen for INITIAL EVALUATION on 10/31/24; last seen for FOLLOW UP on 1/06/25.  They are returning today for lumbar radiculopathy.         Recommendations/ Plan at the last visit included:  Visit discussion: Tammy Joshi is seen in follow up today at the pain clinic for chronic pain syndrome and lumbar radiculopathy.  Patient is not noted significant improvement in pain control with opioid rotation to Norco 7.5/325 mg.  We discussed potential benefit of buprenorphine micro dosing and she would like to move forward with this option if approved by insurance.  Risks, benefits and side effects of buprenorphine were discussed for both the buccal film and transdermal patch.  Patient would like to proceed with transdermal patches approved.  Considering that she is already taking daily opioid dosing, initial prescription for Butrans 7.5 mcg/h was sent to pharmacy.  Patient is advised to continue current short acting opioids until buprenorphine is approved.  She has recently refilled Norco 7.5/325 mg tabs and has a 12-day supply remaining as of today.  If Butrans is delayed for prior authorization, then okay to switch to Percocet 5/325 mg max 3/day at next refill request.  Otherwise, I advise starting Butrans  7.5 mcg/h for 1 full week then start downward taper off short acting opioids as of week to patch application.  Patient had several questions regarding insurance authorization and is concerned about not having opioid medication for pain control.  I did encourage her that we will continue current short acting opioids until insurance has approved buprenorphine.  Patient was advised that it may take several weeks to optimize buprenorphine dosing and we will work through this  incrementally.  Return to clinic in 6 weeks to reassess pain control.  Patient was encouraged to continue with orthopedic evaluation for left hip.  She is also was encouraged to contact neurosurgery for clarification on order to proceed with SI joint injection.  Patient verbalizes understanding and agreement with plan.      PLAN:    Medication Management:   - Pending Prior Authorization: START Butrans 7.5mcg/hr patch - place ONE patch on skin every 7 days; remove old patch before placing new one.  - After one week on Butrans patch, we will start a taper off the short acting opioids.   Continue Current Treatment Plan with:   Orthopedics for evaluation of left hip pain.     Return to Clinic:   -  30-minute In-Person Appointment with Zaria Hansen, ETHAN, APRN, FNANGELITO-C in 6 weeks, or sooner if needed    Future Considerations:   If Butrans is denied by your insurance, we will appeal for approval. In the meantime, you may continue Norco 7.5/325mg for the next 12 days. If you have not started Butrans by the time your Norco prescription runs out, it is okay to switch back to Percocet 5/325mg at previous dose.        Interval history from last visit on 1/06/25:   Pain score today: Severe Pain (7)   Pain rating: intensity ranges from 5/10 to 8/10, and averages 6/10 on a 0-10 scale.   Felt some initial benefit from Norco 7.5/325mg but not as effective as Percocet 5/325mg.   Taking Pregabalin 100mg BID; no side effects noted.   Feels some benefit from Pregabalin dosing; occasionally more sleepy.  Has a lump in left upper leg; distal to GT band that is painful to touch.   Has been referred to Orthopedics for left hip pain.   She did not have Left SI joint injection; was sent MyChart communication from Dr. Muñiz's office with instruction to complete PT, EMG and SI injection before follow up.           Since the last visit, Tammy Joshi reports:   Pain score today: Severe Pain (7)   Pain rating: intensity ranges from 4/10 to  8/10, and averages 7/10 on a 0-10 scale.   She saw Dr. Lema in Sports Medicine for left GT bursitis; was given GT bursae injection and noted improvement in left leg upper leg pain; did not help left hip bone pain.   Has been tolerating Butrans 10 mcg/hr patch; now on 2nd patch.   Experiences constipation; bowel movements once per week.   Taking a daily fiber pill and docusate once in awhile.   Has stopped Norco 7.5/325mg   Has less menstrual cramping pain since starting Butrans.   Taking Pregabalin 100mg BID; no side effects.   Takes Cyclobenzaprine 10mg PRN; not feeling increased somnolence.         REVIEW OF SYSTEMS:   ROS: 10 point ROS neg other than the symptoms noted above in the HPI.     The patient otherwise denies red flag symptoms, such as: thunderclap headache, bowel or bladder incontinence, parasthesias, weakness, saddle anesthesia, unintentional weight loss, or fever/chills/sweats.     Medical History: Any changes in medical history since they were last seen? No    Medications and Allergies reviewed. Yes     Review of [place] Prescription Monitoring Program ():  reviewed on 2/20/25. No concern for abuse or misuse of controlled medications based on this report. Controlled substances prescribed in the past 6 months include: (Pregabalin 100mg, Butrans 10mcg/hr, Norco 7.5/325mg, Percocet 5/325mg )     Current MME: 18 / day    Current PDMP reportable controlled substance medications, if any, are being prescribed by: Pain Mgmt    Primary Care Provider is Mary Wang       CSA due date: 11/25/25   UDS due date:  10/31/25     THE 4 As OF OPIOID MAINTENANCE ANALGESIA    Analgesia: Is pain relief clinically significant? Yes  Activity: Is patient functional and able to perform Activities of Daily Living? Yes  Adverse effects: Is patient free from adverse side effects from opiates? Yes  Adherence to Rx protocol: Is patient adhering to Controlled Substance Agreement and taking medications ONLY  as ordered? Yes    Is Narcan prescribed for opiate use >50 MME daily or concurrent use of opiates and benzodiazepines? N/A           Objective    OBJECTIVE:    Physical Exam  Vitals:    02/20/25 1549   BP: 113/81   Pulse: 93        Appearance:   A&O. Patient is appropriate.   Patient is in NAD.      HHENT:  Normocephalic, atraumatic. Eyes without conjunctival injection or jaundice. Neck supple. No obvious neck masses.     Pulmonary:  Normal effort; no cough or audible wheeze      Skin: No obvious rash, lesions, or petechiae of exposed skin.    Neuro: Cranial nerves grossly intact.  Mentation and speech appropriate for age.     Psych:  Alert, without lethargy or stupor. Speech fluent. Appropriate affect. Mood normal. Able to follow commands without difficulty. Normal mood, judgement and behavior.          Diagnostic Tests/ Imaging/ Labs resulted since last visit:   None       Assessment & Plan      VISIT DIAGNOSIS:   1. Chronic pain syndrome  - Adult Pain Clinic Follow-Up Order  - Adult Pain Clinic Follow-Up Order; Future    2. Lumbar radiculopathy  - Adult Pain Clinic Follow-Up Order  - pregabalin (LYRICA) 25 MG capsule; Take 1 capsule (25 mg) by mouth 2 times daily. Continue taking 100mg dose  Dispense: 60 capsule; Refill: 0  - Adult Pain Clinic Follow-Up Order; Future    3. Myofascial pain  - Adult Pain Clinic Follow-Up Order  - cyclobenzaprine (FLEXERIL) 5 MG tablet; Take 1-2 tablets (5-10 mg) by mouth 3 times daily as needed for muscle spasms (max 4 per day).  Dispense: 90 tablet; Refill: 0  - pregabalin (LYRICA) 25 MG capsule; Take 1 capsule (25 mg) by mouth 2 times daily. Continue taking 100mg dose  Dispense: 60 capsule; Refill: 0  - Adult Pain Clinic Follow-Up Order; Future    4. Encounter for therapeutic drug monitoring  - Adult Pain Clinic Follow-Up Order  - Adult Pain Clinic Follow-Up Order; Future    5. Chronic, continuous use of opioids  - Adult Pain Clinic Follow-Up Order  - Adult Pain Clinic Follow-Up  Order; Future    6. Hip pain, left  - Adult Pain Clinic Follow-Up Order  - Adult Pain Clinic Follow-Up Order; Future      ASSESSMENT:   Visit discussion: Tammy Joshi is seen in follow up today at the pain clinic for chronic low back and left hip pain.  Patient experiences no significant improvement in pain control with Butrans 10 mcg/h patch; however, it is only been 1 and half weeks since dose adjustment.  We discussed importance of maintaining current dose as long as possible before escalating to a higher patch level.  She does note some improvement with cyclobenzaprine but does take it intermittently.  We discussed potential benefit of using cyclobenzaprine at a lower dose more often to help with muscle spasms.  Refill sent today for cyclobenzaprine 5 mg as indicated below.  We discussed potential benefit of continuing upward taper of pregabalin and patient would like to move forward with this dose adjustment.  Taper pregabalin up to 125 mg twice daily as indicated below.  Patient is advised to follow-up with sports medicine for additional evaluation of hip pain.  Return to pain management clinic in 4 to 6 weeks to reassess pain control and medication dosing.  She verbalizes understanding and agreement with plan.      PLAN:    Medication Management:   - CONTINUE taking Butrans 10mcg/hr patch - one patch every 7 days   - STOP taking Cyclobenzaprine 10mg   - START taking Cyclobenzaprine 5mg - take 1-2 tabs up to three times per day as needed for spasms; max 4 per day     - TAPER Pregabalin as follows:  Take 100mg in AM and 125mg at bedtime for 7 days, then take 125mg two times per day     Return to Clinic:   -  30-minute In-Person Appointment with Zaria Hansen DNP, APRN, FNP-C in 4-6 weeks, or sooner if needed      Future Considerations:   Follow up with Dr. Lema in Sports Medicine.        ___________________________________________________________________    BILLING TIME DOCUMENTATION:   TOTAL TIME  includes:   Time spent preparing to see the patient: 3 minutes (reviewing records and tests)  Time spend face to face with the patient: 28 minutes  Time spent ordering tests, medications, procedures and referrals: 0 minutes  Time spent Referring and communicating with other healthcare professionals: 0 minutes  Documenting clinical information in Epic: 3 minutes    The total TIME spent on this patient on the day of the appointment was 34 minutes.     ___________________________________________________________________    Review of Electronic Chart: Today I have also reviewed available medical information in the patient's medical record at [facility] ([facility]) and Care Everywhere (if available), including relevant provider notes, laboratory work, and imaging.     Zraia Hansen, APRN, DNP, FNP-C   [facility]

## 2025-02-20 ENCOUNTER — OFFICE VISIT (OUTPATIENT)
Dept: PALLIATIVE MEDICINE | Facility: CLINIC | Age: 45
End: 2025-02-20
Attending: NURSE PRACTITIONER
Payer: COMMERCIAL

## 2025-02-20 VITALS — HEART RATE: 93 BPM | DIASTOLIC BLOOD PRESSURE: 81 MMHG | SYSTOLIC BLOOD PRESSURE: 113 MMHG

## 2025-02-20 DIAGNOSIS — F11.90 CHRONIC, CONTINUOUS USE OF OPIOIDS: ICD-10-CM

## 2025-02-20 DIAGNOSIS — M54.16 LUMBAR RADICULOPATHY: ICD-10-CM

## 2025-02-20 DIAGNOSIS — Z51.81 ENCOUNTER FOR THERAPEUTIC DRUG MONITORING: ICD-10-CM

## 2025-02-20 DIAGNOSIS — M79.18 MYOFASCIAL PAIN: ICD-10-CM

## 2025-02-20 DIAGNOSIS — M25.552 HIP PAIN, LEFT: ICD-10-CM

## 2025-02-20 DIAGNOSIS — G89.4 CHRONIC PAIN SYNDROME: ICD-10-CM

## 2025-02-20 RX ORDER — CYCLOBENZAPRINE HCL 5 MG
5-10 TABLET ORAL 3 TIMES DAILY PRN
Qty: 90 TABLET | Refills: 0 | Status: SHIPPED | OUTPATIENT
Start: 2025-02-20

## 2025-02-20 RX ORDER — PREGABALIN 25 MG/1
25 CAPSULE ORAL 2 TIMES DAILY
Qty: 60 CAPSULE | Refills: 0 | Status: SHIPPED | OUTPATIENT
Start: 2025-02-20

## 2025-02-20 ASSESSMENT — PAIN SCALES - GENERAL: PAINLEVEL_OUTOF10: SEVERE PAIN (7)

## 2025-02-20 NOTE — PATIENT INSTRUCTIONS
PATIENT INSTRUCTIONS:     I am recommending a multidisciplinary treatment plan to help this patient better manage her pain. The following recommendations were given to the patient. Diagnosis, treatment options, risks, benefits, and alternatives were discussed; all questions were answered. Self-care instructions were given. The patient expressed understanding of the plan for management.     Medication Management:   - CONTINUE taking Butrans 10mcg/hr patch - one patch every 7 days   - STOP taking Cyclobenzaprine 10mg   - START taking Cyclobenzaprine 5mg - take 1-2 tabs up to three times per day as needed for spasms; max 4 per day     - TAPER Pregabalin as follows:  Take 100mg in AM and 125mg at bedtime for 7 days, then take 125mg two times per day     Return to Clinic:   -  30-minute In-Person Appointment with Zaria Hansen DNP, PACO, CYRUS in 4-6 weeks, or sooner if needed      Future Considerations:   Follow up with Dr. Lema in Sports Medicine.        ----------------------------------------------------------------  Clinic Number:  [phone]   Call with any questions about your care and for scheduling assistance.   Calls are returned Monday through Friday between 8 AM and 4:30 PM. We usually get back to you within 2 business days depending on the issue/request.    If we are prescribing your medications:  For opioid medication refills, call the clinic or send a [employer] [place] [employer] message 7 days in advance.  Please include:  Name of requested medication  Name of the pharmacy.  For non-opioid medications, call your pharmacy directly to request a refill. Please allow 3-4 days to be processed.   Per [place] State Law:  All controlled substance prescriptions must be filled within 30 days of being written.    For those controlled substances allowing refills, pickup must occur within 30 days of last fill.      We believe regular attendance is key to your success in our program!    Any time you are unable to keep your appointment we  ask that you call us at least 24 hours in advance to cancel.This will allow us to offer the appointment time to another patient.   Multiple missed appointments may lead to dismissal from the clinic.

## 2025-03-02 ENCOUNTER — MYC MEDICAL ADVICE (OUTPATIENT)
Dept: PALLIATIVE MEDICINE | Facility: CLINIC | Age: 45
End: 2025-03-02
Payer: COMMERCIAL

## 2025-03-02 DIAGNOSIS — F11.90 CHRONIC, CONTINUOUS USE OF OPIOIDS: ICD-10-CM

## 2025-03-02 DIAGNOSIS — G89.4 CHRONIC PAIN SYNDROME: ICD-10-CM

## 2025-03-02 DIAGNOSIS — M54.16 LUMBAR RADICULOPATHY: ICD-10-CM

## 2025-03-03 RX ORDER — BUPRENORPHINE 15 UG/H
1 PATCH TRANSDERMAL
Qty: 4 PATCH | Refills: 0 | Status: SHIPPED | OUTPATIENT
Start: 2025-03-03

## 2025-03-03 RX ORDER — BUPRENORPHINE 10 UG/H
1 PATCH TRANSDERMAL
Qty: 4 PATCH | Refills: 0 | Status: CANCELLED | OUTPATIENT
Start: 2025-03-03

## 2025-03-03 NOTE — TELEPHONE ENCOUNTER
Medication refill information reviewed.     Due date for  buprenorphine (BUTRANS) 10 MCG/HR WK patch   is 3/4/2025     Hi. I just put on my last buprenorphine patch for this box. (10mcg) You wanted me to update you at this point. I do not feel like the pain is any better at this point. I feel we need to go up to the next dose. I will need the refill this upcoming week. Thank you, Tammy Joshi     Prescriptions prepped for review.     Will route to provider.

## 2025-03-03 NOTE — TELEPHONE ENCOUNTER
Received request for a refill(s) of buprenorphine (BUTRANS) 10 MCG/HR WK patch      Hi. I just put on my last buprenorphine patch for this box. (10mcg) You wanted me to update you at this point. I do not feel like the pain is any better at this point. I feel we need to go up to the next dose. I will need the refill this upcoming week. Thank you, Tammy Joshi     Last dispensed from pharmacy on 02/03/25    Patient's last office/virtual visit by prescribing provider on 02/20/25  Next office/virtual appointment scheduled for 03/24/25    Last urine drug screen date 10/31/24  Current opioid agreement on file (completed within the last year) Yes Date of opioid agreement: 11/25/24    E-prescribe to pharmacy-[id number] [place] [facility] - [place], [place] - [facility] [address]      Will route to nursing Kenvil for review and preparation of prescription(s).

## 2025-03-03 NOTE — TELEPHONE ENCOUNTER
Tammy EATON Pain Nurse (supporting Zaria Hansen DNP)9 hours ago (11:22 PM)       Hi.  I just put on my last buprenorphine patch for this box. (10mcg) You wanted me to update you at this point.  I do not feel like the pain is any better at this point.  I feel we need to go up to the next dose. I will need the refill this upcoming week.  Thank you,  Tammy Joshi

## 2025-03-03 NOTE — TELEPHONE ENCOUNTER
3/3/2025 12:58 PM     REFILL REQUEST:     This is a patient of mine. PDMP and Chart have been reviewed; refill request is appropriate.    Refilled for 28 day supply.  Script e-Prescribed to pharmacy    PACO Mans[place], ETHAN, FNP-C

## 2025-03-05 ENCOUNTER — E-VISIT (OUTPATIENT)
Dept: URGENT CARE | Facility: CLINIC | Age: 45
End: 2025-03-05
Payer: COMMERCIAL

## 2025-03-05 DIAGNOSIS — N39.0 ACUTE UTI (URINARY TRACT INFECTION): Primary | ICD-10-CM

## 2025-03-05 PROCEDURE — 99207 PR NON-BILLABLE SERV PER CHARTING: CPT | Performed by: EMERGENCY MEDICINE

## 2025-03-05 RX ORDER — NITROFURANTOIN 25; 75 MG/1; MG/1
100 CAPSULE ORAL 2 TIMES DAILY
Qty: 10 CAPSULE | Refills: 0 | Status: SHIPPED | OUTPATIENT
Start: 2025-03-05 | End: 2025-03-10

## 2025-03-05 NOTE — PATIENT INSTRUCTIONS
Dear Tammy Joshi    After reviewing your responses, I've been able to diagnose you with a urinary tract infection, which is a common infection of the bladder with bacteria.  This is not a sexually transmitted infection, though urinating immediately after intercourse can help prevent infections.  Drinking lots of fluids is also helpful to clear your current infection and prevent the next one.      I have sent a prescription for antibiotics to your pharmacy to treat this infection.    It is important that you take all of your prescribed medication even if your symptoms are improving after a few doses.  Taking all of your medicine helps prevent the symptoms from returning.     If your symptoms worsen, you develop pain in your back or stomach, develop fevers, or are not improving in 5 days, please contact your primary care provider for an appointment or visit any of our convenient Walk-in or Urgent Care Centers to be seen, which can be found on our website here.    Thanks again for choosing us as your health care partner,    Huy Travis MD  Urinary Tract Infection (UTI) in Women: Care Instructions  Overview     A urinary tract infection (UTI) is an infection caused by bacteria. It can happen anywhere in the urinary tract. A UTI can happen in the:  Kidneys.  Ureters, the tubes that connect the kidneys to the bladder.  Bladder.  Urethra, where the urine comes out.  Most UTIs are bladder infections. They often cause pain or burning when you urinate.  Most UTIs can be cured with antibiotics. If you are prescribed antibiotics, be sure to complete your treatment so that the infection does not get worse.  Follow-up care is a key part of your treatment and safety. Be sure to make and go to all appointments, and call your doctor if you are having problems. It's also a good idea to know your test results and keep a list of the medicines you take.  How can you care for yourself at home?  Take your antibiotics as  "directed. Do not stop taking them just because you feel better. You need to take the full course of antibiotics.  Drink extra water and other fluids for the next day or two. This will help make the urine less concentrated and help wash out the bacteria that are causing the infection. (If you have kidney, heart, or liver disease and have to limit fluids, talk with your doctor before you increase the amount of fluids you drink.)  Avoid drinks that are carbonated or have caffeine. They can irritate the bladder.  Urinate often. Try to empty your bladder each time.  To relieve pain, take a hot bath or lay a heating pad set on low over your lower belly or genital area. Never go to sleep with a heating pad in place.  To prevent UTIs  Drink plenty of water each day. This helps you urinate often, which clears bacteria from your system. (If you have kidney, heart, or liver disease and have to limit fluids, talk with your doctor before you increase the amount of fluids you drink.)  Urinate when you need to.  If you are sexually active, urinate right after you have sex.  Change sanitary pads often.  Avoid douches, bubble baths, feminine hygiene sprays, and other feminine hygiene products that have deodorants.  After going to the bathroom, wipe from front to back.  When should you call for help?   Call your doctor now or seek immediate medical care if:    You have new or worse fever, chills, nausea, or vomiting.     You have new pain in your back just below your rib cage. This is called flank pain.     There is new blood or pus in your urine.     You have any problems with your antibiotic medicine.   Watch closely for changes in your health, and be sure to contact your doctor if:    You are not getting better after taking an antibiotic for 2 days.     Your symptoms go away but then come back.   Where can you learn more?  Go to https://www.healthwise.net/patiented  Enter K848 in the search box to learn more about \"Urinary Tract " "Infection (UTI) in Women: Care Instructions.\"  Current as of: April 30, 2024  Content Version: 14.3    2024 [employer].   Care instructions adapted under license by your healthcare professional. If you have questions about a medical condition or this instruction, always ask your healthcare professional. [employer] disclaims any warranty or liability for your use of this information.    "

## 2025-03-06 ENCOUNTER — OFFICE VISIT (OUTPATIENT)
Dept: ORTHOPEDICS | Facility: CLINIC | Age: 45
End: 2025-03-06
Payer: COMMERCIAL

## 2025-03-06 VITALS — WEIGHT: 146.9 LBS | HEIGHT: 64 IN | BODY MASS INDEX: 25.08 KG/M2

## 2025-03-06 DIAGNOSIS — M70.62 TROCHANTERIC BURSITIS, LEFT HIP: ICD-10-CM

## 2025-03-06 DIAGNOSIS — M25.552 HIP PAIN, LEFT: ICD-10-CM

## 2025-03-06 DIAGNOSIS — M76.02 GLUTEAL TENDINITIS OF LEFT BUTTOCK: ICD-10-CM

## 2025-03-06 DIAGNOSIS — M76.32 IT BAND SYNDROME, LEFT: Primary | ICD-10-CM

## 2025-03-06 NOTE — LETTER
3/6/2025      Tammy Joshi  [address] [facility] [address]      Dear Colleague,    Thank you for referring your patient, Tammy Joshi, to the [facility] [place]. Please see a copy of my visit note below.    Tammy Joshi  :  1980  DOS: 25  MRN: [id number]    Sports Medicine Clinic Visit    PCP: Mary Wang    Tammy Joshi is a 44 year old female who is seen in consultation at the request of  Jameson Randall D.O. presenting with left hip and lateral thigh pain    Injury: Gradual onset of pain over the past 8 month(s).  Pain located over left posterolateral hip, proximal lateral thigh, nonradiating.  Reports constant pain.  Additional Features:  Positive: instability and weakness.  Symptoms are better with Heat.  Symptoms are worse with: walking, laying on left side, sitting, physical therapy.  Other evaluation and/or treatments so far consists of: physical therapy, heat, ice, lumbar epidural steroid injections, narcotic pain medication.  Recent imaging completed: MRI left femur completed 10/19/24, CT left femur completed 10/8/24, XR left femur completed 24, left lower extremity EMG completed 24.  Prior History of related problems: chronic left hip pain    Social History: currently employed as     Interim History - 2025  Since last visit on 2025 patient has continued moderate-severe radiating left lateral hip & thigh pain.  Left hip bursa injection completed on 25 provided relief for ~ 3 weeks.  Patient has been with walking and lying on left hip.  No new injury in the interim.    Review of Systems  Musculoskeletal: as above  Remainder of review of systems is negative including constitutional, CV, pulmonary, GI, Skin and Neurologic except as noted in HPI or medical history.    Past Medical History:   Diagnosis Date     Absence of menstruation      Anxiety      Depressive disorder       "Diabetes (H)      Female infertility of unspecified origin      MILD/NOS PREECLAMP-ANTEP 08/29/2005     Polycystic ovaries      PPH (postpartum hemorrhage) 11/01/2009     Past Surgical History:   Procedure Laterality Date     D & C  1998    Missed Ab     LITHOTRIPSY  2011     [facility] HAND/FINGER SURGERY UNLISTED  7th grade    left index     Family History   Adopted: Yes   Problem Relation Age of Onset     Unknown/Adopted Other         patient was adopted; knows a limited amount of medical history of birth parents       Objective  Ht 1.626 m (5' 4\")   Wt 66.6 kg (146 lb 14.4 oz)   BMI 25.22 kg/m      General: healthy, alert and in no distress    HEENT: no scleral icterus or conjunctival erythema   Skin: no suspicious lesions or rash. No jaundice.   CV: regular rhythm by palpation, 2+ distal pulses, no pedal edema    Resp: normal respiratory effort without conversational dyspnea   Psych: normal mood and affect    Gait: mildly antalgic, appropriate coordination and balance   Neuro: normal light touch sensory exam of the extremities. Motor strength as noted below     Left hip exam    Inspection:        no edema or ecchymosis in hip area    ROM:       Full active and passive ROM     Strength:        flexion 5/5       extension 5/5       abduction 5/5       adduction 5/5    Tender:        greater trochanter milder today       SI joint minimal       Lateral aspect of vastus lateralis ongoing, as well as lateral hamstring       IT band mild       Gluteus medius minimal today    Non Tender:        remainder of hip area       illiac crest    Sensation:        grossly intact in hip and thigh       Somewhat hypersensitive along the lateral thigh, neg Tinel's over LFCN    Skin:       well perfused       capillary refill brisk    Special Tests:        neg (-) COLBY       neg FADIR       neg (-) scour       painful Ashok    Radiology  Exam: MRI of the left femur dated 10/19/2024.     COMPARISON: 9/30/2024.     CLINICAL HISTORY: Pain " of femur.     TECHNIQUE: Multiplanar, multisequence MR imaging of the left femur was  obtained using standard sequences in 3 orthogonal planes without the  use of intravenous or intra-articular gadolinium contrast.     FINDINGS:     No marrow signal abnormalities to suggest fracture or stress changes.  No marrow infiltration.     Minimal fluid in the left knee joint. No significant joint effusion in  either hip.     No soft tissue masses or fluid collections. The muscle bulk is intact  without significant atrophy or soft tissue edema.     No full-thickness tendon tear or tendon retraction.                                                                      IMPRESSION:  1. No marrow signal are visualized to suggest fracture or marrow  infiltration.  2. No soft tissue masses or fluid collections.     MURALI HOLLAND MD   =========================  EXAM: CT PELVIS SOFT TISSUE WO CONTRAST, CT FEMUR THIGH LEFT W/O CONTRAST  LOCATION: [facility]  DATE: 10/8/2024     INDICATION: Left gluteal pain femur pain, eval for intra abdominal cause  COMPARISON: None.  TECHNIQUE: CT scan of the pelvis was performed without IV contrast. Multiplanar reformats were obtained. Dose reduction techniques were used.  CONTRAST: None.     FINDINGS:     PELVIS ORGANS: Included small and large bowel are unremarkable. The bladder is unremarkable. The within limits of a noncontrast exam the uterus and adnexa are unremarkable.      MUSCULOSKELETAL: The bones are well-mineralized. The bony pelvis is intact the SI joints are patent. The hip joints are well aligned. The left femur is intact the included portions of the proximal left tibia and fibula are unremarkable. The knee joints   well aligned. No acute fracture or malalignment is identified. No erosive or destructive bony changes are identified.     The musculotendinous and soft tissue structures of the included left leg and hip are unremarkable.                                                                       IMPRESSION:  1.  Unremarkable noncontrast CT of the pelvis and left thigh.   2.  No worrisome osseous lesion of the bony pelvis or left femur.  =======================================  EXAM: XR FEMUR LEFT 2 VIEWS  LOCATION: [facility]  DATE: 09/30/2024     INDICATION: Left femur pain.  COMPARISON: Left hip MRI dated 08/09/2024.                                                                      IMPRESSION: Intact left femur. There is no evidence of an acute fracture. No suspicious lytic or blastic lesion identified. No abnormal soft tissue calcification.  ===============================    Comment EMG: Normal study  1.  Normal needle EMG left lower extremity.     Interpretation: Normal study     1. There is no electrodiagnostic evidence of lumbosacral radiculopathy, lumbosacral plexopathy, or focal neuropathy in the left lower extremity.    Assessment:  1. It band syndrome, left    2. Gluteal tendinitis of left buttock    3. Trochanteric bursitis, left hip    4. Hip pain, left        Plan:  Discussed the assessment with the patient.  Follow up: 1-2 mo with me based on short term progress  Continue to follow up with me for further treatment and recommendations  Has seen many specialties and had extensive workup thus far: multiple MRIs of hip, lumbar spine and femur, multiple CTs, XR, EMG, blood work  No clear concern for autoimmune process, inflammatory markers were negative in the past, could workup further but defer for now  Clinically she has pain currently consistent with improving trochanteric bursitis > IT band syndrome > less likely meralgia paresthetica, and ongoing pain in the lateral thigh musculature and along femur  Agree with PM&R that there are not classic signs of LFCN neuropathy, but still on ddx  Many tx tried so far including pain medication, oral steroid, epidural injection, nerve pain medications, PT, pain mgmt  consultation  Trochanteric bursa CSI seemed helpful for about 2 weeks  Can consider LFCN hydrodissection/block vs TPI in the future, defer for now as these sx are not as focal  Plan to restart PT when pain improving, strongly encouraged, HEP provided, PT order available anytime  Dry needling could be considered if the pain continues to show signs of being primarily muscular  MR, CT and XR images independently visualized and reviewed with patient today in clinic  Oral Tylenol and topical Voltaren gel reviewed as safe OTC options, reviewed safe dosing strategies  Supportive care reviewed  All questions were answered today  Contact us with additional questions or concerns  Signs and sx of concern reviewed      Esteban Lema DO, TANG  Sports Medicine Physician  [facility]      Time spent in chart review, one-on-one evaluation, discussion with patient regarding: nature of problem, clinical course, prior treatments, therapeutic options, shared-decision making, potential procedures and referrals, and charting related to the visit: 37 minutes.  If applicable, time does not include time spent performing any procedure.        Disclaimer: This note consists of symbols derived from keyboarding, dictation and/or voice recognition software. As a result, there may be errors in the script that have gone undetected. Please consider this when interpreting information found in this chart.      Again, thank you for allowing me to participate in the care of your patient.        Sincerely,        Esteban Lema DO    Electronically signed

## 2025-03-06 NOTE — PROGRESS NOTES
Tammy Joshi  :  1980  DOS: 25  MRN: [id number]    Sports Medicine Clinic Visit    PCP: Mary Wang    Tammy Joshi is a 44 year old female who is seen in consultation at the request of  Jameson Randall D.O. presenting with left hip and lateral thigh pain    Injury: Gradual onset of pain over the past 8 month(s).  Pain located over left posterolateral hip, proximal lateral thigh, nonradiating.  Reports constant pain.  Additional Features:  Positive: instability and weakness.  Symptoms are better with Heat.  Symptoms are worse with: walking, laying on left side, sitting, physical therapy.  Other evaluation and/or treatments so far consists of: physical therapy, heat, ice, lumbar epidural steroid injections, narcotic pain medication.  Recent imaging completed: MRI left femur completed 10/19/24, CT left femur completed 10/8/24, XR left femur completed 24, left lower extremity EMG completed 24.  Prior History of related problems: chronic left hip pain    Social History: currently employed as     Interim History - 2025  Since last visit on 2025 patient has continued moderate-severe radiating left lateral hip & thigh pain.  Left hip bursa injection completed on 25 provided relief for ~ 3 weeks.  Patient has been with walking and lying on left hip.  No new injury in the interim.    Review of Systems  Musculoskeletal: as above  Remainder of review of systems is negative including constitutional, CV, pulmonary, GI, Skin and Neurologic except as noted in HPI or medical history.    Past Medical History:   Diagnosis Date    Absence of menstruation     Anxiety     Depressive disorder     Diabetes (H)     Female infertility of unspecified origin     MILD/NOS PREECLAMP-ANTEP 2005    Polycystic ovaries     PPH (postpartum hemorrhage) 2009     Past Surgical History:   Procedure Laterality Date    D & C      Missed Ab     "LITHOTRIPSY  2011    [facility] HAND/FINGER SURGERY UNLISTED  7th grade    left index     Family History   Adopted: Yes   Problem Relation Age of Onset    Unknown/Adopted Other         patient was adopted; knows a limited amount of medical history of birth parents       Objective  Ht 1.626 m (5' 4\")   Wt 66.6 kg (146 lb 14.4 oz)   BMI 25.22 kg/m      General: healthy, alert and in no distress    HEENT: no scleral icterus or conjunctival erythema   Skin: no suspicious lesions or rash. No jaundice.   CV: regular rhythm by palpation, 2+ distal pulses, no pedal edema    Resp: normal respiratory effort without conversational dyspnea   Psych: normal mood and affect    Gait: mildly antalgic, appropriate coordination and balance   Neuro: normal light touch sensory exam of the extremities. Motor strength as noted below     Left hip exam    Inspection:        no edema or ecchymosis in hip area    ROM:       Full active and passive ROM     Strength:        flexion 5/5       extension 5/5       abduction 5/5       adduction 5/5    Tender:        greater trochanter milder today       SI joint minimal       Lateral aspect of vastus lateralis ongoing, as well as lateral hamstring       IT band mild       Gluteus medius minimal today    Non Tender:        remainder of hip area       illiac crest    Sensation:        grossly intact in hip and thigh       Somewhat hypersensitive along the lateral thigh, neg Tinel's over LFCN    Skin:       well perfused       capillary refill brisk    Special Tests:        neg (-) COLBY       neg FADIR       neg (-) scour       painful Ashok    Radiology  Exam: MRI of the left femur dated 10/19/2024.     COMPARISON: 9/30/2024.     CLINICAL HISTORY: Pain of femur.     TECHNIQUE: Multiplanar, multisequence MR imaging of the left femur was  obtained using standard sequences in 3 orthogonal planes without the  use of intravenous or intra-articular gadolinium contrast.     FINDINGS:     No marrow signal " abnormalities to suggest fracture or stress changes.  No marrow infiltration.     Minimal fluid in the left knee joint. No significant joint effusion in  either hip.     No soft tissue masses or fluid collections. The muscle bulk is intact  without significant atrophy or soft tissue edema.     No full-thickness tendon tear or tendon retraction.                                                                      IMPRESSION:  1. No marrow signal are visualized to suggest fracture or marrow  infiltration.  2. No soft tissue masses or fluid collections.     MURALI HOLLAND MD   =========================  EXAM: CT PELVIS SOFT TISSUE WO CONTRAST, CT FEMUR THIGH LEFT W/O CONTRAST  LOCATION: [facility]  DATE: 10/8/2024     INDICATION: Left gluteal pain femur pain, eval for intra abdominal cause  COMPARISON: None.  TECHNIQUE: CT scan of the pelvis was performed without IV contrast. Multiplanar reformats were obtained. Dose reduction techniques were used.  CONTRAST: None.     FINDINGS:     PELVIS ORGANS: Included small and large bowel are unremarkable. The bladder is unremarkable. The within limits of a noncontrast exam the uterus and adnexa are unremarkable.      MUSCULOSKELETAL: The bones are well-mineralized. The bony pelvis is intact the SI joints are patent. The hip joints are well aligned. The left femur is intact the included portions of the proximal left tibia and fibula are unremarkable. The knee joints   well aligned. No acute fracture or malalignment is identified. No erosive or destructive bony changes are identified.     The musculotendinous and soft tissue structures of the included left leg and hip are unremarkable.                                                                      IMPRESSION:  1.  Unremarkable noncontrast CT of the pelvis and left thigh.   2.  No worrisome osseous lesion of the bony pelvis or left femur.  =======================================  EXAM: XR FEMUR LEFT  2 VIEWS  LOCATION: [facility]  DATE: 09/30/2024     INDICATION: Left femur pain.  COMPARISON: Left hip MRI dated 08/09/2024.                                                                      IMPRESSION: Intact left femur. There is no evidence of an acute fracture. No suspicious lytic or blastic lesion identified. No abnormal soft tissue calcification.  ===============================    Comment EMG: Normal study  1.  Normal needle EMG left lower extremity.     Interpretation: Normal study     1. There is no electrodiagnostic evidence of lumbosacral radiculopathy, lumbosacral plexopathy, or focal neuropathy in the left lower extremity.    Assessment:  1. It band syndrome, left    2. Gluteal tendinitis of left buttock    3. Trochanteric bursitis, left hip    4. Hip pain, left        Plan:  Discussed the assessment with the patient.  Follow up: 1-2 mo with me based on short term progress  Continue to follow up with me for further treatment and recommendations  Has seen many specialties and had extensive workup thus far: multiple MRIs of hip, lumbar spine and femur, multiple CTs, XR, EMG, blood work  No clear concern for autoimmune process, inflammatory markers were negative in the past, could workup further but defer for now  Clinically she has pain currently consistent with improving trochanteric bursitis > IT band syndrome > less likely meralgia paresthetica, and ongoing pain in the lateral thigh musculature and along femur  Agree with PM&R that there are not classic signs of LFCN neuropathy, but still on ddx  Many tx tried so far including pain medication, oral steroid, epidural injection, nerve pain medications, PT, pain mgmt consultation  Trochanteric bursa CSI seemed helpful for about 2 weeks  Can consider LFCN hydrodissection/block vs TPI in the future, defer for now as these sx are not as focal  Plan to restart PT when pain improving, strongly encouraged, HEP provided, PT order  available anytime  Dry needling could be considered if the pain continues to show signs of being primarily muscular  MR, CT and XR images independently visualized and reviewed with patient today in clinic  Oral Tylenol and topical Voltaren gel reviewed as safe OTC options, reviewed safe dosing strategies  Supportive care reviewed  All questions were answered today  Contact us with additional questions or concerns  Signs and sx of concern reviewed      Esteban Lema DO, TANG  Sports Medicine Physician  [facility] and Sports Medicine      Time spent in chart review, one-on-one evaluation, discussion with patient regarding: nature of problem, clinical course, prior treatments, therapeutic options, shared-decision making, potential procedures and referrals, and charting related to the visit: 37 minutes.  If applicable, time does not include time spent performing any procedure.        Disclaimer: This note consists of symbols derived from keyboarding, dictation and/or voice recognition software. As a result, there may be errors in the script that have gone undetected. Please consider this when interpreting information found in this chart.

## 2025-03-12 DIAGNOSIS — E11.65 TYPE 2 DIABETES MELLITUS WITH HYPERGLYCEMIA, WITHOUT LONG-TERM CURRENT USE OF INSULIN (H): ICD-10-CM

## 2025-03-12 RX ORDER — BLOOD SUGAR DIAGNOSTIC
STRIP MISCELLANEOUS
Qty: 100 STRIP | Refills: 0 | Status: SHIPPED | OUTPATIENT
Start: 2025-03-12

## 2025-03-12 NOTE — TELEPHONE ENCOUNTER
Dose verified. Has upcoming appt 4/14/25 with Mary TORRES.   Prescription approved per [facility] Refill Protocol.  Julie Behrendt RN

## 2025-03-23 NOTE — PROGRESS NOTES
[facility]         Date of Visit: Mar 24, 2025     CHIEF COMPLAINT:   Chief Complaint   Patient presents with    Pain       Subjective   SUBJECTIVE    INTERVAL HISTORY:   Tammy Joshi is a 44 year old female seen for INITIAL EVALUATION on 10/31/24; last seen for FOLLOW UP on 2/20/25.  They are returning today for lumbar radiculopathy.         Recommendations/ Plan at the last visit included:  Visit discussion: Tammy Joshi is seen in follow up today at the pain clinic for chronic low back and left hip pain.  Patient experiences no significant improvement in pain control with Butrans 10 mcg/h patch; however, it is only been 1 and half weeks since dose adjustment.  We discussed importance of maintaining current dose as long as possible before escalating to a higher patch level.  She does note some improvement with cyclobenzaprine but does take it intermittently.  We discussed potential benefit of using cyclobenzaprine at a lower dose more often to help with muscle spasms.  Refill sent today for cyclobenzaprine 5 mg as indicated below.  We discussed potential benefit of continuing upward taper of pregabalin and patient would like to move forward with this dose adjustment.  Taper pregabalin up to 125 mg twice daily as indicated below.  Patient is advised to follow-up with sports medicine for additional evaluation of hip pain.  Return to pain management clinic in 4 to 6 weeks to reassess pain control and medication dosing.  She verbalizes understanding and agreement with plan.      PLAN:    Medication Management:   - CONTINUE taking Butrans 10mcg/hr patch - one patch every 7 days   - STOP taking Cyclobenzaprine 10mg   - START taking Cyclobenzaprine 5mg - take 1-2 tabs up to three times per day as needed for spasms; max 4 per day     - TAPER Pregabalin as follows:  Take 100mg in AM and 125mg at bedtime for 7 days, then take 125mg two times per day   Return to Clinic:   -  30-minute  In-Person Appointment with Zaria Hansen, ETHAN, APRN, FNP-C in 4-6 weeks, or sooner if needed    Future Considerations:   Follow up with Dr. Lema in Sports Medicine.        Interval history from last visit on 2/20/25:   Pain score today: Severe Pain (7)   Pain rating: intensity ranges from 4/10 to 8/10, and averages 7/10 on a 0-10 scale.   She saw Dr. Lema in Sports Medicine for left GT bursitis; was given GT bursae injection and noted improvement in left leg upper leg pain; did not help left hip bone pain.   Has been tolerating Butrans 10 mcg/hr patch; now on 2nd patch.   Experiences constipation; bowel movements once per week.   Taking a daily fiber pill and docusate once in awhile.   Has stopped Norco 7.5/325mg   Has less menstrual cramping pain since starting Butrans.   Taking Pregabalin 100mg BID; no side effects.   Takes Cyclobenzaprine 10mg PRN; not feeling increased somnolence.           Since the last visit, Tammy Joshi reports:   Pain score today: Severe Pain (8)   Pain rating: intensity ranges from 7/10 to 9/10 on a 0-10 scale.   [employer] communication from 3/17/25 reports rash/hives reaction.   Reports red, raised bumps are noted on arms, shoulders, neck, upper back.   Bumps are itchy and then start bleeding when scratched; develops into scabs.     Feels pain is worse since last visit.   Left hip/lateral upper femur pain and swelling is getting worse.   Pain is worse with walking and sitting.   Expresses frustration with underlying pain not improving with left hip.         REVIEW OF SYSTEMS:   ROS: 10 point ROS neg other than the symptoms noted above in the HPI.     The patient otherwise denies red flag symptoms, such as: thunderclap headache, bowel or bladder incontinence, parasthesias, weakness, saddle anesthesia, unintentional weight loss, or fever/chills/sweats.     Medical History: Any changes in medical history since they were last seen? No    Medications and Allergies reviewed. Yes     Review  [facility] Prescription Monitoring Program ():  reviewed on 3/24/25. No concern for abuse or misuse of controlled medications based on this report. Controlled substances prescribed in the past 6 months include: (Butrans 15mcg/hr; 10mcg/hr; 7.5mcg/hr; Pregabalin 25mg, 100mg; Norco 7.5/325mg)     Current MME: 27 / day    Current PDMP reportable controlled substance medications, if any, are being prescribed by: Pain Mgmt    Primary Care Provider is Mary Wang       CSA due date: 11/25/25   UDS due date:  10/31/25     THE 4 As OF OPIOID MAINTENANCE ANALGESIA    Analgesia: Is pain relief clinically significant? Yes - not optimized   Activity: Is patient functional and able to perform Activities of Daily Living? Yes  Adverse effects: Is patient free from adverse side effects from opiates? Yes  Adherence to Rx protocol: Is patient adhering to Controlled Substance Agreement and taking medications ONLY as ordered? Yes    Is Narcan prescribed for opiate use >50 MME daily or concurrent use of opiates and benzodiazepines? Yes           Objective    OBJECTIVE:    Physical Exam  Vitals:    03/24/25 1448   BP: 111/85   Pulse: 91        Appearance:   A&O. Patient is appropriate.   Patient is in NAD.      HHENT:  Normocephalic, atraumatic. Eyes without conjunctival injection or jaundice. Neck supple. No obvious neck masses.     Pulmonary:  Normal effort; no cough or audible wheeze      Skin: No obvious rash, lesions, or petechiae of exposed skin.    Neuro: Cranial nerves grossly intact.  Mentation and speech appropriate for age.     Psych:  Alert, without lethargy or stupor. Speech fluent. Appropriate affect. Mood normal. Able to follow commands without difficulty. Normal mood, judgement and behavior.        Gait pattern:   Patient has an antalgic gait pattern:YES  Gait favors the left side.  Mobility and/or assistive devices? NO        MSK:  Location: left thigh  Inspection:  swelling along lateral femoral  head  ROM:  fluctuant area along lateral thigh at femoral head          Diagnostic Tests/ Imaging/ Labs resulted since last visit:   None       Assessment & Plan      VISIT DIAGNOSIS:   1. Chronic pain syndrome (Primary)  - Adult Pain Clinic Follow-Up Order  - buprenorphine (BUTRANS) 15 MCG/HR Wk patch; Place 1 patch onto the skin every 7 days. Fill 4/04/25, start 4/06/25 (change patch every Sunday)  Dispense: 4 patch; Refill: 0  - Adult Pain Clinic Follow-Up Order; Future    2. Hip pain, left  - Adult Pain Clinic Follow-Up Order  - Adult Pain Clinic Follow-Up Order; Future    3. Lumbar radiculopathy  - Adult Pain Clinic Follow-Up Order  - buprenorphine (BUTRANS) 15 MCG/HR Wk patch; Place 1 patch onto the skin every 7 days. Fill 4/04/25, start 4/06/25 (change patch every Sunday)  Dispense: 4 patch; Refill: 0  - Adult Pain Clinic Follow-Up Order; Future    4. Myofascial pain  - Adult Pain Clinic Follow-Up Order  - Adult Pain Clinic Follow-Up Order; Future    5. Encounter for therapeutic drug monitoring  - Adult Pain Clinic Follow-Up Order  - EKG 12-lead complete w/read - Clinics  - Anti Nuclear Sofi IgG by IFA with Reflex; Future  - CRP inflammation; Future  - Rheumatoid factor; Future  - Cyclic Citrullinated Peptide Antibody IgG; Future  - Adult Pain Clinic Follow-Up Order; Future  - Anti Nuclear Sofi IgG by IFA with Reflex  - CRP inflammation  - Rheumatoid factor  - Cyclic Citrullinated Peptide Antibody IgG  - Erythrocyte sedimentation rate auto; Future  - Erythrocyte sedimentation rate auto    6. Chronic, continuous use of opioids  - Adult Pain Clinic Follow-Up Order  - buprenorphine (BUTRANS) 15 MCG/HR Wk patch; Place 1 patch onto the skin every 7 days. Fill 4/04/25, start 4/06/25 (change patch every Sunday)  Dispense: 4 patch; Refill: 0  - Adult Pain Clinic Follow-Up Order; Future      ASSESSMENT:   Visit discussion: Tammy Joshi is seen in follow up today at the pain clinic for chronic myofascial pain, lumbar  radiculopathy and left hip swelling and pain.  In light of intermittent rash and ongoing left hip swelling, planning to obtain lab results to review inflammatory markers to assess for potential underlying auto inflammatory condition.  After discussion with patient about pruritic swelling of skin that bleeds and scabs, I do not think the Butrans is a contributing factor.  May consider referral to dermatology for additional evaluation if condition continues.  Patient was advised to return to Dr. Lema in sports medicine to continue evaluation of left hip swelling and pain.  She does express frustration with ongoing pain condition and poor response to any medication.  Patient was reminded that she has been on several medications in the past which have not offered significant improvement in her pain.  It is not advisable to continue upward taper of buprenorphine without giving her body time to adjust.  We may consider additional upward taper of pregabalin in the future.  In the meantime, I am recommending to patient that we review for inflammatory markers or underlying cause of pain condition.  Patient is advised to return to clinic in 4 weeks to reassess pain control.  If there are any results by lab that are substantial, I will contact patient by phone; otherwise [employer] message will be sent with results.  Patient will contact orthopedics to schedule follow-up with Dr. Lema.  I did place an order for EKG to be completed at next primary care visit to establish baseline QT interval with buprenorphine dosing.  Return to clinic in 4 weeks.  Patient verbalizes understanding and agreement with plan.      PLAN:    Medication Management:   - CONTINUE taking Cyclobenzaprine 5mg - 1- 2 tabs by mouth three times per day   - CONTINUE taking Pregabalin 125mg by mouth two times per day    - CONTINUE taking Butrans 15mcg/hr patch - one patch every 7 days       New Orders:   - Blood or urine testing: to rule out underlying conditions  or nutritional deficiencies that may be contributing to your pain.     - EKG to evaluate baseline heart rhythm due to medication use     Return to Clinic:   -  30-minute In-Person Appointment with Zaria Hansen DNP, APRN, FNP-C in 4 weeks, or sooner if needed      Future Considerations:   We may consider referral to Dermatology if the rash does not improve.  Return to Dr. Lema in Sports Medicine for additional evaluation of left hip swelling.        ___________________________________________________________________    BILLING TIME DOCUMENTATION:   TOTAL TIME includes:   Time spent preparing to see the patient: 3 minutes (reviewing records and tests)  Time spend face to face with the patient: 38 minutes  Time spent ordering tests, medications, procedures and referrals: 0 minutes  Time spent Referring and communicating with other healthcare professionals: 0 minutes  Documenting clinical information in Epic: 3 minutes    The total TIME spent on this patient on the day of the appointment was 44 minutes.     ___________________________________________________________________    Review of Electronic Chart: Today I have also reviewed available medical information in the patient's medical record at [facility] ([facility]) and Care Everywhere (if available), including relevant provider notes, laboratory work, and imaging.     PACO Mansfield, ETHAN, CYRUS   [facility]

## 2025-03-24 ENCOUNTER — OFFICE VISIT (OUTPATIENT)
Dept: PALLIATIVE MEDICINE | Facility: CLINIC | Age: 45
End: 2025-03-24
Attending: NURSE PRACTITIONER
Payer: COMMERCIAL

## 2025-03-24 VITALS — DIASTOLIC BLOOD PRESSURE: 85 MMHG | SYSTOLIC BLOOD PRESSURE: 111 MMHG | HEART RATE: 91 BPM

## 2025-03-24 DIAGNOSIS — G89.4 CHRONIC PAIN SYNDROME: Primary | ICD-10-CM

## 2025-03-24 DIAGNOSIS — Z51.81 ENCOUNTER FOR THERAPEUTIC DRUG MONITORING: ICD-10-CM

## 2025-03-24 DIAGNOSIS — M79.18 MYOFASCIAL PAIN: ICD-10-CM

## 2025-03-24 DIAGNOSIS — F11.90 CHRONIC, CONTINUOUS USE OF OPIOIDS: ICD-10-CM

## 2025-03-24 DIAGNOSIS — M25.552 HIP PAIN, LEFT: ICD-10-CM

## 2025-03-24 DIAGNOSIS — M54.16 LUMBAR RADICULOPATHY: ICD-10-CM

## 2025-03-24 LAB
CRP SERPL-MCNC: <3 MG/L
ERYTHROCYTE [SEDIMENTATION RATE] IN BLOOD BY WESTERGREN METHOD: 13 MM/HR (ref 0–20)

## 2025-03-24 PROCEDURE — 93000 ELECTROCARDIOGRAM COMPLETE: CPT | Performed by: NURSE PRACTITIONER

## 2025-03-24 PROCEDURE — 86431 RHEUMATOID FACTOR QUANT: CPT | Performed by: NURSE PRACTITIONER

## 2025-03-24 PROCEDURE — 86200 CCP ANTIBODY: CPT | Performed by: NURSE PRACTITIONER

## 2025-03-24 PROCEDURE — 85652 RBC SED RATE AUTOMATED: CPT | Performed by: NURSE PRACTITIONER

## 2025-03-24 PROCEDURE — 86038 ANTINUCLEAR ANTIBODIES: CPT | Performed by: NURSE PRACTITIONER

## 2025-03-24 PROCEDURE — 86140 C-REACTIVE PROTEIN: CPT | Performed by: NURSE PRACTITIONER

## 2025-03-24 PROCEDURE — 36415 COLL VENOUS BLD VENIPUNCTURE: CPT | Performed by: NURSE PRACTITIONER

## 2025-03-24 PROCEDURE — 99215 OFFICE O/P EST HI 40 MIN: CPT | Performed by: NURSE PRACTITIONER

## 2025-03-24 RX ORDER — BUPRENORPHINE 15 UG/H
1 PATCH TRANSDERMAL
Qty: 4 PATCH | Refills: 0 | Status: SHIPPED | OUTPATIENT
Start: 2025-03-24

## 2025-03-24 ASSESSMENT — PAIN SCALES - GENERAL: PAINLEVEL_OUTOF10: SEVERE PAIN (8)

## 2025-03-24 NOTE — PATIENT INSTRUCTIONS
PATIENT INSTRUCTIONS:     I am recommending a multidisciplinary treatment plan to help this patient better manage her pain. The following recommendations were given to the patient. Diagnosis, treatment options, risks, benefits, and alternatives were discussed; all questions were answered. Self-care instructions were given. The patient expressed understanding of the plan for management.   Medication Management:   - CONTINUE taking Cyclobenzaprine 5mg - 1- 2 tabs by mouth three times per day   - CONTINUE taking Pregabalin 125mg by mouth two times per day    - CONTINUE taking Butrans 15mcg/hr patch - one patch every 7 days       New Orders:   - Blood or urine testing: to rule out underlying conditions or nutritional deficiencies that may be contributing to your pain.       Return to Clinic:   -  30-minute In-Person Appointment with Zaria Hansen, ETHAN, PACO, CYRUS in 4 weeks, or sooner if needed      Future Considerations:   We may consider referral to Dermatology if the rash does not improve.  Return to Dr. Lema in Sports Medicine for additional evaluation of left hip swelling.    ----------------------------------------------------------------  Clinic Number:  [phone]   Call with any questions about your care and for scheduling assistance.   Calls are returned Monday through Friday between 8 AM and 4:30 PM. We usually get back to you within 2 business days depending on the issue/request.    If we are prescribing your medications:  For opioid medication refills, call the clinic or send a [employer] message 7 days in advance.  Please include:  Name of requested medication  Name of the pharmacy.  For non-opioid medications, call your pharmacy directly to request a refill. Please allow 3-4 days to be processed.   Per [place] State Law:  All controlled substance prescriptions must be filled within 30 days of being written.    For those controlled substances allowing refills, pickup must occur within 30 days of last fill.       We believe regular attendance is key to your success in our program!    Any time you are unable to keep your appointment we ask that you call us at least 24 hours in advance to cancel.This will allow us to offer the appointment time to another patient.   Multiple missed appointments may lead to dismissal from the clinic.

## 2025-03-25 LAB
CCP AB SER IA-ACNC: 1 U/ML
RHEUMATOID FACT SERPL-ACNC: <10 IU/ML

## 2025-03-26 LAB — ANA SER QL IF: NEGATIVE

## 2025-04-06 ENCOUNTER — MYC REFILL (OUTPATIENT)
Dept: PALLIATIVE MEDICINE | Facility: CLINIC | Age: 45
End: 2025-04-06
Payer: COMMERCIAL

## 2025-04-06 ENCOUNTER — MYC MEDICAL ADVICE (OUTPATIENT)
Dept: PALLIATIVE MEDICINE | Facility: CLINIC | Age: 45
End: 2025-04-06
Payer: COMMERCIAL

## 2025-04-06 ENCOUNTER — MYC REFILL (OUTPATIENT)
Dept: FAMILY MEDICINE | Facility: CLINIC | Age: 45
End: 2025-04-06
Payer: COMMERCIAL

## 2025-04-06 DIAGNOSIS — G89.4 CHRONIC PAIN SYNDROME: ICD-10-CM

## 2025-04-06 DIAGNOSIS — M54.16 LUMBAR RADICULOPATHY: ICD-10-CM

## 2025-04-06 DIAGNOSIS — E11.9 TYPE 2 DIABETES MELLITUS WITHOUT COMPLICATION, WITHOUT LONG-TERM CURRENT USE OF INSULIN (H): ICD-10-CM

## 2025-04-06 DIAGNOSIS — F11.90 CHRONIC, CONTINUOUS USE OF OPIOIDS: ICD-10-CM

## 2025-04-06 DIAGNOSIS — M79.18 MYOFASCIAL PAIN: ICD-10-CM

## 2025-04-06 RX ORDER — BUPRENORPHINE 15 UG/H
1 PATCH TRANSDERMAL
Qty: 4 PATCH | Refills: 0 | Status: CANCELLED | OUTPATIENT
Start: 2025-04-06

## 2025-04-07 DIAGNOSIS — E11.9 TYPE 2 DIABETES MELLITUS WITHOUT COMPLICATION, WITHOUT LONG-TERM CURRENT USE OF INSULIN (H): ICD-10-CM

## 2025-04-07 RX ORDER — CYCLOBENZAPRINE HCL 5 MG
5-10 TABLET ORAL 3 TIMES DAILY PRN
Qty: 90 TABLET | Refills: 0 | Status: SHIPPED | OUTPATIENT
Start: 2025-04-07

## 2025-04-07 RX ORDER — PREGABALIN 100 MG/1
100 CAPSULE ORAL 2 TIMES DAILY
Qty: 60 CAPSULE | Refills: 2 | Status: SHIPPED | OUTPATIENT
Start: 2025-04-07

## 2025-04-07 RX ORDER — BUPRENORPHINE 15 UG/H
1 PATCH TRANSDERMAL
Qty: 4 PATCH | Refills: 0 | Status: CANCELLED | OUTPATIENT
Start: 2025-04-07

## 2025-04-07 NOTE — TELEPHONE ENCOUNTER
Received request for a refill(s) of   cyclobenzaprine (FLEXERIL) 5 MG tablet  pregabalin (LYRICA) 100 MG capsule  buprenorphine (BUTRANS) 15 MCG/HR Wk patch    Last dispensed from pharmacy on     cyclobenzaprine (FLEXERIL) 5 MG tablet - 2/20/2025  pregabalin (LYRICA) 100 MG capsule - 2/5/2025  buprenorphine (BUTRANS) 15 MCG/HR Wk patch - 3/3/2025    Patient's last office/virtual visit by prescribing provider on 3/24/2025  Next office/virtual appointment scheduled for None on File    Last urine drug screen date 10/31/2024  Current opioid agreement on file (completed within the last year) YesDate of opioid agreement: 11/26/2024    E-prescribe to [facility] [id number] [place] [facility] [place] [address] [address] [facility]   pharmacy    Will route to [facility] for review and preparation of prescription(s).

## 2025-04-07 NOTE — TELEPHONE ENCOUNTER
Medication refill information reviewed.     Due date for buprenorphine (BUTRANS) 15 MCG/HR Wk patch is 4/6/25     Prescriptions prepped for review.     Will route to provider.     Pt is out of medication

## 2025-04-07 NOTE — TELEPHONE ENCOUNTER
Prescription for buprenorphine (BUTRANS) 15 MCG/HR Wk patch was signed / sent to pharmacy on 3/24/2024.    Patient notified to contact pharmacy to fill medication.     Leela Noriega RN

## 2025-04-07 NOTE — TELEPHONE ENCOUNTER
Refills have been requested for the following medications:         pregabalin (LYRICA) 100 MG capsule [Zaria Hansen]         cyclobenzaprine (FLEXERIL) 5 MG tablet [Zaria Hansen]         buprenorphine (BUTRANS) 15 MCG/HR Wk patch [Zaria Hansen]      Patient Comment: I didn't notice that I had none of these left.  I was supposed to change it yesterday but I'm out.     Preferred pharmacy: [facility] [address] [address] [place] [address] [address] [facility]

## 2025-04-07 NOTE — TELEPHONE ENCOUNTER
4/7/2025 5:36 PM     REFILL REQUEST:     This is a patient of mine. PDMP and Chart have been reviewed; refill request is appropriate.    Refilled for 90 day supply.  Script e-Prescribed to pharmacy    PACO Mansfield, ETHAN, FNP-C

## 2025-04-08 RX ORDER — TIRZEPATIDE 2.5 MG/.5ML
2.5 INJECTION, SOLUTION SUBCUTANEOUS
OUTPATIENT
Start: 2025-04-08

## 2025-04-09 RX ORDER — TIRZEPATIDE 10 MG/.5ML
INJECTION, SOLUTION SUBCUTANEOUS
OUTPATIENT
Start: 2025-04-09

## 2025-04-11 ENCOUNTER — APPOINTMENT (OUTPATIENT)
Dept: CT IMAGING | Facility: CLINIC | Age: 45
DRG: 872 | End: 2025-04-11
Attending: EMERGENCY MEDICINE
Payer: COMMERCIAL

## 2025-04-11 ENCOUNTER — HOSPITAL ENCOUNTER (INPATIENT)
Facility: CLINIC | Age: 45
LOS: 2 days | Discharge: HOME | DRG: 872 | End: 2025-04-14
Attending: EMERGENCY MEDICINE | Admitting: STUDENT IN AN ORGANIZED HEALTH CARE EDUCATION/TRAINING PROGRAM
Payer: COMMERCIAL

## 2025-04-11 DIAGNOSIS — Z79.85 LONG-TERM (CURRENT) USE OF INJECTABLE NON-INSULIN ANTIDIABETIC DRUGS: Primary | ICD-10-CM

## 2025-04-11 DIAGNOSIS — N12 PYELONEPHRITIS: ICD-10-CM

## 2025-04-11 DIAGNOSIS — Z79.891 LONG TERM CURRENT USE OF OPIATE ANALGESIC: ICD-10-CM

## 2025-04-11 DIAGNOSIS — E87.1 HYPONATREMIA: ICD-10-CM

## 2025-04-11 LAB
ALBUMIN SERPL BCG-MCNC: 2.9 G/DL (ref 3.5–5.2)
ALBUMIN SERPL BCG-MCNC: 3.5 G/DL (ref 3.5–5.2)
ALBUMIN UR-MCNC: 30 MG/DL
ALP SERPL-CCNC: 94 U/L (ref 40–150)
ALT SERPL W P-5'-P-CCNC: 14 U/L (ref 0–50)
ANION GAP SERPL CALCULATED.3IONS-SCNC: 11 MMOL/L (ref 7–15)
ANION GAP SERPL CALCULATED.3IONS-SCNC: 15 MMOL/L (ref 7–15)
APPEARANCE UR: ABNORMAL
AST SERPL W P-5'-P-CCNC: 20 U/L (ref 0–45)
B-OH-BUTYR SERPL-SCNC: 0.96 MMOL/L
BACTERIA #/AREA URNS HPF: ABNORMAL /HPF
BASOPHILS # BLD AUTO: 0 10E3/UL (ref 0–0.2)
BASOPHILS # BLD AUTO: 0 10E3/UL (ref 0–0.2)
BASOPHILS NFR BLD AUTO: 0 %
BASOPHILS NFR BLD AUTO: 0 %
BILIRUB SERPL-MCNC: 1 MG/DL
BILIRUB UR QL STRIP: NEGATIVE
BUN SERPL-MCNC: 10.6 MG/DL (ref 6–20)
BUN SERPL-MCNC: 12.8 MG/DL (ref 6–20)
CALCIUM SERPL-MCNC: 10.1 MG/DL (ref 8.8–10.4)
CALCIUM SERPL-MCNC: 8.8 MG/DL (ref 8.8–10.4)
CHLORIDE SERPL-SCNC: 92 MMOL/L (ref 98–107)
CHLORIDE SERPL-SCNC: 98 MMOL/L (ref 98–107)
COLOR UR AUTO: YELLOW
CREAT SERPL-MCNC: 0.54 MG/DL (ref 0.51–0.95)
CREAT SERPL-MCNC: 0.66 MG/DL (ref 0.51–0.95)
D DIMER PPP FEU-MCNC: 1.99 UG/ML FEU (ref 0–0.5)
EGFRCR SERPLBLD CKD-EPI 2021: >90 ML/MIN/1.73M2
EGFRCR SERPLBLD CKD-EPI 2021: >90 ML/MIN/1.73M2
EOSINOPHIL # BLD AUTO: 0 10E3/UL (ref 0–0.7)
EOSINOPHIL # BLD AUTO: 0 10E3/UL (ref 0–0.7)
EOSINOPHIL NFR BLD AUTO: 0 %
EOSINOPHIL NFR BLD AUTO: 0 %
ERYTHROCYTE [DISTWIDTH] IN BLOOD BY AUTOMATED COUNT: 12.8 % (ref 10–15)
ERYTHROCYTE [DISTWIDTH] IN BLOOD BY AUTOMATED COUNT: 13.1 % (ref 10–15)
FLUAV RNA SPEC QL NAA+PROBE: NEGATIVE
FLUBV RNA RESP QL NAA+PROBE: NEGATIVE
GLUCOSE SERPL-MCNC: 102 MG/DL (ref 70–99)
GLUCOSE SERPL-MCNC: 125 MG/DL (ref 70–99)
GLUCOSE UR STRIP-MCNC: NEGATIVE MG/DL
HCO3 SERPL-SCNC: 24 MMOL/L (ref 22–29)
HCO3 SERPL-SCNC: 25 MMOL/L (ref 22–29)
HCT VFR BLD AUTO: 34.4 % (ref 35–47)
HCT VFR BLD AUTO: 39.1 % (ref 35–47)
HGB BLD-MCNC: 12.2 G/DL (ref 11.7–15.7)
HGB BLD-MCNC: 13.6 G/DL (ref 11.7–15.7)
HGB UR QL STRIP: ABNORMAL
IMM GRANULOCYTES # BLD: 0 10E3/UL
IMM GRANULOCYTES # BLD: 0.1 10E3/UL
IMM GRANULOCYTES NFR BLD: 1 %
IMM GRANULOCYTES NFR BLD: 1 %
KETONES UR STRIP-MCNC: 20 MG/DL
LACTATE SERPL-SCNC: 1.6 MMOL/L (ref 0.7–2)
LEUKOCYTE ESTERASE UR QL STRIP: ABNORMAL
LYMPHOCYTES # BLD AUTO: 0.3 10E3/UL (ref 0.8–5.3)
LYMPHOCYTES # BLD AUTO: 0.4 10E3/UL (ref 0.8–5.3)
LYMPHOCYTES NFR BLD AUTO: 3 %
LYMPHOCYTES NFR BLD AUTO: 6 %
MAGNESIUM SERPL-MCNC: 1.8 MG/DL (ref 1.7–2.3)
MCH RBC QN AUTO: 31.1 PG (ref 26.5–33)
MCH RBC QN AUTO: 31.7 PG (ref 26.5–33)
MCHC RBC AUTO-ENTMCNC: 34.8 G/DL (ref 31.5–36.5)
MCHC RBC AUTO-ENTMCNC: 35.5 G/DL (ref 31.5–36.5)
MCV RBC AUTO: 89 FL (ref 78–100)
MCV RBC AUTO: 90 FL (ref 78–100)
MONOCYTES # BLD AUTO: 0.8 10E3/UL (ref 0–1.3)
MONOCYTES # BLD AUTO: 1 10E3/UL (ref 0–1.3)
MONOCYTES NFR BLD AUTO: 10 %
MONOCYTES NFR BLD AUTO: 12 %
MUCOUS THREADS #/AREA URNS LPF: PRESENT /LPF
NEUTROPHILS # BLD AUTO: 5.9 10E3/UL (ref 1.6–8.3)
NEUTROPHILS # BLD AUTO: 8.7 10E3/UL (ref 1.6–8.3)
NEUTROPHILS NFR BLD AUTO: 82 %
NEUTROPHILS NFR BLD AUTO: 86 %
NITRATE UR QL: POSITIVE
NRBC # BLD AUTO: 0 10E3/UL
NRBC # BLD AUTO: 0 10E3/UL
NRBC BLD AUTO-RTO: 0 /100
NRBC BLD AUTO-RTO: 0 /100
PH UR STRIP: 6 [PH] (ref 5–7)
PHOSPHATE SERPL-MCNC: 1.9 MG/DL (ref 2.5–4.5)
PLATELET # BLD AUTO: 109 10E3/UL (ref 150–450)
PLATELET # BLD AUTO: 125 10E3/UL (ref 150–450)
POTASSIUM SERPL-SCNC: 3.3 MMOL/L (ref 3.4–5.3)
POTASSIUM SERPL-SCNC: 3.6 MMOL/L (ref 3.4–5.3)
PROCALCITONIN SERPL IA-MCNC: 1.27 NG/ML
PROT SERPL-MCNC: 7.2 G/DL (ref 6.4–8.3)
RBC # BLD AUTO: 3.85 10E6/UL (ref 3.8–5.2)
RBC # BLD AUTO: 4.37 10E6/UL (ref 3.8–5.2)
RBC URINE: 4 /HPF
RSV RNA SPEC NAA+PROBE: NEGATIVE
SARS-COV-2 RNA RESP QL NAA+PROBE: NEGATIVE
SODIUM SERPL-SCNC: 131 MMOL/L (ref 135–145)
SODIUM SERPL-SCNC: 134 MMOL/L (ref 135–145)
SP GR UR STRIP: 1.03 (ref 1–1.03)
TRANSITIONAL EPI: <1 /HPF
TROPONIN T SERPL HS-MCNC: 7 NG/L
TROPONIN T SERPL HS-MCNC: <6 NG/L
TSH SERPL DL<=0.005 MIU/L-ACNC: 0.78 UIU/ML (ref 0.3–4.2)
UROBILINOGEN UR STRIP-MCNC: 2 MG/DL
WBC # BLD AUTO: 10 10E3/UL (ref 4–11)
WBC # BLD AUTO: 7.2 10E3/UL (ref 4–11)
WBC URINE: 79 /HPF

## 2025-04-11 PROCEDURE — 87186 SC STD MICRODIL/AGAR DIL: CPT | Performed by: EMERGENCY MEDICINE

## 2025-04-11 PROCEDURE — 84155 ASSAY OF PROTEIN SERUM: CPT | Performed by: EMERGENCY MEDICINE

## 2025-04-11 PROCEDURE — 87154 CUL TYP ID BLD PTHGN 6+ TRGT: CPT | Performed by: EMERGENCY MEDICINE

## 2025-04-11 PROCEDURE — 258N000003 HC RX IP 258 OP 636

## 2025-04-11 PROCEDURE — 250N000009 HC RX 250: Performed by: EMERGENCY MEDICINE

## 2025-04-11 PROCEDURE — 250N000013 HC RX MED GY IP 250 OP 250 PS 637

## 2025-04-11 PROCEDURE — 85041 AUTOMATED RBC COUNT: CPT

## 2025-04-11 PROCEDURE — 87637 SARSCOV2&INF A&B&RSV AMP PRB: CPT | Performed by: EMERGENCY MEDICINE

## 2025-04-11 PROCEDURE — 81003 URINALYSIS AUTO W/O SCOPE: CPT | Performed by: EMERGENCY MEDICINE

## 2025-04-11 PROCEDURE — 83605 ASSAY OF LACTIC ACID: CPT | Performed by: EMERGENCY MEDICINE

## 2025-04-11 PROCEDURE — 36415 COLL VENOUS BLD VENIPUNCTURE: CPT | Performed by: EMERGENCY MEDICINE

## 2025-04-11 PROCEDURE — 82010 KETONE BODYS QUAN: CPT | Performed by: EMERGENCY MEDICINE

## 2025-04-11 PROCEDURE — 250N000013 HC RX MED GY IP 250 OP 250 PS 637: Performed by: EMERGENCY MEDICINE

## 2025-04-11 PROCEDURE — 36415 COLL VENOUS BLD VENIPUNCTURE: CPT

## 2025-04-11 PROCEDURE — 258N000003 HC RX IP 258 OP 636: Performed by: EMERGENCY MEDICINE

## 2025-04-11 PROCEDURE — G0378 HOSPITAL OBSERVATION PER HR: HCPCS

## 2025-04-11 PROCEDURE — 96374 THER/PROPH/DIAG INJ IV PUSH: CPT | Mod: 59 | Performed by: EMERGENCY MEDICINE

## 2025-04-11 PROCEDURE — 250N000011 HC RX IP 250 OP 636: Performed by: EMERGENCY MEDICINE

## 2025-04-11 PROCEDURE — 96361 HYDRATE IV INFUSION ADD-ON: CPT | Performed by: EMERGENCY MEDICINE

## 2025-04-11 PROCEDURE — 99285 EMERGENCY DEPT VISIT HI MDM: CPT | Mod: 25 | Performed by: EMERGENCY MEDICINE

## 2025-04-11 PROCEDURE — 84100 ASSAY OF PHOSPHORUS: CPT

## 2025-04-11 PROCEDURE — 84443 ASSAY THYROID STIM HORMONE: CPT | Performed by: EMERGENCY MEDICINE

## 2025-04-11 PROCEDURE — 84145 PROCALCITONIN (PCT): CPT | Performed by: EMERGENCY MEDICINE

## 2025-04-11 PROCEDURE — 84484 ASSAY OF TROPONIN QUANT: CPT | Performed by: EMERGENCY MEDICINE

## 2025-04-11 PROCEDURE — 99222 1ST HOSP IP/OBS MODERATE 55: CPT

## 2025-04-11 PROCEDURE — 84460 ALANINE AMINO (ALT) (SGPT): CPT | Performed by: EMERGENCY MEDICINE

## 2025-04-11 PROCEDURE — 87086 URINE CULTURE/COLONY COUNT: CPT | Performed by: EMERGENCY MEDICINE

## 2025-04-11 PROCEDURE — 93005 ELECTROCARDIOGRAM TRACING: CPT | Performed by: EMERGENCY MEDICINE

## 2025-04-11 PROCEDURE — 71275 CT ANGIOGRAPHY CHEST: CPT

## 2025-04-11 PROCEDURE — 83735 ASSAY OF MAGNESIUM: CPT | Performed by: EMERGENCY MEDICINE

## 2025-04-11 PROCEDURE — 82040 ASSAY OF SERUM ALBUMIN: CPT

## 2025-04-11 PROCEDURE — 93010 ELECTROCARDIOGRAM REPORT: CPT | Performed by: EMERGENCY MEDICINE

## 2025-04-11 PROCEDURE — 85004 AUTOMATED DIFF WBC COUNT: CPT

## 2025-04-11 PROCEDURE — 85379 FIBRIN DEGRADATION QUANT: CPT | Performed by: EMERGENCY MEDICINE

## 2025-04-11 PROCEDURE — 85025 COMPLETE CBC W/AUTO DIFF WBC: CPT | Performed by: EMERGENCY MEDICINE

## 2025-04-11 RX ORDER — ACETAMINOPHEN 325 MG/1
650 TABLET ORAL EVERY 4 HOURS PRN
Status: DISCONTINUED | OUTPATIENT
Start: 2025-04-11 | End: 2025-04-14 | Stop reason: HOSPADM

## 2025-04-11 RX ORDER — AMOXICILLIN 250 MG
1 CAPSULE ORAL 2 TIMES DAILY PRN
Status: DISCONTINUED | OUTPATIENT
Start: 2025-04-11 | End: 2025-04-14 | Stop reason: HOSPADM

## 2025-04-11 RX ORDER — AMOXICILLIN 250 MG
2 CAPSULE ORAL 2 TIMES DAILY PRN
Status: DISCONTINUED | OUTPATIENT
Start: 2025-04-11 | End: 2025-04-14 | Stop reason: HOSPADM

## 2025-04-11 RX ORDER — AMITRIPTYLINE HYDROCHLORIDE 10 MG/1
10 TABLET ORAL
Status: DISCONTINUED | OUTPATIENT
Start: 2025-04-11 | End: 2025-04-14 | Stop reason: HOSPADM

## 2025-04-11 RX ORDER — NALOXONE HYDROCHLORIDE 0.4 MG/ML
0.2 INJECTION, SOLUTION INTRAMUSCULAR; INTRAVENOUS; SUBCUTANEOUS
Status: DISCONTINUED | OUTPATIENT
Start: 2025-04-11 | End: 2025-04-14 | Stop reason: HOSPADM

## 2025-04-11 RX ORDER — PROCHLORPERAZINE MALEATE 5 MG/1
10 TABLET ORAL EVERY 6 HOURS PRN
Status: DISCONTINUED | OUTPATIENT
Start: 2025-04-11 | End: 2025-04-14 | Stop reason: HOSPADM

## 2025-04-11 RX ORDER — IOPAMIDOL 755 MG/ML
67 INJECTION, SOLUTION INTRAVASCULAR ONCE
Status: DISCONTINUED | OUTPATIENT
Start: 2025-04-11 | End: 2025-04-11

## 2025-04-11 RX ORDER — OMEGA-3 FATTY ACIDS/FISH OIL 300-1000MG
2 CAPSULE ORAL EVERY 4 HOURS PRN
Status: ON HOLD | COMMUNITY
End: 2025-04-14

## 2025-04-11 RX ORDER — IOPAMIDOL 755 MG/ML
67 INJECTION, SOLUTION INTRAVASCULAR ONCE
Status: COMPLETED | OUTPATIENT
Start: 2025-04-11 | End: 2025-04-11

## 2025-04-11 RX ORDER — ACETAMINOPHEN 325 MG/1
650 TABLET ORAL ONCE
Status: COMPLETED | OUTPATIENT
Start: 2025-04-11 | End: 2025-04-11

## 2025-04-11 RX ORDER — BLOOD-GLUCOSE METER
EACH MISCELLANEOUS
COMMUNITY
Start: 2024-11-08

## 2025-04-11 RX ORDER — TIRZEPATIDE 7.5 MG/.5ML
0.5 INJECTION, SOLUTION SUBCUTANEOUS WEEKLY
COMMUNITY
Start: 2025-04-07 | End: 2025-04-11

## 2025-04-11 RX ORDER — LIDOCAINE 40 MG/G
CREAM TOPICAL
Status: DISCONTINUED | OUTPATIENT
Start: 2025-04-11 | End: 2025-04-14 | Stop reason: HOSPADM

## 2025-04-11 RX ORDER — NALOXONE HYDROCHLORIDE 0.4 MG/ML
0.4 INJECTION, SOLUTION INTRAMUSCULAR; INTRAVENOUS; SUBCUTANEOUS
Status: DISCONTINUED | OUTPATIENT
Start: 2025-04-11 | End: 2025-04-14 | Stop reason: HOSPADM

## 2025-04-11 RX ORDER — CALCIUM CARBONATE 500 MG/1
1000 TABLET, CHEWABLE ORAL 4 TIMES DAILY PRN
Status: DISCONTINUED | OUTPATIENT
Start: 2025-04-11 | End: 2025-04-14 | Stop reason: HOSPADM

## 2025-04-11 RX ORDER — ONDANSETRON 4 MG/1
4 TABLET, ORALLY DISINTEGRATING ORAL EVERY 6 HOURS PRN
Status: DISCONTINUED | OUTPATIENT
Start: 2025-04-11 | End: 2025-04-14 | Stop reason: HOSPADM

## 2025-04-11 RX ORDER — ALBUTEROL SULFATE 90 UG/1
2 INHALANT RESPIRATORY (INHALATION) EVERY 4 HOURS PRN
Status: DISCONTINUED | OUTPATIENT
Start: 2025-04-11 | End: 2025-04-14 | Stop reason: HOSPADM

## 2025-04-11 RX ORDER — OXYCODONE HYDROCHLORIDE 5 MG/1
5 TABLET ORAL EVERY 4 HOURS PRN
Status: DISCONTINUED | OUTPATIENT
Start: 2025-04-11 | End: 2025-04-14 | Stop reason: HOSPADM

## 2025-04-11 RX ORDER — ELECTROLYTES/DEXTROSE
1 SOLUTION, ORAL ORAL EVERY OTHER DAY
Status: ON HOLD | COMMUNITY
End: 2025-04-14

## 2025-04-11 RX ORDER — CEFTRIAXONE 1 G/1
1 INJECTION, POWDER, FOR SOLUTION INTRAMUSCULAR; INTRAVENOUS ONCE
Status: COMPLETED | OUTPATIENT
Start: 2025-04-11 | End: 2025-04-11

## 2025-04-11 RX ORDER — CYCLOBENZAPRINE HCL 5 MG
5-10 TABLET ORAL 3 TIMES DAILY PRN
Status: DISCONTINUED | OUTPATIENT
Start: 2025-04-11 | End: 2025-04-14 | Stop reason: HOSPADM

## 2025-04-11 RX ORDER — CALCIUM CARBONATE 500 MG/1
1000 TABLET, CHEWABLE ORAL 4 TIMES DAILY PRN
Status: DISCONTINUED | OUTPATIENT
Start: 2025-04-11 | End: 2025-04-11

## 2025-04-11 RX ORDER — ONDANSETRON 2 MG/ML
4 INJECTION INTRAMUSCULAR; INTRAVENOUS EVERY 6 HOURS PRN
Status: DISCONTINUED | OUTPATIENT
Start: 2025-04-11 | End: 2025-04-14 | Stop reason: HOSPADM

## 2025-04-11 RX ADMIN — SODIUM CHLORIDE 1000 ML: 0.9 INJECTION, SOLUTION INTRAVENOUS at 14:32

## 2025-04-11 RX ADMIN — ACETAMINOPHEN 650 MG: 325 TABLET, FILM COATED ORAL at 12:39

## 2025-04-11 RX ADMIN — CEFTRIAXONE SODIUM 1 G: 1 INJECTION, POWDER, FOR SOLUTION INTRAMUSCULAR; INTRAVENOUS at 16:38

## 2025-04-11 RX ADMIN — SODIUM CHLORIDE, SODIUM LACTATE, POTASSIUM CHLORIDE, AND CALCIUM CHLORIDE 1000 ML: .6; .31; .03; .02 INJECTION, SOLUTION INTRAVENOUS at 18:19

## 2025-04-11 RX ADMIN — SODIUM CHLORIDE 94 ML: 9 INJECTION, SOLUTION INTRAVENOUS at 15:39

## 2025-04-11 RX ADMIN — PREGABALIN 125 MG: 25 CAPSULE ORAL at 22:34

## 2025-04-11 RX ADMIN — ACETAMINOPHEN 650 MG: 325 TABLET ORAL at 19:34

## 2025-04-11 RX ADMIN — IOPAMIDOL 67 ML: 755 INJECTION, SOLUTION INTRAVENOUS at 15:38

## 2025-04-11 ASSESSMENT — ACTIVITIES OF DAILY LIVING (ADL)
ADLS_ACUITY_SCORE: 41
ADLS_ACUITY_SCORE: 18
ADLS_ACUITY_SCORE: 41
ADLS_ACUITY_SCORE: 41
ADLS_ACUITY_SCORE: 18
ADLS_ACUITY_SCORE: 41
ADLS_ACUITY_SCORE: 41

## 2025-04-11 ASSESSMENT — COLUMBIA-SUICIDE SEVERITY RATING SCALE - C-SSRS
2. HAVE YOU ACTUALLY HAD ANY THOUGHTS OF KILLING YOURSELF IN THE PAST MONTH?: NO
6. HAVE YOU EVER DONE ANYTHING, STARTED TO DO ANYTHING, OR PREPARED TO DO ANYTHING TO END YOUR LIFE?: NO
1. IN THE PAST MONTH, HAVE YOU WISHED YOU WERE DEAD OR WISHED YOU COULD GO TO SLEEP AND NOT WAKE UP?: NO

## 2025-04-11 NOTE — ED NOTES
"[facility]   Admission Handoff    The patient is aTmmy Joshi, 44 year old who arrived in the ED by CAR from home with a complaint of Dizziness, Fever, and Generalized Body Aches  . The patient's current symptoms are new and during this time the symptoms have remained the same. In the ED, patient was diagnosed with   Final diagnoses:   Pyelonephritis   Hyponatremia         Needed?: No    Allergies:  No Known Allergies    Past Medical Hx:   Past Medical History:   Diagnosis Date    Absence of menstruation     Anxiety     Depressive disorder     Diabetes (H)     Female infertility of unspecified origin     MILD/NOS PREECLAMP-ANTEP 08/29/2005    Polycystic ovaries     PPH (postpartum hemorrhage) 11/01/2009       Initial vitals were: BP: 110/81  Pulse: (!) 132  Temp: 100.4  F (38  C)  Resp: 16  Height: 162.6 cm (5' 4\")  Weight: 62.6 kg (138 lb)  SpO2: 97 %   Recent vital Signs: /67   Pulse 101   Temp 100.4  F (38  C) (Tympanic)   Resp 16   Ht 1.626 m (5' 4\")   Wt 62.6 kg (138 lb)   SpO2 99%   BMI 23.69 kg/m      Elimination Status: Continent: Yes     Activity Level: SBA    Fall Status: Reason for falls risk: Elimination  bed/chair alarm on, nonskid shoes/slippers when out of bed, arm band in place, patient and family education, and assistive device/personal items within reach    Baseline Mental status: WDL  Current Mental Status changes: at basesline    Infection present or suspected this encounter: yes urinary  Sepsis suspected: No    Isolation type: NA    Bariatric equipment needed?: No    In the ED these meds were given:   Medications   lactated ringers BOLUS 1,000 mL (has no administration in time range)   acetaminophen (TYLENOL) tablet 650 mg (650 mg Oral $Given 4/11/25 1239)   sodium chloride 0.9% BOLUS 1,000 mL (1,000 mLs Intravenous $New Bag 4/11/25 1432)   iopamidol (ISOVUE-370) solution 67 mL (67 mLs Intravenous $Given 4/11/25 1538)   sodium chloride 0.9 " % bag for CT scan flush (94 mLs As instructed $Given 4/11/25 1224)   cefTRIAXone (ROCEPHIN) 1 g vial to attach to  mL bag for ADULTS or NS 50 mL bag for PEDS (1 g Intravenous $New Bag 4/11/25 1638)       Drips running?  Yes    Home pump  No    Current LDAs: Peripheral IV: Site RAC; Gauge 20  lactated Ringer's     Results:   Labs/Imaging  Ordered and Resulted from Time of ED Arrival Up to the Time of Departure from the ED  Results for orders placed or performed during the hospital encounter of 04/11/25 (from the past 24 hours)   Influenza A/B, RSV and SARS-CoV2 PCR (COVID-19) Nose    Specimen: Nose; Swab   Result Value Ref Range    Influenza A PCR Negative Negative    Influenza B PCR Negative Negative    RSV PCR Negative Negative    SARS CoV2 PCR Negative Negative    Narrative    Testing was performed using the Xpert Xpress CoV2/Flu/RSV Assay on the [employer] GeneXpert Instrument. This test should be ordered for the detection of SARS-CoV2, influenza, and RSV viruses in individuals with signs and symptoms of respiratory tract infection. This test is for in vitro diagnostic use under the [place] FDA for laboratories certified under CLIA to perform high or moderate complexity testing. This test has been [place] FDA cleared. A negative result does not rule out the presence of PCR inhibitors in the specimen or target RNA in concentration below the limit of detection for the assay. If only one viral target is positive but coinfection with multiple targets is suspected, the sample should be re-tested with another FDA cleared, approved, or authorized test, if coninfection would change clinical management. This test was validated by the [facility]. These laboratories are certified under the Clinical Laboratory Improvement Amendments of 1988 (CLIA-88) as qualified to perfom high complexity laboratory testing.   EKG 12-lead, tracing only   Result Value Ref Range    Systolic Blood Pressure  mmHg    Diastolic Blood  Pressure  mmHg    Ventricular Rate 98 BPM    Atrial Rate 98 BPM    AK Interval 130 ms    QRS Duration 82 ms     ms    QTc 411 ms    P Axis 45 degrees    R AXIS 55 degrees    T Axis 60 degrees    Interpretation ECG       Sinus rhythm  Normal ECG  When compared with ECG of 08-Nov-2021 19:37, (unconfirmed)  No significant change was found     Comprehensive metabolic panel   Result Value Ref Range    Sodium 131 (L) 135 - 145 mmol/L    Potassium 3.6 3.4 - 5.3 mmol/L    Carbon Dioxide (CO2) 24 22 - 29 mmol/L    Anion Gap 15 7 - 15 mmol/L    Urea Nitrogen 12.8 6.0 - 20.0 mg/dL    Creatinine 0.66 0.51 - 0.95 mg/dL    GFR Estimate >90 >60 mL/min/1.73m2    Calcium 10.1 8.8 - 10.4 mg/dL    Chloride 92 (L) 98 - 107 mmol/L    Glucose 125 (H) 70 - 99 mg/dL    Alkaline Phosphatase 94 40 - 150 U/L    AST 20 0 - 45 U/L    ALT 14 0 - 50 U/L    Protein Total 7.2 6.4 - 8.3 g/dL    Albumin 3.5 3.5 - 5.2 g/dL    Bilirubin Total 1.0 <=1.2 mg/dL   CBC with platelets differential    Narrative    The following orders were created for panel order CBC with platelets differential.  Procedure                               Abnormality         Status                     ---------                               -----------         ------                     CBC with platelets and ...[[id number]]  Abnormal            Final result               RBC and Platelet Morpho...[[id number]]                                                   Please view results for these tests on the individual orders.   D dimer quantitative   Result Value Ref Range    D-Dimer Quantitative 1.99 (H) 0.00 - 0.50 ug/mL FEU    Narrative    This D-dimer assay is intended for use in conjunction with a clinical pretest probability assessment model to exclude pulmonary embolism (PE) and deep venous thrombosis (DVT) in outpatients suspected of PE or DVT. The cut-off value is 0.50 ug/mL FEU.   Procalcitonin   Result Value Ref Range    Procalcitonin 1.27 (H) <0.50 ng/mL   Troponin  T, High Sensitivity   Result Value Ref Range    Troponin T, High Sensitivity 7 <=14 ng/L   TSH with free T4 reflex   Result Value Ref Range    TSH 0.78 0.30 - 4.20 uIU/mL   Magnesium   Result Value Ref Range    Magnesium 1.8 1.7 - 2.3 mg/dL   Ketone Beta-Hydroxybutyrate Quantitative   Result Value Ref Range    Ketone (Beta-Hydroxybutyrate) Quantitative 0.96 (H) <=0.30 mmol/L   Lactic Acid Whole Blood with 1X Repeat in 2 HR when >2   Result Value Ref Range    Lactic Acid, Initial 1.6 0.7 - 2.0 mmol/L   CBC with platelets and differential   Result Value Ref Range    WBC Count 10.0 4.0 - 11.0 10e3/uL    RBC Count 4.37 3.80 - 5.20 10e6/uL    Hemoglobin 13.6 11.7 - 15.7 g/dL    Hematocrit 39.1 35.0 - 47.0 %    MCV 90 78 - 100 fL    MCH 31.1 26.5 - 33.0 pg    MCHC 34.8 31.5 - 36.5 g/dL    RDW 12.8 10.0 - 15.0 %    Platelet Count 125 (L) 150 - 450 10e3/uL    % Neutrophils 86 %    % Lymphocytes 3 %    % Monocytes 10 %    % Eosinophils 0 %    % Basophils 0 %    % Immature Granulocytes 1 %    NRBCs per 100 WBC 0 <1 /100    Absolute Neutrophils 8.7 (H) 1.6 - 8.3 10e3/uL    Absolute Lymphocytes 0.3 (L) 0.8 - 5.3 10e3/uL    Absolute Monocytes 1.0 0.0 - 1.3 10e3/uL    Absolute Eosinophils 0.0 0.0 - 0.7 10e3/uL    Absolute Basophils 0.0 0.0 - 0.2 10e3/uL    Absolute Immature Granulocytes 0.1 <=0.4 10e3/uL    Absolute NRBCs 0.0 10e3/uL   CT Chest (PE) Abdomen Pelvis w Contrast    Narrative    EXAM: CT CHEST PE ABDOMEN PELVIS W CONTRAST  LOCATION: [facility]  DATE: 4/11/2025    INDICATION: elevated dimer. Left lower quadrant pain, palpitation, fevers, chills and tachycardia.  COMPARISON: 11/8/2021 and 6/29/2021  TECHNIQUE: CT chest pulmonary angiogram and routine CT abdomen pelvis with IV contrast. Arterial phase through the chest and venous phase through the abdomen and pelvis. Multiplanar reformats and MIP reconstructions were performed. Dose reduction   techniques were used.   CONTRAST: 67mL isovue  370    FINDINGS:  ANGIOGRAM CHEST: Pulmonary arteries are normal caliber and negative for pulmonary emboli. Thoracic aorta is negative for dissection. No CT evidence of right heart strain.     LUNGS AND PLEURA: Trace right pleural effusion. No significant left pleural effusion. No infiltrate. No pulmonary nodules or masses.    MEDIASTINUM/AXILLAE: No lymphadenopathy. No thoracic aortic aneurysm. No pericardial effusion. Unremarkable esophagus.    CORONARY ARTERY CALCIFICATION: None.    HEPATOBILIARY: Normal.    PANCREAS: Normal.    SPLEEN: Normal.    ADRENAL GLANDS: Normal.    KIDNEYS/BLADDER: Diffuse striated nephrogram appearance of the right kidney with right perinephric stranding and trace fluid and stranding in the right pararenal fascia. Linear urothelial enhancement in the right renal pelvis and upper ureter. No right   hydronephrosis. No right-sided renal or ureteral calculi.    Minimal striated nephrogram appearance of the upper pole of the left kidney (coronal series 13, image 51). No left renal calculi or hydronephrosis. No significant left perinephric stranding. A few subcentimeter hypodense lesions in the left kidney are   likely cysts, requiring no specific follow-up.    No urinary bladder calculi.    BOWEL: No obstruction or inflammatory change. Normal appendix.    LYMPH NODES: No lymphadenopathy.    VASCULATURE: Major intra-abdominal vasculature is patent.    PELVIC ORGANS: No pelvic masses. Trace pelvic free fluid. No abscess.    MUSCULOSKELETAL: Unremarkable.      Impression    IMPRESSION:  1.  No pulmonary emboli.  2.  Acute bilateral pyelonephritis, moderate to severe in the right kidney, and mild in the left kidney.  3.  Trace right pleural effusion.     UA with Microscopic reflex to Culture    Specimen: Urine, Clean Catch   Result Value Ref Range    Color Urine Yellow Colorless, Straw, Light Yellow, Yellow    Appearance Urine Slightly Cloudy (A) Clear    Glucose Urine Negative Negative mg/dL     Bilirubin Urine Negative Negative    Ketones Urine 20 (A) Negative mg/dL    Specific Gravity Urine 1.034 1.003 - 1.035    Blood Urine Trace (A) Negative    pH Urine 6.0 5.0 - 7.0    Protein Albumin Urine 30 (A) Negative mg/dL    Urobilinogen Urine 2.0 (A) Normal mg/dL    Nitrite Urine Positive (A) Negative    Leukocyte Esterase Urine Moderate (A) Negative    Bacteria Urine Moderate (A) None Seen /HPF    Mucus Urine Present (A) None Seen /LPF    RBC Urine 4 (H) <=2 /HPF    WBC Urine 79 (H) <=5 /HPF    Transitional Epithelials Urine <1 <=1 /HPF    Narrative    Urine Culture ordered based on laboratory criteria       For the majority of the shift this patient's behavior was Green     Cardiac Rhythm: Tachycardia  Pt needs tele? yes  Skin/wound Issues: None    Code Status: Full Code    Pain control: good    Nausea control: pt had none    Abnormal labs/tests/findings requiring intervention: CBC, CT    Patient tested for COVID 19 prior to admission: YES     OBS brochure/video discussed/provided to patient/family: Yes     Family present during ED course? Yes     Family Comments/Social Situation comments: Spouse at bedside    Tasks needing completion: None    Bria Ibanez RN

## 2025-04-11 NOTE — ED TRIAGE NOTES
"Body aches and fever started Sunday. No N/V. Pt cannot tolerate food \"everything tastes gross\". Not drinking very well. Urine dark yellow. Been taking advil, last taken 10am. Dizzy     Triage Assessment (Adult)       Row Name 04/11/25 1230          Triage Assessment    Airway WDL WDL        Respiratory WDL    Respiratory WDL WDL        Peripheral/Neurovascular WDL    Peripheral Neurovascular WDL WDL                     "

## 2025-04-11 NOTE — ED PROVIDER NOTES
"[facility]  Emergency Department Visit Note    PATIENT:  Tammy Joshi     44 year old     female      [id number]    Chief complaint:  Chief Complaint   Patient presents with    Dizziness    Fever    Generalized Body Aches        History of present illness:  Patient is a 44 year old female with a medical history significant for chronic left hip pain on a Butrans patch for pain management presenting for evaluation of a week of lightheadedness, dehydration, decreased oral intake and body aches and fever.  She has had bodyaches and fever since Sunday, for the past 5 days.  She has been using Tylenol intermittently.  She denies any nausea or vomiting.  She notes that she is eating very little.  She is on Mounjaro for weight loss and notes that she has been eating and drinking even less over the past week.  No dysuria.  No pain with a deep breath.  No difficulty breathing.  No sore throat or ear pain.  Mild headache.  She works as a .  She notes that the painful area over her left hip has been persistently bothersome.  No blood in her urine.  No new rashes.  Her urine has been dark yellow.  She has been having normal bowel movements.  She is wearing a Butrans patch.  No confusion. No pain with a  deep breath.     Review of Systems:  As in HPI above    /67   Pulse 101   Temp 100.4  F (38  C) (Tympanic)   Resp 16   Ht 1.626 m (5' 4\")   Wt 62.6 kg (138 lb)   SpO2 99%   BMI 23.69 kg/m      Physical Exam  Constitutional: laying in hospital bed, alert, oriented, in no apparent distress, conversant, and answering questions appropriately  HEENT: normocephalic, atraumatic, pupils 3mm, equal, round, and reactive to light, sclerae anicteric, extraocular motions intact, and moist mucous membranes  Neck: able to fully range, no midline tenderness, and no stridor  Cardiovascular: tachycardic and regular rhythm  Pulmonary: breathing comfortably on room air and lungs clear to " auscultation bilaterally  Abdominal: soft, non-tender, non-distended  Genitourinary: deferred  Extremities/MSK: no peripheral edema, no cyanosis , and no calf tenderness to palpation small area of tenderness along the left hip along the lateral buttock without fluctuance  Skin: warm, dry, diaphoretic, no rashes or lesions, and no mottling  Neurologic: moves all four extremities spontaneously and GCS 15  Psychiatric: calm, appropriate      MDM:  Patient is a 44 year old female with above history presenting for evaluation of lightheadedness, dehydration, fever for the past week.    Vitals notable for tachycardia and fever otherwise normal blood pressure normal respiratory rate and normal oxygenation. Exam reveals a diaphoretic but otherwise well-appearing alert and oriented 44-year-old female.  No focal issues, no respiratory distress, no rashes.    Considering the longevity of patient's symptoms and her abnormal vital signs including tachycardia and fever, plan to initiate septic workup including chest x-ray, urine, blood cultures, basic labs and a lactic acid.  Also considering her decreased oral intake plan to initiate IV fluids.  Patient is in agreement with this plan.    My differential includes PE considering tachycardia and fever, ACS considering tachycardia, infection, UTI, viral syndrome, pneumonia.  Pro-Brett added on to help differentiate.    Remainder of ED course below.    ED COURSE:  ED Course as of 04/11/25 1935 Fri Apr 11, 2025   1420 EKG 12-lead, tracing only  ECG:  - Ventricular rate 98 bpm, regular  - OH, QRS, QT intervals normal  - Axis normal  - no ST segment or T wave changes concerning for acute ischemia  - Comparison to prior ECGs: no changes  - My independent interpretation: overall reassuring in this context     1622 UA with Microscopic reflex to Culture(!)  Concerning for UTI as cause of symptoms, ordered ceftriaxone   1623 CT Chest (PE) Abdomen Pelvis w Contrast  IMPRESSION:  1.  No  pulmonary emboli.  2.  Acute bilateral pyelonephritis, moderate to severe in the right kidney, and mild in the left kidney.  3.  Trace right pleural effusion.     1827 Ketone Quantitative(!): 0.96  Considering patient's decreased oral intake ordered this.  Lower concern for clinically significant ketosis   1827 Comprehensive metabolic panel(!)  Hyponatremia.  In the setting of patient's decreased oral intake, likely related to hypovolemic hyponatremia.  Ordered fluids   1827 TSH: 0.78  Reassuring,    1829 CBC with platelets differential(!)  No leukocytosis however she does have significant absolute neutrophil count indicative of likely infection   1829 Lactic Acid: 1.6   1830 I discussed admission versus going home and trialing outpatient antibiotics with the patient.  She does have bilateral pyelonephritis with possible that her kidneys are at higher risk for injury.  Considering patient is on Mounjaro and is having a really hard time oral hydrating,   1935 Plan for admission to the hospital.  She consents to this plan.  Plan for IV fluids and continued electrolyte management and monitoring of kidney function.       Encounter Diagnoses:  Final diagnoses:   Pyelonephritis   Hyponatremia       Final disposition: [facility] observation    Claudia Koch DO  EM Physician  [facility] ED       Claudia Koch DO  04/11/25 1935

## 2025-04-12 ENCOUNTER — MYC MEDICAL ADVICE (OUTPATIENT)
Dept: PALLIATIVE MEDICINE | Facility: CLINIC | Age: 45
End: 2025-04-12
Payer: COMMERCIAL

## 2025-04-12 PROBLEM — M54.16 LUMBAR RADICULAR PAIN: Status: ACTIVE | Noted: 2024-09-23

## 2025-04-12 PROBLEM — M79.18 MYOFASCIAL PAIN: Status: ACTIVE | Noted: 2024-09-23

## 2025-04-12 PROBLEM — F11.20 CONTINUOUS OPIOID DEPENDENCE (H): Status: ACTIVE | Noted: 2024-10-14

## 2025-04-12 PROBLEM — M48.02 CERVICAL STENOSIS OF SPINAL CANAL: Status: ACTIVE | Noted: 2024-09-23

## 2025-04-12 LAB
ACB COMPLEX DNA BLD POS QL NAA+NON-PROBE: NOT DETECTED
B FRAGILIS DNA BLD POS QL NAA+NON-PROBE: NOT DETECTED
BACTERIA UR CULT: ABNORMAL
BLACTX-M ISLT/SPM QL: NOT DETECTED
BLAIMP ISLT/SPM QL: NOT DETECTED
BLAKPC ISLT/SPM QL: NOT DETECTED
BLAOXA-48-LIKE ISLT/SPM QL: NOT DETECTED
BLAVIM ISLT/SPM QL: NOT DETECTED
C ALBICANS DNA BLD POS QL NAA+NON-PROBE: NOT DETECTED
C AURIS DNA BLD POS QL NAA+NON-PROBE: NOT DETECTED
C GATTII+NEOFOR DNA BLD POS QL NAA+N-PRB: NOT DETECTED
C GLABRATA DNA BLD POS QL NAA+NON-PROBE: NOT DETECTED
C KRUSEI DNA BLD POS QL NAA+NON-PROBE: NOT DETECTED
C PARAP DNA BLD POS QL NAA+NON-PROBE: NOT DETECTED
C TROPICLS DNA BLD POS QL NAA+NON-PROBE: NOT DETECTED
COLISTIN RES MCR-1 ISLT/SPM QL: NOT DETECTED
E CLOAC COMP DNA BLD POS NAA+NON-PROBE: NOT DETECTED
E COLI DNA BLD POS QL NAA+NON-PROBE: DETECTED
E FAECALIS DNA BLD POS QL NAA+NON-PROBE: NOT DETECTED
E FAECIUM DNA BLD POS QL NAA+NON-PROBE: NOT DETECTED
GP B STREP DNA BLD POS QL NAA+NON-PROBE: NOT DETECTED
HAEM INFLU DNA BLD POS QL NAA+NON-PROBE: NOT DETECTED
K OXYTOCA DNA BLD POS QL NAA+NON-PROBE: NOT DETECTED
KLEBSIELLA SP DNA BLD POS QL NAA+NON-PRB: NOT DETECTED
KLEBSIELLA SP DNA BLD POS QL NAA+NON-PRB: NOT DETECTED
L MONOCYTOG DNA BLD POS QL NAA+NON-PROBE: NOT DETECTED
N MEN DNA BLD POS QL NAA+NON-PROBE: NOT DETECTED
NDM: NOT DETECTED
P AERUGINOSA DNA BLD POS NAA+NON-PROBE: NOT DETECTED
PROTEUS SP DNA BLD POS QL NAA+NON-PROBE: NOT DETECTED
S AUREUS DNA BLD POS QL NAA+NON-PROBE: NOT DETECTED
S AUREUS+CONS DNA BLD POS NAA+NON-PROBE: NOT DETECTED
S EPIDERMIDIS DNA BLD POS QL NAA+NON-PRB: NOT DETECTED
S LUGDUNENSIS DNA BLD POS QL NAA+NON-PRB: NOT DETECTED
S MALTOPHILIA DNA BLD POS QL NAA+NON-PRB: NOT DETECTED
S MARCESCENS DNA BLD POS NAA+NON-PROBE: NOT DETECTED
S PNEUM DNA BLD POS QL NAA+NON-PROBE: NOT DETECTED
S PYO DNA BLD POS QL NAA+NON-PROBE: NOT DETECTED
SALMONELLA DNA BLD POS QL NAA+NON-PROBE: NOT DETECTED
STREPTOCOCCUS DNA BLD POS NAA+NON-PROBE: NOT DETECTED

## 2025-04-12 PROCEDURE — 250N000013 HC RX MED GY IP 250 OP 250 PS 637: Performed by: INTERNAL MEDICINE

## 2025-04-12 PROCEDURE — 99232 SBSQ HOSP IP/OBS MODERATE 35: CPT | Performed by: INTERNAL MEDICINE

## 2025-04-12 PROCEDURE — 258N000003 HC RX IP 258 OP 636

## 2025-04-12 PROCEDURE — 258N000003 HC RX IP 258 OP 636: Performed by: INTERNAL MEDICINE

## 2025-04-12 PROCEDURE — 250N000011 HC RX IP 250 OP 636: Performed by: INTERNAL MEDICINE

## 2025-04-12 PROCEDURE — 120N000001 HC R&B MED SURG/OB

## 2025-04-12 PROCEDURE — 250N000013 HC RX MED GY IP 250 OP 250 PS 637

## 2025-04-12 PROCEDURE — G0378 HOSPITAL OBSERVATION PER HR: HCPCS

## 2025-04-12 RX ORDER — POLYETHYLENE GLYCOL 3350 17 G/17G
17 POWDER, FOR SOLUTION ORAL DAILY PRN
Status: DISCONTINUED | OUTPATIENT
Start: 2025-04-12 | End: 2025-04-14 | Stop reason: HOSPADM

## 2025-04-12 RX ORDER — NICOTINE POLACRILEX 4 MG
15-30 LOZENGE BUCCAL
Status: DISCONTINUED | OUTPATIENT
Start: 2025-04-12 | End: 2025-04-14 | Stop reason: HOSPADM

## 2025-04-12 RX ORDER — CEFTRIAXONE 1 G/1
1 INJECTION, POWDER, FOR SOLUTION INTRAMUSCULAR; INTRAVENOUS EVERY 24 HOURS
Status: DISCONTINUED | OUTPATIENT
Start: 2025-04-12 | End: 2025-04-12

## 2025-04-12 RX ORDER — CEFTRIAXONE 2 G/1
2 INJECTION, POWDER, FOR SOLUTION INTRAMUSCULAR; INTRAVENOUS EVERY 24 HOURS
Status: DISCONTINUED | OUTPATIENT
Start: 2025-04-12 | End: 2025-04-12

## 2025-04-12 RX ORDER — CEFTRIAXONE 2 G/1
2 INJECTION, POWDER, FOR SOLUTION INTRAMUSCULAR; INTRAVENOUS EVERY 24 HOURS
Status: DISCONTINUED | OUTPATIENT
Start: 2025-04-12 | End: 2025-04-14 | Stop reason: HOSPADM

## 2025-04-12 RX ORDER — DEXTROSE MONOHYDRATE 25 G/50ML
25-50 INJECTION, SOLUTION INTRAVENOUS
Status: DISCONTINUED | OUTPATIENT
Start: 2025-04-12 | End: 2025-04-14 | Stop reason: HOSPADM

## 2025-04-12 RX ORDER — SODIUM CHLORIDE 9 MG/ML
INJECTION, SOLUTION INTRAVENOUS CONTINUOUS
Status: DISCONTINUED | OUTPATIENT
Start: 2025-04-12 | End: 2025-04-14 | Stop reason: HOSPADM

## 2025-04-12 RX ORDER — POTASSIUM CHLORIDE 1500 MG/1
40 TABLET, EXTENDED RELEASE ORAL ONCE
Status: COMPLETED | OUTPATIENT
Start: 2025-04-12 | End: 2025-04-12

## 2025-04-12 RX ADMIN — PREGABALIN 125 MG: 25 CAPSULE ORAL at 08:44

## 2025-04-12 RX ADMIN — SODIUM CHLORIDE: 0.9 INJECTION, SOLUTION INTRAVENOUS at 14:13

## 2025-04-12 RX ADMIN — PREGABALIN 125 MG: 25 CAPSULE ORAL at 19:38

## 2025-04-12 RX ADMIN — VENLAFAXINE HYDROCHLORIDE 225 MG: 150 CAPSULE, EXTENDED RELEASE ORAL at 08:45

## 2025-04-12 RX ADMIN — POTASSIUM CHLORIDE 40 MEQ: 1500 TABLET, EXTENDED RELEASE ORAL at 12:32

## 2025-04-12 RX ADMIN — SENNOSIDES AND DOCUSATE SODIUM 2 TABLET: 50; 8.6 TABLET ORAL at 08:46

## 2025-04-12 RX ADMIN — OXYCODONE 5 MG: 5 TABLET ORAL at 07:04

## 2025-04-12 RX ADMIN — CEFTRIAXONE 2 G: 2 INJECTION, POWDER, FOR SOLUTION INTRAMUSCULAR; INTRAVENOUS at 12:55

## 2025-04-12 RX ADMIN — ACETAMINOPHEN 650 MG: 325 TABLET ORAL at 23:31

## 2025-04-12 RX ADMIN — CYCLOBENZAPRINE 10 MG: 5 TABLET, FILM COATED ORAL at 09:09

## 2025-04-12 RX ADMIN — SODIUM CHLORIDE, SODIUM LACTATE, POTASSIUM CHLORIDE, AND CALCIUM CHLORIDE 1000 ML: .6; .31; .03; .02 INJECTION, SOLUTION INTRAVENOUS at 04:38

## 2025-04-12 RX ADMIN — POLYETHYLENE GLYCOL 3350 17 G: 17 POWDER, FOR SOLUTION ORAL at 12:32

## 2025-04-12 RX ADMIN — OXYCODONE 5 MG: 5 TABLET ORAL at 00:49

## 2025-04-12 RX ADMIN — ACETAMINOPHEN 650 MG: 325 TABLET ORAL at 08:47

## 2025-04-12 RX ADMIN — ACETAMINOPHEN 650 MG: 325 TABLET ORAL at 14:11

## 2025-04-12 ASSESSMENT — ACTIVITIES OF DAILY LIVING (ADL)
ADLS_ACUITY_SCORE: 18
ADLS_ACUITY_SCORE: 23
ADLS_ACUITY_SCORE: 18
ADLS_ACUITY_SCORE: 23
ADLS_ACUITY_SCORE: 18
ADLS_ACUITY_SCORE: 23
ADLS_ACUITY_SCORE: 18

## 2025-04-12 NOTE — PROGRESS NOTES
See flowsheets regarding patient's temperature.  Tylenol does not seem to be working, however patient's temperature decreases when we adiel her in ice under arms, back of neck, between legs, etc.

## 2025-04-12 NOTE — PROGRESS NOTES
[facility] Medicine Progress Note  Date of Service (when I saw the patient): 04/12/2025    REASON FOR ADMISSION / INTERVAL HISTORY:  Tammy Joshi is a 44 year old female with past medical history significant for chronic pain with chronic opioid use, type 2 DM, anxiety, depression admitted 4/11 with fevers/chills, myalgias, headaches, poor intake, and excessive fatigue progressive x1 week pta       Assessment/ Plan     Pyelonephritis, bilateral   Urinary tract infection   Presented with fever/ myalgias/ HA. UA with positive nitrates, moderate leuk esterase, 79 WBC, 4 rbc. CT a/p with acute bilateral pyelonephritis, moderate to severe in right kidney, mild in left kidney.   No leukocytosis. Procalcitonin elevated (1.27). Creatinine 0.66, GFR >90. Lactic acid wnl.   Low grade fevers in the ED, then up to 102.7F shortly after admission. Started on IV ceftriaxone in the ED and received 2L IVF bolus.    Blood cx -GNB. Blood Culture ID Panel, PCR shows ecoli. Ur cx->100k ecoli. Pt continued to spike temp all evening/ night 101-102f. Tachycardic. BP stable.   Will increase ceftriaxone to 2g q24hrs / continue IVF: LR @ 125mL/hr. Follow blood/ ur cx.     Hypokalemia  Replace per protocol.      Starvation ketosis   Ketones 0.96. Suspect starvation ketosis in the setting of poor oral intake  Continue fluids and rx as above     Dyspareunia   Ongoing x1 month pta. Unclear if secondary to acute illness process.   - Monitor for resolution with treatment of acute illness; consider follow up with obgyn     Chronic pain with continuous opioid dependence   Cervical, lumbar radiculopathy   Left hip pain   Follows with pain clinic last visit 03/23/25. Follows with sports medicine last visit 03/06/25. Neurosurgery evaluation 11/04/24. Unclear etiology of left hip pain.   - Butrans patch in place   - Continue pta cyclobenzaprine 5-10mg TID prn   - Continue pta pregabalin 125mg BID     Type 2  "diabetes mellitus   HgbA1c 5.6% on 02/03/25, significant improvement from 10.5% 1 year ago. Managed prior to admission with tirzepatide 15mg q7days. On admission .   - Hypoglycemia monitoring and correction protocol      Anxiety   Depression   - Continued pta venlafaxine and amitriptyline           Diet: Vegetarian Diet    DVT Prophylaxis: Low Risk/Ambulatory with no VTE prophylaxis indicated and Ambulate every shift  Jesus Catheter: Not present  Code Status:  full    Medically Ready for Discharge: Anticipated in 2-4 Days        YOSEPH CORTEZ MD   Pg [phone]        ROS:  As described in A/P and Exam.  Otherwise ALL are  negative.    PHYSICAL EXAM:  All vitals have been reviewed    Blood pressure 103/62, pulse 95, temperature 99.2  F (37.3  C), temperature source Oral, resp. rate 18, height 1.626 m (5' 4\"), weight 62.8 kg (138 lb 7.2 oz), SpO2 97%, not currently breastfeeding.    No intake/output data recorded.    GENERAL APPEARANCE: healthy, alert and no distress  EYES: conjunctiva clear, eyes grossly normal  HENT: external ears and nose normal   RESP: lungs clear to auscultation - no rales, rhonchi or wheezes  CV: regular rate and rhythm, normal S1 S2, no S3 or S4 and no murmur, click or rub   ABDOMEN: soft, mild tenderness B flanks/ suprapubic region, no HSM or masses and bowel sounds normal  MS: no clubbing, cyanosis; no edema  SKIN: clear without significant rashes or lesions  NEURO: -non-focal moves all 4 extr    ROUTINE  LABS (Last four results)  CMP  Recent Labs   Lab 04/11/25 2206 04/11/25  1430   * 131*   POTASSIUM 3.3* 3.6   CHLORIDE 98 92*   CO2 25 24   ANIONGAP 11 15   * 125*   BUN 10.6 12.8   CR 0.54 0.66   GFRESTIMATED >90 >90   AUGUSTUS 8.8 10.1   MAG  --  1.8   PHOS 1.9*  --    PROTTOTAL  --  7.2   ALBUMIN 2.9* 3.5   BILITOTAL  --  1.0   ALKPHOS  --  94   AST  --  20   ALT  --  14     CBC  Recent Labs   Lab 04/11/25 2206 04/11/25  1430   WBC 7.2 10.0   RBC 3.85 4.37   HGB 12.2 13.6 "   HCT 34.4* 39.1   MCV 89 90   MCH 31.7 31.1   MCHC 35.5 34.8   RDW 13.1 12.8   * 125*     INRNo lab results found in last 7 days.  Arterial Blood GasNo lab results found in last 7 days.    Recent Results (from the past 24 hours)   CT Chest (PE) Abdomen Pelvis w Contrast    Narrative    EXAM: CT CHEST PE ABDOMEN PELVIS W CONTRAST  LOCATION: [facility]  DATE: 4/11/2025    INDICATION: elevated dimer. Left lower quadrant pain, palpitation, fevers, chills and tachycardia.  COMPARISON: 11/8/2021 and 6/29/2021  TECHNIQUE: CT chest pulmonary angiogram and routine CT abdomen pelvis with IV contrast. Arterial phase through the chest and venous phase through the abdomen and pelvis. Multiplanar reformats and MIP reconstructions were performed. Dose reduction   techniques were used.   CONTRAST: 67mL isovue 370    FINDINGS:  ANGIOGRAM CHEST: Pulmonary arteries are normal caliber and negative for pulmonary emboli. Thoracic aorta is negative for dissection. No CT evidence of right heart strain.     LUNGS AND PLEURA: Trace right pleural effusion. No significant left pleural effusion. No infiltrate. No pulmonary nodules or masses.    MEDIASTINUM/AXILLAE: No lymphadenopathy. No thoracic aortic aneurysm. No pericardial effusion. Unremarkable esophagus.    CORONARY ARTERY CALCIFICATION: None.    HEPATOBILIARY: Normal.    PANCREAS: Normal.    SPLEEN: Normal.    ADRENAL GLANDS: Normal.    KIDNEYS/BLADDER: Diffuse striated nephrogram appearance of the right kidney with right perinephric stranding and trace fluid and stranding in the right pararenal fascia. Linear urothelial enhancement in the right renal pelvis and upper ureter. No right   hydronephrosis. No right-sided renal or ureteral calculi.    Minimal striated nephrogram appearance of the upper pole of the left kidney (coronal series 13, image 51). No left renal calculi or hydronephrosis. No significant left perinephric stranding. A few  subcentimeter hypodense lesions in the left kidney are   likely cysts, requiring no specific follow-up.    No urinary bladder calculi.    BOWEL: No obstruction or inflammatory change. Normal appendix.    LYMPH NODES: No lymphadenopathy.    VASCULATURE: Major intra-abdominal vasculature is patent.    PELVIC ORGANS: No pelvic masses. Trace pelvic free fluid. No abscess.    MUSCULOSKELETAL: Unremarkable.      Impression    IMPRESSION:  1.  No pulmonary emboli.  2.  Acute bilateral pyelonephritis, moderate to severe in the right kidney, and mild in the left kidney.  3.  Trace right pleural effusion.

## 2025-04-12 NOTE — H&P
[facility]    History and Physical  Hospital Medicine       Date of Admission:  4/11/2025  Date of Service: 4/11/2025     Assessment & Plan   Tammy Joshi is a 44 year old female with past medical history significant for chronic pain with chronic opioid use, type 2 DM, anxiety, depression. Presented with fevers/chills, myalgias, headaches, poor intake, and excessive fatigue progressive x1 week pta. Admitted 04/11/25 with bilateral pyelonephritis and acute cystitis.     Pyelonephritis, bilateral   Urinary tract infection   UA with positive nitrates, moderate leuk esterase, 79 WBC, 4 rbc. CT a/p with acute bilateral pyelonephritis, moderate to severe in right kidney, mild in left kidney.   No leukocytosis. Procalcitonin elevated (1.27). Creatinine 0.66, GFR >90. Lactic acid wnl.   Low grade fevers in the ED, then up to 102.7F shortly after admission, refractory to acetaminophen. Mild tachycardia (100 bpm). Normotensive. Started on IV ceftriaxone in the ED and received 2L IVF bolus.    - IV ceftriaxone 1g q24hrs   - IVF: LR @ 125mL/hr   - acetaminophen prn for fevers   - Blood and urine cultures pending   - AM CBC, BMP     Starvation ketosis   Ketones 0.96. Suspect starvation ketosis in the setting of poor oral intake    Dyspareunia   Ongoing x1 month pta. Unclear if secondary to acute illness process.   - Monitor for resolution with treatment of acute illness; consider follow up with obgyn    Chronic pain with continuous opioid dependence   Cervical, lumbar radiculopathy   Left hip pain   Follows with pain clinic last visit 03/23/25. Follows with sports medicine last visit 03/06/25. Neurosurgery evaluation 11/04/24. Unclear etiology of left hip pain.   - Butrans patch in place   - Continue pta cyclobenzaprine 5-10mg TID prn   - Continue pta pregabalin 125mg BID    Type 2 diabetes mellitus   HgbA1c 5.6% on 02/03/25, significant improvement from 10.5% 1 year ago. Managed prior to  admission with tirzepatide 15mg q7days. On admission .   - Hypoglycemia monitoring and correction protocol     Anxiety   Depression   - Continued pta venlafaxine and amitriptyline       Active Problems:    Hyponatremia    Pyelonephritis    Clinically Significant Risk Factors Present on Admission        # Hypokalemia: Lowest K = 3.3 mmol/L in last 2 days, will replace as needed  # Hyponatremia: Lowest Na = 131 mmol/L in last 2 days, will monitor as appropriate  # Hypochloremia: Lowest Cl = 92 mmol/L in last 2 days, will monitor as appropriate   # Hypercalcemia: corrected calcium is >10.1, will monitor as appropriate    # Hypoalbuminemia: Lowest albumin = 2.9 g/dL at 4/11/2025 10:06 PM, will monitor as appropriate   # Thrombocytopenia: Lowest platelets = 109 in last 2 days, will monitor for bleeding                             Diet:    DVT Prophylaxis: Pneumatic Compression Devices  Jesus Catheter: Not present  Code Status:        Disposition Plan   Medically Ready for Discharge: Anticipated in 2-4 Days       The patient's care was discussed with the Patient.  I have discussed patient and formulated plan with attending hospitalist physician, Dr. Iglesia Cali PA-C        Primary Care Physician   aMry Wang [phone]    History is obtained from the patient,  and review of old records via the EMR.    History of Present Illness   Tammy Joshi is a 44 year old female with past medical history significant for chronic pain with chronic opioid use, type 2 DM, anxiety, depression. Presented with fevers/chills, myalgias, headaches, poor intake, and excessive fatigue progressive x1 week pta. Admitted 04/11/25 with bilateral pyelonephritis and acute cystitis.    Presents with one week of progressing symptoms including generalized body aches, fatigue, lightheadedness, poor intake, fevers up to 103F, and chills. Endorses mild right sided flank pain x1-2 days pta.   Has been taking  ibuprofen 400mg q8hrs with no relief of her fevers. Has spent most of the past week in bed, sleeping or trying to sleep.   Endorses good oral intake. Endorses chronic constipation which is unchanged.     She was diagnosed with a UTI via E-visit on 3/5/25, was prescribed antibiotics and had relief of symptoms.  She endorses mild urinary urgency over the past week.   Endorses dyspareunia since UTI diagnosis 03/05/25, reports this did not improve despite other symptoms (dysuria, hesitancy) improving. Denies new/changing vaginal discharge, change in menstruation, postcoital bleeding, new sexual partners.      Lives in a private residence with her family. Endorses good medication compliance. Denies tobacco use, illicit drug use. Very seldom alcohol use.     Shortly after admission interview, she noted new onset brown discharge after urinating. Unlike prior menstrual bleeding/spotting.     Review of Systems   The 10 point Review of Systems is negative other than noted in the HPI or here.     Past Medical History    Past Medical History:   Diagnosis Date    Absence of menstruation     Anxiety     Depressive disorder     Diabetes (H)     Female infertility of unspecified origin     MILD/NOS PREECLAMP-ANTEP 08/29/2005    Polycystic ovaries     PPH (postpartum hemorrhage) 11/01/2009       Past Surgical History   Past Surgical History:   Procedure Laterality Date    D & C  1998    Missed Ab    LITHOTRIPSY  2011    [facility] HAND/FINGER SURGERY UNLISTED  7th grade    left index        Prior to Admission Medications   Prior to Admission Medications   Prescriptions Last Dose Informant Patient Reported? Taking?   ACCU-CHEK GUIDE TEST test strip  Self No No   Sig: USE TO TEST BLOOD SUGAR 1 TIMES DAILY OR AS DIRECTED.   Patient not taking: Reported on 3/24/2025   Blood Glucose Monitoring Suppl (ACCU-CHEK GUIDE ME) w/Device KIT  Self Yes Yes   Sig: USE TO TEST BLOOD SUGAR 1 TIMES DAILY OR AS DIRECTED. DISPENSE PREFERRED SUPPLIES PER  INSURANCE   Multiple Vitamin (MULTIVITAMIN ADULT) TABS 4/10/2025 Morning Self Yes Yes   Sig: Take 1 tablet by mouth every other day.   albuterol (PROAIR HFA/PROVENTIL HFA/VENTOLIN HFA) 108 (90 Base) MCG/ACT inhaler More than a month Self No Yes   Sig: Inhale 2 puffs into the lungs every 4 hours as needed for shortness of breath or wheezing   amitriptyline (ELAVIL) 10 MG tablet Past Month Self No Yes   Sig: TAKE 1-3 TABLETS (10-30 MG) BY MOUTH AT BEDTIME.   blood glucose monitoring (NO BRAND SPECIFIED) meter device kit  Self No No   Sig: Use to test blood sugar 1 times daily or as directed. Preferred blood glucose meter OR supplies to accompany: Blood Glucose Monitor Brands: per insurance.   Patient not taking: Reported on 1/6/2025   buprenorphine (BUTRANS) 15 MCG/HR Wk patch 4/7/2025 Morning Self No Yes   Sig: Place 1 patch onto the skin every 7 days. Fill 4/04/25, start 4/06/25 (change patch every Sunday)   cyclobenzaprine (FLEXERIL) 5 MG tablet Past Week Self No Yes   Sig: Take 1-2 tablets (5-10 mg) by mouth 3 times daily as needed for muscle spasms (max 4 per day).   diclofenac (VOLTAREN) 50 MG EC tablet Past Week Self Yes Yes   Sig: Take 1 tablet by mouth as needed for moderate pain.   ibuprofen (ADVIL/MOTRIN) 200 MG capsule 4/11/2025 at  9:30 AM Self Yes Yes   Sig: Take 2 capsules by mouth every 4 hours as needed for fever.   naloxone (NARCAN) 4 MG/0.1ML nasal spray  Self No Yes   Sig: Spray 1 spray (4 mg) into one nostril alternating nostrils as needed for opioid reversal. every 2-3 minutes until assistance arrives   pregabalin (LYRICA) 100 MG capsule 4/11/2025 Morning Self No Yes   Sig: Take 1 capsule (100 mg) by mouth 2 times daily.   Patient taking differently: Take 100 mg by mouth 2 times daily. Take with 1, 25 mg cap to total 125 mg   pregabalin (LYRICA) 25 MG capsule 4/11/2025 Morning Self No Yes   Sig: Take 1 capsule (25 mg) by mouth 2 times daily. Continue taking 100mg dose   Patient taking  differently: Take 25 mg by mouth 2 times daily. Take with 1, 100 mg cap to total 125 mg   thin (NO BRAND SPECIFIED) lancets  Self No No   Sig: Use with lanceting device. To accompany: Blood Glucose Monitor Brands: per insurance.   Patient not taking: Reported on 1/6/2025   tirzepatide (MOUNJARO) 15 MG/0.5ML SOAJ auto-injector pen 4/6/2025 Morning Self No Yes   Sig: Inject 0.5 mLs (15 mg) subcutaneously every 7 days.   venlafaxine (EFFEXOR XR) 150 MG 24 hr capsule 4/11/2025 Morning Self No Yes   Sig: TAKE 1 CAPSULE BY MOUTH EVERY DAY   Patient taking differently: Take 1 capsule by mouth daily. Take with 1, 75 mg cap to total 225 mg   venlafaxine (EFFEXOR XR) 75 MG 24 hr capsule 4/11/2025 Morning Self No Yes   Sig: TAKE 1 CAPSULE (75 MG) BY MOUTH DAILY WITH THE 150MG FOR A TOTAL OF 225MG DAILY   Patient taking differently: Take 1 capsule by mouth daily. 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 Yes     Comment: adopted- unknown   Social History Narrative    Not on file  "    Social Drivers of Health     Financial Resource Strain: Low Risk  (4/11/2025)    Financial Resource Strain     Within the past 12 months, have you or your family members you live with been unable to get utilities (heat, electricity) when it was really needed?: No   Food Insecurity: High Risk (4/11/2025)    Food Insecurity     Within the past 12 months, did you worry that your food would run out before you got money to buy more?: No     Within the past 12 months, did the food you bought just not last and you didn t have money to get more?: Yes   Transportation Needs: Low Risk  (4/11/2025)    Transportation Needs     Within the past 12 months, has lack of transportation kept you from medical appointments, getting your medicines, non-medical meetings or appointments, work, or from getting things that you need?: No   Physical Activity: Not on file   Stress: Not on file   Social Connections: Not on file   Interpersonal Safety: Low Risk  (3/26/2024)    Interpersonal Safety     Do you feel physically and emotionally safe where you currently live?: Yes     Within the past 12 months, have you been hit, slapped, kicked or otherwise physically hurt by someone?: No     Within the past 12 months, have you been humiliated or emotionally abused in other ways by your partner or ex-partner?: No   Housing Stability: Low Risk  (4/11/2025)    Housing Stability     Do you have housing? : Yes     Are you worried about losing your housing?: No       Physical Exam   /58 (BP Location: Right arm)   Pulse 103   Temp 100.3  F (37.9  C) (Oral)   Resp 16   Ht 1.626 m (5' 4\")   Wt 62.8 kg (138 lb 7.2 oz)   SpO2 98%   BMI 23.76 kg/m       Weight: 138 lbs 7.18 oz Body mass index is 23.76 kg/m .     Constitutional: Pleasant, responding to questions appropriately, speaking in full sentences without difficulty. Appears fatigued, acutely ill, but nontoxic. Alert and oriented, appears comfortable, no acute distress.  Eyes: Eyes are " clear, no scleral icterus, pupils are reactive, EOM intact bilaterally.  HENT: Oropharynx is clear and moist. No evidence of cranial trauma.   Cardiovascular: Regular rate and rhythm, normal S1 and S2, and no murmur, rub, or gallops noted. Radial & dorsalis pedis pulses bilaterally. No pitting lower extremity edema.  Respiratory: Respirations unlabored on room air, Clear to auscultation bilaterally without wheezes, crackles, rhonchi.   GI: Active bowel sounds throughout. Soft, not distended. Diffusely tender to palpation, without rebound or guarding. No palpable masses, no hepatomegaly. Bilateral CVA tenderness, R>L  Musculoskeletal: Normal muscle bulk and tone. Moves all extremities spontaneously. No gross deformity.   Skin: Warm and dry, no rashes. Moderate symmetric facial flushing which she attributes to fevers.   Neurologic: Neck supple.  is symmetric. No notable tremor. Speech is clear and fluent.       Data   Data reviewed today:     I have personally reviewed the following data over the past 24 hrs:    7.2  \   12.2   / 109 (L)     134 (L) 98 10.6 /  102 (H)   3.3 (L) 25 0.54 \     ALT: 14 AST: 20 AP: 94 TBILI: 1.0   ALB: 2.9 (L) TOT PROTEIN: 7.2 LIPASE: N/A     Trop: <6 BNP: N/A     TSH: 0.78 T4: N/A A1C: N/A     Procal: 1.27 (H) CRP: N/A Lactic Acid: 1.6       INR:  N/A PTT:  N/A   D-dimer:  1.99 (H) Fibrinogen:  N/A         Recent Results (from the past 24 hours)   CT Chest (PE) Abdomen Pelvis w Contrast    Narrative    EXAM: CT CHEST PE ABDOMEN PELVIS W CONTRAST  LOCATION: [facility]  DATE: 4/11/2025    INDICATION: elevated dimer. Left lower quadrant pain, palpitation, fevers, chills and tachycardia.  COMPARISON: 11/8/2021 and 6/29/2021  TECHNIQUE: CT chest pulmonary angiogram and routine CT abdomen pelvis with IV contrast. Arterial phase through the chest and venous phase through the abdomen and pelvis. Multiplanar reformats and MIP reconstructions were performed. Dose  reduction   techniques were used.   CONTRAST: 67mL isovue 370    FINDINGS:  ANGIOGRAM CHEST: Pulmonary arteries are normal caliber and negative for pulmonary emboli. Thoracic aorta is negative for dissection. No CT evidence of right heart strain.     LUNGS AND PLEURA: Trace right pleural effusion. No significant left pleural effusion. No infiltrate. No pulmonary nodules or masses.    MEDIASTINUM/AXILLAE: No lymphadenopathy. No thoracic aortic aneurysm. No pericardial effusion. Unremarkable esophagus.    CORONARY ARTERY CALCIFICATION: None.    HEPATOBILIARY: Normal.    PANCREAS: Normal.    SPLEEN: Normal.    ADRENAL GLANDS: Normal.    KIDNEYS/BLADDER: Diffuse striated nephrogram appearance of the right kidney with right perinephric stranding and trace fluid and stranding in the right pararenal fascia. Linear urothelial enhancement in the right renal pelvis and upper ureter. No right   hydronephrosis. No right-sided renal or ureteral calculi.    Minimal striated nephrogram appearance of the upper pole of the left kidney (coronal series 13, image 51). No left renal calculi or hydronephrosis. No significant left perinephric stranding. A few subcentimeter hypodense lesions in the left kidney are   likely cysts, requiring no specific follow-up.    No urinary bladder calculi.    BOWEL: No obstruction or inflammatory change. Normal appendix.    LYMPH NODES: No lymphadenopathy.    VASCULATURE: Major intra-abdominal vasculature is patent.    PELVIC ORGANS: No pelvic masses. Trace pelvic free fluid. No abscess.    MUSCULOSKELETAL: Unremarkable.      Impression    IMPRESSION:  1.  No pulmonary emboli.  2.  Acute bilateral pyelonephritis, moderate to severe in the right kidney, and mild in the left kidney.  3.  Trace right pleural effusion.         I personally reviewed no images or EKG's today

## 2025-04-12 NOTE — MEDICATION SCRIBE - ADMISSION MEDICATION HISTORY
Medication Scribe Admission Medication History    Admission medication history is complete. The information provided in this note is only as accurate as the sources available at the time of the update.    Information Source(s): Patient via in-person    Pertinent Information:     Changes made to PTA medication list:  Added: advil, multivit  Deleted: None  Changed: None    Allergies reviewed with patient and updates made in EHR: yes    Medication History Completed By: Naomi Singh 4/11/2025 7:32 PM    PTA Med List   Medication Sig Note Last Dose/Taking    albuterol (PROAIR HFA/PROVENTIL HFA/VENTOLIN HFA) 108 (90 Base) MCG/ACT inhaler Inhale 2 puffs into the lungs every 4 hours as needed for shortness of breath or wheezing  More than a month    amitriptyline (ELAVIL) 10 MG tablet TAKE 1-3 TABLETS (10-30 MG) BY MOUTH AT BEDTIME. 4/11/2025: Pt takes prn Past Month    Blood Glucose Monitoring Suppl (ACCU-CHEK GUIDE ME) w/Device KIT USE TO TEST BLOOD SUGAR 1 TIMES DAILY OR AS DIRECTED. DISPENSE PREFERRED SUPPLIES PER INSURANCE 4/11/2025: Pt rarely tests   Taking    buprenorphine (BUTRANS) 15 MCG/HR Wk patch Place 1 patch onto the skin every 7 days. Fill 4/04/25, start 4/06/25 (change patch every Sunday) 4/11/2025: Patch placed every Monday. Currently on lt upper arm 4/7/2025 Morning    cyclobenzaprine (FLEXERIL) 5 MG tablet Take 1-2 tablets (5-10 mg) by mouth 3 times daily as needed for muscle spasms (max 4 per day).  Past Week    diclofenac (VOLTAREN) 50 MG EC tablet Take 1 tablet by mouth as needed for moderate pain.  Past Week    ibuprofen (ADVIL/MOTRIN) 200 MG capsule Take 2 capsules by mouth every 4 hours as needed for fever.  4/11/2025 at  9:30 AM    Multiple Vitamin (MULTIVITAMIN ADULT) TABS Take 1 tablet by mouth every other day.  4/10/2025 Morning    naloxone (NARCAN) 4 MG/0.1ML nasal spray Spray 1 spray (4 mg) into one nostril alternating nostrils as needed for opioid reversal. every 2-3 minutes until  assistance arrives 4/11/2025: Has on hand  Taking As Needed    pregabalin (LYRICA) 100 MG capsule Take 1 capsule (100 mg) by mouth 2 times daily. (Patient taking differently: Take 100 mg by mouth 2 times daily. Take with 1, 25 mg cap to total 125 mg)  4/11/2025 Morning    pregabalin (LYRICA) 25 MG capsule Take 1 capsule (25 mg) by mouth 2 times daily. Continue taking 100mg dose (Patient taking differently: Take 25 mg by mouth 2 times daily. Take with 1, 100 mg cap to total 125 mg)  4/11/2025 Morning    tirzepatide (MOUNJARO) 15 MG/0.5ML SOAJ auto-injector pen Inject 0.5 mLs (15 mg) subcutaneously every 7 days. 4/11/2025: Takes every Sunday 4/6/2025 Morning    venlafaxine (EFFEXOR XR) 150 MG 24 hr capsule TAKE 1 CAPSULE BY MOUTH EVERY DAY (Patient taking differently: Take 1 capsule by mouth daily. Take with 1, 75 mg cap to total 225 mg)  4/11/2025 Morning    venlafaxine (EFFEXOR XR) 75 MG 24 hr capsule TAKE 1 CAPSULE (75 MG) BY MOUTH DAILY WITH THE 150MG FOR A TOTAL OF 225MG DAILY (Patient taking differently: Take 1 capsule by mouth daily. Take with 1, 150 mg cap to total 225 mg)  4/11/2025 Morning

## 2025-04-12 NOTE — PROGRESS NOTES
"[facility] [facility] ADMISSION NOTE    Patient admitted to room 2313 at approximately 2115 via cart from emergency room. Patient was accompanied by transport tech.     Verbal SBAR report received from EMERGENCY ROOM Note prior to patient arrival.     Patient ambulated to bed independently. Patient alert and oriented X 3. The patient is not having any pain.  . Admission vital signs: Blood pressure 116/60, pulse 118, temperature (!) 102.7  F (39.3  C), temperature source Oral, resp. rate 16, height 1.626 m (5' 4\"), weight 62.8 kg (138 lb 7.2 oz), SpO2 97%, not currently breastfeeding. Patient was oriented to plan of care, call light, bed controls, tv, telephone, bathroom, and visiting hours.     Risk Assessment    The following safety risks were identified during admission: fall. Yellow risk band applied: YES.     Skin Initial Assessment    This writer admitted this patient and completed a full skin assessment and Pierre score in the Adult PCS flowsheet.   Photo documentation of skin problem and/or wound competed via [employer] application (located under Media):  N/A    Appropriate interventions initiated as needed.     Secondary skin check not completed per orders.        Education    Patient has a [place] to Observation order: Yes  Observation education completed and documented: Yes      Mary Schirmers, RN      "

## 2025-04-13 LAB
ANION GAP SERPL CALCULATED.3IONS-SCNC: 15 MMOL/L (ref 7–15)
BASOPHILS # BLD AUTO: 0 10E3/UL (ref 0–0.2)
BASOPHILS NFR BLD AUTO: 0 %
BUN SERPL-MCNC: 4.3 MG/DL (ref 6–20)
CALCIUM SERPL-MCNC: 8.9 MG/DL (ref 8.8–10.4)
CHLORIDE SERPL-SCNC: 104 MMOL/L (ref 98–107)
CREAT SERPL-MCNC: 0.46 MG/DL (ref 0.51–0.95)
EGFRCR SERPLBLD CKD-EPI 2021: >90 ML/MIN/1.73M2
EOSINOPHIL # BLD AUTO: 0 10E3/UL (ref 0–0.7)
EOSINOPHIL NFR BLD AUTO: 0 %
ERYTHROCYTE [DISTWIDTH] IN BLOOD BY AUTOMATED COUNT: 13.3 % (ref 10–15)
GLUCOSE SERPL-MCNC: 87 MG/DL (ref 70–99)
HCO3 SERPL-SCNC: 21 MMOL/L (ref 22–29)
HCT VFR BLD AUTO: 39.6 % (ref 35–47)
HGB BLD-MCNC: 13.8 G/DL (ref 11.7–15.7)
IMM GRANULOCYTES # BLD: 0.1 10E3/UL
IMM GRANULOCYTES NFR BLD: 1 %
LYMPHOCYTES # BLD AUTO: 1 10E3/UL (ref 0.8–5.3)
LYMPHOCYTES NFR BLD AUTO: 14 %
MCH RBC QN AUTO: 31 PG (ref 26.5–33)
MCHC RBC AUTO-ENTMCNC: 34.8 G/DL (ref 31.5–36.5)
MCV RBC AUTO: 89 FL (ref 78–100)
MONOCYTES # BLD AUTO: 1.2 10E3/UL (ref 0–1.3)
MONOCYTES NFR BLD AUTO: 17 %
NEUTROPHILS # BLD AUTO: 4.6 10E3/UL (ref 1.6–8.3)
NEUTROPHILS NFR BLD AUTO: 67 %
NRBC # BLD AUTO: 0 10E3/UL
NRBC BLD AUTO-RTO: 0 /100
PLATELET # BLD AUTO: 114 10E3/UL (ref 150–450)
POTASSIUM SERPL-SCNC: 3.7 MMOL/L (ref 3.4–5.3)
RBC # BLD AUTO: 4.45 10E6/UL (ref 3.8–5.2)
SODIUM SERPL-SCNC: 140 MMOL/L (ref 135–145)
WBC # BLD AUTO: 6.8 10E3/UL (ref 4–11)

## 2025-04-13 PROCEDURE — 250N000013 HC RX MED GY IP 250 OP 250 PS 637

## 2025-04-13 PROCEDURE — 120N000001 HC R&B MED SURG/OB

## 2025-04-13 PROCEDURE — 250N000011 HC RX IP 250 OP 636: Performed by: INTERNAL MEDICINE

## 2025-04-13 PROCEDURE — 258N000003 HC RX IP 258 OP 636: Performed by: INTERNAL MEDICINE

## 2025-04-13 PROCEDURE — 85025 COMPLETE CBC W/AUTO DIFF WBC: CPT | Performed by: INTERNAL MEDICINE

## 2025-04-13 PROCEDURE — 99232 SBSQ HOSP IP/OBS MODERATE 35: CPT | Performed by: INTERNAL MEDICINE

## 2025-04-13 PROCEDURE — 36415 COLL VENOUS BLD VENIPUNCTURE: CPT | Performed by: INTERNAL MEDICINE

## 2025-04-13 PROCEDURE — 250N000013 HC RX MED GY IP 250 OP 250 PS 637: Performed by: INTERNAL MEDICINE

## 2025-04-13 PROCEDURE — 80048 BASIC METABOLIC PNL TOTAL CA: CPT | Performed by: INTERNAL MEDICINE

## 2025-04-13 RX ADMIN — SENNOSIDES AND DOCUSATE SODIUM 2 TABLET: 50; 8.6 TABLET ORAL at 10:02

## 2025-04-13 RX ADMIN — ACETAMINOPHEN 650 MG: 325 TABLET ORAL at 09:59

## 2025-04-13 RX ADMIN — POLYETHYLENE GLYCOL 3350 17 G: 17 POWDER, FOR SOLUTION ORAL at 10:00

## 2025-04-13 RX ADMIN — VENLAFAXINE HYDROCHLORIDE 225 MG: 150 CAPSULE, EXTENDED RELEASE ORAL at 10:02

## 2025-04-13 RX ADMIN — SODIUM CHLORIDE: 0.9 INJECTION, SOLUTION INTRAVENOUS at 00:15

## 2025-04-13 RX ADMIN — PREGABALIN 125 MG: 25 CAPSULE ORAL at 10:02

## 2025-04-13 RX ADMIN — PREGABALIN 125 MG: 25 CAPSULE ORAL at 21:01

## 2025-04-13 RX ADMIN — CEFTRIAXONE 2 G: 2 INJECTION, POWDER, FOR SOLUTION INTRAMUSCULAR; INTRAVENOUS at 13:30

## 2025-04-13 RX ADMIN — SODIUM CHLORIDE: 0.9 INJECTION, SOLUTION INTRAVENOUS at 10:01

## 2025-04-13 RX ADMIN — ACETAMINOPHEN 650 MG: 325 TABLET ORAL at 10:01

## 2025-04-13 ASSESSMENT — ACTIVITIES OF DAILY LIVING (ADL)
ADLS_ACUITY_SCORE: 28
ADLS_ACUITY_SCORE: 27
ADLS_ACUITY_SCORE: 27
ADLS_ACUITY_SCORE: 28
ADLS_ACUITY_SCORE: 27
ADLS_ACUITY_SCORE: 28
ADLS_ACUITY_SCORE: 27
ADLS_ACUITY_SCORE: 28
ADLS_ACUITY_SCORE: 27
ADLS_ACUITY_SCORE: 28

## 2025-04-13 NOTE — PLAN OF CARE
Goal Outcome Evaluation:      Plan of Care Reviewed With: patient    Overall Patient Progress: improving    Patient alert, up independently ambulated hallways and shower today  IVF and IV antibiotics administered as ordered  VSS, afebrile since 0200  Voiding good amounts

## 2025-04-13 NOTE — PLAN OF CARE
"  Problem: Adult Inpatient Plan of Care  Goal: Patient-Specific Goal (Individualized)  Description: You can add care plan individualizations to a care plan. Examples of Individualization might be:  \"Parent requests to be called daily at 9am for status\", \"I have a hard time hearing out of my right ear\", or \"Do not touch me to wake me up as it startlesme\".  Outcome: Progressing     Problem: Adult Inpatient Plan of Care  Goal: Optimal Comfort and Wellbeing  Outcome: Progressing  Intervention: Provide Person-Centered Care  Recent Flowsheet Documentation  Taken 4/13/2025 0000 by Gabriella Cardenas RN  Trust Relationship/Rapport: care explained     Problem: Electrolyte Imbalance  Goal: Electrolyte Balance  Outcome: Progressing     Problem: Fever  Goal: Body Temperature in Desired Range  Outcome: Progressing   Goal Outcome Evaluation:      Plan of Care Reviewed With: patient    Overall Patient Progress: improvingOverall Patient Progress: improving    Outcome Evaluation: Pt alert, oriented, SBA. IV infusing NS @ 100 ml/hr. On RA. LS clear. Prn tylenol given for temp, effective. Denies need for pain medications.      "

## 2025-04-13 NOTE — PLAN OF CARE
Goal Outcome Evaluation:    Pt up w/SBA, denies pain at this time- using call light appropriately and able to make needs known. IVF continued, afebrile this shift, tolerating oral intake. Spouse at bedside.     Monica Dias RN on 4/12/2025 at 11:00 PM

## 2025-04-13 NOTE — PROGRESS NOTES
[facility] Medicine Progress Note  Date of Service (when I saw the patient): 04/13/2025    REASON FOR ADMISSION / INTERVAL HISTORY:  Tammy Joshi is a 44 year old female with past medical history significant for chronic pain with chronic opioid use, type 2 DM, anxiety, depression admitted 4/11 with fevers/chills, myalgias, headaches, poor intake, and excessive fatigue progressive x1 week pta       Assessment/ Plan     Pyelonephritis, bilateral   Urinary tract infection   Ecoli bacteremia with SIRS-possible sepsis  Presented with fever/ myalgias/ HA. UA with positive nitrates, moderate leuk esterase, 79 WBC, 4 rbc. CT a/p with acute bilateral pyelonephritis, moderate to severe in right kidney, mild in left kidney.   No leukocytosis. Procalcitonin elevated (1.27). Creatinine 0.66, GFR >90. Lactic acid wnl.   Low grade fevers in the ED, then up to 102.7F shortly after admission. Started on IV ceftriaxone in the ED and received 2L IVF bolus.    Blood cx 4/11 shows ecoli. Ur cx->100k ecoli. Tmax improved -100.2 . Tachycardic improved. BP stable.   -continue  ceftriaxone  2g q24hrs / continue IVF: LR @ 75mL/hr. Continue to Follow blood/ ur cx.     Hypokalemia  Replace per protocol.     Thrombocytopenia  Likely due to infection/ possible sepsis  Rx as above and follow.     Starvation ketosis   Ketones 0.96. Suspect starvation ketosis in the setting of poor oral intake  Continue fluids and rx as above     Dyspareunia   Ongoing x1 month pta. Unclear if secondary to acute illness process.   - Monitor for resolution with treatment of acute illness; consider follow up with obgyn     Chronic pain with continuous opioid dependence   Cervical, lumbar radiculopathy   Left hip pain   Follows with pain clinic last visit 03/23/25. Follows with sports medicine last visit 03/06/25. Neurosurgery evaluation 11/04/24. Unclear etiology of left hip pain.   - Butrans patch in place   - Continue pta  "cyclobenzaprine 5-10mg TID prn   - Continue pta pregabalin 125mg BID     Type 2 diabetes mellitus   HgbA1c 5.6% on 02/03/25, significant improvement from 10.5% 1 year ago. Managed prior to admission with tirzepatide 15mg q7days. On admission .   - Hypoglycemia monitoring and correction protocol      Anxiety   Depression   - Continued pta venlafaxine and amitriptyline           Diet: Vegetarian Diet    DVT Prophylaxis: Low Risk/Ambulatory with no VTE prophylaxis indicated and Ambulate every shift  Jesus Catheter: Not present  Code Status:  full    Medically Ready for Discharge: Anticipated in 2-4 Days        YOSEPH CORTEZ MD   Pg [phone]        ROS:  As described in A/P and Exam.  Otherwise ALL are  negative.    PHYSICAL EXAM:  All vitals have been reviewed    Blood pressure 128/72, pulse 78, temperature 98  F (36.7  C), temperature source Oral, resp. rate 18, height 1.626 m (5' 4\"), weight 62.8 kg (138 lb 7.2 oz), SpO2 97%, not currently breastfeeding.    I/O this shift:  In: 1622 [P.O.:420; I.V.:1202]  Out: -     GENERAL APPEARANCE: healthy, alert and no distress  EYES: conjunctiva clear, eyes grossly normal  HENT: external ears and nose normal   RESP: lungs clear to auscultation - no rales, rhonchi or wheezes  CV: regular rate and rhythm, normal S1 S2, no S3 or S4 and no murmur, click or rub   ABDOMEN: soft, mild tenderness B flanks/ suprapubic region, no HSM or masses and bowel sounds normal  MS: no clubbing, cyanosis; no edema  SKIN: clear without significant rashes or lesions  NEURO: -non-focal moves all 4 extr    ROUTINE  LABS (Last four results)  CMP  Recent Labs   Lab 04/13/25  0641 04/11/25  2206 04/11/25  1430    134* 131*   POTASSIUM 3.7 3.3* 3.6   CHLORIDE 104 98 92*   CO2 21* 25 24   ANIONGAP 15 11 15   GLC 87 102* 125*   BUN 4.3* 10.6 12.8   CR 0.46* 0.54 0.66   GFRESTIMATED >90 >90 >90   AUGUSTUS 8.9 8.8 10.1   MAG  --   --  1.8   PHOS  --  1.9*  --    PROTTOTAL  --   --  7.2   ALBUMIN  --  " 2.9* 3.5   BILITOTAL  --   --  1.0   ALKPHOS  --   --  94   AST  --   --  20   ALT  --   --  14     CBC  Recent Labs   Lab 04/13/25  0641 04/11/25  2206 04/11/25  1430   WBC 6.8 7.2 10.0   RBC 4.45 3.85 4.37   HGB 13.8 12.2 13.6   HCT 39.6 34.4* 39.1   MCV 89 89 90   MCH 31.0 31.7 31.1   MCHC 34.8 35.5 34.8   RDW 13.3 13.1 12.8   * 109* 125*     INRNo lab results found in last 7 days.  Arterial Blood GasNo lab results found in last 7 days.    No results found for this or any previous visit (from the past 24 hours).

## 2025-04-14 VITALS
OXYGEN SATURATION: 98 % | TEMPERATURE: 97.8 F | WEIGHT: 138.45 LBS | HEART RATE: 78 BPM | SYSTOLIC BLOOD PRESSURE: 123 MMHG | RESPIRATION RATE: 18 BRPM | HEIGHT: 64 IN | BODY MASS INDEX: 23.64 KG/M2 | DIASTOLIC BLOOD PRESSURE: 70 MMHG

## 2025-04-14 LAB
ANION GAP SERPL CALCULATED.3IONS-SCNC: 10 MMOL/L (ref 7–15)
BACTERIA BLD CULT: ABNORMAL
BACTERIA BLD CULT: ABNORMAL
BASOPHILS # BLD AUTO: 0 10E3/UL (ref 0–0.2)
BASOPHILS NFR BLD AUTO: 0 %
BUN SERPL-MCNC: 3.5 MG/DL (ref 6–20)
CALCIUM SERPL-MCNC: 8.8 MG/DL (ref 8.8–10.4)
CHLORIDE SERPL-SCNC: 103 MMOL/L (ref 98–107)
CREAT SERPL-MCNC: 0.58 MG/DL (ref 0.51–0.95)
EGFRCR SERPLBLD CKD-EPI 2021: >90 ML/MIN/1.73M2
EOSINOPHIL # BLD AUTO: 0 10E3/UL (ref 0–0.7)
EOSINOPHIL NFR BLD AUTO: 0 %
ERYTHROCYTE [DISTWIDTH] IN BLOOD BY AUTOMATED COUNT: 13.5 % (ref 10–15)
GLUCOSE SERPL-MCNC: 87 MG/DL (ref 70–99)
HCO3 SERPL-SCNC: 26 MMOL/L (ref 22–29)
HCT VFR BLD AUTO: 35.7 % (ref 35–47)
HGB BLD-MCNC: 12.2 G/DL (ref 11.7–15.7)
IMM GRANULOCYTES # BLD: 0 10E3/UL
IMM GRANULOCYTES NFR BLD: 1 %
LYMPHOCYTES # BLD AUTO: 1.4 10E3/UL (ref 0.8–5.3)
LYMPHOCYTES NFR BLD AUTO: 23 %
MCH RBC QN AUTO: 30.9 PG (ref 26.5–33)
MCHC RBC AUTO-ENTMCNC: 34.2 G/DL (ref 31.5–36.5)
MCV RBC AUTO: 90 FL (ref 78–100)
MONOCYTES # BLD AUTO: 0.9 10E3/UL (ref 0–1.3)
MONOCYTES NFR BLD AUTO: 14 %
NEUTROPHILS # BLD AUTO: 3.8 10E3/UL (ref 1.6–8.3)
NEUTROPHILS NFR BLD AUTO: 62 %
NRBC # BLD AUTO: 0 10E3/UL
NRBC BLD AUTO-RTO: 0 /100
PLAT MORPH BLD: NORMAL
PLATELET # BLD AUTO: 148 10E3/UL (ref 150–450)
POTASSIUM SERPL-SCNC: 4 MMOL/L (ref 3.4–5.3)
RBC # BLD AUTO: 3.95 10E6/UL (ref 3.8–5.2)
RBC MORPH BLD: NORMAL
SODIUM SERPL-SCNC: 139 MMOL/L (ref 135–145)
WBC # BLD AUTO: 6.1 10E3/UL (ref 4–11)

## 2025-04-14 PROCEDURE — 85025 COMPLETE CBC W/AUTO DIFF WBC: CPT | Performed by: INTERNAL MEDICINE

## 2025-04-14 PROCEDURE — 99239 HOSP IP/OBS DSCHRG MGMT >30: CPT | Performed by: INTERNAL MEDICINE

## 2025-04-14 PROCEDURE — 80048 BASIC METABOLIC PNL TOTAL CA: CPT | Performed by: INTERNAL MEDICINE

## 2025-04-14 PROCEDURE — 36415 COLL VENOUS BLD VENIPUNCTURE: CPT | Performed by: INTERNAL MEDICINE

## 2025-04-14 PROCEDURE — 250N000013 HC RX MED GY IP 250 OP 250 PS 637

## 2025-04-14 RX ORDER — CIPROFLOXACIN 500 MG/1
500 TABLET, FILM COATED ORAL 2 TIMES DAILY
Qty: 14 TABLET | Refills: 0 | Status: SHIPPED | OUTPATIENT
Start: 2025-04-14 | End: 2025-04-21

## 2025-04-14 RX ADMIN — PREGABALIN 125 MG: 25 CAPSULE ORAL at 08:14

## 2025-04-14 RX ADMIN — VENLAFAXINE HYDROCHLORIDE 225 MG: 150 CAPSULE, EXTENDED RELEASE ORAL at 08:14

## 2025-04-14 ASSESSMENT — ACTIVITIES OF DAILY LIVING (ADL)
ADLS_ACUITY_SCORE: 27

## 2025-04-14 NOTE — PROGRESS NOTES
[facility] DISCHARGE NOTE    Patient discharged to home at 10:23 AM via ambulation. Accompanied by spouse and staff. Discharge instructions reviewed with patient, opportunity offered to ask questions. Prescriptions sent to patients preferred pharmacy. All belongings sent with patient.    Jerry Smalls RN

## 2025-04-14 NOTE — TELEPHONE ENCOUNTER
Fyi: Pt was admitted to the hospital on Saturday the 12th for kidney infection, pt reports they did give her some pain medications while she was there.    Routing to provider for review    Bushra Bundy RN

## 2025-04-14 NOTE — PLAN OF CARE
Problem: Adult Inpatient Plan of Care  Goal: Optimal Comfort and Wellbeing  Outcome: Progressing  Intervention: Provide Person-Centered Care  Recent Flowsheet Documentation  Taken 4/14/2025 0030 by Gabreilla Cardenas, RN  Trust Relationship/Rapport: care explained  Taken 4/13/2025 2100 by Gabriella Cardenas, RN  Trust Relationship/Rapport: care explained     Problem: Electrolyte Imbalance  Goal: Electrolyte Balance  Outcome: Progressing     Problem: Fever  Goal: Body Temperature in Desired Range  Outcome: Progressing   Goal Outcome Evaluation:      Plan of Care Reviewed With: patient    Overall Patient Progress: improvingOverall Patient Progress: improving    Outcome Evaluation: Pt alert, oriented, independent. On RA. Denies pain, nausea, SOB. Afebrile. Plan d/c home today.

## 2025-04-14 NOTE — DISCHARGE SUMMARY
[facility] Discharge Summary    Tammy Joshi MRN# [id number]   Age: 44 year old YOB: 1980     Date of Admission:  4/11/2025  Date of Discharge::  4/14/2025  Admitting Physician:  Yaniv Fuchs MD  Discharge Physician:  Yaniv Fuchs MD  Primary Physician: Mary Wang  Transferring Facility: N/A     Home clinic: [facility]            Admission Diagnoses:   Hyponatremia [E87.1]  Pyelonephritis [N12]          Discharge Diagnosis:     Principle diagnosis:   Pyelonephritis, bilateral   Urinary tract infection   Ecoli bacteremia with SIRS-possible sepsis  Secondary diagnoses:  Patient Active Problem List   Diagnosis    Hand numbness    CARDIOVASCULAR SCREENING; LDL GOAL LESS THAN 160    Anxiety    Depression    Hyperlipidemia with target LDL less than 100    Type 2 diabetes mellitus without complication (H)    Hip pain, left    Obesity, unspecified    Lumbar disc herniation    Lumbar radicular pain    Myofascial pain    Cervical stenosis of spinal canal    Continuous opioid dependence (H)    Hyponatremia    Pyelonephritis          Brief History of Presenting Illness:   As per admit hx  Tammy Joshi is a 44 year old female with past medical history significant for chronic pain with chronic opioid use, type 2 DM, anxiety, depression. Presented with fevers/chills, myalgias, headaches, poor intake, and excessive fatigue progressive x1 week pta. Admitted 04/11/25 with bilateral pyelonephritis and acute cystitis.     Presents with one week of progressing symptoms including generalized body aches, fatigue, lightheadedness, poor intake, fevers up to 103F, and chills. Endorses mild right sided flank pain x1-2 days pta.   Has been taking ibuprofen 400mg q8hrs with no relief of her fevers. Has spent most of the past week in bed, sleeping or trying to sleep.   Endorses good oral intake. Endorses chronic constipation which is unchanged.             Hospital Course:   Pyelonephritis, bilateral    Urinary tract infection   Sepsis due to Ecoli bacteremia   Presented with fever/ myalgias/ HA. UA with positive nitrates, moderate leuk esterase, 79 WBC, 4 rbc. CT a/p with acute bilateral pyelonephritis, moderate to severe in right kidney, mild in left kidney.   No leukocytosis. Procalcitonin elevated (1.27). Creatinine 0.66, GFR >90. Lactic acid wnl.   Low grade fevers in the ED, then up to 102.7F shortly after admission. Started on IV ceftriaxone in the ED and received 2L IVF bolus.    Blood cx 4/11 shows ecoli. Ur cx->100k ecoli.. Tachycardic improved. BP stable.   Afebrile x 24 hrs now. Completed 3 days of ceftriaxone .  Will discharge on cipro x 7 days     Hypokalemia  Replaced per protocol.      Thrombocytopenia  Likely due to infection/ possible sepsis  Rx as above and follow.     Starvation ketosis   Ketones 0.96. Suspect starvation ketosis in the setting of poor oral intake  Resolved      Dyspareunia   Ongoing x1 month pta. Unclear if secondary to acute illness process.   - Monitor for resolution with treatment of acute illness; consider follow up with obgyn     Chronic pain with continuous opioid dependence   Cervical, lumbar radiculopathy   Left hip pain   Follows with pain clinic last visit 03/23/25. Follows with sports medicine last visit 03/06/25. Neurosurgery evaluation 11/04/24. Unclear etiology of left hip pain.   - Continue pta cyclobenzaprine 5-10mg TID prn   - Continue pta pregabalin 125mg BID     Type 2 diabetes mellitus   HgbA1c 5.6% on 02/03/25, significant improvement from 10.5% 1 year ago. Managed prior to admission with tirzepatide 15mg q7days. On admission .   Continue home regimen     Anxiety   Depression   - Continued pta venlafaxine and amitriptyline                 Procedures:   No procedures performed during this admission         Allergies:    No Known Allergies          Medications Prior to Admission:     Medications Prior to Admission   Medication Sig Dispense Refill Last  Dose/Taking    amitriptyline (ELAVIL) 10 MG tablet TAKE 1-3 TABLETS (10-30 MG) BY MOUTH AT BEDTIME. 90 tablet 1 Past Month    Blood Glucose Monitoring Suppl (ACCU-CHEK GUIDE ME) w/Device KIT USE TO TEST BLOOD SUGAR 1 TIMES DAILY OR AS DIRECTED. DISPENSE PREFERRED SUPPLIES PER INSURANCE   Taking    buprenorphine (BUTRANS) 15 MCG/HR Wk patch Place 1 patch onto the skin every 7 days. Fill 4/04/25, start 4/06/25 (change patch every Sunday) 4 patch 0 4/7/2025 Morning    cyclobenzaprine (FLEXERIL) 5 MG tablet Take 1-2 tablets (5-10 mg) by mouth 3 times daily as needed for muscle spasms (max 4 per day). 90 tablet 0 Past Week    diclofenac (VOLTAREN) 50 MG EC tablet Take 1 tablet by mouth as needed for moderate pain.   Past Week    naloxone (NARCAN) 4 MG/0.1ML nasal spray Spray 1 spray (4 mg) into one nostril alternating nostrils as needed for opioid reversal. every 2-3 minutes until assistance arrives 2 each 2 Taking As Needed    pregabalin (LYRICA) 100 MG capsule Take 1 capsule (100 mg) by mouth 2 times daily. 60 capsule 2 4/11/2025 Morning    pregabalin (LYRICA) 25 MG capsule Take 1 capsule (25 mg) by mouth 2 times daily. Continue taking 100mg dose 60 capsule 0 4/11/2025 Morning    tirzepatide (MOUNJARO) 15 MG/0.5ML SOAJ auto-injector pen Inject 0.5 mLs (15 mg) subcutaneously every 7 days. 6 mL 0 4/6/2025 Morning    venlafaxine (EFFEXOR XR) 150 MG 24 hr capsule TAKE 1 CAPSULE BY MOUTH EVERY DAY 90 capsule 3 4/11/2025 Morning    venlafaxine (EFFEXOR XR) 75 MG 24 hr capsule TAKE 1 CAPSULE (75 MG) BY MOUTH DAILY WITH THE 150MG FOR A TOTAL OF 225MG DAILY 90 capsule 1 4/11/2025 Morning    ACCU-CHEK GUIDE TEST test strip USE TO TEST BLOOD SUGAR 1 TIMES DAILY OR AS DIRECTED. (Patient not taking: Reported on 3/24/2025) 100 strip 0     blood glucose monitoring (NO BRAND SPECIFIED) meter device kit Use to test blood sugar 1 times daily or as directed. Preferred blood glucose meter OR supplies to accompany: Blood Glucose Monitor  Brands: per insurance. (Patient not taking: Reported on 1/6/2025) 1 kit 0     thin (NO BRAND SPECIFIED) lancets Use with lanceting device. To accompany: Blood Glucose Monitor Brands: per insurance. (Patient not taking: Reported on 1/6/2025) 100 each 2     [DISCONTINUED] albuterol (PROAIR HFA/PROVENTIL HFA/VENTOLIN HFA) 108 (90 Base) MCG/ACT inhaler Inhale 2 puffs into the lungs every 4 hours as needed for shortness of breath or wheezing 8.5 g 0 More than a month    [DISCONTINUED] ibuprofen (ADVIL/MOTRIN) 200 MG capsule Take 2 capsules by mouth every 4 hours as needed for fever.   4/11/2025 at  9:30 AM    [DISCONTINUED] Multiple Vitamin (MULTIVITAMIN ADULT) TABS Take 1 tablet by mouth every other day.   4/10/2025 Morning             Discharge Medications:     Current Discharge Medication List        START taking these medications    Details   ciprofloxacin (CIPRO) 500 MG tablet Take 1 tablet (500 mg) by mouth 2 times daily for 7 days.  Qty: 14 tablet, Refills: 0    Associated Diagnoses: Pyelonephritis           CONTINUE these medications which have NOT CHANGED    Details   amitriptyline (ELAVIL) 10 MG tablet TAKE 1-3 TABLETS (10-30 MG) BY MOUTH AT BEDTIME.  Qty: 90 tablet, Refills: 1    Associated Diagnoses: Migraine without status migrainosus, not intractable, unspecified migraine type      Blood Glucose Monitoring Suppl (ACCU-CHEK GUIDE ME) w/Device KIT USE TO TEST BLOOD SUGAR 1 TIMES DAILY OR AS DIRECTED. DISPENSE PREFERRED SUPPLIES PER INSURANCE      buprenorphine (BUTRANS) 15 MCG/HR Wk patch Place 1 patch onto the skin every 7 days. Fill 4/04/25, start 4/06/25 (change patch every Sunday)  Qty: 4 patch, Refills: 0    Associated Diagnoses: Lumbar radiculopathy; Chronic, continuous use of opioids; Chronic pain syndrome      cyclobenzaprine (FLEXERIL) 5 MG tablet Take 1-2 tablets (5-10 mg) by mouth 3 times daily as needed for muscle spasms (max 4 per day).  Qty: 90 tablet, Refills: 0    Associated Diagnoses:  Myofascial pain      diclofenac (VOLTAREN) 50 MG EC tablet Take 1 tablet by mouth as needed for moderate pain.      naloxone (NARCAN) 4 MG/0.1ML nasal spray Spray 1 spray (4 mg) into one nostril alternating nostrils as needed for opioid reversal. every 2-3 minutes until assistance arrives  Qty: 2 each, Refills: 2    Associated Diagnoses: Encounter for therapeutic drug monitoring; Chronic, continuous use of opioids      !! pregabalin (LYRICA) 100 MG capsule Take 1 capsule (100 mg) by mouth 2 times daily.  Qty: 60 capsule, Refills: 2    Associated Diagnoses: Lumbar radiculopathy; Myofascial pain      !! pregabalin (LYRICA) 25 MG capsule Take 1 capsule (25 mg) by mouth 2 times daily. Continue taking 100mg dose  Qty: 60 capsule, Refills: 0    Associated Diagnoses: Lumbar radiculopathy; Myofascial pain      tirzepatide (MOUNJARO) 15 MG/0.5ML SOAJ auto-injector pen Inject 0.5 mLs (15 mg) subcutaneously every 7 days.  Qty: 6 mL, Refills: 0    Associated Diagnoses: Type 2 diabetes mellitus without complication, without long-term current use of insulin (H)      !! venlafaxine (EFFEXOR XR) 150 MG 24 hr capsule TAKE 1 CAPSULE BY MOUTH EVERY DAY  Qty: 90 capsule, Refills: 3    Associated Diagnoses: Situational anxiety      !! venlafaxine (EFFEXOR XR) 75 MG 24 hr capsule TAKE 1 CAPSULE (75 MG) BY MOUTH DAILY WITH THE 150MG FOR A TOTAL OF 225MG DAILY  Qty: 90 capsule, Refills: 1    Associated Diagnoses: Moderate episode of recurrent major depressive disorder (H)      ACCU-CHEK GUIDE TEST test strip USE TO TEST BLOOD SUGAR 1 TIMES DAILY OR AS DIRECTED.  Qty: 100 strip, Refills: 0    Comments: Needs clinic appt for further refills  Associated Diagnoses: Type 2 diabetes mellitus with hyperglycemia, without long-term current use of insulin (H)      blood glucose monitoring (NO BRAND SPECIFIED) meter device kit Use to test blood sugar 1 times daily or as directed. Preferred blood glucose meter OR supplies to accompany: Blood Glucose  "Monitor Brands: per insurance.  Qty: 1 kit, Refills: 0    Associated Diagnoses: Type 2 diabetes mellitus with hyperglycemia, without long-term current use of insulin (H)      thin (NO BRAND SPECIFIED) lancets Use with lanceting device. To accompany: Blood Glucose Monitor Brands: per insurance.  Qty: 100 each, Refills: 2    Associated Diagnoses: Type 2 diabetes mellitus with hyperglycemia, without long-term current use of insulin (H)       !! - Potential duplicate medications found. Please discuss with provider.        STOP taking these medications       albuterol (PROAIR HFA/PROVENTIL HFA/VENTOLIN HFA) 108 (90 Base) MCG/ACT inhaler Comments:   Reason for Stopping:         ibuprofen (ADVIL/MOTRIN) 200 MG capsule Comments:   Reason for Stopping:         Multiple Vitamin (MULTIVITAMIN ADULT) TABS Comments:   Reason for Stopping:                     Consultations:   No consultations were requested during this admission            Discharge Exam:   Blood pressure 123/70, pulse 78, temperature 97.8  F (36.6  C), temperature source Oral, resp. rate 18, height 1.626 m (5' 4\"), weight 62.8 kg (138 lb 7.2 oz), SpO2 98%, not currently breastfeeding.  GENERAL APPEARANCE: healthy, alert and no distress  EYES: conjunctiva clear, eyes grossly normal  HENT: external ears and nose normal   MS: no clubbing, cyanosis; no edema  SKIN: clear without significant rashes or lesions  NEURO: Normal strength and tone, sensory exam grossly normal, mentation intact and speech normal    Unresulted Labs Ordered in the Past 30 Days of this Admission       Date and Time Order Name Status Description    4/11/2025  2:10 PM Blood Culture Peripheral Blood Preliminary             No results found for this or any previous visit (from the past 24 hours).         Pending Tests at Discharge:   Final blood cx 4/11         Discharge Instructions and Follow-Up:     Discharge diet: Regular   Discharge activity: Activity as tolerated   Discharge follow-up: " Follow up with primary care provider in 2 weeks           Discharge Disposition:     Discharged to home      Attestation:  I have reviewed today's vital signs, notes, medications, labs and imaging.    Time Spent on this Encounter   I, Yaniv Fuchs MD, personally saw the patient today and spent greater than 30 minutes discharging this patient.    Yaniv Fuchs MD

## 2025-04-16 LAB
ATRIAL RATE - MUSE: 98 BPM
BACTERIA BLD CULT: NO GROWTH
DIASTOLIC BLOOD PRESSURE - MUSE: NORMAL MMHG
INTERPRETATION ECG - MUSE: NORMAL
P AXIS - MUSE: 45 DEGREES
PR INTERVAL - MUSE: 130 MS
QRS DURATION - MUSE: 82 MS
QT - MUSE: 322 MS
QTC - MUSE: 411 MS
R AXIS - MUSE: 55 DEGREES
SYSTOLIC BLOOD PRESSURE - MUSE: NORMAL MMHG
T AXIS - MUSE: 60 DEGREES
VENTRICULAR RATE- MUSE: 98 BPM

## 2025-05-05 DIAGNOSIS — M79.18 MYOFASCIAL PAIN: ICD-10-CM

## 2025-05-05 RX ORDER — CYCLOBENZAPRINE HCL 5 MG
5-10 TABLET ORAL 3 TIMES DAILY PRN
Qty: 90 TABLET | Refills: 0 | Status: SHIPPED | OUTPATIENT
Start: 2025-05-05

## 2025-05-05 NOTE — TELEPHONE ENCOUNTER
5/5/2025 5:15 PM     REFILL REQUEST:     This is a patient of mine. PDMP and Chart have been reviewed; refill request is appropriate.    Refilled for 30 day supply.  Script e-Prescribed to pharmacy    PACO Mansfield, ETHAN, FNP-C

## 2025-05-05 NOTE — TELEPHONE ENCOUNTER
Received fax from pharmacy requesting refill(s) for cyclobenzaprine (FLEXERIL) 5 MG tablet     Last refilled on 04/07/25    Patient last seen on 03/24/25  Next appt scheduled for None    E-prescribe to:     [id number] [place] [id number]      Will facilitate refill.      Luana Cha MA  [facility] [place]

## 2025-06-04 DIAGNOSIS — M79.18 MYOFASCIAL PAIN: ICD-10-CM

## 2025-06-04 RX ORDER — CYCLOBENZAPRINE HCL 5 MG
5-10 TABLET ORAL 3 TIMES DAILY PRN
Qty: 90 TABLET | Refills: 0 | Status: SHIPPED | OUTPATIENT
Start: 2025-06-04

## 2025-06-04 NOTE — TELEPHONE ENCOUNTER
6/4/2025 5:30 PM     REFILL REQUEST:     This is a patient of mine. PDMP and Chart have been reviewed; refill request is appropriate.    Refilled for 30 day supply.  Script e-Prescribed to pharmacy    PACO Mansfield, ETHAN, FNP-C

## 2025-06-04 NOTE — TELEPHONE ENCOUNTER
Received fax from pharmacy requesting refill(s) for     cyclobenzaprine (FLEXERIL) 5 MG tablet    Date last filled 5/5/2025    Last Appt Date:3/24/2025    Next Appt scheduled: None on File    Pharmacy:     [id number] [place] [id number] ,    Will route for processing    Mary Lou Murillo [facility]

## 2025-06-18 NOTE — PROGRESS NOTES
Patient is here today for a Mantoux (TST) test results.    Did patient return to clinic 48-72 hours from Mantoux (TST) placement: Yes -     PPD Induration   Date Value Ref Range Status   05/08/2024 0 0 - 4.99 mm Final     PPD Redness   Date Value Ref Range Status   05/08/2024 Not Present  Final       Induration Size? Induration <5mm - Enter results in Enter/Edit Activity. Route results to ordering provider.     Patient needs form signed? No    Patient reports having previously had the BCG Vaccine: No    Does patient need a two step? No    Negative Mantoux result    Ba Luo, Clinic RN  [facility]                
independent

## 2025-07-08 ENCOUNTER — TELEPHONE (OUTPATIENT)
Dept: FAMILY MEDICINE | Facility: CLINIC | Age: 45
End: 2025-07-08
Payer: COMMERCIAL

## 2025-07-08 DIAGNOSIS — F33.1 MODERATE EPISODE OF RECURRENT MAJOR DEPRESSIVE DISORDER (H): ICD-10-CM

## 2025-07-10 NOTE — TELEPHONE ENCOUNTER
Called 2 times LM for patient to sign into mychart and complete PHQ 9 assigned to her so we can get medication approved.      Katiuska White on 7/10/2025 at 3:29 PM

## 2025-07-10 NOTE — TELEPHONE ENCOUNTER
Care team please call to update phq or direct pt to complete on mychart that was sent.     An Joshi MSN, RN

## 2025-07-14 RX ORDER — VENLAFAXINE HYDROCHLORIDE 75 MG/1
75 CAPSULE, EXTENDED RELEASE ORAL DAILY
Qty: 90 CAPSULE | Refills: 1 | Status: SHIPPED | OUTPATIENT
Start: 2025-07-14

## 2025-07-17 DIAGNOSIS — E11.9 TYPE 2 DIABETES MELLITUS WITHOUT COMPLICATION, WITHOUT LONG-TERM CURRENT USE OF INSULIN (H): ICD-10-CM

## 2025-07-17 RX ORDER — TIRZEPATIDE 15 MG/.5ML
INJECTION, SOLUTION SUBCUTANEOUS
Qty: 6 ML | Refills: 0 | Status: SHIPPED | OUTPATIENT
Start: 2025-07-17

## 2025-08-19 ENCOUNTER — NURSE TRIAGE (OUTPATIENT)
Dept: FAMILY MEDICINE | Facility: CLINIC | Age: 45
End: 2025-08-19
Payer: COMMERCIAL

## 2025-08-19 DIAGNOSIS — L50.1 IDIOPATHIC URTICARIA: Primary | ICD-10-CM

## 2025-08-19 RX ORDER — PREDNISONE 20 MG/1
TABLET ORAL
Qty: 20 TABLET | Refills: 0 | Status: SHIPPED | OUTPATIENT
Start: 2025-08-19

## 2025-08-19 RX ORDER — HYDROXYZINE HYDROCHLORIDE 25 MG/1
12.5-25 TABLET, FILM COATED ORAL EVERY 6 HOURS PRN
Qty: 60 TABLET | Refills: 1 | Status: SHIPPED | OUTPATIENT
Start: 2025-08-19

## 2025-08-23 ENCOUNTER — HEALTH MAINTENANCE LETTER (OUTPATIENT)
Age: 45
End: 2025-08-23

## 2025-08-25 ENCOUNTER — VIRTUAL VISIT (OUTPATIENT)
Dept: FAMILY MEDICINE | Facility: CLINIC | Age: 45
End: 2025-08-25
Payer: COMMERCIAL

## 2025-08-25 DIAGNOSIS — F41.9 ANXIETY: ICD-10-CM

## 2025-08-25 DIAGNOSIS — M79.18 MYOFASCIAL PAIN: ICD-10-CM

## 2025-08-25 DIAGNOSIS — E11.9 TYPE 2 DIABETES MELLITUS WITHOUT COMPLICATION, WITHOUT LONG-TERM CURRENT USE OF INSULIN (H): Primary | ICD-10-CM

## 2025-08-25 DIAGNOSIS — M25.552 HIP PAIN, LEFT: ICD-10-CM

## 2025-08-25 PROCEDURE — 98007 SYNCH AUDIO-VIDEO EST HI 40: CPT | Performed by: PHYSICIAN ASSISTANT

## 2025-08-25 RX ORDER — PREGABALIN 50 MG/1
50 CAPSULE ORAL 2 TIMES DAILY
Qty: 180 CAPSULE | Refills: 1 | Status: SHIPPED | OUTPATIENT
Start: 2025-08-25

## 2025-08-25 RX ORDER — LORAZEPAM 0.5 MG/1
0.5 TABLET ORAL EVERY 8 HOURS PRN
Qty: 60 TABLET | Refills: 0 | Status: SHIPPED | OUTPATIENT
Start: 2025-08-25

## 2025-08-25 RX ORDER — CYCLOBENZAPRINE HCL 5 MG
5-10 TABLET ORAL 3 TIMES DAILY PRN
Qty: 90 TABLET | Refills: 3 | Status: SHIPPED | OUTPATIENT
Start: 2025-08-25